# Patient Record
Sex: FEMALE | Race: WHITE | Employment: OTHER | URBAN - NONMETROPOLITAN AREA
[De-identification: names, ages, dates, MRNs, and addresses within clinical notes are randomized per-mention and may not be internally consistent; named-entity substitution may affect disease eponyms.]

---

## 2017-01-04 RX ORDER — GLUCOSAMINE HCL/CHONDROITIN SU 500-400 MG
CAPSULE ORAL
Qty: 100 STRIP | Refills: 3 | Status: SHIPPED | OUTPATIENT
Start: 2017-01-04 | End: 2018-05-11

## 2017-01-04 RX ORDER — BLOOD SUGAR DIAGNOSTIC
STRIP MISCELLANEOUS
Qty: 1 DEVICE | Refills: 0 | Status: CANCELLED | OUTPATIENT
Start: 2017-01-04

## 2017-01-19 ENCOUNTER — ANTI-COAG VISIT (OUTPATIENT)
Dept: OTHER | Age: 72
End: 2017-01-19

## 2017-01-19 VITALS
WEIGHT: 227.8 LBS | BODY MASS INDEX: 33.64 KG/M2 | SYSTOLIC BLOOD PRESSURE: 132 MMHG | DIASTOLIC BLOOD PRESSURE: 56 MMHG | HEART RATE: 47 BPM

## 2017-01-19 DIAGNOSIS — Z95.2 MITRAL VALVE REPLACED: ICD-10-CM

## 2017-01-19 LAB — POC INR: 2.9 (ref 0.8–1.2)

## 2017-01-19 PROCEDURE — 85610 PROTHROMBIN TIME: CPT

## 2017-01-19 PROCEDURE — 1036F TOBACCO NON-USER: CPT

## 2017-01-19 ASSESSMENT — ENCOUNTER SYMPTOMS
CONSTIPATION: 0
BLOOD IN STOOL: 0
SHORTNESS OF BREATH: 0
DIARRHEA: 0

## 2017-02-23 ENCOUNTER — ANTI-COAG VISIT (OUTPATIENT)
Dept: OTHER | Age: 72
End: 2017-02-23

## 2017-02-23 VITALS
SYSTOLIC BLOOD PRESSURE: 152 MMHG | HEART RATE: 48 BPM | WEIGHT: 233 LBS | DIASTOLIC BLOOD PRESSURE: 64 MMHG | BODY MASS INDEX: 34.41 KG/M2

## 2017-02-23 DIAGNOSIS — Z95.2 MITRAL VALVE REPLACED: ICD-10-CM

## 2017-02-23 LAB — POC INR: 3.3 (ref 0.8–1.2)

## 2017-02-23 PROCEDURE — G8427 DOCREV CUR MEDS BY ELIG CLIN: HCPCS | Performed by: NURSE PRACTITIONER

## 2017-02-23 PROCEDURE — 4040F PNEUMOC VAC/ADMIN/RCVD: CPT | Performed by: NURSE PRACTITIONER

## 2017-02-23 PROCEDURE — G8417 CALC BMI ABV UP PARAM F/U: HCPCS | Performed by: NURSE PRACTITIONER

## 2017-02-23 PROCEDURE — 3017F COLORECTAL CA SCREEN DOC REV: CPT | Performed by: NURSE PRACTITIONER

## 2017-02-23 PROCEDURE — 85610 PROTHROMBIN TIME: CPT

## 2017-02-23 PROCEDURE — 99999 PR OFFICE/OUTPT VISIT,PROCEDURE ONLY: CPT | Performed by: NURSE PRACTITIONER

## 2017-02-23 ASSESSMENT — ENCOUNTER SYMPTOMS
BLOOD IN STOOL: 0
DIARRHEA: 0
SHORTNESS OF BREATH: 0
CONSTIPATION: 0

## 2017-03-09 DIAGNOSIS — Z95.2 MITRAL VALVE REPLACED: Primary | ICD-10-CM

## 2017-03-10 RX ORDER — FUROSEMIDE 40 MG/1
40 TABLET ORAL DAILY
Qty: 30 TABLET | Refills: 3 | Status: SHIPPED | OUTPATIENT
Start: 2017-03-10 | End: 2017-07-05 | Stop reason: SDUPTHER

## 2017-03-10 RX ORDER — POTASSIUM CHLORIDE 20 MEQ/1
20 TABLET, EXTENDED RELEASE ORAL DAILY
COMMUNITY
End: 2017-03-10 | Stop reason: SDUPTHER

## 2017-03-10 RX ORDER — FUROSEMIDE 40 MG/1
40 TABLET ORAL DAILY
COMMUNITY
End: 2017-03-10 | Stop reason: SDUPTHER

## 2017-03-10 RX ORDER — POTASSIUM CHLORIDE 20 MEQ/1
20 TABLET, EXTENDED RELEASE ORAL DAILY
Qty: 30 TABLET | Refills: 3 | Status: SHIPPED | OUTPATIENT
Start: 2017-03-10 | End: 2017-03-23

## 2017-03-13 ENCOUNTER — OFFICE VISIT (OUTPATIENT)
Dept: CARDIOLOGY | Age: 72
End: 2017-03-13

## 2017-03-13 VITALS
BODY MASS INDEX: 33.62 KG/M2 | HEIGHT: 69 IN | SYSTOLIC BLOOD PRESSURE: 132 MMHG | DIASTOLIC BLOOD PRESSURE: 70 MMHG | HEART RATE: 60 BPM | WEIGHT: 227 LBS

## 2017-03-13 DIAGNOSIS — I48.21 PERMANENT ATRIAL FIBRILLATION (HCC): Primary | ICD-10-CM

## 2017-03-13 DIAGNOSIS — Z95.2 S/P MVR (MITRAL VALVE REPLACEMENT): ICD-10-CM

## 2017-03-13 DIAGNOSIS — I10 ESSENTIAL HYPERTENSION: ICD-10-CM

## 2017-03-13 DIAGNOSIS — I50.32 CHRONIC DIASTOLIC (CONGESTIVE) HEART FAILURE (HCC): ICD-10-CM

## 2017-03-13 PROCEDURE — G8417 CALC BMI ABV UP PARAM F/U: HCPCS | Performed by: PHYSICIAN ASSISTANT

## 2017-03-13 PROCEDURE — G8482 FLU IMMUNIZE ORDER/ADMIN: HCPCS | Performed by: PHYSICIAN ASSISTANT

## 2017-03-13 PROCEDURE — G8427 DOCREV CUR MEDS BY ELIG CLIN: HCPCS | Performed by: PHYSICIAN ASSISTANT

## 2017-03-13 PROCEDURE — 99213 OFFICE O/P EST LOW 20 MIN: CPT | Performed by: PHYSICIAN ASSISTANT

## 2017-03-13 PROCEDURE — 1090F PRES/ABSN URINE INCON ASSESS: CPT | Performed by: PHYSICIAN ASSISTANT

## 2017-03-13 PROCEDURE — G8399 PT W/DXA RESULTS DOCUMENT: HCPCS | Performed by: PHYSICIAN ASSISTANT

## 2017-03-13 PROCEDURE — 1123F ACP DISCUSS/DSCN MKR DOCD: CPT | Performed by: PHYSICIAN ASSISTANT

## 2017-03-13 PROCEDURE — 3014F SCREEN MAMMO DOC REV: CPT | Performed by: PHYSICIAN ASSISTANT

## 2017-03-13 PROCEDURE — 1036F TOBACCO NON-USER: CPT | Performed by: PHYSICIAN ASSISTANT

## 2017-03-13 PROCEDURE — 3017F COLORECTAL CA SCREEN DOC REV: CPT | Performed by: PHYSICIAN ASSISTANT

## 2017-03-13 PROCEDURE — 4040F PNEUMOC VAC/ADMIN/RCVD: CPT | Performed by: PHYSICIAN ASSISTANT

## 2017-03-21 ENCOUNTER — OFFICE VISIT (OUTPATIENT)
Dept: CARDIOLOGY | Age: 72
End: 2017-03-21

## 2017-03-21 VITALS
BODY MASS INDEX: 32.73 KG/M2 | DIASTOLIC BLOOD PRESSURE: 72 MMHG | HEART RATE: 86 BPM | HEIGHT: 69 IN | WEIGHT: 221 LBS | SYSTOLIC BLOOD PRESSURE: 162 MMHG

## 2017-03-21 DIAGNOSIS — Z95.2 MITRAL VALVE REPLACED: Primary | ICD-10-CM

## 2017-03-21 DIAGNOSIS — Z86.79 HISTORY OF VALVULAR HEART DISEASE: ICD-10-CM

## 2017-03-21 PROCEDURE — G8482 FLU IMMUNIZE ORDER/ADMIN: HCPCS | Performed by: INTERNAL MEDICINE

## 2017-03-21 PROCEDURE — 4040F PNEUMOC VAC/ADMIN/RCVD: CPT | Performed by: INTERNAL MEDICINE

## 2017-03-21 PROCEDURE — 1123F ACP DISCUSS/DSCN MKR DOCD: CPT | Performed by: INTERNAL MEDICINE

## 2017-03-21 PROCEDURE — G8399 PT W/DXA RESULTS DOCUMENT: HCPCS | Performed by: INTERNAL MEDICINE

## 2017-03-21 PROCEDURE — 93000 ELECTROCARDIOGRAM COMPLETE: CPT | Performed by: INTERNAL MEDICINE

## 2017-03-21 PROCEDURE — 3017F COLORECTAL CA SCREEN DOC REV: CPT | Performed by: INTERNAL MEDICINE

## 2017-03-21 PROCEDURE — 1090F PRES/ABSN URINE INCON ASSESS: CPT | Performed by: INTERNAL MEDICINE

## 2017-03-21 PROCEDURE — G8417 CALC BMI ABV UP PARAM F/U: HCPCS | Performed by: INTERNAL MEDICINE

## 2017-03-21 PROCEDURE — 99214 OFFICE O/P EST MOD 30 MIN: CPT | Performed by: INTERNAL MEDICINE

## 2017-03-21 PROCEDURE — G8428 CUR MEDS NOT DOCUMENT: HCPCS | Performed by: INTERNAL MEDICINE

## 2017-03-21 PROCEDURE — 1036F TOBACCO NON-USER: CPT | Performed by: INTERNAL MEDICINE

## 2017-03-21 PROCEDURE — 3014F SCREEN MAMMO DOC REV: CPT | Performed by: INTERNAL MEDICINE

## 2017-03-21 ASSESSMENT — ENCOUNTER SYMPTOMS
SHORTNESS OF BREATH: 1
COUGH: 0
LEFT EYE: 0
WHEEZING: 0
BACK PAIN: 0
SORE THROAT: 0
VOMITING: 0
NAUSEA: 0
DIARRHEA: 0
DOUBLE VISION: 0
CONSTIPATION: 0
BLURRED VISION: 0
RIGHT EYE: 0
ABDOMINAL PAIN: 0

## 2017-03-23 ENCOUNTER — ANTI-COAG VISIT (OUTPATIENT)
Dept: OTHER | Age: 72
End: 2017-03-23

## 2017-03-23 VITALS
WEIGHT: 220.4 LBS | SYSTOLIC BLOOD PRESSURE: 180 MMHG | HEART RATE: 60 BPM | DIASTOLIC BLOOD PRESSURE: 50 MMHG | BODY MASS INDEX: 32.55 KG/M2

## 2017-03-23 DIAGNOSIS — Z95.2 MITRAL VALVE REPLACED: ICD-10-CM

## 2017-03-23 LAB — POC INR: 2.5 (ref 0.8–1.2)

## 2017-03-23 ASSESSMENT — ENCOUNTER SYMPTOMS
BLOOD IN STOOL: 0
DIARRHEA: 0
SHORTNESS OF BREATH: 0
CONSTIPATION: 0

## 2017-04-25 ENCOUNTER — TELEPHONE (OUTPATIENT)
Dept: CARDIOLOGY | Age: 72
End: 2017-04-25

## 2017-04-25 ENCOUNTER — TELEPHONE (OUTPATIENT)
Dept: FAMILY MEDICINE CLINIC | Age: 72
End: 2017-04-25

## 2017-04-25 DIAGNOSIS — Z86.79 HISTORY OF VALVULAR HEART DISEASE: ICD-10-CM

## 2017-04-25 DIAGNOSIS — R00.1 BRADYCARDIA: Primary | ICD-10-CM

## 2017-04-25 DIAGNOSIS — E11.9 TYPE 2 DIABETES MELLITUS WITHOUT COMPLICATION, WITHOUT LONG-TERM CURRENT USE OF INSULIN (HCC): ICD-10-CM

## 2017-04-25 DIAGNOSIS — I10 ESSENTIAL HYPERTENSION: ICD-10-CM

## 2017-04-25 DIAGNOSIS — E78.5 HYPERLIPIDEMIA, UNSPECIFIED HYPERLIPIDEMIA TYPE: Primary | ICD-10-CM

## 2017-04-26 ENCOUNTER — TELEPHONE (OUTPATIENT)
Dept: OTHER | Age: 72
End: 2017-04-26

## 2017-04-27 ENCOUNTER — ANTI-COAG VISIT (OUTPATIENT)
Dept: OTHER | Age: 72
End: 2017-04-27

## 2017-04-27 VITALS
SYSTOLIC BLOOD PRESSURE: 162 MMHG | BODY MASS INDEX: 33.26 KG/M2 | HEART RATE: 56 BPM | WEIGHT: 225.2 LBS | DIASTOLIC BLOOD PRESSURE: 62 MMHG

## 2017-04-27 DIAGNOSIS — Z95.2 MITRAL VALVE REPLACED: ICD-10-CM

## 2017-04-27 LAB — POC INR: 2.2 (ref 0.8–1.2)

## 2017-04-27 ASSESSMENT — ENCOUNTER SYMPTOMS
SHORTNESS OF BREATH: 0
BLOOD IN STOOL: 0
DIARRHEA: 0
CONSTIPATION: 0

## 2017-05-01 ENCOUNTER — OFFICE VISIT (OUTPATIENT)
Dept: FAMILY MEDICINE CLINIC | Age: 72
End: 2017-05-01

## 2017-05-01 VITALS
SYSTOLIC BLOOD PRESSURE: 152 MMHG | WEIGHT: 223.2 LBS | HEIGHT: 69 IN | HEART RATE: 56 BPM | BODY MASS INDEX: 33.06 KG/M2 | DIASTOLIC BLOOD PRESSURE: 60 MMHG

## 2017-05-01 DIAGNOSIS — E11.9 TYPE 2 DIABETES MELLITUS WITHOUT COMPLICATION, WITHOUT LONG-TERM CURRENT USE OF INSULIN (HCC): Primary | ICD-10-CM

## 2017-05-01 DIAGNOSIS — Z95.2 MITRAL VALVE REPLACED: ICD-10-CM

## 2017-05-01 DIAGNOSIS — I10 ESSENTIAL HYPERTENSION: ICD-10-CM

## 2017-05-01 DIAGNOSIS — L98.9 SKIN LESION OF LEFT ARM: ICD-10-CM

## 2017-05-01 DIAGNOSIS — I25.2 MI, OLD: ICD-10-CM

## 2017-05-01 PROCEDURE — 3014F SCREEN MAMMO DOC REV: CPT | Performed by: FAMILY MEDICINE

## 2017-05-01 PROCEDURE — G8417 CALC BMI ABV UP PARAM F/U: HCPCS | Performed by: FAMILY MEDICINE

## 2017-05-01 PROCEDURE — 4040F PNEUMOC VAC/ADMIN/RCVD: CPT | Performed by: FAMILY MEDICINE

## 2017-05-01 PROCEDURE — 1123F ACP DISCUSS/DSCN MKR DOCD: CPT | Performed by: FAMILY MEDICINE

## 2017-05-01 PROCEDURE — G8399 PT W/DXA RESULTS DOCUMENT: HCPCS | Performed by: FAMILY MEDICINE

## 2017-05-01 PROCEDURE — 3017F COLORECTAL CA SCREEN DOC REV: CPT | Performed by: FAMILY MEDICINE

## 2017-05-01 PROCEDURE — 3044F HG A1C LEVEL LT 7.0%: CPT | Performed by: FAMILY MEDICINE

## 2017-05-01 PROCEDURE — 99214 OFFICE O/P EST MOD 30 MIN: CPT | Performed by: FAMILY MEDICINE

## 2017-05-01 PROCEDURE — 1090F PRES/ABSN URINE INCON ASSESS: CPT | Performed by: FAMILY MEDICINE

## 2017-05-01 PROCEDURE — 1036F TOBACCO NON-USER: CPT | Performed by: FAMILY MEDICINE

## 2017-05-01 PROCEDURE — G8427 DOCREV CUR MEDS BY ELIG CLIN: HCPCS | Performed by: FAMILY MEDICINE

## 2017-05-01 RX ORDER — VALSARTAN 320 MG/1
320 TABLET ORAL DAILY
Qty: 90 TABLET | Refills: 3 | Status: SHIPPED | OUTPATIENT
Start: 2017-05-01 | End: 2018-04-24 | Stop reason: SDUPTHER

## 2017-05-01 RX ORDER — GLIMEPIRIDE 2 MG/1
TABLET ORAL
Qty: 180 TABLET | Refills: 3 | Status: SHIPPED | OUTPATIENT
Start: 2017-05-01 | End: 2017-11-15 | Stop reason: DRUGHIGH

## 2017-05-01 RX ORDER — NITROGLYCERIN 400 UG/1
SPRAY ORAL
Qty: 1 BOTTLE | Refills: 0 | Status: SHIPPED | OUTPATIENT
Start: 2017-05-01 | End: 2018-07-03 | Stop reason: SDUPTHER

## 2017-05-01 RX ORDER — WARFARIN SODIUM 1 MG/1
1 TABLET ORAL
COMMUNITY
End: 2017-05-01 | Stop reason: SDUPTHER

## 2017-05-01 RX ORDER — WARFARIN SODIUM 1 MG/1
TABLET ORAL
Qty: 180 TABLET | Refills: 3 | Status: SHIPPED | OUTPATIENT
Start: 2017-05-01 | End: 2018-04-24 | Stop reason: SDUPTHER

## 2017-05-01 ASSESSMENT — ENCOUNTER SYMPTOMS
EYES NEGATIVE: 1
BLOOD IN STOOL: 0
DIARRHEA: 0
VOMITING: 0
ABDOMINAL PAIN: 0
SHORTNESS OF BREATH: 0
NAUSEA: 0

## 2017-05-15 ENCOUNTER — TELEPHONE (OUTPATIENT)
Dept: CARDIOLOGY | Age: 72
End: 2017-05-15

## 2017-05-15 ENCOUNTER — TELEPHONE (OUTPATIENT)
Dept: OTHER | Age: 72
End: 2017-05-15

## 2017-05-22 ENCOUNTER — TELEPHONE (OUTPATIENT)
Dept: CARDIOLOGY | Age: 72
End: 2017-05-22

## 2017-05-23 ENCOUNTER — PROCEDURE VISIT (OUTPATIENT)
Dept: CARDIOLOGY | Age: 72
End: 2017-05-23

## 2017-05-23 DIAGNOSIS — Z95.0 PACEMAKER: ICD-10-CM

## 2017-05-23 PROCEDURE — 93280 PM DEVICE PROGR EVAL DUAL: CPT | Performed by: INTERNAL MEDICINE

## 2017-05-23 RX ORDER — PREDNISONE 10 MG/1
TABLET ORAL
Qty: 8 TABLET | Refills: 0 | Status: SHIPPED | OUTPATIENT
Start: 2017-05-23 | End: 2017-06-08

## 2017-05-25 ENCOUNTER — TELEPHONE (OUTPATIENT)
Dept: CARDIOLOGY | Age: 72
End: 2017-05-25

## 2017-05-25 ENCOUNTER — ANTI-COAG VISIT (OUTPATIENT)
Dept: OTHER | Age: 72
End: 2017-05-25

## 2017-05-25 VITALS
DIASTOLIC BLOOD PRESSURE: 64 MMHG | WEIGHT: 221 LBS | SYSTOLIC BLOOD PRESSURE: 178 MMHG | HEART RATE: 60 BPM | BODY MASS INDEX: 32.64 KG/M2

## 2017-05-25 DIAGNOSIS — Z95.2 MITRAL VALVE REPLACED: ICD-10-CM

## 2017-05-25 ASSESSMENT — ENCOUNTER SYMPTOMS
DIARRHEA: 0
CONSTIPATION: 0
SHORTNESS OF BREATH: 1
BLOOD IN STOOL: 0

## 2017-05-30 ENCOUNTER — TELEPHONE (OUTPATIENT)
Dept: CARDIOLOGY | Age: 72
End: 2017-05-30

## 2017-06-08 ENCOUNTER — ANTI-COAG VISIT (OUTPATIENT)
Dept: OTHER | Age: 72
End: 2017-06-08

## 2017-06-08 VITALS
HEART RATE: 72 BPM | BODY MASS INDEX: 32.55 KG/M2 | SYSTOLIC BLOOD PRESSURE: 158 MMHG | WEIGHT: 220.4 LBS | DIASTOLIC BLOOD PRESSURE: 65 MMHG

## 2017-06-08 DIAGNOSIS — Z95.2 MITRAL VALVE REPLACED: ICD-10-CM

## 2017-06-08 LAB — POC INR: 2.4 (ref 0.8–1.2)

## 2017-06-08 ASSESSMENT — ENCOUNTER SYMPTOMS
DIARRHEA: 0
BLOOD IN STOOL: 0
CONSTIPATION: 0
SHORTNESS OF BREATH: 0

## 2017-06-22 ENCOUNTER — ANTI-COAG VISIT (OUTPATIENT)
Dept: OTHER | Age: 72
End: 2017-06-22

## 2017-06-22 VITALS
WEIGHT: 223 LBS | DIASTOLIC BLOOD PRESSURE: 70 MMHG | HEART RATE: 83 BPM | SYSTOLIC BLOOD PRESSURE: 150 MMHG | BODY MASS INDEX: 32.93 KG/M2

## 2017-06-22 DIAGNOSIS — Z95.2 MITRAL VALVE REPLACED: ICD-10-CM

## 2017-06-22 LAB — POC INR: 2.3 (ref 0.8–1.2)

## 2017-06-22 ASSESSMENT — ENCOUNTER SYMPTOMS
SHORTNESS OF BREATH: 0
DIARRHEA: 0
BLOOD IN STOOL: 0
CONSTIPATION: 0

## 2017-07-05 RX ORDER — FUROSEMIDE 40 MG/1
40 TABLET ORAL DAILY
Qty: 90 TABLET | Refills: 0 | Status: SHIPPED | OUTPATIENT
Start: 2017-07-05 | End: 2017-09-14 | Stop reason: SDUPTHER

## 2017-07-10 ENCOUNTER — ANTI-COAG VISIT (OUTPATIENT)
Dept: OTHER | Age: 72
End: 2017-07-10

## 2017-07-10 ENCOUNTER — TELEPHONE (OUTPATIENT)
Dept: FAMILY MEDICINE CLINIC | Age: 72
End: 2017-07-10

## 2017-07-10 VITALS
BODY MASS INDEX: 32.49 KG/M2 | WEIGHT: 220 LBS | DIASTOLIC BLOOD PRESSURE: 68 MMHG | HEART RATE: 83 BPM | SYSTOLIC BLOOD PRESSURE: 155 MMHG

## 2017-07-10 DIAGNOSIS — Z95.2 MITRAL VALVE REPLACED: ICD-10-CM

## 2017-07-10 LAB
HCT VFR BLD CALC: 34.3 % (ref 37–47)
HEMOGLOBIN: 11.5 GM/DL (ref 12–16)
POC INR: 2.7 (ref 0.8–1.2)

## 2017-07-10 ASSESSMENT — ENCOUNTER SYMPTOMS
DIARRHEA: 0
SHORTNESS OF BREATH: 0
BLOOD IN STOOL: 1
CONSTIPATION: 0

## 2017-07-13 ENCOUNTER — TELEPHONE (OUTPATIENT)
Dept: FAMILY MEDICINE CLINIC | Age: 72
End: 2017-07-13

## 2017-07-13 DIAGNOSIS — K92.1 BLACK TARRY STOOLS: Primary | ICD-10-CM

## 2017-07-20 ENCOUNTER — TELEPHONE (OUTPATIENT)
Dept: FAMILY MEDICINE CLINIC | Age: 72
End: 2017-07-20

## 2017-07-20 DIAGNOSIS — R19.5 POSITIVE OCCULT STOOL BLOOD TEST: ICD-10-CM

## 2017-07-20 DIAGNOSIS — K92.1 BLACK TARRY STOOLS: ICD-10-CM

## 2017-07-20 DIAGNOSIS — Z12.11 SCREEN FOR COLON CANCER: Primary | ICD-10-CM

## 2017-07-20 DIAGNOSIS — R53.1 WEAKNESS: Primary | ICD-10-CM

## 2017-07-20 LAB
HEMOCCULT STL QL: POSITIVE

## 2017-07-20 PROCEDURE — 82270 OCCULT BLOOD FECES: CPT | Performed by: FAMILY MEDICINE

## 2017-07-21 ENCOUNTER — HOSPITAL ENCOUNTER (OUTPATIENT)
Age: 72
Discharge: HOME | End: 2017-07-21
Payer: MEDICARE

## 2017-07-21 DIAGNOSIS — R53.1 WEAKNESS: ICD-10-CM

## 2017-07-21 DIAGNOSIS — R19.5 POSITIVE OCCULT STOOL BLOOD TEST: ICD-10-CM

## 2017-07-21 DIAGNOSIS — K92.1 BLACK TARRY STOOLS: ICD-10-CM

## 2017-07-21 LAB
ANION GAP SERPL CALCULATED.3IONS-SCNC: 16 MEQ/L (ref 8–16)
BUN BLDV-MCNC: 30 MG/DL (ref 7–22)
CALCIUM SERPL-MCNC: 9.9 MG/DL (ref 8.5–10.5)
CHLORIDE BLD-SCNC: 104 MEQ/L (ref 98–111)
CO2: 21 MEQ/L (ref 23–33)
CREAT SERPL-MCNC: 1.2 MG/DL (ref 0.4–1.2)
FERRITIN: 18 NG/ML (ref 10–291)
GFR SERPL CREATININE-BSD FRML MDRD: 44 ML/MIN/1.73M2
GLUCOSE BLD-MCNC: 138 MG/DL (ref 70–108)
HCT VFR BLD CALC: 30.6 % (ref 37–47)
HEMOGLOBIN: 10.2 GM/DL (ref 12–16)
IRON: 34 UG/DL (ref 50–170)
MCH RBC QN AUTO: 29.5 PG (ref 27–31)
MCHC RBC AUTO-ENTMCNC: 33.4 GM/DL (ref 33–37)
MCV RBC AUTO: 88.1 FL (ref 81–99)
PDW BLD-RTO: 15.4 % (ref 11.5–14.5)
PLATELET # BLD: 256 THOU/MM3 (ref 130–400)
PMV BLD AUTO: 8.7 MCM (ref 7.4–10.4)
POTASSIUM SERPL-SCNC: 4.7 MEQ/L (ref 3.5–5.2)
RBC # BLD: 3.47 MILL/MM3 (ref 4.2–5.4)
SODIUM BLD-SCNC: 141 MEQ/L (ref 135–145)
TSH SERPL DL<=0.05 MIU/L-ACNC: 2.89 UIU/ML (ref 0.4–4.2)
VITAMIN B-12: 273 PG/ML (ref 211–911)
WBC # BLD: 6.4 THOU/MM3 (ref 4.8–10.8)

## 2017-07-21 PROCEDURE — 36415 COLL VENOUS BLD VENIPUNCTURE: CPT

## 2017-07-21 PROCEDURE — 84443 ASSAY THYROID STIM HORMONE: CPT

## 2017-07-21 PROCEDURE — 85027 COMPLETE CBC AUTOMATED: CPT

## 2017-07-21 PROCEDURE — 82607 VITAMIN B-12: CPT

## 2017-07-21 PROCEDURE — 82728 ASSAY OF FERRITIN: CPT

## 2017-07-21 PROCEDURE — 80048 BASIC METABOLIC PNL TOTAL CA: CPT

## 2017-07-21 PROCEDURE — 83540 ASSAY OF IRON: CPT

## 2017-07-24 ENCOUNTER — TELEPHONE (OUTPATIENT)
Dept: FAMILY MEDICINE CLINIC | Age: 72
End: 2017-07-24

## 2017-07-24 DIAGNOSIS — R79.9 ELEVATED BUN: Primary | ICD-10-CM

## 2017-07-31 ENCOUNTER — HOSPITAL ENCOUNTER (OUTPATIENT)
Dept: PHARMACY | Age: 72
Setting detail: THERAPIES SERIES
Discharge: HOME | End: 2017-07-31
Payer: MEDICARE

## 2017-07-31 VITALS
BODY MASS INDEX: 32.84 KG/M2 | HEART RATE: 83 BPM | WEIGHT: 222.4 LBS | DIASTOLIC BLOOD PRESSURE: 68 MMHG | SYSTOLIC BLOOD PRESSURE: 157 MMHG

## 2017-07-31 DIAGNOSIS — Z95.2 MITRAL VALVE REPLACED: ICD-10-CM

## 2017-07-31 LAB — POC INR: 2.8 (ref 0.8–1.2)

## 2017-07-31 PROCEDURE — 36416 COLLJ CAPILLARY BLOOD SPEC: CPT

## 2017-07-31 PROCEDURE — 99211 OFF/OP EST MAY X REQ PHY/QHP: CPT

## 2017-08-01 ENCOUNTER — TELEPHONE (OUTPATIENT)
Dept: PHARMACY | Age: 72
End: 2017-08-01

## 2017-08-08 DIAGNOSIS — I10 ESSENTIAL HYPERTENSION: ICD-10-CM

## 2017-08-08 RX ORDER — HYDRALAZINE HYDROCHLORIDE 50 MG/1
TABLET, FILM COATED ORAL
Qty: 270 TABLET | Refills: 3 | Status: SHIPPED | OUTPATIENT
Start: 2017-08-08 | End: 2018-08-05 | Stop reason: SDUPTHER

## 2017-08-11 ENCOUNTER — OFFICE VISIT (OUTPATIENT)
Dept: PULMONOLOGY | Age: 72
End: 2017-08-11
Payer: MEDICARE

## 2017-08-11 VITALS
OXYGEN SATURATION: 98 % | DIASTOLIC BLOOD PRESSURE: 64 MMHG | HEART RATE: 70 BPM | BODY MASS INDEX: 33.18 KG/M2 | HEIGHT: 69 IN | SYSTOLIC BLOOD PRESSURE: 126 MMHG | WEIGHT: 224 LBS

## 2017-08-11 DIAGNOSIS — Z99.89 OSA ON CPAP: Primary | ICD-10-CM

## 2017-08-11 DIAGNOSIS — J30.9 ALLERGIC RHINITIS, UNSPECIFIED ALLERGIC RHINITIS TRIGGER, UNSPECIFIED RHINITIS SEASONALITY: ICD-10-CM

## 2017-08-11 DIAGNOSIS — G47.33 OSA ON CPAP: Primary | ICD-10-CM

## 2017-08-11 PROCEDURE — 1090F PRES/ABSN URINE INCON ASSESS: CPT | Performed by: INTERNAL MEDICINE

## 2017-08-11 PROCEDURE — 1123F ACP DISCUSS/DSCN MKR DOCD: CPT | Performed by: INTERNAL MEDICINE

## 2017-08-11 PROCEDURE — G8399 PT W/DXA RESULTS DOCUMENT: HCPCS | Performed by: INTERNAL MEDICINE

## 2017-08-11 PROCEDURE — 99214 OFFICE O/P EST MOD 30 MIN: CPT | Performed by: INTERNAL MEDICINE

## 2017-08-11 PROCEDURE — 1036F TOBACCO NON-USER: CPT | Performed by: INTERNAL MEDICINE

## 2017-08-11 PROCEDURE — G8427 DOCREV CUR MEDS BY ELIG CLIN: HCPCS | Performed by: INTERNAL MEDICINE

## 2017-08-11 PROCEDURE — 3014F SCREEN MAMMO DOC REV: CPT | Performed by: INTERNAL MEDICINE

## 2017-08-11 PROCEDURE — 4040F PNEUMOC VAC/ADMIN/RCVD: CPT | Performed by: INTERNAL MEDICINE

## 2017-08-11 PROCEDURE — 3017F COLORECTAL CA SCREEN DOC REV: CPT | Performed by: INTERNAL MEDICINE

## 2017-08-11 PROCEDURE — G8417 CALC BMI ABV UP PARAM F/U: HCPCS | Performed by: INTERNAL MEDICINE

## 2017-08-11 RX ORDER — FLUTICASONE PROPIONATE 50 MCG
2 SPRAY, SUSPENSION (ML) NASAL DAILY
Qty: 1 BOTTLE | Refills: 6 | Status: SHIPPED | OUTPATIENT
Start: 2017-08-11 | End: 2022-10-18

## 2017-08-21 ENCOUNTER — NURSE ONLY (OUTPATIENT)
Dept: CARDIOLOGY CLINIC | Age: 72
End: 2017-08-21
Payer: MEDICARE

## 2017-08-21 DIAGNOSIS — Z95.0 PACEMAKER: Primary | ICD-10-CM

## 2017-08-21 PROCEDURE — 93280 PM DEVICE PROGR EVAL DUAL: CPT | Performed by: INTERNAL MEDICINE

## 2017-08-22 ENCOUNTER — ANESTHESIA (OUTPATIENT)
Dept: ENDOSCOPY | Age: 72
End: 2017-08-22
Payer: MEDICARE

## 2017-08-22 ENCOUNTER — ANESTHESIA EVENT (OUTPATIENT)
Dept: ENDOSCOPY | Age: 72
End: 2017-08-22
Payer: MEDICARE

## 2017-08-22 ENCOUNTER — HOSPITAL ENCOUNTER (OUTPATIENT)
Age: 72
Setting detail: OUTPATIENT SURGERY
Discharge: HOME | End: 2017-08-22
Attending: INTERNAL MEDICINE | Admitting: INTERNAL MEDICINE
Payer: MEDICARE

## 2017-08-22 VITALS — SYSTOLIC BLOOD PRESSURE: 134 MMHG | DIASTOLIC BLOOD PRESSURE: 54 MMHG | OXYGEN SATURATION: 97 %

## 2017-08-22 VITALS
SYSTOLIC BLOOD PRESSURE: 170 MMHG | RESPIRATION RATE: 16 BRPM | OXYGEN SATURATION: 98 % | TEMPERATURE: 96.8 F | WEIGHT: 218 LBS | DIASTOLIC BLOOD PRESSURE: 63 MMHG | BODY MASS INDEX: 32.29 KG/M2 | HEIGHT: 69 IN | HEART RATE: 63 BPM

## 2017-08-22 PROCEDURE — 3609012400 HC EGD TRANSORAL BIOPSY SINGLE/MULTIPLE: Performed by: INTERNAL MEDICINE

## 2017-08-22 PROCEDURE — 3700000000 HC ANESTHESIA ATTENDED CARE: Performed by: INTERNAL MEDICINE

## 2017-08-22 PROCEDURE — 3700000001 HC ADD 15 MINUTES (ANESTHESIA): Performed by: INTERNAL MEDICINE

## 2017-08-22 PROCEDURE — 6360000002 HC RX W HCPCS

## 2017-08-22 PROCEDURE — 2580000003 HC RX 258: Performed by: INTERNAL MEDICINE

## 2017-08-22 PROCEDURE — 6360000002 HC RX W HCPCS: Performed by: INTERNAL MEDICINE

## 2017-08-22 PROCEDURE — 88305 TISSUE EXAM BY PATHOLOGIST: CPT

## 2017-08-22 PROCEDURE — 6360000002 HC RX W HCPCS: Performed by: NURSE ANESTHETIST, CERTIFIED REGISTERED

## 2017-08-22 PROCEDURE — 2500000003 HC RX 250 WO HCPCS: Performed by: NURSE ANESTHETIST, CERTIFIED REGISTERED

## 2017-08-22 PROCEDURE — 7100000000 HC PACU RECOVERY - FIRST 15 MIN: Performed by: INTERNAL MEDICINE

## 2017-08-22 PROCEDURE — 3609027000 HC COLONOSCOPY: Performed by: INTERNAL MEDICINE

## 2017-08-22 RX ORDER — SODIUM CHLORIDE 450 MG/100ML
INJECTION, SOLUTION INTRAVENOUS CONTINUOUS
Status: DISCONTINUED | OUTPATIENT
Start: 2017-08-22 | End: 2017-08-24 | Stop reason: HOSPADM

## 2017-08-22 RX ORDER — LIDOCAINE HYDROCHLORIDE 10 MG/ML
INJECTION, SOLUTION INFILTRATION; PERINEURAL PRN
Status: DISCONTINUED | OUTPATIENT
Start: 2017-08-22 | End: 2017-08-22 | Stop reason: SDUPTHER

## 2017-08-22 RX ORDER — PROPOFOL 10 MG/ML
INJECTION, EMULSION INTRAVENOUS PRN
Status: DISCONTINUED | OUTPATIENT
Start: 2017-08-22 | End: 2017-08-22 | Stop reason: SDUPTHER

## 2017-08-22 RX ADMIN — PROPOFOL 30 MG: 10 INJECTION, EMULSION INTRAVENOUS at 15:29

## 2017-08-22 RX ADMIN — SODIUM CHLORIDE: 4.5 INJECTION, SOLUTION INTRAVENOUS at 13:32

## 2017-08-22 RX ADMIN — LIDOCAINE HYDROCHLORIDE 50 MG: 10 INJECTION, SOLUTION INFILTRATION; PERINEURAL at 15:18

## 2017-08-22 RX ADMIN — PROPOFOL 30 MG: 10 INJECTION, EMULSION INTRAVENOUS at 15:19

## 2017-08-22 RX ADMIN — PROPOFOL 30 MG: 10 INJECTION, EMULSION INTRAVENOUS at 15:33

## 2017-08-22 RX ADMIN — PROPOFOL 70 MG: 10 INJECTION, EMULSION INTRAVENOUS at 15:18

## 2017-08-22 RX ADMIN — AMPICILLIN SODIUM 2 G: 2 INJECTION, POWDER, FOR SOLUTION INTRAMUSCULAR; INTRAVENOUS at 13:43

## 2017-08-22 RX ADMIN — PROPOFOL 30 MG: 10 INJECTION, EMULSION INTRAVENOUS at 15:23

## 2017-08-22 RX ADMIN — SODIUM CHLORIDE: 4.5 INJECTION, SOLUTION INTRAVENOUS at 15:12

## 2017-08-22 ASSESSMENT — ENCOUNTER SYMPTOMS: SHORTNESS OF BREATH: 1

## 2017-08-22 ASSESSMENT — PAIN - FUNCTIONAL ASSESSMENT: PAIN_FUNCTIONAL_ASSESSMENT: 0-10

## 2017-08-28 ENCOUNTER — HOSPITAL ENCOUNTER (OUTPATIENT)
Dept: PHARMACY | Age: 72
Setting detail: THERAPIES SERIES
Discharge: HOME | End: 2017-08-28
Payer: MEDICARE

## 2017-08-28 VITALS
SYSTOLIC BLOOD PRESSURE: 136 MMHG | WEIGHT: 218.8 LBS | DIASTOLIC BLOOD PRESSURE: 60 MMHG | BODY MASS INDEX: 32.31 KG/M2 | HEART RATE: 67 BPM

## 2017-08-28 DIAGNOSIS — Z95.2 MITRAL VALVE REPLACED: ICD-10-CM

## 2017-08-28 LAB — POC INR: 2.3 (ref 0.8–1.2)

## 2017-08-28 PROCEDURE — 85610 PROTHROMBIN TIME: CPT

## 2017-08-28 PROCEDURE — 36416 COLLJ CAPILLARY BLOOD SPEC: CPT

## 2017-08-28 PROCEDURE — 99211 OFF/OP EST MAY X REQ PHY/QHP: CPT

## 2017-08-28 RX ORDER — FAMOTIDINE 40 MG/1
40 TABLET, FILM COATED ORAL NIGHTLY PRN
COMMUNITY
End: 2020-08-03

## 2017-09-01 ENCOUNTER — HOSPITAL ENCOUNTER (OUTPATIENT)
Age: 72
Discharge: HOME | End: 2017-09-01
Payer: MEDICARE

## 2017-09-01 DIAGNOSIS — R79.9 ELEVATED BUN: ICD-10-CM

## 2017-09-01 LAB
BUN BLDV-MCNC: 22 MG/DL (ref 7–22)
CREAT SERPL-MCNC: 1.2 MG/DL (ref 0.4–1.2)
GFR SERPL CREATININE-BSD FRML MDRD: 44 ML/MIN/1.73M2

## 2017-09-01 PROCEDURE — 82565 ASSAY OF CREATININE: CPT

## 2017-09-01 PROCEDURE — 36415 COLL VENOUS BLD VENIPUNCTURE: CPT

## 2017-09-01 PROCEDURE — 84520 ASSAY OF UREA NITROGEN: CPT

## 2017-09-11 ENCOUNTER — HOSPITAL ENCOUNTER (OUTPATIENT)
Dept: PHARMACY | Age: 72
Setting detail: THERAPIES SERIES
Discharge: HOME | End: 2017-09-11
Payer: MEDICARE

## 2017-09-11 VITALS
BODY MASS INDEX: 32.64 KG/M2 | SYSTOLIC BLOOD PRESSURE: 152 MMHG | HEART RATE: 67 BPM | WEIGHT: 221 LBS | DIASTOLIC BLOOD PRESSURE: 66 MMHG

## 2017-09-11 DIAGNOSIS — Z95.2 MITRAL VALVE REPLACED: ICD-10-CM

## 2017-09-11 LAB — POC INR: 1.8 (ref 0.8–1.2)

## 2017-09-11 PROCEDURE — 36416 COLLJ CAPILLARY BLOOD SPEC: CPT

## 2017-09-11 PROCEDURE — 99211 OFF/OP EST MAY X REQ PHY/QHP: CPT

## 2017-09-11 PROCEDURE — 85610 PROTHROMBIN TIME: CPT

## 2017-09-11 RX ORDER — FERROUS SULFATE 325(65) MG
325 TABLET ORAL
COMMUNITY
End: 2018-04-24 | Stop reason: ALTCHOICE

## 2017-09-11 ASSESSMENT — ENCOUNTER SYMPTOMS
DIARRHEA: 0
CONSTIPATION: 0
BLOOD IN STOOL: 0

## 2017-09-14 ENCOUNTER — OFFICE VISIT (OUTPATIENT)
Dept: CARDIOLOGY CLINIC | Age: 72
End: 2017-09-14
Payer: MEDICARE

## 2017-09-14 VITALS
SYSTOLIC BLOOD PRESSURE: 148 MMHG | BODY MASS INDEX: 32.73 KG/M2 | HEIGHT: 69 IN | WEIGHT: 221 LBS | HEART RATE: 96 BPM | DIASTOLIC BLOOD PRESSURE: 60 MMHG

## 2017-09-14 DIAGNOSIS — I10 ESSENTIAL HYPERTENSION: ICD-10-CM

## 2017-09-14 DIAGNOSIS — R00.1 BRADYCARDIA: ICD-10-CM

## 2017-09-14 DIAGNOSIS — Z95.2 S/P MVR (MITRAL VALVE REPLACEMENT): Primary | ICD-10-CM

## 2017-09-14 PROCEDURE — 1036F TOBACCO NON-USER: CPT | Performed by: NUCLEAR MEDICINE

## 2017-09-14 PROCEDURE — 3014F SCREEN MAMMO DOC REV: CPT | Performed by: NUCLEAR MEDICINE

## 2017-09-14 PROCEDURE — 4040F PNEUMOC VAC/ADMIN/RCVD: CPT | Performed by: NUCLEAR MEDICINE

## 2017-09-14 PROCEDURE — 3017F COLORECTAL CA SCREEN DOC REV: CPT | Performed by: NUCLEAR MEDICINE

## 2017-09-14 PROCEDURE — 99213 OFFICE O/P EST LOW 20 MIN: CPT | Performed by: NUCLEAR MEDICINE

## 2017-09-14 PROCEDURE — 1090F PRES/ABSN URINE INCON ASSESS: CPT | Performed by: NUCLEAR MEDICINE

## 2017-09-14 PROCEDURE — G8399 PT W/DXA RESULTS DOCUMENT: HCPCS | Performed by: NUCLEAR MEDICINE

## 2017-09-14 PROCEDURE — G8417 CALC BMI ABV UP PARAM F/U: HCPCS | Performed by: NUCLEAR MEDICINE

## 2017-09-14 PROCEDURE — 1123F ACP DISCUSS/DSCN MKR DOCD: CPT | Performed by: NUCLEAR MEDICINE

## 2017-09-14 PROCEDURE — G8427 DOCREV CUR MEDS BY ELIG CLIN: HCPCS | Performed by: NUCLEAR MEDICINE

## 2017-09-14 RX ORDER — FUROSEMIDE 40 MG/1
40 TABLET ORAL DAILY
Qty: 90 TABLET | Refills: 3 | Status: SHIPPED | OUTPATIENT
Start: 2017-09-14 | End: 2018-09-20 | Stop reason: SDUPTHER

## 2017-09-18 ENCOUNTER — HOSPITAL ENCOUNTER (OUTPATIENT)
Dept: PHARMACY | Age: 72
Setting detail: THERAPIES SERIES
Discharge: HOME | End: 2017-09-18
Payer: MEDICARE

## 2017-09-18 VITALS
WEIGHT: 224.2 LBS | HEART RATE: 62 BPM | SYSTOLIC BLOOD PRESSURE: 154 MMHG | DIASTOLIC BLOOD PRESSURE: 57 MMHG | BODY MASS INDEX: 33.11 KG/M2

## 2017-09-18 DIAGNOSIS — Z95.2 MITRAL VALVE REPLACED: ICD-10-CM

## 2017-09-18 LAB — POC INR: 2.9 (ref 0.8–1.2)

## 2017-09-18 PROCEDURE — 99211 OFF/OP EST MAY X REQ PHY/QHP: CPT

## 2017-09-18 PROCEDURE — 36416 COLLJ CAPILLARY BLOOD SPEC: CPT

## 2017-09-18 PROCEDURE — 85610 PROTHROMBIN TIME: CPT

## 2017-10-02 ENCOUNTER — HOSPITAL ENCOUNTER (OUTPATIENT)
Dept: PHARMACY | Age: 72
Setting detail: THERAPIES SERIES
Discharge: HOME | End: 2017-10-02
Payer: MEDICARE

## 2017-10-02 VITALS
HEART RATE: 83 BPM | BODY MASS INDEX: 32.46 KG/M2 | DIASTOLIC BLOOD PRESSURE: 71 MMHG | WEIGHT: 219.8 LBS | SYSTOLIC BLOOD PRESSURE: 173 MMHG

## 2017-10-02 DIAGNOSIS — Z95.2 MITRAL VALVE REPLACED: ICD-10-CM

## 2017-10-02 LAB — POC INR: 2.4 (ref 0.8–1.2)

## 2017-10-02 PROCEDURE — 99211 OFF/OP EST MAY X REQ PHY/QHP: CPT

## 2017-10-02 PROCEDURE — 85610 PROTHROMBIN TIME: CPT

## 2017-10-02 PROCEDURE — 36416 COLLJ CAPILLARY BLOOD SPEC: CPT

## 2017-10-02 ASSESSMENT — ENCOUNTER SYMPTOMS
SHORTNESS OF BREATH: 0
DIARRHEA: 0
CONSTIPATION: 0
BLOOD IN STOOL: 0

## 2017-10-02 NOTE — PROGRESS NOTES
The Medication Management [facility]  Anticoagulation Clinic  [phone] (phone)           [phone] (fax)    Visit Date: 10/2/2017     Subjective:       Patient ID: Autumn Shah, 67 y.o., female    HPI  Patient seen in clinic for anticoagulation management for diagnoses of chronic atrial fib, MVR with a goal INR of 2.5-3.5. Last seen 2 weeks ago. States correct coumadin dose and tablet strength. Denies missed doses. Denies dietary changes. Review of Systems   Constitutional: Negative for activity change, appetite change and fatigue. HENT: Negative for nosebleeds. Respiratory: Negative for shortness of breath. Cardiovascular: Negative for chest pain and leg swelling. Gastrointestinal: Negative for blood in stool, constipation and diarrhea. Genitourinary: Negative for hematuria. Neurological: Negative for dizziness, weakness and light-headedness. Headaches:  off and on, usual.   Hematological: Does not bruise/bleed easily.        Objective:   Physical Exam   Vitals:    10/02/17 1032   BP: (!) 173/71   Pulse: 83       Physical Exam    Assessment:     Lab Results   Component Value Date    INR 2.40 (H) 10/02/2017    INR 2.90 (H) 09/18/2017    INR 1.80 (H) 09/11/2017    PROTIME 2.42 (H) 12/15/2011    PROTIME 1.97 (H) 12/01/2011    PROTIME 1.80 (H) 11/22/2011     INR subtherapeutic   Recent Labs      10/02/17   1032   INR  2.40*                           Plan:   POCT INR ordered and result reviewed with Ree Quinn. Order received and verified to take coumadin 3 mg po today, then continue Coumadin 3 mg po W, 2 mg po MTTHFSS. Recheck INR 2 weeks. Patient reminded to call the Anticoagulation Clinic with any signs or symptoms of bleeding or with any medication changes. Patient given instructions utilizing the teach back method. Discharged ambulatory in no apparent distress.

## 2017-10-02 NOTE — IP AVS SNAPSHOT
After Visit Summary             Ileana Correa   10/2/2017 10:30 AM   Anti-Coag    Description:  Female : 1945   Provider:  Ivan Peterson RN   Department:  [facility]              Your Follow-Up and Future Appointments         Below is a list of your follow-up and future appointments. This may not be a complete list as you may have made appointments directly with providers that we are not aware of or your providers may have made some for you. Please call your providers to confirm appointments. It is important to keep your appointments. Please bring your current insurance card, photo ID, co-pay, and all medication bottles to your appointment. If self-pay, payment is expected at the time of service. Your To-Do List     Future Appointments Provider Department Dept Phone    10/16/2017 10:30 AM Rakesh Milner RN [facility] [phone]    2018 11:15 AM [facility], [address] [place] [address]    Please arrive 15 minutes prior to appointment time, bring insurance card and photo ID. Please arrive 15 minutes prior to appointment time, bring insurance card and photo ID.    2018 9:30 AM SCHEDULE, [address] of [place] [address]    2018 10:30 AM Cale Conte MD [facility] [address] [place] [address] [phone]    Please arrive 15 minutes prior to appointment, bring photo ID and insurance card. Please arrive 15 minutes prior to appointment, bring photo ID and insurance card. Information from Your Visit        Department     Name Address Phone Fax    [facility] [address] [place] [address] [address]  [address] [place] [id number]      You Were Seen for:         Comments    Mitral valve replaced   [[id number]]         Anticoagulation Summary as of 10/2/2017              Today's INR 2.40!     Next INR check 10/16/2017      Vital Signs Obesity (BMI 35.0-39.9 without comorbidity)    SOB (shortness of breath)    Mitral valve replaced    Diabetes mellitus ([address]    Arrhythmia    MI, old    Cataract    Hyperlipidemia    Medtronic dual pacemaker    Symptomatic bradycardia    Anticoagulated on Coumadin    Essential hypertension    Nasal septal deviation    History of valvular heart disease    History of sinus bradycardia    Generalized headaches      Your Goals as of 10/2/2017              Today    9/18/17 9/14/17       Blood Pressure    Blood Pressure < 140/90   173/71  154/57  148/60       Exercise    Exercise 3x per week (30 min per time)              Result Component    LDL CALC < 100           HEMOGLOBIN A1C < 7.0             Immunizations as of 10/2/2017     Name Date    Influenza Virus Vaccine 9/24/2016, 11/24/2014, 11/14/2013, 11/1/2012, 1/1/2011    Influenza, High Dose 9/22/2016    Pneumococcal 13-valent Conjugate ([id number]) 4/23/2015    Pneumococcal Polysaccharide ([id number]) 1/1/2009    Zoster 6/30/2015      Preventive Care        Date Due    Tetanus Combination Vaccine (1 - Tdap) 6/12/1964    Pneumococcal Vaccines (two) for all adults aged 72 and over (2 of 2 - PPSV23) 4/23/2016    Yearly Flu Vaccine (1) 9/1/2017    Eye Exam By An Eye Doctor 9/28/2017    Hemoglobin A1C (Test For Long-Term Glucose Control) 4/27/2018    Urine Check For Kidney Problems 4/27/2018    Cholesterol Screening 4/27/2018    Diabetic Foot Exam 5/1/2018    Colon Cancer Stool Test 7/20/2018    Mammograms are recommended every 2 years for low/average risk patients aged 48 - 69, and every year for high risk patients per updated national guidelines. However these guidelines can be individualized by your provider. 12/27/2018            Dtime[employer] Signup           Our records indicate that you have an active [employer] account. You can view your After Visit Summary by going to [email or web address] and logging in with your

## 2017-10-10 DIAGNOSIS — E11.9 TYPE 2 DIABETES MELLITUS WITHOUT COMPLICATION, WITHOUT LONG-TERM CURRENT USE OF INSULIN (HCC): Primary | ICD-10-CM

## 2017-10-10 RX ORDER — METFORMIN HYDROCHLORIDE 500 MG/1
TABLET, EXTENDED RELEASE ORAL
Qty: 360 TABLET | Refills: 3 | Status: SHIPPED | OUTPATIENT
Start: 2017-10-10 | End: 2018-10-07 | Stop reason: SDUPTHER

## 2017-10-10 NOTE — TELEPHONE ENCOUNTER
Fax received from [address] for Emily Last asking for refill of the Metformin HCL ER Tabs 500 mg #360, directions 4 tabs q d. Filled last 12/30/16, last yearly visit 5/1/17.

## 2017-10-16 ENCOUNTER — HOSPITAL ENCOUNTER (OUTPATIENT)
Dept: PHARMACY | Age: 72
Setting detail: THERAPIES SERIES
Discharge: HOME | End: 2017-10-16
Payer: MEDICARE

## 2017-10-16 VITALS
SYSTOLIC BLOOD PRESSURE: 158 MMHG | BODY MASS INDEX: 32.61 KG/M2 | WEIGHT: 220.8 LBS | HEART RATE: 83 BPM | DIASTOLIC BLOOD PRESSURE: 71 MMHG

## 2017-10-16 DIAGNOSIS — Z95.2 MITRAL VALVE REPLACED: ICD-10-CM

## 2017-10-16 LAB — POC INR: 2.1 (ref 0.8–1.2)

## 2017-10-16 PROCEDURE — 99211 OFF/OP EST MAY X REQ PHY/QHP: CPT

## 2017-10-16 PROCEDURE — 36416 COLLJ CAPILLARY BLOOD SPEC: CPT

## 2017-10-16 PROCEDURE — 85610 PROTHROMBIN TIME: CPT

## 2017-10-16 ASSESSMENT — ENCOUNTER SYMPTOMS
SHORTNESS OF BREATH: 0
CONSTIPATION: 0
BLOOD IN STOOL: 0
DIARRHEA: 0

## 2017-10-16 NOTE — PROGRESS NOTES
The [address] [facility]  Anticoagulation Clinic  [phone] (phone)  [phone] (fax)    Subjective:      Patient ID: Maico Gallegos is a 67 y.o. female. HPI  Has diagnosis of  MVR, per Dr. Rajesh Schumacher referral - being seen for Warfarin monitoring (2 week visit). Correctly states dose/tablet strength. Denies missed dose. Denies diet/medication change. Review of Systems   Constitutional: Negative for activity change, appetite change and fatigue. HENT: Negative for nosebleeds. Respiratory: Negative for shortness of breath. Cardiovascular: Negative for chest pain and leg swelling. Gastrointestinal: Negative for blood in stool, constipation and diarrhea. Genitourinary: Negative for hematuria. Neurological: Positive for dizziness (at times, usual). Negative for weakness, light-headedness and headaches. Hematological: Does not bruise/bleed easily. Objective:   Physical Exam   Constitutional: She is oriented to person, place, and time. She appears well-developed and well-nourished. Cardiovascular: Normal rate. Pulmonary/Chest: Effort normal.   Neurological: She is alert and oriented to person, place, and time. Psychiatric: She has a normal mood and affect. Her behavior is normal.   Vitals reviewed. Assessment:      Lab Results   Component Value Date    INR 2.10 (H) 10/16/2017    INR 2.40 (H) 10/02/2017    INR 2.90 (H) 09/18/2017    PROTIME 2.42 (H) 12/15/2011    PROTIME 1.97 (H) 12/01/2011    PROTIME 1.80 (H) 11/22/2011     INR subtherapeutic - goal 2.5-3.5. Recent Labs      10/16/17   1032   INR  2.10*           Plan:    POCT INR ordered/reviewed. Increase Coumadin to 3 mg MWF, 2 mg TThSS PO (from 3 mg W, 2 mg MTThFSS). Recheck INR 2 weeks. (Report given - orders entered by HILDA Neil, PharmD.)  Patient reminded to call the Anticoagulation Clinic with any signs or symptoms of bleeding or with any medication changes.   Patient given instructions utilizing the teach back method. Discharged ambulatory in no apparent distress.

## 2017-10-30 ENCOUNTER — HOSPITAL ENCOUNTER (OUTPATIENT)
Dept: PHARMACY | Age: 72
Setting detail: THERAPIES SERIES
Discharge: HOME | End: 2017-10-30
Payer: MEDICARE

## 2017-10-30 VITALS
WEIGHT: 220.4 LBS | BODY MASS INDEX: 32.55 KG/M2 | DIASTOLIC BLOOD PRESSURE: 62 MMHG | SYSTOLIC BLOOD PRESSURE: 162 MMHG | HEART RATE: 64 BPM

## 2017-10-30 DIAGNOSIS — Z95.2 MITRAL VALVE REPLACED: ICD-10-CM

## 2017-10-30 LAB — POC INR: 4.1 (ref 0.8–1.2)

## 2017-10-30 PROCEDURE — 36416 COLLJ CAPILLARY BLOOD SPEC: CPT

## 2017-10-30 PROCEDURE — 85610 PROTHROMBIN TIME: CPT

## 2017-10-30 PROCEDURE — 99211 OFF/OP EST MAY X REQ PHY/QHP: CPT

## 2017-10-30 ASSESSMENT — ENCOUNTER SYMPTOMS
BLOOD IN STOOL: 0
CONSTIPATION: 0
DIARRHEA: 0
SHORTNESS OF BREATH: 0

## 2017-10-30 NOTE — PROGRESS NOTES
The Medication Management [facility]  Anticoagulation Clinic  [phone] (phone)           [phone] (fax)    Visit Date: 10/30/2017     Subjective:       Patient ID: Rachel Oscar, 67 y.o., female    HPI  Patient seen in clinic for anticoagulation management due to MVR and chronic A fib with a goal INR of 2.5-3.5. Patient last seen 2 week(s) ago. INR ordered and reviewed today. Patient denies any new Rx medications. Patient denies any OTC medications or products. Patient denies any missed doses. Patient denies change in diet. Patient denies change in tobacco/alcohol use. Patient denies upcoming surgeries. Review of Systems   Constitutional: Negative for activity change, appetite change and fatigue. HENT: Negative for nosebleeds. Respiratory: Negative for shortness of breath. Cardiovascular: Negative for chest pain and leg swelling. Gastrointestinal: Negative for blood in stool, constipation and diarrhea. Genitourinary: Negative for hematuria. Neurological: Negative for weakness, light-headedness and headaches. Hematological: Does not bruise/bleed easily.        Objective:   Physical Exam   Vitals:    10/30/17 1016   BP: (!) 162/62   Pulse: 64       Physical Exam    Assessment:      Lab Results   Component Value Date    INR 4.10 (H) 10/30/2017    INR 2.10 (H) 10/16/2017    INR 2.40 (H) 10/02/2017    PROTIME 2.42 (H) 12/15/2011    PROTIME 1.97 (H) 12/01/2011    PROTIME 1.80 (H) 11/22/2011     INR supratherapeutic   Recent Labs      10/30/17   1012   INR  4.10*                               Plan:      Coumadin 1.5mg today x1, then decrease Coumadin to 3mg MF, 2mg STWTHS. Recheck INR 2 weeks. Patient reminded to call the Anticoagulation Clinic with signs or symptoms of bleeding or with any medication changes. Patient given instructions utilizing the teach back method. Discharged ambulatory in no apparent distress.

## 2017-11-13 ENCOUNTER — HOSPITAL ENCOUNTER (OUTPATIENT)
Dept: PHARMACY | Age: 72
Setting detail: THERAPIES SERIES
Discharge: HOME | End: 2017-11-13
Payer: MEDICARE

## 2017-11-13 VITALS
DIASTOLIC BLOOD PRESSURE: 67 MMHG | SYSTOLIC BLOOD PRESSURE: 155 MMHG | WEIGHT: 216.2 LBS | BODY MASS INDEX: 31.93 KG/M2 | HEART RATE: 76 BPM

## 2017-11-13 DIAGNOSIS — Z95.2 MITRAL VALVE REPLACED: ICD-10-CM

## 2017-11-13 LAB — POC INR: 3.4 (ref 0.8–1.2)

## 2017-11-13 PROCEDURE — 99211 OFF/OP EST MAY X REQ PHY/QHP: CPT | Performed by: PHARMACIST

## 2017-11-13 PROCEDURE — 36416 COLLJ CAPILLARY BLOOD SPEC: CPT | Performed by: PHARMACIST

## 2017-11-13 PROCEDURE — 85610 PROTHROMBIN TIME: CPT | Performed by: PHARMACIST

## 2017-11-13 NOTE — PROGRESS NOTES
The Medication Management [facility]  Anticoagulation Clinic  [phone] (phone)           [phone] (fax)    Visit Date: 11/13/2017     Subjective:       Patient ID: Toño Warren, 67 y.o., female    HPI  Patient seen in clinic for anticoagulation management due to MVR and chronic A fib with a goal INR of 2.5-3.5. Patient last seen 2 week(s) ago. INR ordered and reviewed today. Patient denies any new Rx medications. Patient denies any OTC medications or products. Patient denies any missed doses. Patient denies change in diet. Patient denies change in tobacco/alcohol use. Patient denies upcoming surgeries. Review of Systems   Constitutional: Negative for activity change, appetite change and fatigue. HENT: Negative for nosebleeds. Respiratory: Negative for shortness of breath. Cardiovascular: Negative for chest pain and leg swelling. Gastrointestinal: Negative for blood in stool, constipation and diarrhea. Genitourinary: Negative for hematuria. Neurological: Negative for weakness, light-headedness and headaches. Hematological: Does not bruise/bleed easily.        Objective:   Physical Exam   Vitals:    11/13/17 1014   BP: (!) 155/67   Pulse: 76       Physical Exam    Assessment:      Lab Results   Component Value Date    INR 3.40 (H) 11/13/2017    INR 4.10 (H) 10/30/2017    INR 2.10 (H) 10/16/2017    PROTIME 2.42 (H) 12/15/2011    PROTIME 1.97 (H) 12/01/2011    PROTIME 1.80 (H) 11/22/2011     INR supratherapeutic   Recent Labs      11/13/17   1013   INR  3.40*                               Plan:      Continue Coumadin to 3mg MF, 2mg STWTHS. Recheck INR 3 weeks. Patient reminded to call the Anticoagulation Clinic with signs or symptoms of bleeding or with any medication changes. Patient given instructions utilizing the teach back method. Discharged ambulatory in no apparent distress.

## 2017-11-15 ENCOUNTER — HOSPITAL ENCOUNTER (OUTPATIENT)
Age: 72
Discharge: HOME | End: 2017-11-15
Payer: MEDICARE

## 2017-11-15 DIAGNOSIS — E11.9 TYPE 2 DIABETES MELLITUS WITHOUT COMPLICATION, WITHOUT LONG-TERM CURRENT USE OF INSULIN (HCC): ICD-10-CM

## 2017-11-15 LAB
AVERAGE GLUCOSE: 135 MG/DL (ref 70–126)
HBA1C MFR BLD: 6.5 % (ref 4.4–6.4)

## 2017-11-15 PROCEDURE — 36415 COLL VENOUS BLD VENIPUNCTURE: CPT

## 2017-11-15 PROCEDURE — 83036 HEMOGLOBIN GLYCOSYLATED A1C: CPT

## 2017-11-15 RX ORDER — GLIMEPIRIDE 4 MG/1
TABLET ORAL
Qty: 180 TABLET | Refills: 3 | Status: SHIPPED
Start: 2017-11-15 | End: 2018-04-24 | Stop reason: SDUPTHER

## 2017-11-15 NOTE — PROGRESS NOTES
Patient stated she increased Glimepiride 2mg 1 tablet BID to Glimepiride 4mg 1 tablet BID due to higher blood sugar reading. Med list updated.

## 2017-12-04 ENCOUNTER — HOSPITAL ENCOUNTER (OUTPATIENT)
Dept: PHARMACY | Age: 72
Setting detail: THERAPIES SERIES
Discharge: HOME | End: 2017-12-04
Payer: MEDICARE

## 2017-12-04 DIAGNOSIS — Z95.2 MITRAL VALVE REPLACED: ICD-10-CM

## 2017-12-04 LAB
HCT VFR BLD CALC: 38.6 % (ref 37–47)
HEMOGLOBIN: 12.4 GM/DL (ref 12–16)
INR BLD: 4.56 (ref 0.85–1.13)
INR BLD: 5.8

## 2017-12-04 PROCEDURE — 36415 COLL VENOUS BLD VENIPUNCTURE: CPT

## 2017-12-04 PROCEDURE — 85610 PROTHROMBIN TIME: CPT

## 2017-12-04 PROCEDURE — 36416 COLLJ CAPILLARY BLOOD SPEC: CPT

## 2017-12-04 PROCEDURE — 99211 OFF/OP EST MAY X REQ PHY/QHP: CPT

## 2017-12-04 ASSESSMENT — ENCOUNTER SYMPTOMS
COUGH: 1
DIARRHEA: 0
SHORTNESS OF BREATH: 1
CONSTIPATION: 0
BLOOD IN STOOL: 0

## 2017-12-04 NOTE — PROGRESS NOTES
reminded to call the Anticoagulation Clinic with any signs or symptoms of bleeding or with any medication changes. Patient given instructions utilizing the teach back method. Verified cell phone number; received permission to leave any new orders on voicemail. Discharged ambulatory in no apparent distress. Lab INR 4.56. Received new orders from [place]., PharmD., to take usual 2 mg dose 12/6, instead of 1 mg Coumadin (still hold today, 1 mg tomorrow); see in 3-7 days. Called patient - verified understanding with teach back method. Will keep 12/7 visit.

## 2017-12-07 ENCOUNTER — HOSPITAL ENCOUNTER (OUTPATIENT)
Dept: PHARMACY | Age: 72
Setting detail: THERAPIES SERIES
Discharge: HOME | End: 2017-12-07
Payer: MEDICARE

## 2017-12-07 DIAGNOSIS — Z95.2 MITRAL VALVE REPLACED: ICD-10-CM

## 2017-12-07 LAB — POC INR: 3 (ref 0.8–1.2)

## 2017-12-07 PROCEDURE — 36416 COLLJ CAPILLARY BLOOD SPEC: CPT

## 2017-12-07 PROCEDURE — 99211 OFF/OP EST MAY X REQ PHY/QHP: CPT

## 2017-12-07 PROCEDURE — 85610 PROTHROMBIN TIME: CPT

## 2017-12-07 ASSESSMENT — ENCOUNTER SYMPTOMS
CONSTIPATION: 0
BLOOD IN STOOL: 0
DIARRHEA: 0
SHORTNESS OF BREATH: 1

## 2017-12-07 NOTE — PROGRESS NOTES
The [address] [facility]  Anticoagulation Clinic  [phone] (phone)  [phone] (fax)    Subjective:      Patient ID: Abisai Howell is a 67 y.o. female. HPI  Has diagnosis of  MVR, per Dr. Carrington Templeton referral - being seen for Warfarin monitoring (3 day visit). Correctly states dose/tablet strength. Has been taking Coricidin past two weeks for head/chest cold, but feeling better today. Review of Systems   Constitutional: Positive for activity change (decreased) and appetite change (better than earlier this week). Negative for fatigue. HENT: Negative for nosebleeds. Respiratory: Positive for shortness of breath (usual). Cough: better. Cardiovascular: Negative for chest pain and leg swelling. Gastrointestinal: Negative for blood in stool, constipation and diarrhea. Genitourinary: Negative for hematuria. Neurological: Negative for dizziness, weakness, light-headedness and headaches. Hematological: Does not bruise/bleed easily. Objective:   Physical Exam   Constitutional: She is oriented to person, place, and time. She appears well-developed and well-nourished. Pulmonary/Chest: Effort normal.   Neurological: She is alert and oriented to person, place, and time. Skin: Skin is warm and dry. Psychiatric: She has a normal mood and affect. Her behavior is normal.       Assessment:      Lab Results   Component Value Date    INR 3.00 (H) 12/07/2017    INR 4.56 (H) 12/04/2017    INR 5.80 12/04/2017    PROTIME 2.42 (H) 12/15/2011    PROTIME 1.97 (H) 12/01/2011    PROTIME 1.80 (H) 11/22/2011     INR therapeutic - goal 2.5-3.5. Recent Labs      12/07/17   0856   INR  3.00*           Plan:    POCT INR ordered/performed/reviewed. 2 mg tomorrow, F, otherwise continue 3 mg MF, 2 mg TWThSS PO. Recheck INR in 7 days.  (Report given - orders entered by Guilherme Sanchez., PharmD.)  Patient reminded to call the Anticoagulation Clinic with any signs or symptoms

## 2017-12-12 ENCOUNTER — PROCEDURE VISIT (OUTPATIENT)
Dept: CARDIOLOGY CLINIC | Age: 72
End: 2017-12-12
Payer: MEDICARE

## 2017-12-12 DIAGNOSIS — Z95.0 PACEMAKER: Primary | ICD-10-CM

## 2017-12-12 PROCEDURE — 93296 REM INTERROG EVL PM/IDS: CPT | Performed by: INTERNAL MEDICINE

## 2017-12-12 PROCEDURE — 93294 REM INTERROG EVL PM/LDLS PM: CPT | Performed by: INTERNAL MEDICINE

## 2017-12-12 NOTE — PROGRESS NOTES
Emelina pt   7.5 years on device   At imped 456   At fib/coumadin  R waves 15.9  0.7% atrial paced 91.5% RVP

## 2017-12-14 ENCOUNTER — HOSPITAL ENCOUNTER (OUTPATIENT)
Dept: PHARMACY | Age: 72
Setting detail: THERAPIES SERIES
Discharge: HOME | End: 2017-12-14
Payer: MEDICARE

## 2017-12-14 DIAGNOSIS — Z95.2 MITRAL VALVE REPLACED: ICD-10-CM

## 2017-12-14 LAB — POC INR: 4.3 (ref 0.8–1.2)

## 2017-12-14 PROCEDURE — 99211 OFF/OP EST MAY X REQ PHY/QHP: CPT

## 2017-12-14 PROCEDURE — 85610 PROTHROMBIN TIME: CPT

## 2017-12-14 PROCEDURE — 36416 COLLJ CAPILLARY BLOOD SPEC: CPT

## 2017-12-14 ASSESSMENT — ENCOUNTER SYMPTOMS
SHORTNESS OF BREATH: 1
COUGH: 0
DIARRHEA: 0
CONSTIPATION: 0
BLOOD IN STOOL: 0

## 2017-12-14 NOTE — PROGRESS NOTES
The [address] [facility]  Anticoagulation Clinic  [phone] (phone)  [phone] (fax)    Subjective:      Patient ID: Edna Chao is a 67 y.o. female. HPI  Has diagnosis of  MVR, per Dr. Shahid Day referral - being seen for Warfarin monitoring (7 day visit). Correctly states tablet strength. Follows printed instructions for dose. Has been taking Coricidin past 3 weeks for head/chest cold. Review of Systems   Constitutional: Positive for appetite change (slowly improving). Negative for activity change and fatigue. HENT: Negative for nosebleeds. Respiratory: Positive for shortness of breath (usual). Negative for cough. Cardiovascular: Negative for chest pain and leg swelling. Gastrointestinal: Negative for blood in stool, constipation and diarrhea. Genitourinary: Negative for hematuria. Neurological: Positive for headaches (past 2 days; blames on cold). Negative for dizziness, weakness and light-headedness. Hematological: Does not bruise/bleed easily. Objective:   Physical Exam   Constitutional: She is oriented to person, place, and time. She appears well-developed and well-nourished. Pulmonary/Chest: Effort normal.   Neurological: She is alert and oriented to person, place, and time. Skin: Skin is warm and dry. Psychiatric: She has a normal mood and affect. Her behavior is normal.       Assessment:      Lab Results   Component Value Date    INR 4.30 (H) 12/14/2017    INR 3.00 (H) 12/07/2017    INR 4.56 (H) 12/04/2017    PROTIME 2.42 (H) 12/15/2011    PROTIME 1.97 (H) 12/01/2011    PROTIME 1.80 (H) 11/22/2011     INR supratherapeutic - goal 2.5-3.5. Recent Labs      12/14/17   0959   INR  4.30*           Plan:    POCT INR ordered/performed/reviewed. Hold today, then decrease PO Coumadin to 2 mg daily (from 3 mg MF, 2 mg TWThSS). Recheck INR in 14 days.  (Report given - orders received from/verified with Ilda Mclean PharmD.)  Patient reminded to call the Anticoagulation Clinic with any signs or symptoms of bleeding or with any medication changes. Patient given instructions utilizing the teach back method. Discharged ambulatory in no apparent distress.

## 2017-12-28 ENCOUNTER — HOSPITAL ENCOUNTER (OUTPATIENT)
Dept: PHARMACY | Age: 72
Setting detail: THERAPIES SERIES
Discharge: HOME | End: 2017-12-28
Payer: MEDICARE

## 2017-12-28 ENCOUNTER — HOSPITAL ENCOUNTER (OUTPATIENT)
Dept: WOMENS IMAGING | Age: 72
Discharge: HOME | End: 2017-12-28
Payer: MEDICARE

## 2017-12-28 DIAGNOSIS — Z95.2 MITRAL VALVE REPLACED: ICD-10-CM

## 2017-12-28 DIAGNOSIS — Z12.31 VISIT FOR SCREENING MAMMOGRAM: ICD-10-CM

## 2017-12-28 LAB — POC INR: 2.9 (ref 0.8–1.2)

## 2017-12-28 PROCEDURE — 99211 OFF/OP EST MAY X REQ PHY/QHP: CPT

## 2017-12-28 PROCEDURE — 85610 PROTHROMBIN TIME: CPT

## 2017-12-28 PROCEDURE — G0202 SCR MAMMO BI INCL CAD: HCPCS

## 2017-12-28 PROCEDURE — 36416 COLLJ CAPILLARY BLOOD SPEC: CPT

## 2017-12-28 ASSESSMENT — ENCOUNTER SYMPTOMS
DIARRHEA: 0
SHORTNESS OF BREATH: 1
COUGH: 0
BLOOD IN STOOL: 0
CONSTIPATION: 0

## 2017-12-28 NOTE — PROGRESS NOTES
The [address] [facility]  Anticoagulation Clinic  [phone] (phone)  [phone] (fax)    Subjective:      Patient ID: Kari Downey is a 67 y.o. female. HPI  Has diagnosis of  MVR, per Dr. Ten Zamora referral - being seen for Warfarin monitoring (14 day visit after decreasing dose by 2 mg/week). Correctly states tablet strength/dose. Has been taking Coricidin past 5 weeks for head/chest cold, but taking less now. Review of Systems   Constitutional: Negative for appetite change and fatigue. HENT: Negative for nosebleeds. Respiratory: Positive for shortness of breath (usual). Negative for cough. Cardiovascular: Negative for chest pain and leg swelling. Gastrointestinal: Negative for blood in stool, constipation and diarrhea. Genitourinary: Negative for hematuria. Neurological: Positive for headaches (blames on her cold - taking nothing extra). Negative for dizziness, weakness and light-headedness. Hematological: Does not bruise/bleed easily. Objective:   Physical Exam   Constitutional: She is oriented to person, place, and time. She appears well-developed and well-nourished. Pulmonary/Chest: Effort normal.   Neurological: She is alert and oriented to person, place, and time. Skin: Skin is warm and dry. Psychiatric: She has a normal mood and affect. Her behavior is normal.   Lateral aspect of right eye bloodshot - states had rubbed it. Assessment:      Lab Results   Component Value Date    INR 2.90 (H) 12/28/2017    INR 4.30 (H) 12/14/2017    INR 3.00 (H) 12/07/2017    PROTIME 2.42 (H) 12/15/2011    PROTIME 1.97 (H) 12/01/2011    PROTIME 1.80 (H) 11/22/2011     INR therapeutic  - goal 2.5-3.5. Recent Labs      12/28/17   1029   INR  2.90*         Plan:    POCT INR ordered/performed/reviewed. Continue PO Coumadin 2 mg daily. Recheck INR in 21 days.  (Report given - orders entered by Britt Brand, PharmD.)  Patient reminded to call the

## 2018-01-17 DIAGNOSIS — E78.5 HYPERLIPIDEMIA: ICD-10-CM

## 2018-01-17 RX ORDER — SIMVASTATIN 10 MG
TABLET ORAL
Qty: 90 TABLET | Refills: 3 | Status: SHIPPED | OUTPATIENT
Start: 2018-01-17 | End: 2019-01-14 | Stop reason: SDUPTHER

## 2018-01-18 ENCOUNTER — HOSPITAL ENCOUNTER (OUTPATIENT)
Dept: PHARMACY | Age: 73
Setting detail: THERAPIES SERIES
Discharge: HOME | End: 2018-01-18
Payer: MEDICARE

## 2018-01-18 DIAGNOSIS — Z95.2 MITRAL VALVE REPLACED: ICD-10-CM

## 2018-01-18 LAB — POC INR: 3 (ref 0.8–1.2)

## 2018-01-18 PROCEDURE — 85610 PROTHROMBIN TIME: CPT

## 2018-01-18 PROCEDURE — 36416 COLLJ CAPILLARY BLOOD SPEC: CPT

## 2018-01-18 PROCEDURE — 99211 OFF/OP EST MAY X REQ PHY/QHP: CPT

## 2018-01-18 ASSESSMENT — ENCOUNTER SYMPTOMS
CONSTIPATION: 0
SHORTNESS OF BREATH: 1
DIARRHEA: 0
BLOOD IN STOOL: 0
COUGH: 0

## 2018-01-18 NOTE — PROGRESS NOTES
The [address] [facility]  Anticoagulation Clinic  [phone] (phone)  [phone] (fax)    Subjective:      Patient ID: Reece Ledezma is a 67 y.o. female. HPI  Has diagnosis of  MVR, per Dr. Graciela Whitfield referral - being seen for Warfarin monitoring (3 week visit). Correctly states tablet strength/dose. No diet/medication change. Review of Systems   Constitutional: Positive for activity change (slightly decreased). Negative for appetite change and fatigue. HENT: Negative for nosebleeds. Respiratory: Positive for shortness of breath (usual). Negative for cough. Cardiovascular: Negative for chest pain and leg swelling. Gastrointestinal: Negative for blood in stool, constipation and diarrhea. Genitourinary: Negative for hematuria. Neurological: Positive for dizziness (at times, usual). Negative for weakness, light-headedness and headaches. Hematological: Does not bruise/bleed easily. Objective:   Physical Exam   Constitutional: She is oriented to person, place, and time. She appears well-developed and well-nourished. Pulmonary/Chest: Effort normal.   Neurological: She is alert and oriented to person, place, and time. Skin: Skin is warm and dry. Psychiatric: She has a normal mood and affect. Her behavior is normal.       Assessment:      Lab Results   Component Value Date    INR 3.00 (H) 01/18/2018    INR 2.90 (H) 12/28/2017    INR 4.30 (H) 12/14/2017    PROTIME 2.42 (H) 12/15/2011    PROTIME 1.97 (H) 12/01/2011    PROTIME 1.80 (H) 11/22/2011     INR therapeutic  - goal 2.5-3.5. Recent Labs      01/18/18   1056   INR  3.00*         Plan:    POCT INR ordered/performed/result reviewed. Continue PO Coumadin 2 mg daily. Recheck INR in 4 weeks. Patient reminded to call the Anticoagulation Clinic with any signs or symptoms of bleeding or with any medication changes. Patient given instructions utilizing the teach back method.     Discharged

## 2018-02-15 ENCOUNTER — HOSPITAL ENCOUNTER (OUTPATIENT)
Dept: PHARMACY | Age: 73
Setting detail: THERAPIES SERIES
Discharge: HOME | End: 2018-02-15
Payer: MEDICARE

## 2018-02-15 DIAGNOSIS — Z95.2 MITRAL VALVE REPLACED: ICD-10-CM

## 2018-02-15 LAB — POC INR: 3.2 (ref 0.8–1.2)

## 2018-02-15 PROCEDURE — 36416 COLLJ CAPILLARY BLOOD SPEC: CPT

## 2018-02-15 PROCEDURE — 99211 OFF/OP EST MAY X REQ PHY/QHP: CPT

## 2018-02-15 PROCEDURE — 85610 PROTHROMBIN TIME: CPT

## 2018-02-15 ASSESSMENT — ENCOUNTER SYMPTOMS
COUGH: 0
SHORTNESS OF BREATH: 1
CONSTIPATION: 0
DIARRHEA: 0
BLOOD IN STOOL: 0

## 2018-03-13 ENCOUNTER — PROCEDURE VISIT (OUTPATIENT)
Dept: CARDIOLOGY CLINIC | Age: 73
End: 2018-03-13
Payer: MEDICARE

## 2018-03-13 DIAGNOSIS — Z86.79 HISTORY OF SINUS BRADYCARDIA: Primary | ICD-10-CM

## 2018-03-13 PROCEDURE — 93296 REM INTERROG EVL PM/IDS: CPT | Performed by: INTERNAL MEDICINE

## 2018-03-13 PROCEDURE — 93294 REM INTERROG EVL PM/LDLS PM: CPT | Performed by: INTERNAL MEDICINE

## 2018-03-15 ENCOUNTER — HOSPITAL ENCOUNTER (OUTPATIENT)
Dept: PHARMACY | Age: 73
Setting detail: THERAPIES SERIES
Discharge: HOME | End: 2018-03-15
Payer: MEDICARE

## 2018-03-15 DIAGNOSIS — Z95.2 MITRAL VALVE REPLACED: ICD-10-CM

## 2018-03-15 LAB — POC INR: 2.8 (ref 0.8–1.2)

## 2018-03-15 PROCEDURE — 36416 COLLJ CAPILLARY BLOOD SPEC: CPT

## 2018-03-15 PROCEDURE — 99211 OFF/OP EST MAY X REQ PHY/QHP: CPT

## 2018-03-15 PROCEDURE — 85610 PROTHROMBIN TIME: CPT

## 2018-03-15 ASSESSMENT — ENCOUNTER SYMPTOMS
DIARRHEA: 0
CONSTIPATION: 0
BLOOD IN STOOL: 0
SHORTNESS OF BREATH: 1
COUGH: 0

## 2018-04-10 ENCOUNTER — HOSPITAL ENCOUNTER (OUTPATIENT)
Dept: PHARMACY | Age: 73
Setting detail: THERAPIES SERIES
Discharge: HOME | End: 2018-04-10
Payer: MEDICARE

## 2018-04-10 DIAGNOSIS — Z95.2 MITRAL VALVE REPLACED: ICD-10-CM

## 2018-04-10 LAB — POC INR: 3.2 (ref 0.8–1.2)

## 2018-04-10 PROCEDURE — 36416 COLLJ CAPILLARY BLOOD SPEC: CPT

## 2018-04-10 PROCEDURE — 85610 PROTHROMBIN TIME: CPT

## 2018-04-10 PROCEDURE — 99211 OFF/OP EST MAY X REQ PHY/QHP: CPT

## 2018-04-18 ENCOUNTER — TELEPHONE (OUTPATIENT)
Dept: FAMILY MEDICINE CLINIC | Age: 73
End: 2018-04-18

## 2018-04-18 DIAGNOSIS — E78.5 HYPERLIPIDEMIA, UNSPECIFIED HYPERLIPIDEMIA TYPE: ICD-10-CM

## 2018-04-18 DIAGNOSIS — I10 ESSENTIAL HYPERTENSION: ICD-10-CM

## 2018-04-18 DIAGNOSIS — E11.9 TYPE 2 DIABETES MELLITUS WITHOUT COMPLICATION, WITHOUT LONG-TERM CURRENT USE OF INSULIN (HCC): Primary | ICD-10-CM

## 2018-04-20 DIAGNOSIS — E11.9 TYPE 2 DIABETES MELLITUS WITHOUT COMPLICATION, WITHOUT LONG-TERM CURRENT USE OF INSULIN (HCC): ICD-10-CM

## 2018-04-21 RX ORDER — GLIMEPIRIDE 2 MG/1
2 TABLET ORAL 2 TIMES DAILY
Qty: 180 TABLET | Refills: 3 | Status: SHIPPED | OUTPATIENT
Start: 2018-04-21 | End: 2018-04-24 | Stop reason: SDUPTHER

## 2018-04-21 RX ORDER — GLIMEPIRIDE 2 MG/1
TABLET ORAL
Qty: 60 TABLET | Refills: 0 | Status: SHIPPED | OUTPATIENT
Start: 2018-04-21 | End: 2019-04-23

## 2018-04-23 ENCOUNTER — HOSPITAL ENCOUNTER (OUTPATIENT)
Age: 73
Discharge: HOME | End: 2018-04-23
Payer: MEDICARE

## 2018-04-23 DIAGNOSIS — E11.9 TYPE 2 DIABETES MELLITUS WITHOUT COMPLICATION, WITHOUT LONG-TERM CURRENT USE OF INSULIN (HCC): ICD-10-CM

## 2018-04-23 DIAGNOSIS — E78.5 HYPERLIPIDEMIA, UNSPECIFIED HYPERLIPIDEMIA TYPE: ICD-10-CM

## 2018-04-23 DIAGNOSIS — I10 ESSENTIAL HYPERTENSION: ICD-10-CM

## 2018-04-23 LAB
ALBUMIN SERPL-MCNC: 4.3 G/DL (ref 3.5–5.1)
ALP BLD-CCNC: 70 U/L (ref 38–126)
ALT SERPL-CCNC: 12 U/L (ref 11–66)
ANION GAP SERPL CALCULATED.3IONS-SCNC: 13 MEQ/L (ref 8–16)
AST SERPL-CCNC: 21 U/L (ref 5–40)
AVERAGE GLUCOSE: 144 MG/DL (ref 70–126)
BILIRUB SERPL-MCNC: 0.3 MG/DL (ref 0.3–1.2)
BUN BLDV-MCNC: 24 MG/DL (ref 7–22)
CALCIUM SERPL-MCNC: 10.3 MG/DL (ref 8.5–10.5)
CHLORIDE BLD-SCNC: 105 MEQ/L (ref 98–111)
CHOLESTEROL, TOTAL: 174 MG/DL (ref 100–199)
CO2: 24 MEQ/L (ref 23–33)
CREAT SERPL-MCNC: 1.2 MG/DL (ref 0.4–1.2)
CREATININE, URINE: 176.7 MG/DL
GFR SERPL CREATININE-BSD FRML MDRD: 44 ML/MIN/1.73M2
GLUCOSE BLD-MCNC: 152 MG/DL (ref 70–108)
HBA1C MFR BLD: 6.8 % (ref 4.4–6.4)
HDLC SERPL-MCNC: 42 MG/DL
LDL CHOLESTEROL CALCULATED: 87 MG/DL
MICROALBUMIN UR-MCNC: 1.93 MG/DL
MICROALBUMIN/CREAT UR-RTO: 11 MG/G (ref 0–30)
POTASSIUM SERPL-SCNC: 4.2 MEQ/L (ref 3.5–5.2)
SODIUM BLD-SCNC: 142 MEQ/L (ref 135–145)
TOTAL PROTEIN: 7.7 G/DL (ref 6.1–8)
TRIGL SERPL-MCNC: 223 MG/DL (ref 0–199)

## 2018-04-23 PROCEDURE — 80061 LIPID PANEL: CPT

## 2018-04-23 PROCEDURE — 83036 HEMOGLOBIN GLYCOSYLATED A1C: CPT

## 2018-04-23 PROCEDURE — 82043 UR ALBUMIN QUANTITATIVE: CPT

## 2018-04-23 PROCEDURE — 36415 COLL VENOUS BLD VENIPUNCTURE: CPT

## 2018-04-23 PROCEDURE — 80053 COMPREHEN METABOLIC PANEL: CPT

## 2018-04-24 ENCOUNTER — OFFICE VISIT (OUTPATIENT)
Dept: FAMILY MEDICINE CLINIC | Age: 73
End: 2018-04-24
Payer: MEDICARE

## 2018-04-24 VITALS
HEART RATE: 64 BPM | WEIGHT: 221 LBS | RESPIRATION RATE: 16 BRPM | TEMPERATURE: 97.8 F | BODY MASS INDEX: 32.64 KG/M2 | DIASTOLIC BLOOD PRESSURE: 70 MMHG | SYSTOLIC BLOOD PRESSURE: 138 MMHG

## 2018-04-24 DIAGNOSIS — L98.9 SKIN LESION OF LEFT ARM: ICD-10-CM

## 2018-04-24 DIAGNOSIS — E11.9 TYPE 2 DIABETES MELLITUS WITHOUT COMPLICATION, WITHOUT LONG-TERM CURRENT USE OF INSULIN (HCC): Primary | ICD-10-CM

## 2018-04-24 DIAGNOSIS — L30.0 NUMMULAR ECZEMA: ICD-10-CM

## 2018-04-24 DIAGNOSIS — Z95.2 MITRAL VALVE REPLACED: ICD-10-CM

## 2018-04-24 DIAGNOSIS — I10 ESSENTIAL HYPERTENSION: ICD-10-CM

## 2018-04-24 PROCEDURE — G8399 PT W/DXA RESULTS DOCUMENT: HCPCS | Performed by: FAMILY MEDICINE

## 2018-04-24 PROCEDURE — 3017F COLORECTAL CA SCREEN DOC REV: CPT | Performed by: FAMILY MEDICINE

## 2018-04-24 PROCEDURE — 3044F HG A1C LEVEL LT 7.0%: CPT | Performed by: FAMILY MEDICINE

## 2018-04-24 PROCEDURE — 4040F PNEUMOC VAC/ADMIN/RCVD: CPT | Performed by: FAMILY MEDICINE

## 2018-04-24 PROCEDURE — 2022F DILAT RTA XM EVC RTNOPTHY: CPT | Performed by: FAMILY MEDICINE

## 2018-04-24 PROCEDURE — 1036F TOBACCO NON-USER: CPT | Performed by: FAMILY MEDICINE

## 2018-04-24 PROCEDURE — 99214 OFFICE O/P EST MOD 30 MIN: CPT | Performed by: FAMILY MEDICINE

## 2018-04-24 PROCEDURE — 1123F ACP DISCUSS/DSCN MKR DOCD: CPT | Performed by: FAMILY MEDICINE

## 2018-04-24 PROCEDURE — G8427 DOCREV CUR MEDS BY ELIG CLIN: HCPCS | Performed by: FAMILY MEDICINE

## 2018-04-24 PROCEDURE — 1090F PRES/ABSN URINE INCON ASSESS: CPT | Performed by: FAMILY MEDICINE

## 2018-04-24 PROCEDURE — G8417 CALC BMI ABV UP PARAM F/U: HCPCS | Performed by: FAMILY MEDICINE

## 2018-04-24 RX ORDER — VALSARTAN 320 MG/1
320 TABLET ORAL DAILY
Qty: 90 TABLET | Refills: 3 | Status: SHIPPED | OUTPATIENT
Start: 2018-04-24 | End: 2018-08-20

## 2018-04-24 RX ORDER — MOMETASONE FUROATE 1 MG/G
CREAM TOPICAL
Qty: 45 G | Refills: 0 | Status: SHIPPED | OUTPATIENT
Start: 2018-04-24 | End: 2019-04-29 | Stop reason: SDUPTHER

## 2018-04-24 RX ORDER — WARFARIN SODIUM 1 MG/1
TABLET ORAL
Qty: 180 TABLET | Refills: 3 | Status: SHIPPED | OUTPATIENT
Start: 2018-04-24 | End: 2019-04-23 | Stop reason: SDUPTHER

## 2018-04-24 ASSESSMENT — ENCOUNTER SYMPTOMS
BLOOD IN STOOL: 0
DIARRHEA: 0
ABDOMINAL PAIN: 0
VOMITING: 0
EYES NEGATIVE: 1
NAUSEA: 0
SHORTNESS OF BREATH: 0

## 2018-04-24 ASSESSMENT — PATIENT HEALTH QUESTIONNAIRE - PHQ9
2. FEELING DOWN, DEPRESSED OR HOPELESS: 0
SUM OF ALL RESPONSES TO PHQ QUESTIONS 1-9: 0
SUM OF ALL RESPONSES TO PHQ9 QUESTIONS 1 & 2: 0
1. LITTLE INTEREST OR PLEASURE IN DOING THINGS: 0

## 2018-05-11 RX ORDER — GLUCOSAMINE HCL/CHONDROITIN SU 500-400 MG
CAPSULE ORAL
Qty: 100 STRIP | Refills: 3 | Status: CANCELLED | OUTPATIENT
Start: 2018-05-11

## 2018-05-11 RX ORDER — GLUCOSAMINE HCL/CHONDROITIN SU 500-400 MG
CAPSULE ORAL
Qty: 100 STRIP | Refills: 3 | Status: SHIPPED | OUTPATIENT
Start: 2018-05-11 | End: 2020-04-23

## 2018-05-15 ENCOUNTER — HOSPITAL ENCOUNTER (OUTPATIENT)
Dept: PHARMACY | Age: 73
Setting detail: THERAPIES SERIES
Discharge: HOME | End: 2018-05-15
Payer: MEDICARE

## 2018-05-15 DIAGNOSIS — C44.41 BASAL CELL CARCINOMA OF SKIN OF NECK: ICD-10-CM

## 2018-05-15 DIAGNOSIS — Z95.2 MITRAL VALVE REPLACED: ICD-10-CM

## 2018-05-15 LAB — POC INR: 3 (ref 0.8–1.2)

## 2018-05-15 PROCEDURE — 36416 COLLJ CAPILLARY BLOOD SPEC: CPT

## 2018-05-15 PROCEDURE — 99211 OFF/OP EST MAY X REQ PHY/QHP: CPT

## 2018-05-15 PROCEDURE — 85610 PROTHROMBIN TIME: CPT

## 2018-06-12 ENCOUNTER — PROCEDURE VISIT (OUTPATIENT)
Dept: CARDIOLOGY CLINIC | Age: 73
End: 2018-06-12

## 2018-06-12 DIAGNOSIS — Z95.0 PACEMAKER: Primary | ICD-10-CM

## 2018-06-18 DIAGNOSIS — Z95.2 MITRAL VALVE REPLACED: Primary | ICD-10-CM

## 2018-06-19 ENCOUNTER — HOSPITAL ENCOUNTER (OUTPATIENT)
Age: 73
Discharge: HOME | End: 2018-06-19
Payer: MEDICARE

## 2018-06-19 ENCOUNTER — HOSPITAL ENCOUNTER (OUTPATIENT)
Dept: PHARMACY | Age: 73
Setting detail: THERAPIES SERIES
Discharge: HOME | End: 2018-06-19
Payer: MEDICARE

## 2018-06-19 DIAGNOSIS — Z95.2 MITRAL VALVE REPLACED: ICD-10-CM

## 2018-06-19 LAB
HCT VFR BLD CALC: 36.8 % (ref 37–47)
HEMOGLOBIN: 12.3 GM/DL (ref 12–16)
POC INR: 3.4 (ref 0.8–1.2)

## 2018-06-19 PROCEDURE — 85014 HEMATOCRIT: CPT

## 2018-06-19 PROCEDURE — 36416 COLLJ CAPILLARY BLOOD SPEC: CPT

## 2018-06-19 PROCEDURE — 36415 COLL VENOUS BLD VENIPUNCTURE: CPT

## 2018-06-19 PROCEDURE — 85610 PROTHROMBIN TIME: CPT

## 2018-06-19 PROCEDURE — 85018 HEMOGLOBIN: CPT

## 2018-06-19 PROCEDURE — 99211 OFF/OP EST MAY X REQ PHY/QHP: CPT

## 2018-07-03 DIAGNOSIS — I25.2 MI, OLD: ICD-10-CM

## 2018-07-03 RX ORDER — NITROGLYCERIN 400 UG/1
SPRAY ORAL
Qty: 1 BOTTLE | Refills: 0 | Status: SHIPPED | OUTPATIENT
Start: 2018-07-03 | End: 2020-07-23 | Stop reason: SDUPTHER

## 2018-07-24 ENCOUNTER — HOSPITAL ENCOUNTER (OUTPATIENT)
Dept: PHARMACY | Age: 73
Setting detail: THERAPIES SERIES
Discharge: HOME | End: 2018-07-24
Payer: MEDICARE

## 2018-07-24 DIAGNOSIS — Z95.2 MITRAL VALVE REPLACED: ICD-10-CM

## 2018-07-24 LAB — POC INR: 3 (ref 0.8–1.2)

## 2018-07-24 PROCEDURE — 99211 OFF/OP EST MAY X REQ PHY/QHP: CPT

## 2018-07-24 PROCEDURE — 36416 COLLJ CAPILLARY BLOOD SPEC: CPT

## 2018-07-24 PROCEDURE — 85610 PROTHROMBIN TIME: CPT

## 2018-07-24 NOTE — PROGRESS NOTES
Medication Management [facility]  Anticoagulation Clinic  [phone] (phone)  [phone] (fax)      Ms. Ellen Oseguera is a 75 y.o.  female with history of MVR, per Dr. Meliza Ford referral, who presents today for Warfarin monitoring and adjustment (5 week visit). Patient verifies current dosing regimen and tablet strength. No missed or extra doses. Patient denies bleeding/chest pain. Has usual LAND, but slightly more swelling. States has had some blood spots on arms. No blood in urine or stool. No dietary changes. No changes in medication/OTC agents/herbals. No change in alcohol use or tobacco use. Change in activity level: increased. Patient denies lightheadedness/falls. Has usual dizziness. Takes nothing for the usual occasional headache. No vomiting/diarrhea or acute illness. No procedures scheduled in the future at this time. Assessment:   Lab Results   Component Value Date    INR 3.00 (H) 07/24/2018    INR 3.40 (H) 06/19/2018    INR 3.00 (H) 05/15/2018    PROTIME 2.42 (H) 12/15/2011    PROTIME 1.97 (H) 12/01/2011    PROTIME 1.80 (H) 11/22/2011     INR therapeutic - goal 2.5-3.5. Recent Labs      07/24/18   1056   INR  3.00*           Plan:  POCT INR ordered/performed/result reviewed. Continue Coumadin 2 mg daily PO. Recheck INR in 6 weeks. Patient reminded to call the Anticoagulation Clinic with any signs or symptoms of bleeding or with any medication changes. Patient given instructions utilizing the teach back method. Discharged ambulatory in no apparent distress. After visit summary printed and reviewed with patient.       Medications reviewed and updated on home medication list.     Influenza vaccine:     [] given    [x] declined   [x] received previously   [] plans to receive at a later time   [] refused    [x] documented in EPIC

## 2018-08-05 DIAGNOSIS — I10 ESSENTIAL HYPERTENSION: ICD-10-CM

## 2018-08-06 RX ORDER — HYDRALAZINE HYDROCHLORIDE 50 MG/1
TABLET, FILM COATED ORAL
Qty: 270 TABLET | Refills: 3 | Status: SHIPPED | OUTPATIENT
Start: 2018-08-06 | End: 2019-08-04 | Stop reason: SDUPTHER

## 2018-08-07 ENCOUNTER — PATIENT MESSAGE (OUTPATIENT)
Dept: FAMILY MEDICINE CLINIC | Age: 73
End: 2018-08-07

## 2018-08-07 NOTE — TELEPHONE ENCOUNTER
From: Sharilyn Apgar  To: Jorge Desai MD  Sent: 2018 1:41 PM EDT  Subject: Prescription Question    I understand there has been a recall on Valsartan, which is a generic for  Diovan. I am taking 320 mg tablet daily of Valsartan.   What if anything would you suggest?

## 2018-08-07 NOTE — TELEPHONE ENCOUNTER
Have her contact her pharmacy and see if she is taking one of the lots of Diovan that were recalled. If so, would recommend changing diovan to losartan 100mg po qd.  CG

## 2018-08-20 RX ORDER — OLMESARTAN MEDOXOMIL 40 MG/1
40 TABLET ORAL DAILY
Qty: 30 TABLET | Refills: 1 | Status: SHIPPED | OUTPATIENT
Start: 2018-08-20 | End: 2018-10-11 | Stop reason: SDUPTHER

## 2018-08-21 ENCOUNTER — NURSE ONLY (OUTPATIENT)
Dept: CARDIOLOGY CLINIC | Age: 73
End: 2018-08-21
Payer: MEDICARE

## 2018-08-21 DIAGNOSIS — Z95.0 PACEMAKER: Primary | ICD-10-CM

## 2018-08-21 PROCEDURE — 93280 PM DEVICE PROGR EVAL DUAL: CPT | Performed by: INTERNAL MEDICINE

## 2018-08-22 ENCOUNTER — OFFICE VISIT (OUTPATIENT)
Dept: PULMONOLOGY | Age: 73
End: 2018-08-22
Payer: MEDICARE

## 2018-08-22 VITALS
SYSTOLIC BLOOD PRESSURE: 128 MMHG | OXYGEN SATURATION: 97 % | WEIGHT: 219.2 LBS | BODY MASS INDEX: 32.47 KG/M2 | DIASTOLIC BLOOD PRESSURE: 78 MMHG | HEIGHT: 69 IN | HEART RATE: 81 BPM

## 2018-08-22 DIAGNOSIS — G47.33 OBSTRUCTIVE SLEEP APNEA ON CPAP: Primary | ICD-10-CM

## 2018-08-22 DIAGNOSIS — Z99.89 OBSTRUCTIVE SLEEP APNEA ON CPAP: Primary | ICD-10-CM

## 2018-08-22 PROCEDURE — 4040F PNEUMOC VAC/ADMIN/RCVD: CPT | Performed by: PHYSICIAN ASSISTANT

## 2018-08-22 PROCEDURE — 3017F COLORECTAL CA SCREEN DOC REV: CPT | Performed by: PHYSICIAN ASSISTANT

## 2018-08-22 PROCEDURE — G8427 DOCREV CUR MEDS BY ELIG CLIN: HCPCS | Performed by: PHYSICIAN ASSISTANT

## 2018-08-22 PROCEDURE — G8399 PT W/DXA RESULTS DOCUMENT: HCPCS | Performed by: PHYSICIAN ASSISTANT

## 2018-08-22 PROCEDURE — 1101F PT FALLS ASSESS-DOCD LE1/YR: CPT | Performed by: PHYSICIAN ASSISTANT

## 2018-08-22 PROCEDURE — 1036F TOBACCO NON-USER: CPT | Performed by: PHYSICIAN ASSISTANT

## 2018-08-22 PROCEDURE — G8417 CALC BMI ABV UP PARAM F/U: HCPCS | Performed by: PHYSICIAN ASSISTANT

## 2018-08-22 PROCEDURE — 1123F ACP DISCUSS/DSCN MKR DOCD: CPT | Performed by: PHYSICIAN ASSISTANT

## 2018-08-22 PROCEDURE — 1090F PRES/ABSN URINE INCON ASSESS: CPT | Performed by: PHYSICIAN ASSISTANT

## 2018-08-22 PROCEDURE — 99213 OFFICE O/P EST LOW 20 MIN: CPT | Performed by: PHYSICIAN ASSISTANT

## 2018-08-22 ASSESSMENT — ENCOUNTER SYMPTOMS
SPUTUM PRODUCTION: 0
SHORTNESS OF BREATH: 0
HEARTBURN: 0
SINUS PAIN: 0
SORE THROAT: 0
NAUSEA: 0
ORTHOPNEA: 0
COUGH: 0
WHEEZING: 0
GASTROINTESTINAL NEGATIVE: 1
EYES NEGATIVE: 1
RESPIRATORY NEGATIVE: 1

## 2018-08-22 NOTE — PROGRESS NOTES
[facility]      Kailash Weiner                                         [id number]  8/22/2018   Chief Complaint   Patient presents with    Sleep Apnea     [place] 12 mo f/u [facility]         Pt of Dr. Silvia Reina    PAP Download:   Original or initial  AHI: 40.8     Date of initial study: 3/21/11    [x] Compliant  100%   []  Noncompliant 0 %     PAP Type CPAP   Level  12   Avg Hrs/Day 6:17  AHI: 2.2   Recorded compliance dates , July 23, 2018  to August 21, 2018   Machine/Mfg: Respironics Interface: Nasal with chin strap    Provider:  [x][facility]  [][facility]  [][employer]  [][facility]         [][facility] []Other:   Neck Size: 15.75  Mallampati Mallampati 3  ESS:  7    Here is a scan of the most recent download:              Presentation:   Halima Allen presents for sleep medicine follow up for obstructive sleep apnea  Since the last visit, Halima Allen is doing reasonably well with their sleep machine. Other comments: She is doing well with PAP. She uses a chin strap. She is still tired at times. Equipment issues: The pressure is  acceptable, the mask is acceptable and she  is  using the humidity. Sleep issues:  Do you feel better? Yes  More rested? Sometimes   Better concentration? yes    Progress History:   Since last visit any new medical issues? No  New ER or hospitlal visits? No  Any new or changes in medicines? No  Any new sleep medicines?  No        Past Medical History:   Diagnosis Date    Arrhythmia     CHRONIC ATRIAL FIB    Cancer ([facility] [address]     breast ca left    Depression     Diabetes mellitus (HCC)     Generalized headaches     History of dizziness     History of sinus bradycardia     History of valvular heart disease     Hyperlipidemia     Hypertension     Medtronic dual pacemaker 5/23/2017    MI, old     Mitral valve replaced     Numbness and tingling     Obesity     Obstructive sleep apnea on CPAP 2011    Osteopenia     SOB (shortness of breath)     HX OF: daily on Mondays. (Patient taking differently: No sig reported) 180 tablet 3    SITagliptin (JANUVIA) 100 MG tablet Take 1 tablet by mouth daily 90 tablet 3    glimepiride (AMARYL) 2 MG tablet TAKE 1 TABLET TWICE A DAY 60 tablet 0    simvastatin (ZOCOR) 10 MG tablet TAKE 1 TABLET AT BEDTIME 90 tablet 3    Chlorpheniramine-DM (CORICIDIN HBP COUGH/COLD PO) Take by mouth See Admin Instructions      metFORMIN (GLUCOPHAGE-XR) 500 MG extended release tablet TAKE 4 TABLETS DAILY 360 tablet 3    Biotin w/ Vitamins C & E (HAIR SKIN & NAILS GUMMIES PO) Take 2 tablets by mouth daily       furosemide (LASIX) 40 MG tablet Take 1 tablet by mouth daily 90 tablet 3    famotidine (PEPCID) 40 MG tablet Take 40 mg by mouth nightly as needed       AMOXICILLIN PO Take 4 capsules by mouth With teeth cleaning.  Blood Glucose Monitoring Suppl (ADVOCATE BLOOD GLUCOSE MONITOR) CONNIE Dx: 250.00 1 Device 0    aspirin 81 MG tablet Take 81 mg by mouth daily. With food; blood thinner      fluticasone (FLONASE) 50 MCG/ACT nasal spray 2 sprays by Nasal route daily (Patient taking differently: 2 sprays by Nasal route daily as needed ) 1 Bottle 6     No current facility-administered medications for this visit. Family History   Problem Relation Age of Onset    Hypertension Mother     Stroke Mother     Diabetes Mother     Cancer Father         Review of Systems -   Review of Systems   Constitutional: Negative. Negative for chills and fever. HENT: Negative. Negative for congestion, nosebleeds, sinus pain and sore throat. Eyes: Negative. Respiratory: Negative. Negative for cough, sputum production, shortness of breath and wheezing. Cardiovascular: Negative. Negative for chest pain, orthopnea and PND. Gastrointestinal: Negative. Negative for heartburn and nausea. Genitourinary: Negative. Musculoskeletal: Negative. Negative for joint pain and myalgias. Skin: Negative. Neurological: Negative.   Negative for dizziness, weakness and headaches. Endo/Heme/Allergies: Negative. Psychiatric/Behavioral: Negative. Negative for depression. The patient is not nervous/anxious and does not have insomnia. All other systems reviewed and are negative. Physical Exam:    BMI:  Body mass index is 32.37 kg/m². Wt Readings from Last 3 Encounters:   08/22/18 219 lb 3.2 oz (99.4 kg)   04/24/18 221 lb (100.2 kg)   11/13/17 216 lb 3.2 oz (98.1 kg)     Vitals: /78 (Site: Right Arm, Position: Sitting)   Pulse 81   Ht 5' 9\" (1.753 m)   Wt 219 lb 3.2 oz (99.4 kg)   SpO2 97%   BMI 32.37 kg/m²       Physical Exam   Constitutional: She is oriented to person, place, and time and well-developed, well-nourished, and in no distress. No distress. HENT:   Head: Normocephalic and atraumatic. Mouth/Throat: Oropharynx is clear and moist. No oropharyngeal exudate. Eyes: Pupils are equal, round, and reactive to light. Neck: Normal range of motion. Neck supple. Cardiovascular: Normal rate and normal heart sounds. irreg with click   Pulmonary/Chest: Effort normal and breath sounds normal. No stridor. No respiratory distress. Abdominal: Soft. Bowel sounds are normal.   Musculoskeletal: Normal range of motion. She exhibits no edema. Neurological: She is alert and oriented to person, place, and time. Gait normal.   Skin: Skin is warm and dry. She is not diaphoretic. Psychiatric: Mood, memory, affect and judgment normal.         ASSESSMENT/DIAGNOSIS     Diagnosis Orders   1. Obstructive sleep apnea on CPAP              Plan   Do you need any equipment today? Yes update supplies    - She  was advised to continue current positive airway pressure therapy with above described pressure. - She  advised to keep good compliance with current recommended pressure to get optimal results and clinical improvement  - Recommend 7-9 hours of sleep with PAP  - She was advised to call [employer] regarding supplies if needed.    - She call my office for earlier appointment if needed for worsening of sleep symptoms.   - She was instructed on weight loss  - Keisha aWtts was educated about my impression and plan. Patient verbalizes understanding.   We will see Theresa Hernandez back in: 1 year with download    Mary Glasgow PA-C, MPAS  8/22/2018

## 2018-09-04 ENCOUNTER — HOSPITAL ENCOUNTER (OUTPATIENT)
Dept: PHARMACY | Age: 73
Setting detail: THERAPIES SERIES
Discharge: HOME | End: 2018-09-04
Payer: MEDICARE

## 2018-09-04 DIAGNOSIS — Z95.2 MITRAL VALVE REPLACED: ICD-10-CM

## 2018-09-04 LAB — POC INR: 3.5 (ref 0.8–1.2)

## 2018-09-04 PROCEDURE — 85610 PROTHROMBIN TIME: CPT

## 2018-09-04 PROCEDURE — 99211 OFF/OP EST MAY X REQ PHY/QHP: CPT

## 2018-09-04 PROCEDURE — 36416 COLLJ CAPILLARY BLOOD SPEC: CPT

## 2018-09-04 NOTE — PROGRESS NOTES
Medication Management [facility]  Anticoagulation Clinic  [phone] (phone)  [phone] (fax)    Ms. Ajay Barr is a 75 y.o.  female with history of MVR who presents today for anticoagulation monitoring and adjustment. Patient verifies current dosing regimen and tablet strength. No missed or extra doses. Patient denies s/s bleeding/swelling/chest pain. Usual SOB. Has a fading bruise on right forearm with small knot in middle. Origin unknown. No blood in urine or stool. No dietary changes. Eating more salads, mostly iceberg lettuce. No changes in medication/herbals. Stopped vitamin D, vitamin B12, and biotin 2 weeks ago. No change in alcohol use or tobacco use. Small increase in activity level. Patient denies dizziness/lightheadedness/falls. Has had a few headaches, relieved with tylenol. No vomiting/diarrhea or acute illness. No procedures scheduled in the future at this time. Assessment:   Lab Results   Component Value Date    INR 3.50 (H) 09/04/2018    INR 3.00 (H) 07/24/2018    INR 3.40 (H) 06/19/2018    PROTIME 2.42 (H) 12/15/2011    PROTIME 1.97 (H) 12/01/2011    PROTIME 1.80 (H) 11/22/2011     INR therapeutic   Recent Labs      09/04/18   1047   INR  3.50*     Plan: POCT INR ordered and result reviewed. Continue Coumadin 2 mg po daily. Recheck INR 6 weeks. Patient reminded to call the Anticoagulation Clinic with any signs or symptoms of bleeding or with any medication changes. Patient given instructions utilizing the teach back method. Discharged ambulatory in no apparent distress. After visit summary printed and reviewed with patient.       Medications reviewed and updated on home medication list Yes    Influenza vaccine:     [] given    [] declined   [x] received previously   [] plans to receive at a later time   [] refused    [x] documented in EPIC

## 2018-09-20 ENCOUNTER — OFFICE VISIT (OUTPATIENT)
Dept: CARDIOLOGY CLINIC | Age: 73
End: 2018-09-20
Payer: MEDICARE

## 2018-09-20 VITALS
BODY MASS INDEX: 32.45 KG/M2 | DIASTOLIC BLOOD PRESSURE: 62 MMHG | HEIGHT: 69 IN | SYSTOLIC BLOOD PRESSURE: 150 MMHG | HEART RATE: 81 BPM | WEIGHT: 219.1 LBS

## 2018-09-20 DIAGNOSIS — R06.02 SOB (SHORTNESS OF BREATH): ICD-10-CM

## 2018-09-20 DIAGNOSIS — I10 ESSENTIAL HYPERTENSION: Primary | ICD-10-CM

## 2018-09-20 DIAGNOSIS — Z95.2 MITRAL VALVE REPLACED: ICD-10-CM

## 2018-09-20 PROCEDURE — 1090F PRES/ABSN URINE INCON ASSESS: CPT | Performed by: NUCLEAR MEDICINE

## 2018-09-20 PROCEDURE — 3017F COLORECTAL CA SCREEN DOC REV: CPT | Performed by: NUCLEAR MEDICINE

## 2018-09-20 PROCEDURE — 1036F TOBACCO NON-USER: CPT | Performed by: NUCLEAR MEDICINE

## 2018-09-20 PROCEDURE — G8427 DOCREV CUR MEDS BY ELIG CLIN: HCPCS | Performed by: NUCLEAR MEDICINE

## 2018-09-20 PROCEDURE — 4040F PNEUMOC VAC/ADMIN/RCVD: CPT | Performed by: NUCLEAR MEDICINE

## 2018-09-20 PROCEDURE — 1101F PT FALLS ASSESS-DOCD LE1/YR: CPT | Performed by: NUCLEAR MEDICINE

## 2018-09-20 PROCEDURE — G8399 PT W/DXA RESULTS DOCUMENT: HCPCS | Performed by: NUCLEAR MEDICINE

## 2018-09-20 PROCEDURE — 1123F ACP DISCUSS/DSCN MKR DOCD: CPT | Performed by: NUCLEAR MEDICINE

## 2018-09-20 PROCEDURE — 99213 OFFICE O/P EST LOW 20 MIN: CPT | Performed by: NUCLEAR MEDICINE

## 2018-09-20 PROCEDURE — G8417 CALC BMI ABV UP PARAM F/U: HCPCS | Performed by: NUCLEAR MEDICINE

## 2018-09-20 PROCEDURE — 93000 ELECTROCARDIOGRAM COMPLETE: CPT | Performed by: NUCLEAR MEDICINE

## 2018-09-20 RX ORDER — FUROSEMIDE 40 MG/1
40 TABLET ORAL DAILY
Qty: 90 TABLET | Refills: 3 | Status: SHIPPED | OUTPATIENT
Start: 2018-09-20 | End: 2019-10-21 | Stop reason: SDUPTHER

## 2018-09-20 NOTE — PROGRESS NOTES
[address] [facility]ARDI[facility]  [address]  [address] [address]  Dept: [phone]  Dept Fax: [phone]  Loc: [phone]    Visit Date: 9/20/2018    Mehdi Case is a 75 y.o. female who presents today for:  Chief Complaint   Patient presents with    Check-Up    Cardiac Valve Problem    Hypertension     MVR and pacer  Doing fair  Some tachycardia at times  BP is stable  Higher today   No ER visits      HPI:  HPI  Past Medical History:   Diagnosis Date    Arrhythmia     CHRONIC ATRIAL FIB    Cancer ([facility] [address]     breast ca left    Depression     Diabetes mellitus ([facility] [address]     Generalized headaches     History of dizziness     History of sinus bradycardia     History of valvular heart disease     Hyperlipidemia     Hypertension     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 MV area by recent EKG was 1 sq. cm. and mean gradient across MV was 17mmHg. MV index was about 0.7 sq. cm. per body surface area. Moderate pulmonary artery systolic HTN. Patent coronary arteries.  CARDIAC VALVE REPLACEMENT  2003    MVR  33mm [place] Mechanical valve    CARDIOVASCULAR STRESS TEST  05/26/2011    Cannot exclude mild degree of ischemia in anterolateral wall. EF 58%. Mildly abnormal nuclear scan.  CARDIOVASCULAR STRESS TEST  7-09-10    Atrial fibrillation. Episodes of bradycardia mainly at night, which could be physiologic. Predominantly bradycardiac rhythm w/o significant pauses.  Frequent PVC's     CARDIOVASCULAR STRESS TEST  1-14-08 Atrial fibrillation w/ controlled ventricular repsonse and an average heart rate of 58 bpm. 1 episode of 3 beat run of nonsustained ventricular tachyarrhythmia at rate of 110-158 bpm. 3 transient episodes of ventricular bigeminy w/ rate in mid 60's to low 70's.  CARDIOVASCULAR STRESS TEST  2-08-99    Unremarkable routine stress test. Chronic A-fib.  CARDIOVASCULAR STRESS TEST  2-08-00    Normal stress EKG. Hypertension, not well controlled. Patient's sitting BP was 160/94. At peak exercise BP was 180/102. Moderate degree of MVP.  CATARACT REMOVAL Right 7/16/14    CATARACT REMOVAL      COLONOSCOPY  2/2010    COLONOSCOPY  08/2017    KNEE ARTHROSCOPY  6/2011    KNEE SURGERY      MITRAL VALVE REPLACEMENT      PACEMAKER INSERTION Left 05/18/2017    by Dr Faye Alberts  [address] IND N/A 8/22/2017    COLONOSCOPY performed by Malena Crawford MD at [facility]    [place] EGD TRANSORAL BIOPSY SINGLE/MULTIPLE N/A 8/22/2017    EGD BIOPSY performed by Malena Crawford MD at [address] ECHOCARDIOGRAM  05/26/2011    LV mildly dilated, systolic function mildly reduced. EF 40-45%. Mild diffuse hypokinesis. Mild lateral wall was hypokinetic. Apical lateral wall was dyskinetic. Wall thickness was at upper limits of normal. LA moderately dilated. RV and RA mildly dilated. Systolic function mildly reduced. Mild MR and TR.  TRANSTHORACIC ECHOCARDIOGRAM  01/14/2008    Biatrial enlargement, RV dilated. Atheromatous aortic root. Borderline LVH. Normal LV systolic function. EF 55%. Prosthetic MV appears to be functioning well. Mildly sclerosed AV. Trivial MR, mild TR, trivial PI, RVSP 47mmHg.  TRANSTHORACIC ECHOCARDIOGRAM  2-08-00    Moderate MV stenosis based on available echo doppler data.     UPPER GASTROINTESTINAL ENDOSCOPY  08/2017     Family History   Problem Relation Age of Onset    Hypertension Mother     Stroke Mother     Diabetes Mother     Cancer Father      Social tablet Take 81 mg by mouth daily. With food; blood thinner       No current facility-administered medications for this visit. Allergies   Allergen Reactions    Adalat [Nifedipine] Itching     redness    Amlodipine Swelling     If take more than 5mg legs swell, hot and red    Tape [Adhesive Tape] Rash     Skin pulls off     Health Maintenance   Topic Date Due    DTaP/Tdap/Td vaccine (1 - Tdap) 06/12/1964    Shingles Vaccine (1 of 2 - 2 Dose Series) 08/30/2015    Colon Cancer Screen FIT/FOBT  07/20/2018    Flu vaccine (1) 09/01/2018    Diabetic retinal exam  10/30/2018    A1C test (Diabetic or Prediabetic)  04/23/2019    Diabetic microalbuminuria test  04/23/2019    Lipid screen  04/23/2019    Potassium monitoring  04/23/2019    Creatinine monitoring  04/23/2019    Diabetic foot exam  04/24/2019    Breast cancer screen  12/28/2019    DEXA (modify frequency per FRAX score)  Completed    Pneumococcal low/med risk  Completed       Subjective:  Review of Systems  General:   No fever, no chills, No fatigue or weight loss  Pulmonary:    No dyspnea, no wheezing  Cardiac:    Denies recent chest pain,   GI:     No nausea or vomiting, no abdominal pain  Neuro:    No dizziness or light headedness,   Musculoskeletal:  No recent active issues  Extremities:   No edema, good peripheral pulses      Objective:  Physical Exam  BP (!) 166/70   Pulse 81   Ht 5' 9\" (1.753 m)   Wt 219 lb 1.6 oz (99.4 kg)   BMI 32.36 kg/m²   General:   Well developed, well nourished  Lungs:   Clear to auscultation  Heart:    Normal S1 S2, Slight murmur. no rubs, no gallops  Abdomen:   Soft, non tender, no organomegalies, positive bowel sounds  Extremities:   No edema, no cyanosis, good peripheral pulses  Neurological:   Awake, alert, oriented. No obvious focal deficits  Musculoskelatal:  No obvious deformities    Assessment:      Diagnosis Orders   1. Essential hypertension  EKG 12 lead   2.  SOB (shortness of breath)  EKG 12 lead

## 2018-09-20 NOTE — PROGRESS NOTES
Pt here for 1 year fu     Pt states she will get a \"twinge\"   Gets some SOB on exertion, and dizziness

## 2018-09-25 ENCOUNTER — CARE COORDINATION (OUTPATIENT)
Dept: CARE COORDINATION | Age: 73
End: 2018-09-25

## 2018-10-08 RX ORDER — METFORMIN HYDROCHLORIDE 500 MG/1
TABLET, EXTENDED RELEASE ORAL
Qty: 360 TABLET | Refills: 3 | Status: SHIPPED | OUTPATIENT
Start: 2018-10-08 | End: 2019-10-06 | Stop reason: SDUPTHER

## 2018-10-10 ENCOUNTER — PATIENT MESSAGE (OUTPATIENT)
Dept: FAMILY MEDICINE CLINIC | Age: 73
End: 2018-10-10

## 2018-10-10 NOTE — TELEPHONE ENCOUNTER
From: Roopa Higgins  To: Sandee Man MD  Sent: 10/10/2018 11:33 AM EDT  Subject: Prescription Question    Here are my blood pressure readings since Oct 1st. Dr Colby Serrano put me on metaprolol 25 mg 2 tablets daily, starting 9/20/2018. Oct. 1   8:45 a m 153/69 69  10:40 a.m. 139/65 76  7:30 p.m. 111/46 75  Oct. 2  7:50 a.m. 125/53 69  1:30 p.m. 167/88 77  Oct. 3  7:00 a.m. 127/58 69  Oct. 4  7:00 a.m. 140/76 84  Oct.5  7:10 a.m. 107//48 80   Oct 6  8:00 a.m. 149/80 74  Oct 7  9:00 a.m 142/74 73  2:00 p.m. 124/62 81  Oct. 8  8:00 a.m. 153/79 72  Oct. 9  8:25 a.m. 136/65 69  11:00 a.m. 124/58/87  3:00 p.m. 131/50/ 63  Oct. 10  9:30 a.m. 144/62 66     A.m. pressures are taken before any a.m. medications. I have 9 pills remaining of Olmesartan meds. 4o mgs.   If  wants me to continue, call [facility]

## 2018-10-11 RX ORDER — OLMESARTAN MEDOXOMIL 40 MG/1
40 TABLET ORAL DAILY
Qty: 90 TABLET | Refills: 3 | Status: SHIPPED | OUTPATIENT
Start: 2018-10-11 | End: 2019-10-21 | Stop reason: SDUPTHER

## 2018-10-11 RX ORDER — OLMESARTAN MEDOXOMIL 40 MG/1
40 TABLET ORAL DAILY
Qty: 30 TABLET | Refills: 1 | Status: SHIPPED | OUTPATIENT
Start: 2018-10-11 | End: 2018-10-11

## 2018-10-16 ENCOUNTER — HOSPITAL ENCOUNTER (OUTPATIENT)
Dept: PHARMACY | Age: 73
Setting detail: THERAPIES SERIES
Discharge: HOME | End: 2018-10-16
Payer: MEDICARE

## 2018-10-16 DIAGNOSIS — Z95.2 MITRAL VALVE REPLACED: ICD-10-CM

## 2018-10-16 LAB — POC INR: 2.5 (ref 0.8–1.2)

## 2018-10-16 PROCEDURE — 85610 PROTHROMBIN TIME: CPT

## 2018-10-16 PROCEDURE — 99211 OFF/OP EST MAY X REQ PHY/QHP: CPT

## 2018-10-16 PROCEDURE — 36416 COLLJ CAPILLARY BLOOD SPEC: CPT

## 2018-10-16 NOTE — PROGRESS NOTES
Medication Management [facility]  Anticoagulation Clinic  [phone] (phone)  [phone] (fax)    Ms. Nilton Shields is a 75 y.o.  female with history of MVR who presents today for anticoagulation monitoring and adjustment. Patient verifies current dosing regimen and tablet strength. No missed or extra doses. Patient denies s/s bleeding/swelling/chest pain. Usual SOB and bruises. No blood in urine or stool. No dietary changes. No changes in OTC agents/Herbals.  switched to benicar due to valsartan recall. Onesimo James started metoprolol. No change in alcohol use or tobacco use. No change in activity level. Patient denies headaches/dizziness/lightheadedness/falls. No vomiting/diarrhea or acute illness. No procedures scheduled in the future at this time. Assessment:   Lab Results   Component Value Date    INR 2.50 (H) 10/16/2018    INR 3.50 (H) 09/04/2018    INR 3.00 (H) 07/24/2018    PROTIME 2.42 (H) 12/15/2011    PROTIME 1.97 (H) 12/01/2011    PROTIME 1.80 (H) 11/22/2011     INR therapeutic   Recent Labs      10/16/18   1013   INR  2.50*     Plan: POCT INR ordered and result reviewed. Continue Coumadin 2 mg po daily. Recheck INR in 6 weeks. Patient reminded to call the Anticoagulation Clinic with any signs or symptoms of bleeding or with any medication changes. Patient given instructions utilizing the teach back method. Discharged ambulatory in no apparent distress. After visit summary printed and reviewed with patient.       Medications reviewed and updated on home medication list Yes    Influenza vaccine:     [] given    [] declined   [x] received previously   [] plans to receive at a later time   [] refused    [x] documented in EPIC

## 2018-10-26 ENCOUNTER — HOSPITAL ENCOUNTER (OUTPATIENT)
Age: 73
Discharge: HOME | End: 2018-10-26
Payer: MEDICARE

## 2018-10-26 DIAGNOSIS — E11.9 TYPE 2 DIABETES MELLITUS WITHOUT COMPLICATION, WITHOUT LONG-TERM CURRENT USE OF INSULIN (HCC): ICD-10-CM

## 2018-10-26 LAB
AVERAGE GLUCOSE: 129 MG/DL (ref 70–126)
HBA1C MFR BLD: 6.3 % (ref 4.4–6.4)

## 2018-10-26 PROCEDURE — 36415 COLL VENOUS BLD VENIPUNCTURE: CPT

## 2018-10-26 PROCEDURE — 83036 HEMOGLOBIN GLYCOSYLATED A1C: CPT

## 2018-10-29 ENCOUNTER — OFFICE VISIT (OUTPATIENT)
Dept: FAMILY MEDICINE CLINIC | Age: 73
End: 2018-10-29
Payer: MEDICARE

## 2018-10-29 VITALS
HEART RATE: 80 BPM | RESPIRATION RATE: 12 BRPM | DIASTOLIC BLOOD PRESSURE: 62 MMHG | SYSTOLIC BLOOD PRESSURE: 162 MMHG | WEIGHT: 223.4 LBS | BODY MASS INDEX: 32.99 KG/M2

## 2018-10-29 DIAGNOSIS — E11.21 TYPE 2 DIABETES MELLITUS WITH DIABETIC NEPHROPATHY, WITHOUT LONG-TERM CURRENT USE OF INSULIN (HCC): Primary | ICD-10-CM

## 2018-10-29 DIAGNOSIS — Z12.31 ENCOUNTER FOR SCREENING MAMMOGRAM FOR MALIGNANT NEOPLASM OF BREAST: ICD-10-CM

## 2018-10-29 DIAGNOSIS — I10 ESSENTIAL HYPERTENSION: ICD-10-CM

## 2018-10-29 DIAGNOSIS — N18.30 CHRONIC KIDNEY DISEASE, STAGE III (MODERATE) (HCC): ICD-10-CM

## 2018-10-29 PROCEDURE — 1036F TOBACCO NON-USER: CPT | Performed by: FAMILY MEDICINE

## 2018-10-29 PROCEDURE — G8427 DOCREV CUR MEDS BY ELIG CLIN: HCPCS | Performed by: FAMILY MEDICINE

## 2018-10-29 PROCEDURE — G8484 FLU IMMUNIZE NO ADMIN: HCPCS | Performed by: FAMILY MEDICINE

## 2018-10-29 PROCEDURE — 1101F PT FALLS ASSESS-DOCD LE1/YR: CPT | Performed by: FAMILY MEDICINE

## 2018-10-29 PROCEDURE — 3017F COLORECTAL CA SCREEN DOC REV: CPT | Performed by: FAMILY MEDICINE

## 2018-10-29 PROCEDURE — G8399 PT W/DXA RESULTS DOCUMENT: HCPCS | Performed by: FAMILY MEDICINE

## 2018-10-29 PROCEDURE — 2022F DILAT RTA XM EVC RTNOPTHY: CPT | Performed by: FAMILY MEDICINE

## 2018-10-29 PROCEDURE — G8417 CALC BMI ABV UP PARAM F/U: HCPCS | Performed by: FAMILY MEDICINE

## 2018-10-29 PROCEDURE — 1123F ACP DISCUSS/DSCN MKR DOCD: CPT | Performed by: FAMILY MEDICINE

## 2018-10-29 PROCEDURE — 3044F HG A1C LEVEL LT 7.0%: CPT | Performed by: FAMILY MEDICINE

## 2018-10-29 PROCEDURE — 4040F PNEUMOC VAC/ADMIN/RCVD: CPT | Performed by: FAMILY MEDICINE

## 2018-10-29 PROCEDURE — 1090F PRES/ABSN URINE INCON ASSESS: CPT | Performed by: FAMILY MEDICINE

## 2018-10-29 PROCEDURE — 99214 OFFICE O/P EST MOD 30 MIN: CPT | Performed by: FAMILY MEDICINE

## 2018-10-29 ASSESSMENT — ENCOUNTER SYMPTOMS
SHORTNESS OF BREATH: 0
DIARRHEA: 0
NAUSEA: 0
BLOOD IN STOOL: 0
EYES NEGATIVE: 1
VOMITING: 0
ABDOMINAL PAIN: 0

## 2018-10-29 NOTE — PROGRESS NOTES
Arm, Position: Sitting, Cuff Size: Large Adult)   Pulse 80   Resp 12   Wt 223 lb 6.4 oz (101.3 kg)   BMI 32.99 kg/m²     Wt Readings from Last 3 Encounters:   10/29/18 223 lb 6.4 oz (101.3 kg)   09/20/18 219 lb 1.6 oz (99.4 kg)   08/22/18 219 lb 3.2 oz (99.4 kg)       Physical Exam   Constitutional: She is oriented to person, place, and time. She appears well-developed and well-nourished. HENT:   Head: Normocephalic and atraumatic. Right Ear: External ear normal.   Left Ear: External ear normal.   Mouth/Throat: Oropharynx is clear and moist.   TM's normal.   Eyes: Conjunctivae are normal.   Neck: Neck supple. No thyromegaly present. Cardiovascular: Normal rate and regular rhythm. No murmur heard. Valve click noted   Pulmonary/Chest: Effort normal and breath sounds normal. She has no wheezes. Abdominal: Soft. Bowel sounds are normal. There is no tenderness. There is no rebound and no guarding. Musculoskeletal: She exhibits no edema. Lymphadenopathy:     She has no cervical adenopathy. Neurological: She is alert and oriented to person, place, and time. Skin: Skin is warm and dry. No rash noted. Psychiatric: She has a normal mood and affect. Her behavior is normal.   Vitals reviewed.     Lab Results   Component Value Date    LABA1C 6.3 10/26/2018     No results found for: EAG  Lab Results   Component Value Date    CHOL 174 04/23/2018    TRIG 223 (H) 04/23/2018    HDL 42 04/23/2018    LDLCALC 87 04/23/2018     Lab Results   Component Value Date    WBC 6.4 07/21/2017    HGB 12.3 06/19/2018    HCT 36.8 (L) 06/19/2018    MCV 88.1 07/21/2017     07/21/2017     Lab Results   Component Value Date     04/23/2018    K 4.2 04/23/2018     04/23/2018    CO2 24 04/23/2018    BUN 24 04/23/2018    CREATININE 1.2 04/23/2018    GLUCOSE 152 04/23/2018    GLUCOSE 129 12/15/2011    CALCIUM 10.3 04/23/2018              Immunization History   Administered Date(s) Administered    Influenza Virus

## 2018-11-13 ENCOUNTER — PATIENT MESSAGE (OUTPATIENT)
Dept: FAMILY MEDICINE CLINIC | Age: 73
End: 2018-11-13

## 2018-11-14 RX ORDER — METOPROLOL TARTRATE 50 MG/1
50 TABLET, FILM COATED ORAL 2 TIMES DAILY
Qty: 180 TABLET | Refills: 3 | Status: SHIPPED | OUTPATIENT
Start: 2018-11-14 | End: 2019-10-21 | Stop reason: SDUPTHER

## 2018-11-20 ENCOUNTER — PROCEDURE VISIT (OUTPATIENT)
Dept: CARDIOLOGY CLINIC | Age: 73
End: 2018-11-20

## 2018-11-20 DIAGNOSIS — Z95.0 PACEMAKER: Primary | ICD-10-CM

## 2018-11-20 NOTE — PROGRESS NOTES
DR Claudia Quintana PT/AFIB/COUMADIN   AFIB BURDEN 100%  [employer] DUAL PACEMAKER   BATTERY 5.5-7.5 YRS REMAINING  ATRIAL IMPEDENCE 475  RV IMPEDENCE 475  P WAVES 0.6  RV WAVES 11.3  DDDR   A PACED 1.5%  V PACED 97.6%  NO EPISODES

## 2018-11-27 ENCOUNTER — HOSPITAL ENCOUNTER (OUTPATIENT)
Dept: PHARMACY | Age: 73
Setting detail: THERAPIES SERIES
Discharge: HOME | End: 2018-11-27
Payer: MEDICARE

## 2018-11-27 DIAGNOSIS — Z95.2 MITRAL VALVE REPLACED: ICD-10-CM

## 2018-11-27 LAB — POC INR: 3.3 (ref 0.8–1.2)

## 2018-11-27 PROCEDURE — 85610 PROTHROMBIN TIME: CPT

## 2018-11-27 PROCEDURE — 36416 COLLJ CAPILLARY BLOOD SPEC: CPT

## 2018-11-27 PROCEDURE — 99211 OFF/OP EST MAY X REQ PHY/QHP: CPT

## 2018-11-27 NOTE — PROGRESS NOTES
Medication Management [facility]  Anticoagulation Clinic  [phone] (phone)  [phone] (fax)    Ms. Jose Manuel Yoon is a 75 y.o.  female with history of MVR who presents today for anticoagulation monitoring and adjustment. Patient verifies current dosing regimen and tablet strength. No missed or extra doses. Patient denies s/s bleeding/bruising/swelling/chest pain. Usual SOB. No blood in urine or stool. No dietary changes. Had one spoonful of cranberry salad. No changes in medication/OTC agents/Herbals. No change in alcohol use or tobacco use. No change in activity level. Patient denies falls. Usual sinus headaches, dizziness, and lightheadedness. No vomiting/diarrhea or acute illness. No procedures scheduled in the future at this time. Assessment:   Lab Results   Component Value Date    INR 3.30 (H) 11/27/2018    INR 2.50 (H) 10/16/2018    INR 3.50 (H) 09/04/2018    PROTIME 2.42 (H) 12/15/2011    PROTIME 1.97 (H) 12/01/2011    PROTIME 1.80 (H) 11/22/2011     INR therapeutic   Recent Labs      11/27/18   0959   INR  3.30*     Plan: POCT INR ordered and result reviewed. Continue Coumadin 2 mg po daily. Recheck INR in 6 weeks. Patient reminded to call the Anticoagulation Clinic with any signs or symptoms of bleeding or with any medication changes. Patient given instructions utilizing the teach back method. Discharged ambulatory in no apparent distress. After visit summary printed and reviewed with patient.       Medications reviewed and updated on home medication list Yes    Influenza vaccine:     [] given    [] declined   [x] received previously   [] plans to receive at a later time   [] refused    [x] documented in EPIC

## 2018-12-31 ENCOUNTER — HOSPITAL ENCOUNTER (OUTPATIENT)
Dept: WOMENS IMAGING | Age: 73
Discharge: HOME | End: 2018-12-31
Payer: MEDICARE

## 2018-12-31 DIAGNOSIS — Z12.31 ENCOUNTER FOR SCREENING MAMMOGRAM FOR MALIGNANT NEOPLASM OF BREAST: ICD-10-CM

## 2018-12-31 PROCEDURE — 77067 SCR MAMMO BI INCL CAD: CPT

## 2019-01-08 ENCOUNTER — HOSPITAL ENCOUNTER (OUTPATIENT)
Dept: PHARMACY | Age: 74
Setting detail: THERAPIES SERIES
Discharge: HOME | End: 2019-01-08
Payer: MEDICARE

## 2019-01-08 ENCOUNTER — HOSPITAL ENCOUNTER (OUTPATIENT)
Age: 74
Discharge: HOME | End: 2019-01-08
Payer: MEDICARE

## 2019-01-08 DIAGNOSIS — Z95.2 MITRAL VALVE REPLACED: ICD-10-CM

## 2019-01-08 LAB
HCT VFR BLD CALC: 40.2 % (ref 37–47)
HEMOGLOBIN: 12.6 GM/DL (ref 12–16)
POC INR: 2.2 (ref 0.8–1.2)

## 2019-01-08 PROCEDURE — 99211 OFF/OP EST MAY X REQ PHY/QHP: CPT

## 2019-01-08 PROCEDURE — 85018 HEMOGLOBIN: CPT

## 2019-01-08 PROCEDURE — 85014 HEMATOCRIT: CPT

## 2019-01-08 PROCEDURE — 36415 COLL VENOUS BLD VENIPUNCTURE: CPT

## 2019-01-08 PROCEDURE — 36416 COLLJ CAPILLARY BLOOD SPEC: CPT

## 2019-01-08 PROCEDURE — 85610 PROTHROMBIN TIME: CPT

## 2019-01-14 DIAGNOSIS — E78.5 HYPERLIPIDEMIA: ICD-10-CM

## 2019-01-14 RX ORDER — SIMVASTATIN 10 MG
TABLET ORAL
Qty: 90 TABLET | Refills: 3 | Status: SHIPPED | OUTPATIENT
Start: 2019-01-14 | End: 2020-01-09

## 2019-01-16 ENCOUNTER — TELEPHONE (OUTPATIENT)
Dept: PULMONOLOGY | Age: 74
End: 2019-01-16

## 2019-01-16 DIAGNOSIS — Z99.89 OBSTRUCTIVE SLEEP APNEA ON CPAP: Primary | ICD-10-CM

## 2019-01-16 DIAGNOSIS — G47.33 OBSTRUCTIVE SLEEP APNEA ON CPAP: Primary | ICD-10-CM

## 2019-01-29 ENCOUNTER — HOSPITAL ENCOUNTER (OUTPATIENT)
Dept: PHARMACY | Age: 74
Setting detail: THERAPIES SERIES
Discharge: HOME | End: 2019-01-29
Payer: MEDICARE

## 2019-01-29 DIAGNOSIS — Z95.2 MITRAL VALVE REPLACED: ICD-10-CM

## 2019-01-29 LAB — POC INR: 3.5 (ref 0.8–1.2)

## 2019-01-29 PROCEDURE — 36416 COLLJ CAPILLARY BLOOD SPEC: CPT

## 2019-01-29 PROCEDURE — 85610 PROTHROMBIN TIME: CPT

## 2019-01-29 PROCEDURE — 99211 OFF/OP EST MAY X REQ PHY/QHP: CPT

## 2019-01-29 RX ORDER — FLUOROURACIL 50 MG/G
CREAM TOPICAL DAILY
COMMUNITY
Start: 2018-11-29

## 2019-02-26 ENCOUNTER — HOSPITAL ENCOUNTER (OUTPATIENT)
Dept: PHARMACY | Age: 74
Setting detail: THERAPIES SERIES
Discharge: HOME | End: 2019-02-26
Payer: MEDICARE

## 2019-02-26 ENCOUNTER — PROCEDURE VISIT (OUTPATIENT)
Dept: CARDIOLOGY CLINIC | Age: 74
End: 2019-02-26
Payer: MEDICARE

## 2019-02-26 DIAGNOSIS — Z95.0 PACEMAKER: Primary | ICD-10-CM

## 2019-02-26 DIAGNOSIS — Z95.2 MITRAL VALVE REPLACED: ICD-10-CM

## 2019-02-26 LAB — POC INR: 4.9 (ref 0.8–1.2)

## 2019-02-26 PROCEDURE — 85610 PROTHROMBIN TIME: CPT

## 2019-02-26 PROCEDURE — 93296 REM INTERROG EVL PM/IDS: CPT | Performed by: INTERNAL MEDICINE

## 2019-02-26 PROCEDURE — 36416 COLLJ CAPILLARY BLOOD SPEC: CPT

## 2019-02-26 PROCEDURE — 99211 OFF/OP EST MAY X REQ PHY/QHP: CPT

## 2019-02-26 PROCEDURE — 93294 REM INTERROG EVL PM/LDLS PM: CPT | Performed by: INTERNAL MEDICINE

## 2019-02-27 ENCOUNTER — TELEPHONE (OUTPATIENT)
Dept: CARDIOLOGY CLINIC | Age: 74
End: 2019-02-27

## 2019-03-05 ENCOUNTER — HOSPITAL ENCOUNTER (OUTPATIENT)
Dept: PHARMACY | Age: 74
Setting detail: THERAPIES SERIES
Discharge: HOME | End: 2019-03-05
Payer: MEDICARE

## 2019-03-05 DIAGNOSIS — Z95.2 MITRAL VALVE REPLACED: ICD-10-CM

## 2019-03-05 LAB — POC INR: 2.3 (ref 0.8–1.2)

## 2019-03-05 PROCEDURE — 36416 COLLJ CAPILLARY BLOOD SPEC: CPT

## 2019-03-05 PROCEDURE — 85610 PROTHROMBIN TIME: CPT

## 2019-03-05 PROCEDURE — 99211 OFF/OP EST MAY X REQ PHY/QHP: CPT

## 2019-03-08 ENCOUNTER — OFFICE VISIT (OUTPATIENT)
Dept: CARDIOLOGY CLINIC | Age: 74
End: 2019-03-08
Payer: MEDICARE

## 2019-03-08 ENCOUNTER — TELEPHONE (OUTPATIENT)
Dept: CARDIOLOGY CLINIC | Age: 74
End: 2019-03-08

## 2019-03-08 ENCOUNTER — OFFICE VISIT (OUTPATIENT)
Dept: PULMONOLOGY | Age: 74
End: 2019-03-08
Payer: MEDICARE

## 2019-03-08 VITALS
DIASTOLIC BLOOD PRESSURE: 76 MMHG | HEART RATE: 84 BPM | SYSTOLIC BLOOD PRESSURE: 144 MMHG | OXYGEN SATURATION: 98 % | HEIGHT: 69 IN | WEIGHT: 221.4 LBS | BODY MASS INDEX: 32.79 KG/M2

## 2019-03-08 VITALS
DIASTOLIC BLOOD PRESSURE: 60 MMHG | HEIGHT: 69 IN | WEIGHT: 222 LBS | BODY MASS INDEX: 32.88 KG/M2 | SYSTOLIC BLOOD PRESSURE: 140 MMHG

## 2019-03-08 DIAGNOSIS — Z95.2 H/O MITRAL VALVE REPLACEMENT: Primary | ICD-10-CM

## 2019-03-08 DIAGNOSIS — E66.9 OBESITY (BMI 35.0-39.9 WITHOUT COMORBIDITY): ICD-10-CM

## 2019-03-08 DIAGNOSIS — I48.20 CHRONIC ATRIAL FIBRILLATION (HCC): ICD-10-CM

## 2019-03-08 DIAGNOSIS — Z99.89 OBSTRUCTIVE SLEEP APNEA ON CPAP: Primary | ICD-10-CM

## 2019-03-08 DIAGNOSIS — I10 ESSENTIAL HYPERTENSION: ICD-10-CM

## 2019-03-08 DIAGNOSIS — G47.33 OBSTRUCTIVE SLEEP APNEA ON CPAP: Primary | ICD-10-CM

## 2019-03-08 PROCEDURE — G8482 FLU IMMUNIZE ORDER/ADMIN: HCPCS | Performed by: PHYSICIAN ASSISTANT

## 2019-03-08 PROCEDURE — 99213 OFFICE O/P EST LOW 20 MIN: CPT | Performed by: NUCLEAR MEDICINE

## 2019-03-08 PROCEDURE — 1123F ACP DISCUSS/DSCN MKR DOCD: CPT | Performed by: NUCLEAR MEDICINE

## 2019-03-08 PROCEDURE — 1090F PRES/ABSN URINE INCON ASSESS: CPT | Performed by: NUCLEAR MEDICINE

## 2019-03-08 PROCEDURE — 4040F PNEUMOC VAC/ADMIN/RCVD: CPT | Performed by: NUCLEAR MEDICINE

## 2019-03-08 PROCEDURE — G8482 FLU IMMUNIZE ORDER/ADMIN: HCPCS | Performed by: NUCLEAR MEDICINE

## 2019-03-08 PROCEDURE — 1036F TOBACCO NON-USER: CPT | Performed by: NUCLEAR MEDICINE

## 2019-03-08 PROCEDURE — 1101F PT FALLS ASSESS-DOCD LE1/YR: CPT | Performed by: NUCLEAR MEDICINE

## 2019-03-08 PROCEDURE — G8399 PT W/DXA RESULTS DOCUMENT: HCPCS | Performed by: PHYSICIAN ASSISTANT

## 2019-03-08 PROCEDURE — 1101F PT FALLS ASSESS-DOCD LE1/YR: CPT | Performed by: PHYSICIAN ASSISTANT

## 2019-03-08 PROCEDURE — G8427 DOCREV CUR MEDS BY ELIG CLIN: HCPCS | Performed by: NUCLEAR MEDICINE

## 2019-03-08 PROCEDURE — 4040F PNEUMOC VAC/ADMIN/RCVD: CPT | Performed by: PHYSICIAN ASSISTANT

## 2019-03-08 PROCEDURE — 3017F COLORECTAL CA SCREEN DOC REV: CPT | Performed by: NUCLEAR MEDICINE

## 2019-03-08 PROCEDURE — 3017F COLORECTAL CA SCREEN DOC REV: CPT | Performed by: PHYSICIAN ASSISTANT

## 2019-03-08 PROCEDURE — G8399 PT W/DXA RESULTS DOCUMENT: HCPCS | Performed by: NUCLEAR MEDICINE

## 2019-03-08 PROCEDURE — G8417 CALC BMI ABV UP PARAM F/U: HCPCS | Performed by: PHYSICIAN ASSISTANT

## 2019-03-08 PROCEDURE — 1036F TOBACCO NON-USER: CPT | Performed by: PHYSICIAN ASSISTANT

## 2019-03-08 PROCEDURE — 1090F PRES/ABSN URINE INCON ASSESS: CPT | Performed by: PHYSICIAN ASSISTANT

## 2019-03-08 PROCEDURE — G8427 DOCREV CUR MEDS BY ELIG CLIN: HCPCS | Performed by: PHYSICIAN ASSISTANT

## 2019-03-08 PROCEDURE — G8417 CALC BMI ABV UP PARAM F/U: HCPCS | Performed by: NUCLEAR MEDICINE

## 2019-03-08 PROCEDURE — 1123F ACP DISCUSS/DSCN MKR DOCD: CPT | Performed by: PHYSICIAN ASSISTANT

## 2019-03-08 PROCEDURE — 99213 OFFICE O/P EST LOW 20 MIN: CPT | Performed by: PHYSICIAN ASSISTANT

## 2019-03-08 ASSESSMENT — ENCOUNTER SYMPTOMS
CHEST TIGHTNESS: 0
EYES NEGATIVE: 1
NAUSEA: 0
COUGH: 0
WHEEZING: 0
BACK PAIN: 0
ALLERGIC/IMMUNOLOGIC NEGATIVE: 1
SHORTNESS OF BREATH: 0
DIARRHEA: 0
STRIDOR: 0

## 2019-03-19 ENCOUNTER — HOSPITAL ENCOUNTER (OUTPATIENT)
Dept: PHARMACY | Age: 74
Setting detail: THERAPIES SERIES
Discharge: HOME | End: 2019-03-19
Payer: MEDICARE

## 2019-03-19 DIAGNOSIS — Z95.2 H/O MITRAL VALVE REPLACEMENT: ICD-10-CM

## 2019-03-19 LAB — POC INR: 3.5 (ref 0.8–1.2)

## 2019-03-19 PROCEDURE — 36416 COLLJ CAPILLARY BLOOD SPEC: CPT

## 2019-03-19 PROCEDURE — 85610 PROTHROMBIN TIME: CPT

## 2019-03-19 PROCEDURE — 99211 OFF/OP EST MAY X REQ PHY/QHP: CPT

## 2019-03-21 ENCOUNTER — TELEPHONE (OUTPATIENT)
Dept: PHARMACY | Age: 74
End: 2019-03-21

## 2019-04-09 ENCOUNTER — HOSPITAL ENCOUNTER (OUTPATIENT)
Dept: PHARMACY | Age: 74
Setting detail: THERAPIES SERIES
Discharge: HOME | End: 2019-04-09
Payer: MEDICARE

## 2019-04-09 DIAGNOSIS — Z95.2 H/O MITRAL VALVE REPLACEMENT: ICD-10-CM

## 2019-04-09 LAB — POC INR: 2.8 (ref 0.8–1.2)

## 2019-04-09 PROCEDURE — 36416 COLLJ CAPILLARY BLOOD SPEC: CPT

## 2019-04-09 PROCEDURE — 99211 OFF/OP EST MAY X REQ PHY/QHP: CPT

## 2019-04-09 PROCEDURE — 85610 PROTHROMBIN TIME: CPT

## 2019-04-09 NOTE — PROGRESS NOTES
Medication Management [facility]  Anticoagulation Clinic  [phone] (phone)  [phone] (fax)      Ms. Cipriano Honeycutt is a 75 y.o.  female with history of MVR, per Dr. Jose Daniel Garcia referral, who presents today for Warfarin monitoring and adjustment (3 week visit). Patient verifies current dosing regimen and tablet strength. No missed or extra doses. Patient denies bleeding/bruising/chest pain. Has usual SOB and bilateral leg edema. No blood in urine or stool. No dietary changes. No changes in medication/OTC agents/herbals. No change in alcohol use or tobacco use. No change in activity level. Patient denies falls. Takes nothing for usual slight headaches. Has usual intermittent dizziness. No vomiting/diarrhea or acute illness. No procedures scheduled in the future at this time. Assessment:   Lab Results   Component Value Date    INR 2.80 (H) 04/09/2019    INR 3.50 (H) 03/19/2019    INR 2.30 (H) 03/05/2019    PROTIME 2.42 (H) 12/15/2011    PROTIME 1.97 (H) 12/01/2011    PROTIME 1.80 (H) 11/22/2011     INR therapeutic - goal 2.5-3.5. Recent Labs     04/09/19  1309   INR 2.80*     Plan:  POCT INR ordered/performed/result reviewed. Continue Coumadin 2 mg daily PO. Recheck INR in 4 weeks. Patient reminded to call the Anticoagulation Clinic with any signs or symptoms of bleeding or with any medication changes. Patient given instructions utilizing the teach back method. Discharged ambulatory in no apparent distress. After visit summary printed and reviewed with patient.       Medications reviewed and updated on home medication list.    Influenza vaccine:     [] given    [] declined   [x] received previously   [] plans to receive at a later time   [] refused    [x] documented in EPIC

## 2019-04-22 ENCOUNTER — HOSPITAL ENCOUNTER (OUTPATIENT)
Age: 74
Discharge: HOME | End: 2019-04-22
Payer: MEDICARE

## 2019-04-22 DIAGNOSIS — N18.30 CHRONIC KIDNEY DISEASE, STAGE III (MODERATE) (HCC): ICD-10-CM

## 2019-04-22 DIAGNOSIS — E11.21 TYPE 2 DIABETES MELLITUS WITH DIABETIC NEPHROPATHY, WITHOUT LONG-TERM CURRENT USE OF INSULIN (HCC): ICD-10-CM

## 2019-04-22 DIAGNOSIS — I10 ESSENTIAL HYPERTENSION: ICD-10-CM

## 2019-04-22 LAB
ALBUMIN SERPL-MCNC: 4 G/DL (ref 3.5–5.1)
ALP BLD-CCNC: 68 U/L (ref 38–126)
ALT SERPL-CCNC: 10 U/L (ref 11–66)
ANION GAP SERPL CALCULATED.3IONS-SCNC: 14 MEQ/L (ref 8–16)
AST SERPL-CCNC: 22 U/L (ref 5–40)
AVERAGE GLUCOSE: 144 MG/DL (ref 70–126)
BILIRUB SERPL-MCNC: 0.3 MG/DL (ref 0.3–1.2)
BUN BLDV-MCNC: 21 MG/DL (ref 7–22)
CALCIUM SERPL-MCNC: 10.1 MG/DL (ref 8.5–10.5)
CHLORIDE BLD-SCNC: 108 MEQ/L (ref 98–111)
CHOLESTEROL, TOTAL: 148 MG/DL (ref 100–199)
CO2: 26 MEQ/L (ref 23–33)
CREAT SERPL-MCNC: 1.2 MG/DL (ref 0.4–1.2)
CREATININE, URINE: 139.7 MG/DL
GFR SERPL CREATININE-BSD FRML MDRD: 44 ML/MIN/1.73M2
GLUCOSE BLD-MCNC: 147 MG/DL (ref 70–108)
HBA1C MFR BLD: 6.8 % (ref 4.4–6.4)
HDLC SERPL-MCNC: 39 MG/DL
LDL CHOLESTEROL CALCULATED: 65 MG/DL
MICROALBUMIN UR-MCNC: < 1.2 MG/DL
MICROALBUMIN/CREAT UR-RTO: 9 MG/G (ref 0–30)
POTASSIUM SERPL-SCNC: 4.9 MEQ/L (ref 3.5–5.2)
SODIUM BLD-SCNC: 148 MEQ/L (ref 135–145)
TOTAL PROTEIN: 7 G/DL (ref 6.1–8)
TRIGL SERPL-MCNC: 221 MG/DL (ref 0–199)

## 2019-04-22 PROCEDURE — 80061 LIPID PANEL: CPT

## 2019-04-22 PROCEDURE — 80053 COMPREHEN METABOLIC PANEL: CPT

## 2019-04-22 PROCEDURE — 82043 UR ALBUMIN QUANTITATIVE: CPT

## 2019-04-22 PROCEDURE — 36415 COLL VENOUS BLD VENIPUNCTURE: CPT

## 2019-04-22 PROCEDURE — 83036 HEMOGLOBIN GLYCOSYLATED A1C: CPT

## 2019-04-23 DIAGNOSIS — Z95.2 MITRAL VALVE REPLACED: ICD-10-CM

## 2019-04-23 RX ORDER — GLIMEPIRIDE 2 MG/1
TABLET ORAL
Qty: 180 TABLET | Refills: 3 | Status: SHIPPED | OUTPATIENT
Start: 2019-04-23 | End: 2020-04-17

## 2019-04-23 RX ORDER — WARFARIN SODIUM 1 MG/1
TABLET ORAL
Qty: 180 TABLET | Refills: 3 | Status: SHIPPED | OUTPATIENT
Start: 2019-04-23 | End: 2020-04-17

## 2019-04-29 ENCOUNTER — OFFICE VISIT (OUTPATIENT)
Dept: FAMILY MEDICINE CLINIC | Age: 74
End: 2019-04-29
Payer: MEDICARE

## 2019-04-29 VITALS
BODY MASS INDEX: 33.56 KG/M2 | SYSTOLIC BLOOD PRESSURE: 138 MMHG | HEART RATE: 64 BPM | DIASTOLIC BLOOD PRESSURE: 62 MMHG | HEIGHT: 68 IN | WEIGHT: 221.4 LBS | RESPIRATION RATE: 16 BRPM

## 2019-04-29 DIAGNOSIS — L30.0 NUMMULAR ECZEMA: ICD-10-CM

## 2019-04-29 DIAGNOSIS — E78.5 HYPERLIPIDEMIA, UNSPECIFIED HYPERLIPIDEMIA TYPE: ICD-10-CM

## 2019-04-29 DIAGNOSIS — E11.21 TYPE 2 DIABETES MELLITUS WITH DIABETIC NEPHROPATHY, WITHOUT LONG-TERM CURRENT USE OF INSULIN (HCC): ICD-10-CM

## 2019-04-29 DIAGNOSIS — N18.30 CHRONIC KIDNEY DISEASE, STAGE III (MODERATE) (HCC): ICD-10-CM

## 2019-04-29 DIAGNOSIS — Z00.00 ROUTINE GENERAL MEDICAL EXAMINATION AT A HEALTH CARE FACILITY: Primary | ICD-10-CM

## 2019-04-29 PROCEDURE — G8417 CALC BMI ABV UP PARAM F/U: HCPCS | Performed by: FAMILY MEDICINE

## 2019-04-29 PROCEDURE — 3017F COLORECTAL CA SCREEN DOC REV: CPT | Performed by: FAMILY MEDICINE

## 2019-04-29 PROCEDURE — 1090F PRES/ABSN URINE INCON ASSESS: CPT | Performed by: FAMILY MEDICINE

## 2019-04-29 PROCEDURE — 1036F TOBACCO NON-USER: CPT | Performed by: FAMILY MEDICINE

## 2019-04-29 PROCEDURE — G8427 DOCREV CUR MEDS BY ELIG CLIN: HCPCS | Performed by: FAMILY MEDICINE

## 2019-04-29 PROCEDURE — 2022F DILAT RTA XM EVC RTNOPTHY: CPT | Performed by: FAMILY MEDICINE

## 2019-04-29 PROCEDURE — 3044F HG A1C LEVEL LT 7.0%: CPT | Performed by: FAMILY MEDICINE

## 2019-04-29 PROCEDURE — 99213 OFFICE O/P EST LOW 20 MIN: CPT | Performed by: FAMILY MEDICINE

## 2019-04-29 PROCEDURE — 1123F ACP DISCUSS/DSCN MKR DOCD: CPT | Performed by: FAMILY MEDICINE

## 2019-04-29 PROCEDURE — G0438 PPPS, INITIAL VISIT: HCPCS | Performed by: FAMILY MEDICINE

## 2019-04-29 PROCEDURE — G8399 PT W/DXA RESULTS DOCUMENT: HCPCS | Performed by: FAMILY MEDICINE

## 2019-04-29 PROCEDURE — 4040F PNEUMOC VAC/ADMIN/RCVD: CPT | Performed by: FAMILY MEDICINE

## 2019-04-29 RX ORDER — MOMETASONE FUROATE 1 MG/G
CREAM TOPICAL
Qty: 45 G | Refills: 0 | Status: SHIPPED | OUTPATIENT
Start: 2019-04-29 | End: 2020-12-03 | Stop reason: SDUPTHER

## 2019-04-29 RX ORDER — MOMETASONE FUROATE 1 MG/G
CREAM TOPICAL
Qty: 45 G | Refills: 0 | Status: CANCELLED | OUTPATIENT
Start: 2019-04-29

## 2019-04-29 ASSESSMENT — LIFESTYLE VARIABLES: HOW OFTEN DO YOU HAVE A DRINK CONTAINING ALCOHOL: 0

## 2019-04-29 ASSESSMENT — ANXIETY QUESTIONNAIRES: GAD7 TOTAL SCORE: 0

## 2019-04-29 ASSESSMENT — ENCOUNTER SYMPTOMS
DIARRHEA: 0
SHORTNESS OF BREATH: 0
BLOOD IN STOOL: 0
EYES NEGATIVE: 1
ABDOMINAL PAIN: 0
VOMITING: 0
NAUSEA: 0

## 2019-04-29 ASSESSMENT — PATIENT HEALTH QUESTIONNAIRE - PHQ9
SUM OF ALL RESPONSES TO PHQ QUESTIONS 1-9: 2
SUM OF ALL RESPONSES TO PHQ QUESTIONS 1-9: 2

## 2019-04-29 NOTE — PATIENT INSTRUCTIONS
Personalized Preventive Plan for [facility] - 4/29/2019  Medicare offers a range of preventive health benefits. Some of the tests and screenings are paid in full while other may be subject to a deductible, co-insurance, and/or copay. Some of these benefits include a comprehensive review of your medical history including lifestyle, illnesses that may run in your family, and various assessments and screenings as appropriate. After reviewing your medical record and screening and assessments performed today your provider may have ordered immunizations, labs, imaging, and/or referrals for you. A list of these orders (if applicable) as well as your Preventive Care list are included within your After Visit Summary for your review. Other Preventive Recommendations:    · A preventive eye exam performed by an eye specialist is recommended every 1-2 years to screen for glaucoma; cataracts, macular degeneration, and other eye disorders. · A preventive dental visit is recommended every 6 months. · Try to get at least 150 minutes of exercise per week or 10,000 steps per day on a pedometer . · Order or download the FREE \"Exercise & Physical Activity: Your Everyday Guide\" from [employer]. Call 0-751.570.4965 or search [employer] online. · You need 9810-3954 mg of calcium and 8184-4759 IU of vitamin D per day. It is possible to meet your calcium requirement with diet alone, but a vitamin D supplement is usually necessary to meet this goal.  · When exposed to the sun, use a sunscreen that protects against both UVA and UVB radiation with an SPF of 30 or greater. Reapply every 2 to 3 hours or after sweating, drying off with a towel, or swimming. · Always wear a seat belt when traveling in a car. Always wear a helmet when riding a bicycle or motorcycle.     Keep Your Memory Benton Curiel       Many factors can affect your ability to remembera hectic lifestyle, aging, stress, chronic nutritional needs are being met? Can herbs and supplements still offer a benefit? Researchers have investigated a range of natural remedies, such as ginkgo , ginseng , and the supplement phosphatidylserine (PS). So far, though, the evidence is inconsistent as to whether these products can improve memory or thinking. If you are interested in taking herbs and supplements, talk to your doctor first because they may interact with other medicines that you are taking. Exercise Regularly    Among the many benefits of regular exercise are increased blood flow to the brain and decreased risk of certain diseases that can interfere with memory function. One study found that even moderate exercise has a beneficial effect. Examples of \"moderate\" exercise include:   Playing 18 holes of golf once a week, without a cart   Playing tennis twice a week   Walking one mile per day   Manage Stress    It can be tough to remember what is important when your mind is cluttered. Make time for relaxation. Choose activities that calm you down, and make it routine. Manage Chronic Conditions    Side effects of high blood pressure , diabetes, and heart disease can interfere with mental function. Many of the lifestyle steps discussed here can help manage these conditions. Strive to eat a healthy diet, exercise regularly, get stress under control, and follow your doctor's advice for your condition. Minimize Medications    Talk to your doctor about the medicines that you take. Some may be unnecessary. Also, healthy lifestyle habits may lower the need for certain drugs. Last Reviewed: April 2010 Jenn Wilkins MD   Updated: 4/13/2010   ·     [address]       As we get older, changes in balance, gait, strength, vision, hearing, and cognition make even the most youthful senior more prone to accidents. Falls are one of the leading health risks for older people.  This increased risk of falling is related to:   Aging process (eg, decreased muscle strength, slowed reflexes)   Higher incidence of chronic health problems (eg, arthritis, diabetes) that may limit mobility, agility or sensory awareness   Side effects of medicine (eg, dizziness, blurred vision)especially medicines like prescription pain medicines and drugs used to treat mental health conditions   Depending on the brittleness of your bones, the consequences of a fall can be serious and long lasting. Home Life   Research by the Association of A[employer] [facility]) shows that some home accidents among older adults can be prevented by making simple lifestyle changes and basic modifications and repairs to the home environment. Here are some lifestyle changes that experts recommend:   Have your hearing and vision checked regularly. Be sure to wear prescription glasses that are right for you. Speak to your doctor or pharmacist about the possible side effects of your medicines. A number of medicines can cause dizziness. If you have problems with sleep, talk to your doctor. Limit your intake of alcohol. If necessary, use a cane or walker to help maintain your balance. Wear supportive, rubber-soled shoes, even at home. If you live in a region that gets wintry weather, you may want to put special cleats on your shoes to prevent you from slipping on the snow and ice. Exercise regularly to help maintain muscle tone, agility, and balance. Always hold the banister when going up or down stairs. Also, use  bars when getting in or out of the bath or shower, or using the toilet. To avoid dizziness, get up slowly from a lying down position. Sit up first, dangling your legs for a minute or two before rising to a standing position. Overall Home Safety Check   According to the Consumer Product Safety Commision's \"Older Consumer Home Safety Checklist,\" it is important to check for potential hazards in each room. And remember, proper lighting is an essential factor in home safety.  If you they do not hang over the sink, range, or working areas. Look for coffee pots, kettles and toaster ovens with automatic shut-offs. Keep a mop handy in the kitchen so you can wipe up spills instantly. You should also have a small fire extinguisher. Arrange your kitchen with frequently used items on lower shelves to avoid the need to stand on a stepstool to reach them. Make sure countertops are well-lit to avoid injuries while cutting and preparing food. In the Bathroom    Use a non-slip mat or decals in the tub and shower, since wet, soapy tile or porcelain surfaces are extremely slippery. Make sure bathroom rugs are non-skid or tape them firmly to the floor. Bathtubs should have at least one, preferably two, grab bars, firmly attached to structural supports in the wall. (Do not use built-in soap holders or glass shower doors as grab bars.)    Tub seats fitted with non-slip material on the legs allow you to wash sitting down. For people with limited mobility, bathtub transfer benches allow you to slide safely into the tub. Raised toilet seats and toilet safety rails are helpful for those with knee or hip problems. In the [facility]    Make sure you use a nightlight and that the area around your bed is clear of potential obstacles. Be careful with electric blankets and never go to sleep with a heating pad, which can cause serious burns even if on a low setting. Use fire-resistant mattress covers and pillows, and NEVER smoke in bed. Keep a phone next to the bed that is programmed to dial 911 at the push of a button. If you have a chronic condition, you may want to sign on with an automatic call-in service. Typically the system includes a small pendant that connects directly to an emergency medical voice-response system. You should also make arrangements to stay in contact with someonefriend, neighbor, family memberon a regular schedule.    Fire Prevention   According to the [employer] (Smoke Alarms for Every) [facility], senior citizens are one of the two highest risk groups for death and serious injuries due to residential fires. When cooking, wear short-sleeved items, never a bulky long-sleeved robe. The [facility]'s Safety Checklist for Older Consumers emphasizes the importance of checking basements, garages, workshops and storage areas for fire hazards, such as volatile liquids, piles of old rags or clothing and overloaded circuits. Never smoke in bed or when lying down on a couch or recliner chair. Small portable electric or kerosene heaters are responsible for many home fires and should be used cautiously if at all. If you do use one, be sure to keep them away from flammable materials. In case of fire, make sure you have a pre-established emergency exit plan. Have a professional check your fireplace and other fuel-burning appliances yearly. [employer]   Baby boomers entering the arroyo years will continue to see the development of new products to help older adults live safely and independently in spite of age-related changes. Making Life More Livable  , by Swathi Cabrera, lists over 1,000 products for \"living well in the mature years,\" such as bathing and mobility aids, household security devices, ergonomically designed knives and peelers, and faucet valves and knobs for temperature control. Medical supply stores and organizations are good sources of information about products that improve your quality of life and insure your safety. Last Reviewed: November 2009 Mariza Hernandez MD   Updated: 3/7/2011     ·        Learning About Living Delpha Short  What is a living will? A living will is a legal form you use to write down the kind of care you want at the end of your life. It is used by the health professionals who will treat you if you aren't able to decide for yourself.   If you put your wishes in writing, your loved ones and others will know what kind of care you want. They won't need to guess. This can ease your mind and be helpful to others. A living will is not the same as an estate or property will. An estate will explains what you want to happen with your money and property after you die. Is a living will a legal document? A living will is a legal document. Each state has its own laws about living [place]. If you move to another state, make sure that your living will is legal in the state where you now live. Or you might use a universal form that has been approved by many states. This kind of form can sometimes be completed and stored online. Your electronic copy will then be available wherever you have a connection to the Internet. In most cases, doctors will respect your wishes even if you have a form from a different state. You don't need an  to complete a living will. But legal advice can be helpful if your state's laws are unclear, your health history is complicated, or your family can't agree on what should be in your living will. You can change your living will at any time. Some people find that their wishes about end-of-life care change as their health changes. In addition to making a living will, think about completing a medical power of  form. This form lets you name the person you want to make end-of-life treatment decisions for you (your \"health care agent\") if you're not able to. Many hospitals and nursing homes will give you the forms you need to complete a living will and a medical power of . Your living will is used only if you can't make or communicate decisions for yourself anymore. If you become able to make decisions again, you can accept or refuse any treatment, no matter what you wrote in your living will. Your state may offer an online registry. This is a place where you can store your living will online so the doctors and nurses who need to treat you can find it right away.   What should you think about when creating a living will? Talk about your end-of-life wishes with your family members and your doctor. Let them know what you want. That way the people making decisions for you won't be surprised by your choices. Think about these questions as you make your living will:  Do you know enough about life support methods that might be used? If not, talk to your doctor so you know what might be done if you can't breathe on your own, your heart stops, or you're unable to swallow. What things would you still want to be able to do after you receive life-support methods? Would you want to be able to walk? To speak? To eat on your own? To live without the help of machines? If you have a choice, where do you want to be cared for? In your home? At a hospital or nursing home? Do you want certain Anabaptism practices performed if you become very ill? If you have a choice at the end of your life, where would you prefer to die? At home? In a hospital or nursing home? Somewhere else? Would you prefer to be buried or cremated? Do you want your organs to be donated after you die? What should you do with your living will? Make sure that your family members and your health care agent have copies of your living will. Give your doctor a copy of your living will to keep in your medical record. If you have more than one doctor, make sure that each one has a copy. You may want to put a copy of your living will where it can be easily found. Where can you learn more? Go to [email or web address]://[email or web address].[email or web address] org and sign in to your [employer] account. Enter I794 in the Victorious box to learn more about \"Learning About Living [place]. \"     If you do not have an account, please click on the \"Sign Up Now\" link. Current as of: April 18, 2018  Content Version: 11.9  © 7965-3943 [employer]. Care instructions adapted under license by [address]  If you have questions about a medical condition or this instruction, always ask your healthcare professional. [place] [address] any warranty or liability for your use of this information.     ·

## 2019-04-29 NOTE — PROGRESS NOTES
Chief Complaint   Patient presents with   [place] AT [facility] [place],THE Medicare AW       SUBJECTIVE     Radha Calderon is a 75 y. o.female      Here for follow up of chronic health problems including:    Patient Active Problem List   Diagnosis    SOB (shortness of breath)    H/O mitral valve replacement    Type 2 diabetes mellitus with diabetic nephropathy (HCC)    Hyperlipidemia    Arrhythmia    History of valvular heart disease    History of sinus bradycardia    MI, old    Generalized headaches    Nasal septal deviation    Obesity (BMI 35.0-39.9 without comorbidity)    Cataract    Essential hypertension    Obstructive sleep apnea on CPAP    Anticoagulated on Coumadin    Symptomatic bradycardia    Medtronic dual pacemaker    Basal cell carcinoma of skin of neck    Chronic kidney disease, stage III (moderate) (HCC)    Chronic atrial fibrillation (HCC)       Doing well. FBS under 140. No hypoglycemia. BPs stable. She sees her specialists regularly. Takes all meds as directed and denies side effects. No recent illnesses or hospitalizations. States that she is usually more depressed over the winter but it is improving already with the Spring season. No falls. Some short term memory issues, but nothing significant at this point. Nonsmoker. Body mass index is 33.56 kg/m². Review of Systems   Constitutional: Negative for chills, fatigue and fever. HENT: Negative. Eyes: Negative. Respiratory: Negative for shortness of breath. Cardiovascular: Negative for chest pain and palpitations (chronic). Gastrointestinal: Negative for abdominal pain, blood in stool, diarrhea, nausea and vomiting. Genitourinary: Negative for dysuria. Musculoskeletal: Negative for arthralgias and myalgias. Skin: Negative for rash. Neurological: Negative for dizziness and headaches. Hematological: Negative. Psychiatric/Behavioral: Positive for dysphoric mood.    All other systems reviewed and are 39   LDL Calculated      mg/dL 65   Microalbumin, Random Urine      mg/dL < 1.20   Creatinine, Urine      mg/dL 139.7   Microalb/Creat Ratio      0 - 30 mg/g 9   Hemoglobin A1C      4.4 - 6.4 % 6.8 (H)   AVERAGE GLUCOSE      70 - 126 mg/dL 144 (H)   Anion Gap      8.0 - 16.0 meq/L 14.0   Est, Glom Filt Rate      ml/min/1.73m2 44 (A)         Immunization History   Administered Date(s) Administered    Influenza Virus Vaccine 01/01/2011, 11/01/2012, 11/14/2013, 11/24/2014, 09/24/2016, 10/07/2017    Influenza, High Dose (Fluzone 65 yrs and older) 09/22/2016    Influenza, Triv, inactivated, subunit, adjuvanted, IM (Fluad 65 yrs and older) 10/26/2018    Pneumococcal 13-valent Conjugate ([id number]) 04/23/2015    Pneumococcal Polysaccharide ([id number]) 07/01/2009    Zoster Live (Zostavax) 06/30/2015         Health Maintenance   Topic Date Due    DTaP/Tdap/Td vaccine (1 - Tdap) 06/12/1963    Shingles Vaccine (2 of 3) 08/25/2015    Diabetic retinal exam  12/19/2019    A1C test (Diabetic or Prediabetic)  04/22/2020    Lipid screen  04/22/2020    Potassium monitoring  04/22/2020    Creatinine monitoring  04/22/2020    Diabetic foot exam  04/29/2020    Breast cancer screen  12/31/2020    Colon cancer screen colonoscopy  08/22/2027    DEXA (modify frequency per FRAX score)  Completed    Flu vaccine  Completed    Pneumococcal 65+ years Vaccine  Completed       Future Appointments   Date Time Provider Marija Benavidesi   5/7/2019 10:00 AM Valerie Perry RN STR MED [facility] [address] [facility]   6/18/2019 11:15 AM Ryan Talbot PA-C Pulm Med [facility] - [address] [facility]   8/22/2019 10:15 AM Ryan Talbot PA-C Pulm Med [facility] - [address] [facility]   8/27/2019 10:00 AM SCHEDULE, SRPS PACER NURSE [facility] PACER [facility] [place]   3/13/2020 10:15 AM Caren Kelley MD SRPX Heart [facility] - [address]      1. Type 2 diabetes mellitus with diabetic nephropathy, without long-term current use of insulin ([facility] [address]    2. Nummular eczema    3.  Chronic kidney disease, stage III (moderate) ([facility] [address]    4. Hyperlipidemia, unspecified hyperlipidemia type        PLAN        Requested Prescriptions     Signed Prescriptions Disp Refills    mometasone (ELOCON) 0.1 % cream 45 g 0     Sig: Apply topically daily. Continue current meds    See specialists as planned    Shingrix discussed. She will check with her pharmacy about it. HbA1C in 6 months      Reg Lambert received counseling on the following healthy behaviors: nutrition, exercise and medication adherence    I have instructed Reg Lambert to complete a self tracking handout on Blood Sugars  and Blood Pressures  and instructed them to bring it with them to her next appointment. Discussed use, benefit, and side effects of prescribed medications. Barriers to medication compliance addressed. All patient questions answered. Pt voiced understanding. Electronically signed by Viridiana Wang MD on 2019 at 11:11 AM           Medicare Annual Wellness Visit  Name: Elizabeth Mata Date: 2019   MRN: [id number] Sex: Female   Age: 75 y.o. Ethnicity: Non-/Non    : 1945 Race: Romayne Printers is here for Check-Up and Medicare AWV    Screenings for behavioral, psychosocial and functional/safety risks, and cognitive dysfunction are all negative except as indicated below. These results, as well as other patient data from the [address] [facility] [address] form, are documented in Flowsheets linked to this Encounter. Allergies   Allergen Reactions    Adalat [Nifedipine] Itching     redness    Amlodipine Swelling     If take more than 5mg legs swell, hot and red    Potassium-Containing Compounds      SHIMA    Tape [Adhesive Tape] Rash     Skin pulls off     Prior to Visit Medications    Medication Sig Taking? Authorizing Provider   mometasone (ELOCON) 0.1 % cream Apply topically daily.  Yes Viridiana Wang MD   warfarin (COUMADIN) 1 MG tablet TAKE 2 TABLETS DAILY EXCEPT Medical History:   Diagnosis Date    Arrhythmia     CHRONIC ATRIAL FIB    Cancer ([facility] [address]     breast ca left    Chronic kidney disease, stage III (moderate) ([facility] [address] 10/29/2018    Depression     Diabetes mellitus (HCC)     Generalized headaches     History of dizziness     History of sinus bradycardia     History of valvular heart disease     Hyperlipidemia     Hypertension     Medtronic dual pacemaker 5/23/2017    MI, old     Mitral valve replaced     Numbness and tingling     Obesity     Obstructive sleep apnea on CPAP 2011    Osteopenia     SOB (shortness of breath)     HX OF:     Past Surgical History:   Procedure Laterality Date    BREAST SURGERY  2011    right biopsy    BREAST SURGERY Left 1/15/13    re-excision cranial margin and mammosite spacer    CARDIAC CATHETERIZATION  10/06/2003    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 MV area by recent EKG was 1 sq. cm. and mean gradient across MV was 17mmHg. MV index was about 0.7 sq. cm. per body surface area. Moderate pulmonary artery systolic HTN. Patent coronary arteries.  CARDIAC VALVE REPLACEMENT  2003    MVR  33mm [address] [place] Mechanical valve    CARDIOVASCULAR STRESS TEST  05/26/2011    Cannot exclude mild degree of ischemia in anterolateral wall. EF 58%. Mildly abnormal nuclear scan.  CARDIOVASCULAR STRESS TEST  7-09-10    Atrial fibrillation. Episodes of bradycardia mainly at night, which could be physiologic. Predominantly bradycardiac rhythm w/o significant pauses. Frequent PVC's     CARDIOVASCULAR STRESS TEST  1-14-08    Atrial fibrillation w/ controlled ventricular repsonse and an average heart rate of 58 bpm. 1 episode of 3 beat run of nonsustained ventricular tachyarrhythmia at rate of 110-158 bpm. 3 transient episodes of ventricular bigeminy w/ rate in mid 60's to low 70's.  CARDIOVASCULAR STRESS TEST  2-08-99    Unremarkable routine stress test. Chronic A-fib.  CARDIOVASCULAR STRESS TEST  2-08-00    Normal stress EKG. Physician (Cardiology)  Niki Emmanuel MD (Dermatology)    Wt Readings from Last 3 Encounters:   04/29/19 221 lb 6.4 oz (100.4 kg)   03/08/19 221 lb 6.4 oz (100.4 kg)   03/08/19 222 lb (100.7 kg)     Vitals:    04/29/19 1038 04/29/19 1040   BP: (!) 148/64 138/62   Site: Right Upper Arm Right Upper Arm   Position: Sitting Sitting   Cuff Size: Large Adult Large Adult   Pulse: 64    Resp: 16    Weight: 221 lb 6.4 oz (100.4 kg)    Height: 5' 8.1\" (1.73 m)      Body mass index is 33.56 kg/m². Based upon direct observation of the patient, evaluation of cognition reveals recent and remote memory intact. Patient's complete Health Risk Assessment and screening values have been reviewed and are found in Flowsheets. The following problems were reviewed today and where indicated follow up appointments were made and/or referrals ordered. Positive Risk Factor Screenings with Interventions:     General Health:  General  In general, how would you say your health is?: Good  In the past 7 days, have you experienced any of the following? New or Increased Pain, New or Increased Fatigue, Loneliness, Social Isolation, Stress or Anger?: None of These  Do you get the social and emotional support that you need?: Yes  Do you have a Living Will?: (!) No  General Health Risk Interventions:  · No Living Will: additional information provided . Health Habits/Nutrition:  Health Habits/Nutrition  Do you exercise for at least 20 minutes 2-3 times per week?: (!) No  Have you lost any weight without trying in the past 3 months?: No  Do you eat fewer than 2 meals per day?: (!) Yes  Have you seen a dentist within the past year?: Yes  Body mass index is 33.56 kg/m².   Health Habits/Nutrition Interventions:  · Inadequate physical activity:  educational materials provided to promote increased physical activity  · Nutritional issues:  healthy eating habits encouraged    Safety:  Safety  Do you have working smoke detectors?: Yes  Have all throw rugs been removed or fastened?: (!) No  Do you have non-slip mats in all bathtubs?: (!) No  Do all of your stairways have a railing or banister?: (!) No  Are your doorways, halls and stairs free of clutter?: Yes  Do you always fasten your seatbelt when you are in a car?: Yes  Safety Interventions:  · Home safety tips provided    Personalized Preventive Plan   Current Health Maintenance Status  Immunization History   Administered Date(s) Administered    Influenza Virus Vaccine 01/01/2011, 11/01/2012, 11/14/2013, 11/24/2014, 09/24/2016, 10/07/2017    Influenza, High Dose (Fluzone 65 yrs and older) 09/22/2016    Influenza, Triv, inactivated, subunit, adjuvanted, IM (Fluad 65 yrs and older) 10/26/2018    Pneumococcal 13-valent Conjugate ([id number]) 04/23/2015    Pneumococcal Polysaccharide (Zdiupzpis31) 07/01/2009    Zoster Live (Zostavax) 06/30/2015        Health Maintenance   Topic Date Due    DTaP/Tdap/Td vaccine (1 - Tdap) 06/12/1963    Shingles Vaccine (2 of 3) 08/25/2015    Diabetic retinal exam  12/19/2019    A1C test (Diabetic or Prediabetic)  04/22/2020    Lipid screen  04/22/2020    Potassium monitoring  04/22/2020    Creatinine monitoring  04/22/2020    Diabetic foot exam  04/29/2020    Breast cancer screen  12/31/2020    Colon cancer screen colonoscopy  08/22/2027    DEXA (modify frequency per FRAX score)  Completed    Flu vaccine  Completed    Pneumococcal 65+ years Vaccine  Completed     Recommendations for Preventive Services Due: see orders and patient instructions/AVS.  .   Recommended screening schedule for the next 5-10 years is provided to the patient in written form: see Patient Instructions/AVS.        Electronically signed by Yasmin Garcia MD on 4/29/2019 at 11:18 AM

## 2019-04-30 DIAGNOSIS — E11.9 TYPE 2 DIABETES MELLITUS WITHOUT COMPLICATION, WITHOUT LONG-TERM CURRENT USE OF INSULIN (HCC): ICD-10-CM

## 2019-04-30 RX ORDER — SITAGLIPTIN 100 MG/1
TABLET, FILM COATED ORAL
Qty: 90 TABLET | Refills: 3 | Status: SHIPPED | OUTPATIENT
Start: 2019-04-30 | End: 2020-04-24

## 2019-04-30 NOTE — TELEPHONE ENCOUNTER
Higginson Dyke needs refill of   Requested Prescriptions     Pending Prescriptions Disp Refills    JANUVIA 100 MG tablet [Pharmacy Med Name: James Embs TABS 100MG] 90 tablet 3     Sig: TAKE 1 TABLET DAILY       Last Filled on:  4/24/2018    Last Visit Date:  4/29/2019    Next Visit Date:    Visit date not found

## 2019-05-07 ENCOUNTER — HOSPITAL ENCOUNTER (OUTPATIENT)
Dept: PHARMACY | Age: 74
Setting detail: THERAPIES SERIES
Discharge: HOME | End: 2019-05-07
Payer: MEDICARE

## 2019-05-07 ENCOUNTER — TELEPHONE (OUTPATIENT)
Dept: FAMILY MEDICINE CLINIC | Age: 74
End: 2019-05-07

## 2019-05-07 VITALS — DIASTOLIC BLOOD PRESSURE: 66 MMHG | SYSTOLIC BLOOD PRESSURE: 136 MMHG | HEART RATE: 70 BPM

## 2019-05-07 DIAGNOSIS — D64.9 ANEMIA, UNSPECIFIED TYPE: Primary | ICD-10-CM

## 2019-05-07 DIAGNOSIS — I48.20 CHRONIC ATRIAL FIBRILLATION (HCC): Primary | ICD-10-CM

## 2019-05-07 DIAGNOSIS — Z95.2 H/O MITRAL VALVE REPLACEMENT: ICD-10-CM

## 2019-05-07 LAB
HCT VFR BLD CALC: 33.6 % (ref 37–47)
HEMOGLOBIN: 10.9 GM/DL (ref 12–16)
INR BLD: 5.1 (ref 0.85–1.13)

## 2019-05-07 PROCEDURE — 85014 HEMATOCRIT: CPT

## 2019-05-07 PROCEDURE — 85610 PROTHROMBIN TIME: CPT

## 2019-05-07 PROCEDURE — 85018 HEMOGLOBIN: CPT

## 2019-05-07 PROCEDURE — 99211 OFF/OP EST MAY X REQ PHY/QHP: CPT

## 2019-05-07 NOTE — TELEPHONE ENCOUNTER
This is regarding a fax from [address] stating that they would like CG to review the pts H&H done on 5/7/19 as it was abnormal and lower than it was at last check. Pt has no bleeding but has more ankle swelling and dizziness than usual. Vitals stable. Fax scanned and attached to this telephone encounter for review.

## 2019-05-07 NOTE — PROGRESS NOTES
Medication Management [facility]  Anticoagulation Clinic  [phone] (phone)  [phone] (fax)      Ms. Karen Escalera is a 75 y.o.  female with history of MVR, per Dr. Annie Ching referral, who presents today for Warfarin monitoring and adjustment (4 week visit). Patient verifies current dosing regimen and tablet strength. No missed or extra doses. Patient denies bleeding/chest pain. Has usual SOB, but more ankle swelling bilat. Doesn't weigh at home. No blood in urine or stool. No dietary changes. No changes in medication/OTC agents/herbals. No change in alcohol use or tobacco use. No change in activity level. Patient denies headaches/falls. Has had more dizziness than usual starting 2 days ago. No vomiting/diarrhea or acute illness. No procedures scheduled in the future at this time. Assessment:   Lab Results   Component Value Date    INR 5.10 (HH) 05/07/2019    INR 2.80 (H) 04/09/2019    INR 3.50 (H) 03/19/2019    PROTIME 2.42 (H) 12/15/2011    PROTIME 1.97 (H) 12/01/2011    PROTIME 1.80 (H) 11/22/2011   POCT INR 6.0. INR supratherapeutic - goal 2.5-3.5. Recent Labs     05/07/19  1014   INR 5.10*   Today's H&H:  10.9/33.6 (12.6/40.2 last January). Faxed trend/condition report to PCP; clinic pharmacist was also updated. Plan:  POCT INR ordered/performed/result reviewed. Blood for INR/H&H drawn left antecubital per lab staff - sent to lab. Hold 2 days, then continue Coumadin 2 mg daily PO. Recheck INR in 2 days. (Report given - orders entered by KEITH Crowley, PharmD.)  Patient reminded to call the Anticoagulation Clinic with any signs or symptoms of bleeding or with any medication changes. Patient given instructions utilizing the teach back method. Verified phone numbers; received permission to leave any new orders on voicemail. Advised caution due to unusually thin blood. Discharged ambulatory in no apparent distress.   New orders after lab result per KEITH Vázquez, PharmD.:  Same plan as above, but can see 5/16. Orders called to voicemail on home number and cell phone at noon. New appt. 10:40 5/16 - canceled 5/9 visit. Also advised patient of drop in H&H - sent to PCP. After visit summary printed and reviewed with patient.       Medications reviewed and updated on home medication list.    Influenza vaccine:     [] given    [] declined   [] received previously   [] plans to receive at a later time   [] refused    [] documented in EPIC

## 2019-05-09 NOTE — TELEPHONE ENCOUNTER
Pt called in. She was notified of results. She states she did have a black tarry BM today - do you still want all testing done? She states she did see GI last May for colonoscopy and EGD for same issue.

## 2019-05-13 DIAGNOSIS — D64.9 LOW HEMOGLOBIN AND LOW HEMATOCRIT: Primary | ICD-10-CM

## 2019-05-13 DIAGNOSIS — D64.9 LOW HEMOGLOBIN: ICD-10-CM

## 2019-05-13 LAB
HEMOCCULT STL QL: POSITIVE

## 2019-05-13 PROCEDURE — 82270 OCCULT BLOOD FECES: CPT | Performed by: FAMILY MEDICINE

## 2019-05-14 ENCOUNTER — TELEPHONE (OUTPATIENT)
Dept: FAMILY MEDICINE CLINIC | Age: 74
End: 2019-05-14

## 2019-05-14 NOTE — TELEPHONE ENCOUNTER
I spoke to the pt and notified her of the positive hemocult testing and CG recommendation for referral back to Dr. Osmar Bowling for evaluation and the pt is agreeable. Pt is scheduled for an appt with Enma Jonas CNP on 5/21/19 at 9:15 AM. Pt notified and is agreeable. Records faxed to [phone].

## 2019-05-14 NOTE — TELEPHONE ENCOUNTER
----- Message from Junior Pfeiffer MD sent at 5/13/2019  4:57 PM EDT -----  Stool for occult blood positive x 3. Refer to her GI specialist for evaluation of this.  CG

## 2019-05-16 ENCOUNTER — HOSPITAL ENCOUNTER (OUTPATIENT)
Dept: PHARMACY | Age: 74
Setting detail: THERAPIES SERIES
Discharge: HOME | End: 2019-05-16
Payer: MEDICARE

## 2019-05-16 DIAGNOSIS — Z95.2 H/O MITRAL VALVE REPLACEMENT: ICD-10-CM

## 2019-05-16 LAB — POC INR: 3.7 (ref 0.8–1.2)

## 2019-05-16 PROCEDURE — 36416 COLLJ CAPILLARY BLOOD SPEC: CPT

## 2019-05-16 PROCEDURE — 85610 PROTHROMBIN TIME: CPT

## 2019-05-16 PROCEDURE — 99211 OFF/OP EST MAY X REQ PHY/QHP: CPT

## 2019-05-16 NOTE — PROGRESS NOTES
Medication Management [facility]  Anticoagulation Clinic  [phone] (phone)  [phone] (fax)      Ms. Abbie Posada is a 75 y.o.  female with history of MVR who presents today for anticoagulation monitoring and adjustment. Patient verifies current dosing regimen and tablet strength. No missed or extra doses. Patient denies s/s bruising/swelling/SOB/chest pain   Occult blood in stool x 3. Has follow-up with Dr. [facility] on 5/21/19. No dietary changes. No changes in medication/OTC agents/Herbals. No change in alcohol use or tobacco use. No change in activity level. Patient denies falls. Positive for headaches/dizziness/lightheadednes. Discussed fall prevention. No vomiting or acute illness. Had a couple days of diarrhea in the past week  No Procedures scheduled in the future at this time. Patient informed if she notices she is bleeding more heavily in the stool or becomes very weak to go to the emergency department     Assessment:   Lab Results   Component Value Date    INR 3.70 (H) 05/16/2019    INR 5.10 (HH) 05/07/2019    INR 2.80 (H) 04/09/2019    PROTIME 2.42 (H) 12/15/2011    PROTIME 1.97 (H) 12/01/2011    PROTIME 1.80 (H) 11/22/2011     INR supratherapeutic   Recent Labs     05/16/19  1048   INR 3.70*     Plan:  Hold today's dose then continue Coumadin 2 mg daily. Recheck INR in 1 week. Patient reminded to call the Anticoagulation Clinic with any signs or symptoms of bleeding or with any medication changes. Patient given instructions utilizing the teach back method. Discharged ambulatory in no apparent distress. After visit summary printed and reviewed with patient.       Medications reviewed and updated on home medication list Yes    Influenza vaccine:     [] given    [] declined   [x] received previously   [] plans to receive at a later time   [] refused    [x] documented in EPIC

## 2019-05-21 ENCOUNTER — TELEPHONE (OUTPATIENT)
Dept: CARDIOLOGY CLINIC | Age: 74
End: 2019-05-21

## 2019-05-21 NOTE — TELEPHONE ENCOUNTER
Pre op Risk Assessment    Procedure EGD  Physician GI  Date of surgery/procedure 6-19-19    Last OV 3-8-19  Last Stress 8-9-16  Last Echo 8-9-16  Is patient on blood thinners ASA and Coumadin  Hold Meds/how many days 3 days - pt will need bridged

## 2019-05-22 ENCOUNTER — TELEPHONE (OUTPATIENT)
Dept: PHARMACY | Age: 74
End: 2019-05-22

## 2019-05-22 NOTE — TELEPHONE ENCOUNTER
Left message for patient to return call.  Routed Medication Mgmt message as well as left message for coumadin clinic

## 2019-05-22 NOTE — TELEPHONE ENCOUNTER
Received call from Nickie Engel from heart specialists. Patient has egd on 6/19/19 and to hold coumadin 3 days prior. Dr. Shelia Soliz would like patient bridged with lovenox.     ABW:  221 lb (100 kg)  Calculated CrCl:  51 ml/min  Plt:  256  Lovenox dose:  150 mg subcutaneous daily    Medication Management [address] [facility] Instruction Sheet  Patient:   Governor Sherstha      YOB: 1945    Procedure:  EGD         Date of Procedure:  6/19/19    Day Lovenox AM Lovenox PM Coumadin PM     6/16/19   none   none   none       6/17/19   150 mg   none   none       6/18/19     75 mg   none   none     6/19/19  (procedure)     none   none   none     6/20/19     None   150 mg   4 mg     6/21/19     None   150 mg   4 mg     6/22/19     None   150 mg   2 mg     6/23/19     None   150 mg   2 mg             INR to be checked:  6/24/19  Jessica Santillan [facility]/5/22/19    Clinical Pharmacist/Date    Any questions please call [facility] Anticoagulation Clinic at [phone]

## 2019-05-22 NOTE — TELEPHONE ENCOUNTER
Patient voiced understanding. Is going to see coumadin clinic tomorrow to get instructions on lovenox.

## 2019-05-23 ENCOUNTER — HOSPITAL ENCOUNTER (OUTPATIENT)
Dept: PHARMACY | Age: 74
Setting detail: THERAPIES SERIES
Discharge: HOME | End: 2019-05-23
Payer: MEDICARE

## 2019-05-23 DIAGNOSIS — Z95.2 H/O MITRAL VALVE REPLACEMENT: ICD-10-CM

## 2019-05-23 LAB — POC INR: 4 (ref 0.8–1.2)

## 2019-05-23 PROCEDURE — 36416 COLLJ CAPILLARY BLOOD SPEC: CPT

## 2019-05-23 PROCEDURE — 99211 OFF/OP EST MAY X REQ PHY/QHP: CPT

## 2019-05-23 PROCEDURE — 85610 PROTHROMBIN TIME: CPT

## 2019-05-29 ENCOUNTER — HOSPITAL ENCOUNTER (OUTPATIENT)
Age: 74
Discharge: HOME | End: 2019-05-29
Payer: MEDICARE

## 2019-05-29 LAB
BASOPHILS # BLD: 0.3 %
BASOPHILS ABSOLUTE: 0 THOU/MM3 (ref 0–0.1)
EOSINOPHIL # BLD: 3.1 %
EOSINOPHILS ABSOLUTE: 0.2 THOU/MM3 (ref 0–0.4)
ERYTHROCYTE [DISTWIDTH] IN BLOOD BY AUTOMATED COUNT: 14.6 % (ref 11.5–14.5)
ERYTHROCYTE [DISTWIDTH] IN BLOOD BY AUTOMATED COUNT: 49.7 FL (ref 35–45)
HCT VFR BLD CALC: 29.1 % (ref 37–47)
HEMOGLOBIN: 8.8 GM/DL (ref 12–16)
IMMATURE GRANS (ABS): 0.03 THOU/MM3 (ref 0–0.07)
IMMATURE GRANULOCYTES: 0.5 %
LYMPHOCYTES # BLD: 16.9 %
LYMPHOCYTES ABSOLUTE: 1 THOU/MM3 (ref 1–4.8)
MCH RBC QN AUTO: 28.1 PG (ref 26–33)
MCHC RBC AUTO-ENTMCNC: 30.2 GM/DL (ref 32.2–35.5)
MCV RBC AUTO: 93 FL (ref 81–99)
MONOCYTES # BLD: 8.5 %
MONOCYTES ABSOLUTE: 0.5 THOU/MM3 (ref 0.4–1.3)
NUCLEATED RED BLOOD CELLS: 0 /100 WBC
PLATELET # BLD: 235 THOU/MM3 (ref 130–400)
PMV BLD AUTO: 10 FL (ref 9.4–12.4)
RBC # BLD: 3.13 MILL/MM3 (ref 4.2–5.4)
SEG NEUTROPHILS: 70.7 %
SEGMENTED NEUTROPHILS ABSOLUTE COUNT: 4.3 THOU/MM3 (ref 1.8–7.7)
WBC # BLD: 6.1 THOU/MM3 (ref 4.8–10.8)

## 2019-05-29 PROCEDURE — 36415 COLL VENOUS BLD VENIPUNCTURE: CPT

## 2019-05-29 PROCEDURE — 85025 COMPLETE CBC W/AUTO DIFF WBC: CPT

## 2019-05-31 ENCOUNTER — TELEPHONE (OUTPATIENT)
Dept: PHARMACY | Age: 74
End: 2019-05-31

## 2019-06-03 ENCOUNTER — HOSPITAL ENCOUNTER (OUTPATIENT)
Age: 74
Discharge: HOME | End: 2019-06-03
Payer: MEDICARE

## 2019-06-03 LAB
BASOPHILS # BLD: 0.4 %
BASOPHILS ABSOLUTE: 0 THOU/MM3 (ref 0–0.1)
EOSINOPHIL # BLD: 2.3 %
EOSINOPHILS ABSOLUTE: 0.2 THOU/MM3 (ref 0–0.4)
ERYTHROCYTE [DISTWIDTH] IN BLOOD BY AUTOMATED COUNT: 14.6 % (ref 11.5–14.5)
ERYTHROCYTE [DISTWIDTH] IN BLOOD BY AUTOMATED COUNT: 50 FL (ref 35–45)
HCT VFR BLD CALC: 30.4 % (ref 37–47)
HEMOGLOBIN: 9.2 GM/DL (ref 12–16)
IMMATURE GRANS (ABS): 0.02 THOU/MM3 (ref 0–0.07)
IMMATURE GRANULOCYTES: 0.3 %
LYMPHOCYTES # BLD: 10.4 %
LYMPHOCYTES ABSOLUTE: 0.8 THOU/MM3 (ref 1–4.8)
MCH RBC QN AUTO: 28.1 PG (ref 26–33)
MCHC RBC AUTO-ENTMCNC: 30.3 GM/DL (ref 32.2–35.5)
MCV RBC AUTO: 93 FL (ref 81–99)
MONOCYTES # BLD: 8.3 %
MONOCYTES ABSOLUTE: 0.6 THOU/MM3 (ref 0.4–1.3)
NUCLEATED RED BLOOD CELLS: 0 /100 WBC
PLATELET # BLD: 237 THOU/MM3 (ref 130–400)
PMV BLD AUTO: 10.2 FL (ref 9.4–12.4)
RBC # BLD: 3.27 MILL/MM3 (ref 4.2–5.4)
SEG NEUTROPHILS: 78.3 %
SEGMENTED NEUTROPHILS ABSOLUTE COUNT: 5.7 THOU/MM3 (ref 1.8–7.7)
WBC # BLD: 7.3 THOU/MM3 (ref 4.8–10.8)

## 2019-06-03 PROCEDURE — 36415 COLL VENOUS BLD VENIPUNCTURE: CPT

## 2019-06-03 PROCEDURE — 85025 COMPLETE CBC W/AUTO DIFF WBC: CPT

## 2019-06-04 ENCOUNTER — PROCEDURE VISIT (OUTPATIENT)
Dept: CARDIOLOGY CLINIC | Age: 74
End: 2019-06-04
Payer: MEDICARE

## 2019-06-04 DIAGNOSIS — Z95.0 PACEMAKER: Primary | ICD-10-CM

## 2019-06-04 PROCEDURE — 93296 REM INTERROG EVL PM/IDS: CPT | Performed by: INTERNAL MEDICINE

## 2019-06-04 PROCEDURE — 93294 REM INTERROG EVL PM/LDLS PM: CPT | Performed by: INTERNAL MEDICINE

## 2019-06-04 NOTE — PROGRESS NOTES
Medtronic dual pacemaker  Battery 3.00Volts   6 years  A paced <0.1%  V Paced 98.4%  p wave 0.5mV   r wave 9.9mV   Mode VVIR  A fib burden 100%  Atrial impedance 456 ohms  Ventricle impedance 437 ohms

## 2019-06-06 ENCOUNTER — APPOINTMENT (OUTPATIENT)
Dept: PHARMACY | Age: 74
End: 2019-06-06
Payer: MEDICARE

## 2019-06-10 ENCOUNTER — HOSPITAL ENCOUNTER (OUTPATIENT)
Dept: PHARMACY | Age: 74
Setting detail: THERAPIES SERIES
Discharge: HOME | End: 2019-06-10
Payer: MEDICARE

## 2019-06-10 ENCOUNTER — HOSPITAL ENCOUNTER (OUTPATIENT)
Age: 74
Discharge: HOME | End: 2019-06-10
Payer: MEDICARE

## 2019-06-10 DIAGNOSIS — Z95.2 H/O MITRAL VALVE REPLACEMENT: ICD-10-CM

## 2019-06-10 DIAGNOSIS — K25.3 ACUTE PEPTIC ULCER OF STOMACH: ICD-10-CM

## 2019-06-10 PROBLEM — K25.9 ULCER, STOMACH PEPTIC: Status: ACTIVE | Noted: 2019-06-10

## 2019-06-10 LAB
BASOPHILS # BLD: 0.1 %
BASOPHILS ABSOLUTE: 0 THOU/MM3 (ref 0–0.1)
EOSINOPHIL # BLD: 3.1 %
EOSINOPHILS ABSOLUTE: 0.2 THOU/MM3 (ref 0–0.4)
ERYTHROCYTE [DISTWIDTH] IN BLOOD BY AUTOMATED COUNT: 14.8 % (ref 11.5–14.5)
ERYTHROCYTE [DISTWIDTH] IN BLOOD BY AUTOMATED COUNT: 49.9 FL (ref 35–45)
HCT VFR BLD CALC: 32.9 % (ref 37–47)
HEMOGLOBIN: 9.7 GM/DL (ref 12–16)
IMMATURE GRANS (ABS): 0.03 THOU/MM3 (ref 0–0.07)
IMMATURE GRANULOCYTES: 0.4 %
LYMPHOCYTES # BLD: 12.7 %
LYMPHOCYTES ABSOLUTE: 1 THOU/MM3 (ref 1–4.8)
MCH RBC QN AUTO: 27.2 PG (ref 26–33)
MCHC RBC AUTO-ENTMCNC: 29.5 GM/DL (ref 32.2–35.5)
MCV RBC AUTO: 92.4 FL (ref 81–99)
MONOCYTES # BLD: 6.8 %
MONOCYTES ABSOLUTE: 0.5 THOU/MM3 (ref 0.4–1.3)
NUCLEATED RED BLOOD CELLS: 0 /100 WBC
PLATELET # BLD: 295 THOU/MM3 (ref 130–400)
PMV BLD AUTO: 10.2 FL (ref 9.4–12.4)
POC INR: 2.8 (ref 0.8–1.2)
RBC # BLD: 3.56 MILL/MM3 (ref 4.2–5.4)
SEG NEUTROPHILS: 76.9 %
SEGMENTED NEUTROPHILS ABSOLUTE COUNT: 6.2 THOU/MM3 (ref 1.8–7.7)
WBC # BLD: 8 THOU/MM3 (ref 4.8–10.8)

## 2019-06-10 PROCEDURE — 85610 PROTHROMBIN TIME: CPT

## 2019-06-10 PROCEDURE — 36415 COLL VENOUS BLD VENIPUNCTURE: CPT

## 2019-06-10 PROCEDURE — 99211 OFF/OP EST MAY X REQ PHY/QHP: CPT

## 2019-06-10 PROCEDURE — 36416 COLLJ CAPILLARY BLOOD SPEC: CPT

## 2019-06-10 PROCEDURE — 85025 COMPLETE CBC W/AUTO DIFF WBC: CPT

## 2019-06-10 RX ORDER — FERROUS SULFATE 325(65) MG
325 TABLET ORAL
COMMUNITY
End: 2019-07-02

## 2019-06-10 RX ORDER — OMEPRAZOLE 40 MG/1
40 CAPSULE, DELAYED RELEASE ORAL PRN
COMMUNITY
End: 2019-07-29

## 2019-06-10 NOTE — PROGRESS NOTES
Medication Management [facility]  Anticoagulation Clinic  [phone] (phone)  [phone] (fax)      Ms. Los Espinal is a 75 y.o.  female with history of Heart Valve Replacement who presents today for anticoagulation monitoring and adjustment. Patient verifies current dosing regimen and tablet strength. No missed or extra doses. Patient denies s/s /bruising/chest pain Had a bleeding stomach ulcer, was diagnosed last Wednesday (6/5) but says she felt like she has had it for about a month. Has bilateral swelling in ankles, says this is normal for her. Also has SOB but also says this is normal for her. No blood in urine. Has noticed blood in stool for about a month. Not as many vegetables or greens due to pain from ulcer. No changes in medication/OTC agents/Herbals. No change in alcohol use or tobacco use. Less activity, has been weak from scope and ulcer. Patient denieslightheadedness/falls. Pt says she gets dizzy and has headaches fairly often but this is not new for her. No vomiting/diarrhea or acute illness. No Procedures scheduled in the future at this time. Scope was moved up, not on the 19th anymore already performed on 6/5/19. Says she may see her pulmonologist soon. Patient has an EGD on 7/23/19 and will most likely need lovenox with Dr. Castillo Carbajal. Assessment:   Lab Results   Component Value Date    INR 2.80 (H) 06/10/2019    INR 4.00 (H) 05/23/2019    INR 3.70 (H) 05/16/2019    PROTIME 2.42 (H) 12/15/2011    PROTIME 1.97 (H) 12/01/2011    PROTIME 1.80 (H) 11/22/2011     INR therapeutic   Recent Labs     06/10/19  1046   INR 2.80*           Plan:  Continue Coumadin 1 mg MF; 2 mg [id number]. Recheck INR in 2 weeks. Discontinue Lovenox. Patient reminded to call the Anticoagulation Clinic with any signs or symptoms of bleeding or with any medication changes. Patient given instructions utilizing the teach back method.     Discharged ambulatory in no apparent distress. After visit summary printed and reviewed with patient.       Medications reviewed and updated on home medication list Yes    Influenza vaccine:     [] given    [x] declined   [x] received previously   [] plans to receive at a later time   [] refused    [x] documented in [address], PharmD, [address]  PGY2 Ambulatory Care Resident

## 2019-06-12 ENCOUNTER — TELEPHONE (OUTPATIENT)
Dept: PHARMACY | Age: 74
End: 2019-06-12

## 2019-06-12 NOTE — TELEPHONE ENCOUNTER
Patient has an EGD on 7/23/19 and will most likely need lovenox with Dr. Josie Sol. Dr. Josie Sol would like patient to hold coumadin 3 days prior.  Patient previously had an EGD on 6/19/19 and Dr. Quiana Jose would like patient bridge with lovenox then.      ABW:  221 lb (100 kg)  Calculated CrCl:  51 ml/min  Plt:  295  Lovenox dose:  150 mg subcutaneous daily     Medication Management [address] [facility] Instruction Sheet  Patient:   Nelson Romero                                                                   YOB: 1945     Procedure:  EGD                                                                                Date of Procedure:  7/23/19     Day Lovenox AM Lovenox PM Coumadin PM      7/20/19    none    none    none         7/21/19    150 mg    none    none         7/22/19       75 mg    none    none      7/23/19  (procedure)       none    none    none      7/24/19       None    150 mg    4 mg      7/25/19       None    150 mg    4 mg      7/26/19       None    150 mg    1 mg      7/27/19       None    150 mg    2 mg     7/28/19     None   150 mg   2 mg                                                                                                      INR to be checked:  7/29/19  Torrey Larios RPH/5/22/19     Clinical Pharmacist/Date     Any questions please call [facility] Anticoagulation Clinic at [phone]

## 2019-06-18 ENCOUNTER — OFFICE VISIT (OUTPATIENT)
Dept: PULMONOLOGY | Age: 74
End: 2019-06-18
Payer: MEDICARE

## 2019-06-18 VITALS
DIASTOLIC BLOOD PRESSURE: 68 MMHG | HEIGHT: 68 IN | SYSTOLIC BLOOD PRESSURE: 132 MMHG | WEIGHT: 220.2 LBS | HEART RATE: 82 BPM | OXYGEN SATURATION: 90 % | BODY MASS INDEX: 33.37 KG/M2

## 2019-06-18 DIAGNOSIS — Z99.89 OBSTRUCTIVE SLEEP APNEA ON CPAP: Primary | ICD-10-CM

## 2019-06-18 DIAGNOSIS — E66.9 OBESITY (BMI 35.0-39.9 WITHOUT COMORBIDITY): ICD-10-CM

## 2019-06-18 DIAGNOSIS — G47.33 OBSTRUCTIVE SLEEP APNEA ON CPAP: Primary | ICD-10-CM

## 2019-06-18 PROCEDURE — 1090F PRES/ABSN URINE INCON ASSESS: CPT | Performed by: PHYSICIAN ASSISTANT

## 2019-06-18 PROCEDURE — 99213 OFFICE O/P EST LOW 20 MIN: CPT | Performed by: PHYSICIAN ASSISTANT

## 2019-06-18 PROCEDURE — G8417 CALC BMI ABV UP PARAM F/U: HCPCS | Performed by: PHYSICIAN ASSISTANT

## 2019-06-18 PROCEDURE — 1123F ACP DISCUSS/DSCN MKR DOCD: CPT | Performed by: PHYSICIAN ASSISTANT

## 2019-06-18 PROCEDURE — 4040F PNEUMOC VAC/ADMIN/RCVD: CPT | Performed by: PHYSICIAN ASSISTANT

## 2019-06-18 PROCEDURE — 3017F COLORECTAL CA SCREEN DOC REV: CPT | Performed by: PHYSICIAN ASSISTANT

## 2019-06-18 PROCEDURE — G8399 PT W/DXA RESULTS DOCUMENT: HCPCS | Performed by: PHYSICIAN ASSISTANT

## 2019-06-18 PROCEDURE — 1036F TOBACCO NON-USER: CPT | Performed by: PHYSICIAN ASSISTANT

## 2019-06-18 PROCEDURE — G8427 DOCREV CUR MEDS BY ELIG CLIN: HCPCS | Performed by: PHYSICIAN ASSISTANT

## 2019-06-18 ASSESSMENT — ENCOUNTER SYMPTOMS
CHEST TIGHTNESS: 0
NAUSEA: 0
WHEEZING: 0
DIARRHEA: 0
SHORTNESS OF BREATH: 0
ALLERGIC/IMMUNOLOGIC NEGATIVE: 1
BACK PAIN: 0
COUGH: 0
STRIDOR: 0
EYES NEGATIVE: 1

## 2019-06-18 NOTE — PROGRESS NOTES
(shortness of breath)     HX OF:       Past Surgical History:   Procedure Laterality Date    BREAST SURGERY  2011    right biopsy    BREAST SURGERY Left 1/15/13    re-excision cranial margin and mammosite spacer    CARDIAC CATHETERIZATION  10/06/2003    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 MV area by recent EKG was 1 sq. cm. and mean gradient across MV was 17mmHg. MV index was about 0.7 sq. cm. per body surface area. Moderate pulmonary artery systolic HTN. Patent coronary arteries.  CARDIAC VALVE REPLACEMENT  2003    MVR  33mm St Milan Mechanical valve    CARDIOVASCULAR STRESS TEST  05/26/2011    Cannot exclude mild degree of ischemia in anterolateral wall. EF 58%. Mildly abnormal nuclear scan.  CARDIOVASCULAR STRESS TEST  7-09-10    Atrial fibrillation. Episodes of bradycardia mainly at night, which could be physiologic. Predominantly bradycardiac rhythm w/o significant pauses. Frequent PVC's     CARDIOVASCULAR STRESS TEST  1-14-08    Atrial fibrillation w/ controlled ventricular repsonse and an average heart rate of 58 bpm. 1 episode of 3 beat run of nonsustained ventricular tachyarrhythmia at rate of 110-158 bpm. 3 transient episodes of ventricular bigeminy w/ rate in mid 60's to low 70's.  CARDIOVASCULAR STRESS TEST  2-08-99    Unremarkable routine stress test. Chronic A-fib.  CARDIOVASCULAR STRESS TEST  2-08-00    Normal stress EKG. Hypertension, not well controlled. Patient's sitting BP was 160/94. At peak exercise BP was 180/102. Moderate degree of MVP.     CATARACT REMOVAL Right 7/16/14    CATARACT REMOVAL      COLONOSCOPY  2/2010    COLONOSCOPY  08/2017    KNEE ARTHROSCOPY  6/2011    KNEE SURGERY      MITRAL VALVE REPLACEMENT      PACEMAKER INSERTION Left 05/18/2017    by Dr Jonathan Koroma 8/22/2017    COLONOSCOPY performed by Shane Foster MD at [facility]    HI EGD TRANSORAL BIOPSY SINGLE/MULTIPLE N/A 8/22/2017    EGD BIOPSY performed by Delfin Lorenzo I tablet 3    fluorouracil (EFUDEX) 5 % cream Apply topically 2 times daily       simvastatin (ZOCOR) 10 MG tablet TAKE 1 TABLET AT BEDTIME 90 tablet 3    metoprolol tartrate (LOPRESSOR) 50 MG tablet Take 1 tablet by mouth 2 times daily 180 tablet 3    olmesartan (BENICAR) 40 MG tablet Take 1 tablet by mouth daily 90 tablet 3    metFORMIN (GLUCOPHAGE-XR) 500 MG extended release tablet TAKE 4 TABLETS DAILY 360 tablet 3    furosemide (LASIX) 40 MG tablet Take 1 tablet by mouth daily 90 tablet 3    hydrALAZINE (APRESOLINE) 50 MG tablet TAKE 1 TABLET THREE TIMES A  tablet 3    nitroGLYCERIN (NITROLINGUAL) 0.4 MG/SPRAY 0.4 mg spray 1 spray SL q5 minutes prn chest pain. If third spray does not relieve pain, call 9-1-1. 1 Bottle 0    metroNIDAZOLE (METROCREAM) 0.75 % cream Apply topically See Admin Instructions       Glucose Blood (BLOOD GLUCOSE TEST STRIPS) STRP [employer]ocate Redi-Code plus blood glucose test strips, use daily, DX: E11.9 100 strip 3    famotidine (PEPCID) 40 MG tablet Take 40 mg by mouth nightly as needed       AMOXICILLIN PO Take 4 capsules by mouth With teeth cleaning.  Blood Glucose Monitoring Suppl (ADVOCATE BLOOD GLUCOSE MONITOR) CONNIE Dx: 250.00 1 Device 0    fluticasone (FLONASE) 50 MCG/ACT nasal spray 2 sprays by Nasal route daily (Patient taking differently: 2 sprays by Nasal route daily as needed ) 1 Bottle 6     No current facility-administered medications for this visit. Family History   Problem Relation Age of Onset    Hypertension Mother     Stroke Mother     Diabetes Mother     Cancer Father     Heart Attack Brother     Heart Disease Brother     High Blood Pressure Brother         Review of Systems -   Review of Systems   Constitutional: Negative for activity change, appetite change, chills and fever. HENT: Negative for congestion and postnasal drip. Eyes: Negative.     Respiratory: Negative for cough, chest tightness, shortness of breath, wheezing and

## 2019-06-25 ENCOUNTER — HOSPITAL ENCOUNTER (OUTPATIENT)
Dept: PHARMACY | Age: 74
Setting detail: THERAPIES SERIES
Discharge: HOME | End: 2019-06-25
Payer: MEDICARE

## 2019-06-25 DIAGNOSIS — Z95.2 H/O MITRAL VALVE REPLACEMENT: ICD-10-CM

## 2019-06-25 LAB — POC INR: 1.3 (ref 0.8–1.2)

## 2019-06-25 PROCEDURE — 99211 OFF/OP EST MAY X REQ PHY/QHP: CPT | Performed by: PHARMACIST

## 2019-06-25 PROCEDURE — 85610 PROTHROMBIN TIME: CPT | Performed by: PHARMACIST

## 2019-06-25 PROCEDURE — 36416 COLLJ CAPILLARY BLOOD SPEC: CPT | Performed by: PHARMACIST

## 2019-06-25 NOTE — PROGRESS NOTES
Medication Management [facility]  Anticoagulation Clinic  [phone] (phone)  [phone] (fax)      Ms. Johanny Colon is a 75 y.o.  female with history of Heart Valve Replacement who presents today for anticoagulation monitoring and adjustment. Patient verifies current dosing regimen and tablet strength. No missed or extra doses. Some SOB - normal for patient, swollen ankle - normal for patient  No blood in urine or stool. Pt does not feel like eating anymore; 'nothing tastes good.'  No changes in medication/OTC agents/Herbals. No change in alcohol use or tobacco use. Pt is more active. headaches/dizziness/lightheadedness/ - normal for patient; not doing anything to relieve symptoms  Once in a while diarrhea - comes and goes  EGD schedule for July 23rd, Dr. Dedrick Deal would like patient to hold coumadin 3 days prior. Patient previously had an EGD on 6/19/19 and Dr. Gus Munoz had patient bridge with lovenox then. Assessment:   Lab Results   Component Value Date    INR 1.30 (H) 06/25/2019    INR 2.80 (H) 06/10/2019    INR 4.00 (H) 05/23/2019    PROTIME 2.42 (H) 12/15/2011    PROTIME 1.97 (H) 12/01/2011    PROTIME 1.80 (H) 11/22/2011     INR subtherapeutic   Recent Labs     06/25/19  1135   INR 1.30*          Plan:  Coumadin 4mg today and tomorrow, then increase to 2mg daily. Recheck INR in 1 weeks. Patient reminded to call the Anticoagulation Clinic with any signs or symptoms of bleeding or with any medication changes. Patient given instructions utilizing the teach back method. Discharged ambulatory in no apparent distress. After visit summary printed and reviewed with patient.       Medications reviewed and updated on home medication list Yes    Influenza vaccine:     [] given    [x] declined   [] received previously   [] plans to receive at a later time   [] refused    [] documented in EPIC

## 2019-07-02 ENCOUNTER — HOSPITAL ENCOUNTER (OUTPATIENT)
Dept: PHARMACY | Age: 74
Setting detail: THERAPIES SERIES
Discharge: HOME | End: 2019-07-02
Payer: MEDICARE

## 2019-07-02 DIAGNOSIS — Z95.2 H/O MITRAL VALVE REPLACEMENT: ICD-10-CM

## 2019-07-02 LAB — POC INR: 2.7 (ref 0.8–1.2)

## 2019-07-02 PROCEDURE — 36416 COLLJ CAPILLARY BLOOD SPEC: CPT

## 2019-07-02 PROCEDURE — 85610 PROTHROMBIN TIME: CPT

## 2019-07-02 PROCEDURE — 99211 OFF/OP EST MAY X REQ PHY/QHP: CPT

## 2019-07-08 ENCOUNTER — HOSPITAL ENCOUNTER (OUTPATIENT)
Age: 74
Discharge: HOME | End: 2019-07-08
Payer: MEDICARE

## 2019-07-08 LAB
BASOPHILS # BLD: 0.4 %
BASOPHILS ABSOLUTE: 0 THOU/MM3 (ref 0–0.1)
EOSINOPHIL # BLD: 5.4 %
EOSINOPHILS ABSOLUTE: 0.4 THOU/MM3 (ref 0–0.4)
ERYTHROCYTE [DISTWIDTH] IN BLOOD BY AUTOMATED COUNT: 15 % (ref 11.5–14.5)
ERYTHROCYTE [DISTWIDTH] IN BLOOD BY AUTOMATED COUNT: 49.6 FL (ref 35–45)
HCT VFR BLD CALC: 36.4 % (ref 37–47)
HEMOGLOBIN: 10.8 GM/DL (ref 12–16)
IMMATURE GRANS (ABS): 0.02 THOU/MM3 (ref 0–0.07)
IMMATURE GRANULOCYTES: 0.3 %
LYMPHOCYTES # BLD: 15.9 %
LYMPHOCYTES ABSOLUTE: 1.1 THOU/MM3 (ref 1–4.8)
MCH RBC QN AUTO: 26.8 PG (ref 26–33)
MCHC RBC AUTO-ENTMCNC: 29.7 GM/DL (ref 32.2–35.5)
MCV RBC AUTO: 90.3 FL (ref 81–99)
MONOCYTES # BLD: 5.9 %
MONOCYTES ABSOLUTE: 0.4 THOU/MM3 (ref 0.4–1.3)
NUCLEATED RED BLOOD CELLS: 0 /100 WBC
PLATELET # BLD: 242 THOU/MM3 (ref 130–400)
PMV BLD AUTO: 11.1 FL (ref 9.4–12.4)
RBC # BLD: 4.03 MILL/MM3 (ref 4.2–5.4)
SEG NEUTROPHILS: 72.1 %
SEGMENTED NEUTROPHILS ABSOLUTE COUNT: 5.2 THOU/MM3 (ref 1.8–7.7)
WBC # BLD: 7.2 THOU/MM3 (ref 4.8–10.8)

## 2019-07-08 PROCEDURE — 36415 COLL VENOUS BLD VENIPUNCTURE: CPT

## 2019-07-08 PROCEDURE — 85025 COMPLETE CBC W/AUTO DIFF WBC: CPT

## 2019-07-16 ENCOUNTER — HOSPITAL ENCOUNTER (OUTPATIENT)
Dept: PHARMACY | Age: 74
Setting detail: THERAPIES SERIES
Discharge: HOME | End: 2019-07-16
Payer: MEDICARE

## 2019-07-16 DIAGNOSIS — Z95.2 H/O MITRAL VALVE REPLACEMENT: ICD-10-CM

## 2019-07-16 LAB — POC INR: 3.6 (ref 0.8–1.2)

## 2019-07-16 PROCEDURE — 36416 COLLJ CAPILLARY BLOOD SPEC: CPT | Performed by: PHARMACIST

## 2019-07-16 PROCEDURE — 99213 OFFICE O/P EST LOW 20 MIN: CPT | Performed by: PHARMACIST

## 2019-07-16 PROCEDURE — 85610 PROTHROMBIN TIME: CPT | Performed by: PHARMACIST

## 2019-07-16 NOTE — PATIENT INSTRUCTIONS
Medication Management [address] [place] [address] Instruction Sheet  Patient:   Es Curtis                                                                   Date of Birth:  1945     Procedure:  EGD                                                                                Date of Procedure:  7/23/19     Day Lovenox AM Lovenox PM Coumadin PM      7/20/19    none    none    none         7/21/19    150 mg    none    none         7/22/19       75 mg    none    none      7/23/19  (procedure)       none    none    none      7/24/19       None    150 mg    4 mg      7/25/19       None    150 mg    4 mg      7/26/19       None    150 mg    2 mg      7/27/19       None    150 mg    2 mg      7/28/19       None    150 mg    2 mg                                                                                                      INR to be checked:  7/29/19  Joi Rutherford [facility]/5/22/19     Clinical Pharmacist/Date     Any questions please call [facility] Anticoagulation Clinic at [phone]

## 2019-07-29 ENCOUNTER — HOSPITAL ENCOUNTER (OUTPATIENT)
Dept: PHARMACY | Age: 74
Setting detail: THERAPIES SERIES
Discharge: HOME | End: 2019-07-29
Payer: MEDICARE

## 2019-07-29 DIAGNOSIS — Z95.2 H/O MITRAL VALVE REPLACEMENT: ICD-10-CM

## 2019-07-29 LAB — POC INR: 3.8 (ref 0.8–1.2)

## 2019-07-29 PROCEDURE — 36416 COLLJ CAPILLARY BLOOD SPEC: CPT

## 2019-07-29 PROCEDURE — 85610 PROTHROMBIN TIME: CPT

## 2019-07-29 PROCEDURE — 99211 OFF/OP EST MAY X REQ PHY/QHP: CPT

## 2019-07-29 NOTE — PROGRESS NOTES
Medication Management [facility]  Anticoagulation Clinic  [phone] (phone)  [phone] (fax)      Ms. Zoey Vera is a 75 y.o.  female with history of MVR, per Dr. Kyle Giron referral, who presents today for Warfarin monitoring and adjustment (6 day visit after 7/23 EGD). Patient verifies current dosing regimen and tablet strength. No missed or extra doses, except as ordered before and after procedure. Finished Lovenox last night. Patient denies bleeding/bruising/chest pain. Has usual SOB/swelling of legs. No blood in urine or stool. No dietary changes. No changes in medication/OTC agents/herbals. No change in alcohol use or tobacco use. No change in activity level. Patient denies headaches/falls. Has been more dizzy than usual - advised to call doctor. No vomiting/acute illness. Had \"bad\" diarrhea 7/25 or 7/26. Took Pepto-Bismol; explained why that should be avoided (recommended Imodium). Stool was loose and black today. No procedures scheduled in the future at this time. Assessment:   Lab Results   Component Value Date    INR 3.80 (H) 07/29/2019    INR 3.60 (H) 07/16/2019    INR 2.70 (H) 07/02/2019    PROTIME 2.42 (H) 12/15/2011    PROTIME 1.97 (H) 12/01/2011    PROTIME 1.80 (H) 11/22/2011     INR supratherapeutic - goal 2.5-3.5. Recent Labs     07/29/19  1012   INR 3.80*     Plan:  POCT INR ordered/performed/result reviewed. 1 mg today, then continue Coumadin 2 mg daily PO. Recheck INR in 9-10 days. (Report given - orders entered by K. Ladena Favre., PharmD.)  Patient reminded to call the Anticoagulation Clinic with any signs or symptoms of bleeding or with any medication changes. Patient given instructions utilizing the teach back method. Advised caution due to unusually thin blood. Discharged ambulatory in no apparent distress. After visit summary printed and reviewed with patient.       Medications reviewed and updated on home medication

## 2019-08-04 DIAGNOSIS — I10 ESSENTIAL HYPERTENSION: ICD-10-CM

## 2019-08-05 RX ORDER — HYDRALAZINE HYDROCHLORIDE 50 MG/1
TABLET, FILM COATED ORAL
Qty: 270 TABLET | Refills: 3 | Status: SHIPPED | OUTPATIENT
Start: 2019-08-05 | End: 2020-07-30

## 2019-08-05 NOTE — TELEPHONE ENCOUNTER
Luiza Olsen needs refill of   Requested Prescriptions     Pending Prescriptions Disp Refills    hydrALAZINE (APRESOLINE) 50 MG tablet [Pharmacy Med Name: HYDRALAZINE TABS 50MG] 270 tablet 3     Sig: TAKE 1 TABLET THREE TIMES A DAY       Last Filled on:  8/6/18 #270/3    Last Visit Date:  4/29/2019 medicare annual wellness    Next Visit Date:    Visit date not found

## 2019-08-08 ENCOUNTER — HOSPITAL ENCOUNTER (OUTPATIENT)
Dept: PHARMACY | Age: 74
Setting detail: THERAPIES SERIES
Discharge: HOME | End: 2019-08-08
Payer: MEDICARE

## 2019-08-08 LAB — POC INR: 4 (ref 0.8–1.2)

## 2019-08-08 PROCEDURE — 85610 PROTHROMBIN TIME: CPT

## 2019-08-08 PROCEDURE — 99211 OFF/OP EST MAY X REQ PHY/QHP: CPT

## 2019-08-08 PROCEDURE — 36416 COLLJ CAPILLARY BLOOD SPEC: CPT

## 2019-08-22 ENCOUNTER — HOSPITAL ENCOUNTER (OUTPATIENT)
Dept: PHARMACY | Age: 74
Setting detail: THERAPIES SERIES
Discharge: HOME | End: 2019-08-22
Payer: MEDICARE

## 2019-08-22 ENCOUNTER — HOSPITAL ENCOUNTER (OUTPATIENT)
Age: 74
Discharge: HOME | End: 2019-08-22
Payer: MEDICARE

## 2019-08-22 DIAGNOSIS — Z95.2 H/O MITRAL VALVE REPLACEMENT: ICD-10-CM

## 2019-08-22 LAB
BASOPHILS # BLD: 0.2 %
BASOPHILS ABSOLUTE: 0 THOU/MM3 (ref 0–0.1)
EOSINOPHIL # BLD: 7.2 %
EOSINOPHILS ABSOLUTE: 0.4 THOU/MM3 (ref 0–0.4)
ERYTHROCYTE [DISTWIDTH] IN BLOOD BY AUTOMATED COUNT: 16.5 % (ref 11.5–14.5)
ERYTHROCYTE [DISTWIDTH] IN BLOOD BY AUTOMATED COUNT: 54.2 FL (ref 35–45)
HCT VFR BLD CALC: 35.1 % (ref 37–47)
HEMOGLOBIN: 10.3 GM/DL (ref 12–16)
IMMATURE GRANS (ABS): 0.02 THOU/MM3 (ref 0–0.07)
IMMATURE GRANULOCYTES: 0 %
LYMPHOCYTES # BLD: 16.5 %
LYMPHOCYTES ABSOLUTE: 0.9 THOU/MM3 (ref 1–4.8)
MCH RBC QN AUTO: 26.5 PG (ref 26–33)
MCHC RBC AUTO-ENTMCNC: 29.3 GM/DL (ref 32.2–35.5)
MCV RBC AUTO: 90.2 FL (ref 81–99)
MONOCYTES # BLD: 8.2 %
MONOCYTES ABSOLUTE: 0.5 THOU/MM3 (ref 0.4–1.3)
NUCLEATED RED BLOOD CELLS: 0 /100 WBC
PLATELET # BLD: 206 THOU/MM3 (ref 130–400)
PMV BLD AUTO: 10.9 FL (ref 9.4–12.4)
POC INR: 4.2 (ref 0.8–1.2)
RBC # BLD: 3.89 MILL/MM3 (ref 4.2–5.4)
SEG NEUTROPHILS: 67.5 %
SEGMENTED NEUTROPHILS ABSOLUTE COUNT: 3.8 THOU/MM3 (ref 1.8–7.7)
WBC # BLD: 5.7 THOU/MM3 (ref 4.8–10.8)

## 2019-08-22 PROCEDURE — 36415 COLL VENOUS BLD VENIPUNCTURE: CPT

## 2019-08-22 PROCEDURE — 85610 PROTHROMBIN TIME: CPT

## 2019-08-22 PROCEDURE — 99211 OFF/OP EST MAY X REQ PHY/QHP: CPT

## 2019-08-22 PROCEDURE — 36416 COLLJ CAPILLARY BLOOD SPEC: CPT

## 2019-08-22 PROCEDURE — 85025 COMPLETE CBC W/AUTO DIFF WBC: CPT

## 2019-08-22 RX ORDER — OMEPRAZOLE 40 MG/1
40 CAPSULE, DELAYED RELEASE ORAL DAILY
COMMUNITY
Start: 2019-07-29 | End: 2020-08-03

## 2019-08-27 ENCOUNTER — NURSE ONLY (OUTPATIENT)
Dept: CARDIOLOGY CLINIC | Age: 74
End: 2019-08-27
Payer: MEDICARE

## 2019-08-27 DIAGNOSIS — Z95.0 PACEMAKER: Primary | ICD-10-CM

## 2019-08-27 PROCEDURE — 93280 PM DEVICE PROGR EVAL DUAL: CPT | Performed by: INTERNAL MEDICINE

## 2019-09-05 ENCOUNTER — APPOINTMENT (OUTPATIENT)
Dept: PHARMACY | Age: 74
End: 2019-09-05
Payer: MEDICARE

## 2019-09-09 ENCOUNTER — HOSPITAL ENCOUNTER (OUTPATIENT)
Dept: PHARMACY | Age: 74
Setting detail: THERAPIES SERIES
Discharge: HOME | End: 2019-09-09
Payer: MEDICARE

## 2019-09-09 DIAGNOSIS — Z95.2 H/O MITRAL VALVE REPLACEMENT: ICD-10-CM

## 2019-09-09 LAB — POC INR: 2.3 (ref 0.8–1.2)

## 2019-09-09 PROCEDURE — 36416 COLLJ CAPILLARY BLOOD SPEC: CPT

## 2019-09-09 PROCEDURE — 85610 PROTHROMBIN TIME: CPT

## 2019-09-09 PROCEDURE — 99211 OFF/OP EST MAY X REQ PHY/QHP: CPT

## 2019-09-09 NOTE — PROGRESS NOTES
Medication Management [facility]  Anticoagulation Clinic  [phone] (phone)  [phone] (fax)      Ms. Noris Jade is a 75 y.o.  female with history of MVR, afib who presents today for anticoagulation monitoring and adjustment. Patient verifies current dosing regimen and tablet strength. No missed or extra doses. Patient denies s/s bleeding/bruising/swelling/SOB/chest pain  No blood in urine or stool. No dietary changes. No changes in medication/OTC agents/Herbals. No change in alcohol use or tobacco use. No change in activity level. Patient denies headaches/dizziness/lightheadedness/falls. No vomiting/diarrhea or acute illness. No Procedures scheduled in the future at this time. Assessment:   Lab Results   Component Value Date    INR 2.30 (H) 09/09/2019    INR 4.20 (H) 08/22/2019    INR 4.00 (H) 08/08/2019    PROTIME 2.42 (H) 12/15/2011    PROTIME 1.97 (H) 12/01/2011    PROTIME 1.80 (H) 11/22/2011     INR subtherapeutic   Recent Labs     09/09/19  1051   INR 2.30*     Patient presents with subtherapeutic INR following dose decrease (15.4%) at last visit. Plan:  Take 2mg tonight then increase Coumadin 1mg MonFri and 2mg SuTuWThSa (9.1% increase). Recheck INR in 2 weeks. Patient reminded to call the Anticoagulation Clinic with signs or symptoms of bleeding or with any medication changes. Patient given instructions utilizing the teach back method. Discharged ambulatory in no apparent distress. After visit summary printed and reviewed with patient.       Medications reviewed and updated on home medication list -Yes

## 2019-09-16 ENCOUNTER — HOSPITAL ENCOUNTER (OUTPATIENT)
Dept: GENERAL RADIOLOGY | Age: 74
Discharge: HOME | End: 2019-09-16
Payer: MEDICARE

## 2019-09-16 DIAGNOSIS — D50.0 ANEMIA DUE TO BLOOD LOSS: ICD-10-CM

## 2019-09-16 PROCEDURE — 2500000003 HC RX 250 WO HCPCS: Performed by: NURSE PRACTITIONER

## 2019-09-16 PROCEDURE — 74250 X-RAY XM SM INT 1CNTRST STD: CPT

## 2019-09-16 RX ADMIN — BARIUM SULFATE 600 ML: 240 SUSPENSION ORAL at 09:32

## 2019-09-23 ENCOUNTER — HOSPITAL ENCOUNTER (OUTPATIENT)
Age: 74
Discharge: HOME | End: 2019-09-23
Payer: MEDICARE

## 2019-09-23 LAB
BASOPHILS # BLD: 0.5 %
BASOPHILS ABSOLUTE: 0 THOU/MM3 (ref 0–0.1)
EOSINOPHIL # BLD: 2.5 %
EOSINOPHILS ABSOLUTE: 0.2 THOU/MM3 (ref 0–0.4)
ERYTHROCYTE [DISTWIDTH] IN BLOOD BY AUTOMATED COUNT: 17 % (ref 11.5–14.5)
ERYTHROCYTE [DISTWIDTH] IN BLOOD BY AUTOMATED COUNT: 54.2 FL (ref 35–45)
FOLATE: 17.1 NG/ML (ref 4.8–24.2)
HCT VFR BLD CALC: 34.3 % (ref 37–47)
HEMOGLOBIN: 10.2 GM/DL (ref 12–16)
IMMATURE GRANS (ABS): 0.03 THOU/MM3 (ref 0–0.07)
IMMATURE GRANULOCYTES: 1 %
LYMPHOCYTES # BLD: 16.9 %
LYMPHOCYTES ABSOLUTE: 1 THOU/MM3 (ref 1–4.8)
MCH RBC QN AUTO: 26 PG (ref 26–33)
MCHC RBC AUTO-ENTMCNC: 29.7 GM/DL (ref 32.2–35.5)
MCV RBC AUTO: 87.5 FL (ref 81–99)
MONOCYTES # BLD: 8.9 %
MONOCYTES ABSOLUTE: 0.5 THOU/MM3 (ref 0.4–1.3)
NUCLEATED RED BLOOD CELLS: 0 /100 WBC
PLATELET # BLD: 196 THOU/MM3 (ref 130–400)
PMV BLD AUTO: 10.9 FL (ref 9.4–12.4)
RBC # BLD: 3.92 MILL/MM3 (ref 4.2–5.4)
SEG NEUTROPHILS: 70.7 %
SEGMENTED NEUTROPHILS ABSOLUTE COUNT: 4.2 THOU/MM3 (ref 1.8–7.7)
VITAMIN B-12: 212 PG/ML (ref 211–911)
WBC # BLD: 6 THOU/MM3 (ref 4.8–10.8)

## 2019-09-23 PROCEDURE — 36415 COLL VENOUS BLD VENIPUNCTURE: CPT

## 2019-09-23 PROCEDURE — 85025 COMPLETE CBC W/AUTO DIFF WBC: CPT

## 2019-09-23 PROCEDURE — 82746 ASSAY OF FOLIC ACID SERUM: CPT

## 2019-09-23 PROCEDURE — 84238 ASSAY NONENDOCRINE RECEPTOR: CPT

## 2019-09-23 PROCEDURE — 82607 VITAMIN B-12: CPT

## 2019-09-24 LAB — SOLUBLE TRANSFERRIN RECEPT: 5.6 MG/L (ref 1.9–4.4)

## 2019-09-26 ENCOUNTER — HOSPITAL ENCOUNTER (OUTPATIENT)
Dept: PHARMACY | Age: 74
Setting detail: THERAPIES SERIES
Discharge: HOME | End: 2019-09-26
Payer: MEDICARE

## 2019-09-26 VITALS — TEMPERATURE: 98.6 F

## 2019-09-26 DIAGNOSIS — Z95.2 H/O MITRAL VALVE REPLACEMENT: ICD-10-CM

## 2019-09-26 LAB — POC INR: 1.5 (ref 0.8–1.2)

## 2019-09-26 PROCEDURE — 99211 OFF/OP EST MAY X REQ PHY/QHP: CPT

## 2019-09-26 PROCEDURE — 36416 COLLJ CAPILLARY BLOOD SPEC: CPT

## 2019-09-26 PROCEDURE — 90686 IIV4 VACC NO PRSV 0.5 ML IM: CPT

## 2019-09-26 PROCEDURE — 6360000002 HC RX W HCPCS

## 2019-09-26 PROCEDURE — 85610 PROTHROMBIN TIME: CPT

## 2019-09-26 PROCEDURE — G0008 ADMIN INFLUENZA VIRUS VAC: HCPCS

## 2019-09-26 RX ADMIN — INFLUENZA A VIRUS A/BRISBANE/02/2018 IVR-190 (H1N1) ANTIGEN (PROPIOLACTONE INACTIVATED), INFLUENZA A VIRUS A/KANSAS/14/2017 X-327 (H3N2) ANTIGEN (PROPIOLACTONE INACTIVATED), INFLUENZA B VIRUS B/MARYLAND/15/2016 ANTIGEN (PROPIOLACTONE INACTIVATED), INFLUENZA B VIRUS B/PHUKET/3073/2013 BVR-1B ANTIGEN (PROPIOLACTONE INACTIVATED) 0.5 ML: 15; 15; 15; 15 INJECTION, SUSPENSION INTRAMUSCULAR at 09:05

## 2019-10-02 ENCOUNTER — TELEPHONE (OUTPATIENT)
Dept: CARDIOLOGY CLINIC | Age: 74
End: 2019-10-02

## 2019-10-02 DIAGNOSIS — Z95.2 H/O MITRAL VALVE REPLACEMENT: Primary | ICD-10-CM

## 2019-10-03 ENCOUNTER — TELEPHONE (OUTPATIENT)
Dept: CARDIOLOGY CLINIC | Age: 74
End: 2019-10-03

## 2019-10-03 DIAGNOSIS — Z95.2 MITRAL VALVE REPLACED: Primary | ICD-10-CM

## 2019-10-04 ENCOUNTER — APPOINTMENT (OUTPATIENT)
Dept: PHARMACY | Age: 74
End: 2019-10-04
Payer: MEDICARE

## 2019-10-07 RX ORDER — METFORMIN HYDROCHLORIDE 500 MG/1
TABLET, EXTENDED RELEASE ORAL
Qty: 360 TABLET | Refills: 4 | Status: SHIPPED | OUTPATIENT
Start: 2019-10-07 | End: 2020-12-30

## 2019-10-09 ENCOUNTER — HOSPITAL ENCOUNTER (OUTPATIENT)
Dept: PHARMACY | Age: 74
Setting detail: THERAPIES SERIES
Discharge: HOME | End: 2019-10-09
Payer: MEDICARE

## 2019-10-09 DIAGNOSIS — Z95.2 H/O MITRAL VALVE REPLACEMENT: ICD-10-CM

## 2019-10-09 LAB — POC INR: 2.6 (ref 0.8–1.2)

## 2019-10-09 PROCEDURE — 85610 PROTHROMBIN TIME: CPT

## 2019-10-09 PROCEDURE — 99211 OFF/OP EST MAY X REQ PHY/QHP: CPT

## 2019-10-09 PROCEDURE — 36416 COLLJ CAPILLARY BLOOD SPEC: CPT

## 2019-10-21 RX ORDER — FUROSEMIDE 40 MG/1
TABLET ORAL
Qty: 90 TABLET | Refills: 1 | Status: SHIPPED | OUTPATIENT
Start: 2019-10-21 | End: 2020-04-20

## 2019-10-21 RX ORDER — OLMESARTAN MEDOXOMIL 40 MG/1
TABLET ORAL
Qty: 90 TABLET | Refills: 4 | Status: SHIPPED | OUTPATIENT
Start: 2019-10-21 | End: 2021-01-14

## 2019-10-21 RX ORDER — METOPROLOL TARTRATE 50 MG/1
TABLET, FILM COATED ORAL
Qty: 180 TABLET | Refills: 4 | Status: SHIPPED | OUTPATIENT
Start: 2019-10-21 | End: 2021-01-14

## 2019-10-25 ENCOUNTER — HOSPITAL ENCOUNTER (OUTPATIENT)
Age: 74
Discharge: HOME | End: 2019-10-25
Payer: MEDICARE

## 2019-10-25 LAB
AVERAGE GLUCOSE: 141 MG/DL (ref 70–126)
HBA1C MFR BLD: 6.7 % (ref 4.4–6.4)

## 2019-10-25 PROCEDURE — 83036 HEMOGLOBIN GLYCOSYLATED A1C: CPT

## 2019-10-25 PROCEDURE — 36415 COLL VENOUS BLD VENIPUNCTURE: CPT

## 2019-10-28 ENCOUNTER — HOSPITAL ENCOUNTER (OUTPATIENT)
Dept: PHARMACY | Age: 74
Setting detail: THERAPIES SERIES
Discharge: HOME | End: 2019-10-28
Payer: MEDICARE

## 2019-10-28 DIAGNOSIS — Z95.2 H/O MITRAL VALVE REPLACEMENT: ICD-10-CM

## 2019-10-28 LAB — POC INR: 2.9 (ref 0.8–1.2)

## 2019-10-28 PROCEDURE — 85610 PROTHROMBIN TIME: CPT

## 2019-10-28 PROCEDURE — 99211 OFF/OP EST MAY X REQ PHY/QHP: CPT

## 2019-10-28 PROCEDURE — 36416 COLLJ CAPILLARY BLOOD SPEC: CPT

## 2019-11-21 ENCOUNTER — HOSPITAL ENCOUNTER (OUTPATIENT)
Age: 74
Discharge: HOME | End: 2019-11-21
Payer: MEDICARE

## 2019-11-21 LAB
ERYTHROCYTE [DISTWIDTH] IN BLOOD BY AUTOMATED COUNT: 16.7 % (ref 11.5–14.5)
ERYTHROCYTE [DISTWIDTH] IN BLOOD BY AUTOMATED COUNT: 54.4 FL (ref 35–45)
HCT VFR BLD CALC: 39.1 % (ref 37–47)
HEMOGLOBIN: 11.3 GM/DL (ref 12–16)
MCH RBC QN AUTO: 25.9 PG (ref 26–33)
MCHC RBC AUTO-ENTMCNC: 28.9 GM/DL (ref 32.2–35.5)
MCV RBC AUTO: 89.7 FL (ref 81–99)
PLATELET # BLD: 237 THOU/MM3 (ref 130–400)
PMV BLD AUTO: 11 FL (ref 9.4–12.4)
RBC # BLD: 4.36 MILL/MM3 (ref 4.2–5.4)
SCAN OF BLOOD SMEAR: NORMAL
WBC # BLD: 8.2 THOU/MM3 (ref 4.8–10.8)

## 2019-11-21 PROCEDURE — 85027 COMPLETE CBC AUTOMATED: CPT

## 2019-11-21 PROCEDURE — 36415 COLL VENOUS BLD VENIPUNCTURE: CPT

## 2019-11-25 ENCOUNTER — HOSPITAL ENCOUNTER (OUTPATIENT)
Dept: PHARMACY | Age: 74
Setting detail: THERAPIES SERIES
Discharge: HOME | End: 2019-11-25
Payer: MEDICARE

## 2019-11-25 DIAGNOSIS — Z95.2 H/O MITRAL VALVE REPLACEMENT: ICD-10-CM

## 2019-11-25 LAB — POC INR: 2.9 (ref 0.8–1.2)

## 2019-11-25 PROCEDURE — 99211 OFF/OP EST MAY X REQ PHY/QHP: CPT

## 2019-11-25 PROCEDURE — 85610 PROTHROMBIN TIME: CPT

## 2019-11-25 PROCEDURE — 36416 COLLJ CAPILLARY BLOOD SPEC: CPT

## 2019-12-04 ENCOUNTER — PROCEDURE VISIT (OUTPATIENT)
Dept: CARDIOLOGY CLINIC | Age: 74
End: 2019-12-04
Payer: MEDICARE

## 2019-12-04 DIAGNOSIS — Z95.0 PACEMAKER: Primary | ICD-10-CM

## 2019-12-04 PROCEDURE — 93296 REM INTERROG EVL PM/IDS: CPT | Performed by: INTERNAL MEDICINE

## 2019-12-04 PROCEDURE — 93294 REM INTERROG EVL PM/LDLS PM: CPT | Performed by: INTERNAL MEDICINE

## 2019-12-27 ENCOUNTER — HOSPITAL ENCOUNTER (OUTPATIENT)
Dept: PHARMACY | Age: 74
Setting detail: THERAPIES SERIES
Discharge: HOME | End: 2019-12-27
Payer: MEDICARE

## 2019-12-27 DIAGNOSIS — Z95.2 H/O MITRAL VALVE REPLACEMENT: ICD-10-CM

## 2019-12-27 LAB — POC INR: 2.8 (ref 0.8–1.2)

## 2019-12-27 PROCEDURE — 85610 PROTHROMBIN TIME: CPT

## 2019-12-27 PROCEDURE — 99211 OFF/OP EST MAY X REQ PHY/QHP: CPT

## 2019-12-27 PROCEDURE — 36416 COLLJ CAPILLARY BLOOD SPEC: CPT

## 2020-01-09 RX ORDER — SIMVASTATIN 10 MG
TABLET ORAL
Qty: 90 TABLET | Refills: 4 | Status: SHIPPED | OUTPATIENT
Start: 2020-01-09 | End: 2021-04-05

## 2020-01-09 NOTE — TELEPHONE ENCOUNTER
Eamon Lomax needs refill of   Requested Prescriptions     Pending Prescriptions Disp Refills    simvastatin (ZOCOR) 10 MG tablet [Pharmacy Med Name: SIMVASTATIN TABS 10MG] 90 tablet 4     Sig: TAKE 1 TABLET AT BEDTIME       Last Filled on:  1/14/19     Last Visit Date:  4/29/2019-WINDY      Next Visit Date:    Visit date not found

## 2020-01-28 ENCOUNTER — HOSPITAL ENCOUNTER (OUTPATIENT)
Dept: PHARMACY | Age: 75
Setting detail: THERAPIES SERIES
Discharge: HOME | End: 2020-01-28
Payer: MEDICARE

## 2020-01-28 LAB — POC INR: 3.4 (ref 0.8–1.2)

## 2020-01-28 PROCEDURE — 85610 PROTHROMBIN TIME: CPT

## 2020-01-28 PROCEDURE — 99211 OFF/OP EST MAY X REQ PHY/QHP: CPT

## 2020-01-28 PROCEDURE — 36416 COLLJ CAPILLARY BLOOD SPEC: CPT

## 2020-01-28 RX ORDER — LOPERAMIDE HYDROCHLORIDE 2 MG/1
2 CAPSULE ORAL 4 TIMES DAILY PRN
COMMUNITY

## 2020-02-04 ENCOUNTER — HOSPITAL ENCOUNTER (OUTPATIENT)
Dept: WOMENS IMAGING | Age: 75
Discharge: HOME | End: 2020-02-04
Payer: MEDICARE

## 2020-02-04 PROCEDURE — 77063 BREAST TOMOSYNTHESIS BI: CPT

## 2020-03-03 ENCOUNTER — HOSPITAL ENCOUNTER (OUTPATIENT)
Dept: PHARMACY | Age: 75
Setting detail: THERAPIES SERIES
Discharge: HOME | End: 2020-03-03
Payer: MEDICARE

## 2020-03-03 LAB — POC INR: 2.9 (ref 0.8–1.2)

## 2020-03-03 PROCEDURE — 85610 PROTHROMBIN TIME: CPT

## 2020-03-03 PROCEDURE — 99211 OFF/OP EST MAY X REQ PHY/QHP: CPT

## 2020-03-03 PROCEDURE — 36416 COLLJ CAPILLARY BLOOD SPEC: CPT

## 2020-03-03 RX ORDER — PEPPERMINT OIL
OIL (ML) MISCELLANEOUS PRN
COMMUNITY
End: 2020-11-12

## 2020-03-03 NOTE — PROGRESS NOTES
Medication Management [address]  [phone] (phone)  [phone] (fax)      Ms. Lidia Zhao is a 75 y.o.  female with history of MVR, per Dr. Korin Prince referral, who presents today for Warfarin monitoring and adjustment (5 week visit). Patient verifies current dosing regimen and tablet strength. No missed or extra doses. Patient denies bleeding/bruising/chest pain. Has usual SOB/swelling of legs. No blood in urine or stool. No dietary changes. No changes in medication/OTC agents/herbals. No change in alcohol use or tobacco use. No change in activity level. Patient denies falls. Uses peppermint oil for typical headaches. Has usual dizziness. No vomiting/diarrhea or acute illness. No procedures scheduled in the future at this time. Assessment:   Lab Results   Component Value Date    INR 2.90 (H) 03/03/2020    INR 3.40 (H) 01/28/2020    INR 2.80 (H) 12/27/2019    PROTIME 2.42 (H) 12/15/2011    PROTIME 1.97 (H) 12/01/2011    PROTIME 1.80 (H) 11/22/2011     INR therapeutic - goal 2.5-3.5. Recent Labs     03/03/20  1019   INR 2.90*       Plan:  POCT INR ordered/performed/result reviewed. Continue PO Coumadin 1 mg W, 2 mg MTThFSS. Recheck INR in 5 weeks. Patient reminded to call the Anticoagulation Clinic with any signs or symptoms of bleeding or with any medication changes. Patient given instructions utilizing the teach back method. Discharged ambulatory in no apparent distress. After visit summary printed and reviewed with patient.       Medications reviewed and updated on home medication list.    Influenza vaccine:     [] given    [] declined   [x] received previously   [] plans to receive at a later time   [] refused    [x] documented in EPIC

## 2020-03-11 ENCOUNTER — PROCEDURE VISIT (OUTPATIENT)
Dept: CARDIOLOGY CLINIC | Age: 75
End: 2020-03-11
Payer: MEDICARE

## 2020-03-11 PROCEDURE — 93294 REM INTERROG EVL PM/LDLS PM: CPT | Performed by: INTERNAL MEDICINE

## 2020-03-11 PROCEDURE — 93296 REM INTERROG EVL PM/IDS: CPT | Performed by: INTERNAL MEDICINE

## 2020-03-13 ENCOUNTER — OFFICE VISIT (OUTPATIENT)
Dept: CARDIOLOGY CLINIC | Age: 75
End: 2020-03-13
Payer: MEDICARE

## 2020-03-13 VITALS
SYSTOLIC BLOOD PRESSURE: 138 MMHG | WEIGHT: 220.6 LBS | HEART RATE: 79 BPM | HEIGHT: 69 IN | DIASTOLIC BLOOD PRESSURE: 80 MMHG | BODY MASS INDEX: 32.67 KG/M2

## 2020-03-13 PROCEDURE — 93000 ELECTROCARDIOGRAM COMPLETE: CPT | Performed by: NUCLEAR MEDICINE

## 2020-03-13 PROCEDURE — G8427 DOCREV CUR MEDS BY ELIG CLIN: HCPCS | Performed by: NUCLEAR MEDICINE

## 2020-03-13 PROCEDURE — 3017F COLORECTAL CA SCREEN DOC REV: CPT | Performed by: NUCLEAR MEDICINE

## 2020-03-13 PROCEDURE — 4040F PNEUMOC VAC/ADMIN/RCVD: CPT | Performed by: NUCLEAR MEDICINE

## 2020-03-13 PROCEDURE — G8399 PT W/DXA RESULTS DOCUMENT: HCPCS | Performed by: NUCLEAR MEDICINE

## 2020-03-13 PROCEDURE — G8482 FLU IMMUNIZE ORDER/ADMIN: HCPCS | Performed by: NUCLEAR MEDICINE

## 2020-03-13 PROCEDURE — 1123F ACP DISCUSS/DSCN MKR DOCD: CPT | Performed by: NUCLEAR MEDICINE

## 2020-03-13 PROCEDURE — 1036F TOBACCO NON-USER: CPT | Performed by: NUCLEAR MEDICINE

## 2020-03-13 PROCEDURE — G8417 CALC BMI ABV UP PARAM F/U: HCPCS | Performed by: NUCLEAR MEDICINE

## 2020-03-13 PROCEDURE — 99213 OFFICE O/P EST LOW 20 MIN: CPT | Performed by: NUCLEAR MEDICINE

## 2020-03-13 PROCEDURE — 1090F PRES/ABSN URINE INCON ASSESS: CPT | Performed by: NUCLEAR MEDICINE

## 2020-03-13 NOTE — PROGRESS NOTES
Pt here for a 1 yr f/u    EKG done today    Pt denies cp and palpitations     Pt c/o sob, dizziness and SHIMA

## 2020-03-13 NOTE — PROGRESS NOTES
[facility],[place] [address] [address]  [facility] [address]  Dept: [phone]  Dept Fax: [phone]  Loc: [phone]    Visit Date: 3/13/2020    Alyson Ellis is a 75 y.o. female who presents todayfor:  Chief Complaint   Patient presents with    1 Year Follow Up    Check-Up    Cardiac Valve Problem    Atrial Fibrillation    Hypertension   had some GI bleeding and ulcer  Known MVR and A fib   As well pacer   No major CAD  No chest pain   No changes in breathing  No ER visits  Active       HPI:  HPI  Past Medical History:   Diagnosis Date    Arrhythmia     CHRONIC ATRIAL FIB    Cancer ([facility] [address]     breast ca left    Chronic kidney disease, stage III (moderate) ([facility] [address] 10/29/2018    Depression     Diabetes mellitus (HCC)     Generalized headaches     History of dizziness     History of sinus bradycardia     History of valvular heart disease     Hyperlipidemia     Hypertension     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 MV area by recent EKG was 1 sq. cm. and mean gradient across MV was 17mmHg. MV index was about 0.7 sq. cm. per body surface area. Moderate pulmonary artery systolic HTN. Patent coronary arteries.  CARDIAC VALVE REPLACEMENT  2003    MVR  33mm St Milan Mechanical valve    CARDIOVASCULAR STRESS TEST  05/26/2011    Cannot exclude mild degree of ischemia in anterolateral wall. EF 58%. Mildly abnormal nuclear scan.  CARDIOVASCULAR STRESS TEST  7-09-10    Atrial fibrillation.  Episodes of bradycardia mainly

## 2020-03-31 ENCOUNTER — TELEPHONE (OUTPATIENT)
Dept: PHARMACY | Age: 75
End: 2020-03-31

## 2020-03-31 NOTE — TELEPHONE ENCOUNTER
Called patient to explain new Coumadin monitoring process with COVID-Gus. Advised to go to [facility] at [employer]. 4/7, then will do phone visit. Received permission to leave any orders/messages on any of her numbers.

## 2020-04-07 ENCOUNTER — NURSE ONLY (OUTPATIENT)
Dept: LAB | Age: 75
End: 2020-04-07

## 2020-04-07 ENCOUNTER — HOSPITAL ENCOUNTER (OUTPATIENT)
Dept: PHARMACY | Age: 75
Setting detail: THERAPIES SERIES
Discharge: HOME | End: 2020-04-07
Payer: MEDICARE

## 2020-04-07 LAB — INR BLD: 3.89 (ref 0.85–1.13)

## 2020-04-07 PROCEDURE — 99211 OFF/OP EST MAY X REQ PHY/QHP: CPT | Performed by: PHARMACIST

## 2020-04-17 RX ORDER — WARFARIN SODIUM 1 MG/1
TABLET ORAL
Qty: 180 TABLET | Refills: 0 | Status: SHIPPED | OUTPATIENT
Start: 2020-04-17 | End: 2020-07-16

## 2020-04-17 RX ORDER — GLIMEPIRIDE 2 MG/1
TABLET ORAL
Qty: 180 TABLET | Refills: 0 | Status: SHIPPED | OUTPATIENT
Start: 2020-04-17 | End: 2020-07-16

## 2020-04-17 NOTE — TELEPHONE ENCOUNTER
Lajoyce Kawasaki needs refill of   Requested Prescriptions     Pending Prescriptions Disp Refills    glimepiride (AMARYL) 2 MG tablet [Pharmacy Med Name: GLIMEPIRIDE TABS 2MG] 180 tablet 3     Sig: TAKE 1 TABLET TWICE A DAY    warfarin (COUMADIN) 1 MG tablet [Pharmacy Med Name: Ayse Alicia 1MG] 180 tablet 3     Sig: TAKE 2 TABLETS DAILY EXCEPT TAKE 1 TABLET DAILY ON MONDAYS       Last Filled on:  4/23/19    Last Visit Date:  4/29/2019-WINDY    Next Visit Date:    Visit date not found

## 2020-04-20 RX ORDER — FUROSEMIDE 40 MG/1
TABLET ORAL
Qty: 90 TABLET | Refills: 3 | Status: SHIPPED | OUTPATIENT
Start: 2020-04-20 | End: 2021-03-16

## 2020-04-23 RX ORDER — BLOOD SUGAR DIAGNOSTIC
STRIP MISCELLANEOUS
Qty: 100 STRIP | Refills: 0 | Status: SHIPPED | OUTPATIENT
Start: 2020-04-23 | End: 2021-04-28 | Stop reason: SDUPTHER

## 2020-05-05 ENCOUNTER — NURSE ONLY (OUTPATIENT)
Dept: LAB | Age: 75
End: 2020-05-05

## 2020-05-05 ENCOUNTER — HOSPITAL ENCOUNTER (OUTPATIENT)
Dept: PHARMACY | Age: 75
Setting detail: THERAPIES SERIES
Discharge: HOME | End: 2020-05-05
Payer: MEDICARE

## 2020-05-05 LAB — INR BLD: 3.69 (ref 0.85–1.13)

## 2020-05-05 PROCEDURE — 99211 OFF/OP EST MAY X REQ PHY/QHP: CPT

## 2020-05-21 ENCOUNTER — NURSE ONLY (OUTPATIENT)
Dept: LAB | Age: 75
End: 2020-05-21

## 2020-05-21 ENCOUNTER — HOSPITAL ENCOUNTER (OUTPATIENT)
Dept: PHARMACY | Age: 75
Setting detail: THERAPIES SERIES
Discharge: HOME | End: 2020-05-21
Payer: MEDICARE

## 2020-05-21 LAB — INR BLD: 2.74 (ref 0.85–1.13)

## 2020-05-21 PROCEDURE — 99211 OFF/OP EST MAY X REQ PHY/QHP: CPT | Performed by: PHARMACIST

## 2020-05-21 NOTE — PROGRESS NOTES
Medication Management [address]  [phone] (phone)  [phone] (fax)      Anticoagulation encounter completed via telephone. Patient had lab result completed at outside lab. Ms. Adolph Griffiths is a 75 y.o.  female with history of Heart Valve Replacement. Patient verifies current dosing regimen and tablet strength. No missed or extra doses. Patient denies s/s bleeding/bruising/swelling/SOB/chest pain. +SOB - at baseline. No blood in urine or stool. No dietary changes. Eating less greens  No changes in medication/OTC agents/Herbals. No change in alcohol use or tobacco use. No change in activity level. Patient denies headaches/dizziness/lightheadedness/falls. No vomiting/diarrhea or acute illness. No Procedures scheduled in the future at this time. Assessment:   Lab Results   Component Value Date    INR 2.74 (H) 05/21/2020    INR 3.69 (H) 05/05/2020    INR 3.89 (H) 04/07/2020    PROTIME 2.42 (H) 12/15/2011    PROTIME 1.97 (H) 12/01/2011    PROTIME 1.80 (H) 11/22/2011     INR therapeutic   Recent Labs     05/21/20  0843   INR 2.74*         Plan:  Continue Coumadin 1mg W, 2mg MTThFSS. Recheck INR in 4 weeks. Patient reminded to call the Anticoagulation Clinic with any signs or symptoms of bleeding or with any medication changes. Patient given instructions utilizing the teach back method. The following statement was review with patient regarding this virtual visit:  We want to confirm that, for purposes of billing, this is a virtual visit with your provider for which we will submit a claim for reimbursement with your insurance company. You may be responsible for any copays, coinsurance amounts or other amounts not covered by your insurance company. If you do not accept this, unfortunately we will not be able to schedule a virtual visit with the provider. Do you accept?   Yes    Verbal Consent for Consult Agreement participation during [phone]

## 2020-06-17 ENCOUNTER — PROCEDURE VISIT (OUTPATIENT)
Dept: CARDIOLOGY CLINIC | Age: 75
End: 2020-06-17
Payer: MEDICARE

## 2020-06-17 PROCEDURE — 93294 REM INTERROG EVL PM/LDLS PM: CPT | Performed by: NUCLEAR MEDICINE

## 2020-06-17 PROCEDURE — 93296 REM INTERROG EVL PM/IDS: CPT | Performed by: NUCLEAR MEDICINE

## 2020-06-18 ENCOUNTER — NURSE ONLY (OUTPATIENT)
Dept: LAB | Age: 75
End: 2020-06-18

## 2020-06-18 ENCOUNTER — HOSPITAL ENCOUNTER (OUTPATIENT)
Dept: PHARMACY | Age: 75
Setting detail: THERAPIES SERIES
Discharge: HOME | End: 2020-06-18
Payer: MEDICARE

## 2020-06-18 LAB — INR BLD: 4.09 (ref 0.85–1.13)

## 2020-06-18 PROCEDURE — 99211 OFF/OP EST MAY X REQ PHY/QHP: CPT | Performed by: PHARMACIST

## 2020-06-18 NOTE — PROGRESS NOTES
visit:  We want to confirm that, for purposes of billing, this is a virtual visit with your provider for which we will submit a claim for reimbursement with your insurance company. You may be responsible for any copays, coinsurance amounts or other amounts not covered by your insurance company. If you do not accept this, unfortunately we will not be able to schedule a virtual visit with the provider. Do you accept? Yes    CPA consent:  Yes

## 2020-06-23 ENCOUNTER — OFFICE VISIT (OUTPATIENT)
Dept: PULMONOLOGY | Age: 75
End: 2020-06-23
Payer: MEDICARE

## 2020-06-23 VITALS
DIASTOLIC BLOOD PRESSURE: 72 MMHG | TEMPERATURE: 98 F | WEIGHT: 218.6 LBS | HEIGHT: 69 IN | SYSTOLIC BLOOD PRESSURE: 136 MMHG | OXYGEN SATURATION: 97 % | HEART RATE: 64 BPM | BODY MASS INDEX: 32.38 KG/M2

## 2020-06-23 PROCEDURE — G8427 DOCREV CUR MEDS BY ELIG CLIN: HCPCS | Performed by: PHYSICIAN ASSISTANT

## 2020-06-23 PROCEDURE — 1123F ACP DISCUSS/DSCN MKR DOCD: CPT | Performed by: PHYSICIAN ASSISTANT

## 2020-06-23 PROCEDURE — 99214 OFFICE O/P EST MOD 30 MIN: CPT | Performed by: PHYSICIAN ASSISTANT

## 2020-06-23 PROCEDURE — G8417 CALC BMI ABV UP PARAM F/U: HCPCS | Performed by: PHYSICIAN ASSISTANT

## 2020-06-23 PROCEDURE — 1036F TOBACCO NON-USER: CPT | Performed by: PHYSICIAN ASSISTANT

## 2020-06-23 PROCEDURE — 1090F PRES/ABSN URINE INCON ASSESS: CPT | Performed by: PHYSICIAN ASSISTANT

## 2020-06-23 PROCEDURE — 4040F PNEUMOC VAC/ADMIN/RCVD: CPT | Performed by: PHYSICIAN ASSISTANT

## 2020-06-23 PROCEDURE — G8399 PT W/DXA RESULTS DOCUMENT: HCPCS | Performed by: PHYSICIAN ASSISTANT

## 2020-06-23 ASSESSMENT — ENCOUNTER SYMPTOMS
SHORTNESS OF BREATH: 0
EYES NEGATIVE: 1
ALLERGIC/IMMUNOLOGIC NEGATIVE: 1
COUGH: 0
DIARRHEA: 0
WHEEZING: 0
BACK PAIN: 0
CHEST TIGHTNESS: 0
NAUSEA: 0
STRIDOR: 0

## 2020-06-23 NOTE — PROGRESS NOTES
[facility]      Jennifer Turner         [id number]  6/23/2020   Chief Complaint   Patient presents with    Follow-up     [place] 1 year follow up with a download        Pt of Dr. Dallin Brennan    PAP Download:   Aldo Lynn or initial AHI: 40.8     Date of initial study: 3/21/11      Compliant  20%     Noncompliant 27 %     PAP Type CPAP Level  12 cmh20  Avg Hrs/Day 3:22  AHI: 28.8   Recorded compliance dates , 5/23/20  to 6/21/20   Machine/Mfg: ResMed  Interface: FFM    Provider:    _x_SR-THOMASE           Cali Canas        __ Brianna Porter    __ Manjit Gonzalez            __[facility]   __[place]:       __Other    Neck Size: 15.75  Mallampati Mallampati 3  ESS:  8  SAQLI:     Here is a scan of the most recent download:          Presentation:   Lauren Farrell presents for sleep medicine follow up for obstructive sleep apnea  Since the last visit, Lauren Farrell is struggling with PAP. She states that her nasal mask was discontinued and worn out. She did not tolerate FFM. She states the FFM irritated her skin. She has dry mouth at night. Large mask leak. She is not sleeping well. She takes naps during the day. Equipment issues: The pressure is  acceptable, the mask is unacceptable     Sleep issues:  Do you feel better? No  More rested? No   Better concentration? no    Progress History:   Since last visit any new medical issues? No  New ER or hospitlal visits? No  Any new or changes in medicines? No  Any new sleep medicines?  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 MV area by recent EKG was 1 sq. cm. and mean gradient across MV was 17mmHg. MV index was about 0.7 sq. cm. per body surface area. Moderate pulmonary artery systolic HTN. Patent coronary arteries.  CARDIAC VALVE REPLACEMENT  2003    MVR  33mm St [place] Mechanical valve    CARDIOVASCULAR STRESS TEST  05/26/2011    Cannot exclude mild degree of ischemia in anterolateral wall. EF 58%. Mildly abnormal nuclear scan.  CARDIOVASCULAR STRESS TEST  7-09-10    Atrial fibrillation. Episodes of bradycardia mainly at night, which could be physiologic. Predominantly bradycardiac rhythm w/o significant pauses. Frequent PVC's     CARDIOVASCULAR STRESS TEST  1-14-08    Atrial fibrillation w/ controlled ventricular repsonse and an average heart rate of 58 bpm. 1 episode of 3 beat run of nonsustained ventricular tachyarrhythmia at rate of 110-158 bpm. 3 transient episodes of ventricular bigeminy w/ rate in mid 60's to low 70's.  CARDIOVASCULAR STRESS TEST  2-08-99    Unremarkable routine stress test. Chronic A-fib.  CARDIOVASCULAR STRESS TEST  2-08-00    Normal stress EKG. Hypertension, not well controlled. Patient's sitting BP was 160/94. At peak exercise BP was 180/102. Moderate degree of MVP.     CATARACT REMOVAL Right 7/16/14    CATARACT REMOVAL      COLONOSCOPY  2/2010    COLONOSCOPY  08/2017    KNEE ARTHROSCOPY  6/2011    KNEE SURGERY      MITRAL VALVE REPLACEMENT      PACEMAKER INSERTION Left 05/18/2017    by Dr Damián Gallo  [address]  IND N/A 8/22/2017    COLONOSCOPY performed by Rogerio Kelley, topically as needed Indications: Pain      DM-APAP-CPM (CORICIDIN HBP PO) Take by mouth See Admin Instructions Indications: Cold Symptoms      loperamide (IMODIUM) 2 MG capsule Take 2 mg by mouth 4 times daily as needed for Diarrhea      simvastatin (ZOCOR) 10 MG tablet TAKE 1 TABLET AT BEDTIME 90 tablet 4    UNABLE TO FIND Apply topically See Admin Instructions Indications: Pain Lavendar oil      metoprolol tartrate (LOPRESSOR) 50 MG tablet TAKE 1 TABLET TWICE A  tablet 4    olmesartan (BENICAR) 40 MG tablet TAKE 1 TABLET DAILY 90 tablet 4    metFORMIN (GLUCOPHAGE-XR) 500 MG extended release tablet TAKE 4 TABLETS DAILY 360 tablet 4    omeprazole (PRILOSEC) 40 MG delayed release capsule Take 40 mg by mouth daily       hydrALAZINE (APRESOLINE) 50 MG tablet TAKE 1 TABLET THREE TIMES A  tablet 3    mometasone (ELOCON) 0.1 % cream Apply topically daily. (Patient taking differently: Apply topically as needed Apply topically daily.) 45 g 0    fluorouracil (EFUDEX) 5 % cream Apply topically 2 times daily       nitroGLYCERIN (NITROLINGUAL) 0.4 MG/SPRAY 0.4 mg spray 1 spray SL q5 minutes prn chest pain. If third spray does not relieve pain, call 9-1-1. 1 Bottle 0    metroNIDAZOLE (METROCREAM) 0.75 % cream Apply topically See Admin Instructions       famotidine (PEPCID) 40 MG tablet Take 40 mg by mouth nightly as needed       AMOXICILLIN PO Take 4 capsules by mouth With teeth cleaning.  Blood Glucose Monitoring Suppl (ADVOCATE BLOOD GLUCOSE MONITOR) CONNIE Dx: 250.00 1 Device 0    fluticasone (FLONASE) 50 MCG/ACT nasal spray 2 sprays by Nasal route daily (Patient taking differently: 2 sprays by Nasal route daily as needed ) 1 Bottle 6     No current facility-administered medications for this visit.         Family History   Problem Relation Age of Onset    Hypertension Mother     Stroke Mother     Diabetes Mother     Cancer Father     Heart Attack Brother     Heart Disease Brother    

## 2020-07-06 ENCOUNTER — APPOINTMENT (OUTPATIENT)
Dept: PHARMACY | Age: 75
End: 2020-07-06
Payer: MEDICARE

## 2020-07-06 ENCOUNTER — HOSPITAL ENCOUNTER (OUTPATIENT)
Dept: PHARMACY | Age: 75
Setting detail: THERAPIES SERIES
Discharge: HOME | End: 2020-07-06
Payer: MEDICARE

## 2020-07-06 VITALS — TEMPERATURE: 97.8 F

## 2020-07-06 LAB — POC INR: 3.6 (ref 0.8–1.2)

## 2020-07-06 PROCEDURE — 99211 OFF/OP EST MAY X REQ PHY/QHP: CPT | Performed by: PHARMACIST

## 2020-07-06 PROCEDURE — 85610 PROTHROMBIN TIME: CPT | Performed by: PHARMACIST

## 2020-07-06 PROCEDURE — 36416 COLLJ CAPILLARY BLOOD SPEC: CPT | Performed by: PHARMACIST

## 2020-07-16 RX ORDER — WARFARIN SODIUM 1 MG/1
TABLET ORAL
Qty: 180 TABLET | Refills: 3 | Status: SHIPPED | OUTPATIENT
Start: 2020-07-16 | End: 2021-08-05 | Stop reason: SDUPTHER

## 2020-07-16 RX ORDER — GLIMEPIRIDE 2 MG/1
TABLET ORAL
Qty: 180 TABLET | Refills: 3 | Status: SHIPPED | OUTPATIENT
Start: 2020-07-16 | End: 2021-07-26 | Stop reason: SDUPTHER

## 2020-07-16 NOTE — TELEPHONE ENCOUNTER
Daphney Curtis needs refill of   Requested Prescriptions     Pending Prescriptions Disp Refills    warfarin (COUMADIN) 1 MG tablet [Pharmacy Med Name: Chilkat Diaz 1MG] 180 tablet 3     Sig: TAKE 2 TABLETS DAILY EXCEPT TAKE 1 TABLET DAILY ON MONDAYS    glimepiride (AMARYL) 2 MG tablet [Pharmacy Med Name: GLIMEPIRIDE TABS 2MG] 180 tablet 3     Sig: TAKE 1 TABLET TWICE A DAY       Last Filled on:  4/17/20 #180/0    Last Visit Date:  4/29/2019 annual    Next Visit Date:    7/23/2020 annual

## 2020-07-20 ENCOUNTER — HOSPITAL ENCOUNTER (OUTPATIENT)
Age: 75
Discharge: HOME | End: 2020-07-20
Payer: MEDICARE

## 2020-07-20 DIAGNOSIS — E11.21 TYPE 2 DIABETES MELLITUS WITH DIABETIC NEPHROPATHY, WITHOUT LONG-TERM CURRENT USE OF INSULIN (HCC): ICD-10-CM

## 2020-07-20 DIAGNOSIS — E78.5 HYPERLIPIDEMIA, UNSPECIFIED HYPERLIPIDEMIA TYPE: ICD-10-CM

## 2020-07-20 DIAGNOSIS — I10 ESSENTIAL HYPERTENSION: ICD-10-CM

## 2020-07-20 LAB
ALBUMIN SERPL-MCNC: 4.3 G/DL (ref 3.5–5.1)
ALP BLD-CCNC: < 5 U/L (ref 38–126)
ALT SERPL-CCNC: 10 U/L (ref 11–66)
ANION GAP SERPL CALCULATED.3IONS-SCNC: 9 MEQ/L (ref 8–16)
AST SERPL-CCNC: 21 U/L (ref 5–40)
AVERAGE GLUCOSE: 138 MG/DL (ref 70–126)
BILIRUB SERPL-MCNC: 0.3 MG/DL (ref 0.3–1.2)
BUN BLDV-MCNC: 27 MG/DL (ref 7–22)
CALCIUM SERPL-MCNC: 9.9 MG/DL (ref 8.5–10.5)
CHLORIDE BLD-SCNC: 103 MEQ/L (ref 98–111)
CHOLESTEROL, TOTAL: 150 MG/DL (ref 100–199)
CO2: 28 MEQ/L (ref 23–33)
CREAT SERPL-MCNC: 1.3 MG/DL (ref 0.4–1.2)
CREATININE, URINE: 115.2 MG/DL
GFR SERPL CREATININE-BSD FRML MDRD: 40 ML/MIN/1.73M2
GLUCOSE BLD-MCNC: 141 MG/DL (ref 70–108)
HBA1C MFR BLD: 6.6 % (ref 4.4–6.4)
HDLC SERPL-MCNC: 35 MG/DL
LDL CHOLESTEROL CALCULATED: 71 MG/DL
MICROALBUMIN UR-MCNC: < 1.2 MG/DL
MICROALBUMIN/CREAT UR-RTO: 10 MG/G (ref 0–30)
POTASSIUM SERPL-SCNC: 4.2 MEQ/L (ref 3.5–5.2)
SODIUM BLD-SCNC: 140 MEQ/L (ref 135–145)
TOTAL PROTEIN: 7 G/DL (ref 6.1–8)
TRIGL SERPL-MCNC: 220 MG/DL (ref 0–199)

## 2020-07-20 PROCEDURE — 80061 LIPID PANEL: CPT

## 2020-07-20 PROCEDURE — 83036 HEMOGLOBIN GLYCOSYLATED A1C: CPT

## 2020-07-20 PROCEDURE — 36415 COLL VENOUS BLD VENIPUNCTURE: CPT

## 2020-07-20 PROCEDURE — 82043 UR ALBUMIN QUANTITATIVE: CPT

## 2020-07-20 PROCEDURE — 80053 COMPREHEN METABOLIC PANEL: CPT

## 2020-07-23 ENCOUNTER — OFFICE VISIT (OUTPATIENT)
Dept: FAMILY MEDICINE CLINIC | Age: 75
End: 2020-07-23
Payer: MEDICARE

## 2020-07-23 VITALS
DIASTOLIC BLOOD PRESSURE: 64 MMHG | HEIGHT: 69 IN | HEART RATE: 78 BPM | TEMPERATURE: 97.8 F | WEIGHT: 218 LBS | RESPIRATION RATE: 16 BRPM | SYSTOLIC BLOOD PRESSURE: 134 MMHG | BODY MASS INDEX: 32.29 KG/M2

## 2020-07-23 PROCEDURE — 4040F PNEUMOC VAC/ADMIN/RCVD: CPT | Performed by: FAMILY MEDICINE

## 2020-07-23 PROCEDURE — 1123F ACP DISCUSS/DSCN MKR DOCD: CPT | Performed by: FAMILY MEDICINE

## 2020-07-23 PROCEDURE — G0439 PPPS, SUBSEQ VISIT: HCPCS | Performed by: FAMILY MEDICINE

## 2020-07-23 RX ORDER — NITROGLYCERIN 400 UG/1
SPRAY ORAL
Qty: 1 BOTTLE | Refills: 0 | Status: SHIPPED | OUTPATIENT
Start: 2020-07-23 | End: 2022-01-31 | Stop reason: SDUPTHER

## 2020-07-23 RX ORDER — SITAGLIPTIN 100 MG/1
TABLET, FILM COATED ORAL
Qty: 90 TABLET | Refills: 3 | Status: SHIPPED | OUTPATIENT
Start: 2020-07-23 | End: 2021-07-29

## 2020-07-23 SDOH — ECONOMIC STABILITY: INCOME INSECURITY: HOW HARD IS IT FOR YOU TO PAY FOR THE VERY BASICS LIKE FOOD, HOUSING, MEDICAL CARE, AND HEATING?: NOT HARD AT ALL

## 2020-07-23 SDOH — ECONOMIC STABILITY: FOOD INSECURITY: WITHIN THE PAST 12 MONTHS, YOU WORRIED THAT YOUR FOOD WOULD RUN OUT BEFORE YOU GOT MONEY TO BUY MORE.: NEVER TRUE

## 2020-07-23 SDOH — ECONOMIC STABILITY: TRANSPORTATION INSECURITY
IN THE PAST 12 MONTHS, HAS LACK OF TRANSPORTATION KEPT YOU FROM MEETINGS, WORK, OR FROM GETTING THINGS NEEDED FOR DAILY LIVING?: NO

## 2020-07-23 SDOH — ECONOMIC STABILITY: TRANSPORTATION INSECURITY
IN THE PAST 12 MONTHS, HAS THE LACK OF TRANSPORTATION KEPT YOU FROM MEDICAL APPOINTMENTS OR FROM GETTING MEDICATIONS?: NO

## 2020-07-23 SDOH — ECONOMIC STABILITY: FOOD INSECURITY: WITHIN THE PAST 12 MONTHS, THE FOOD YOU BOUGHT JUST DIDN'T LAST AND YOU DIDN'T HAVE MONEY TO GET MORE.: NEVER TRUE

## 2020-07-23 ASSESSMENT — PATIENT HEALTH QUESTIONNAIRE - PHQ9
SUM OF ALL RESPONSES TO PHQ QUESTIONS 1-9: 0
SUM OF ALL RESPONSES TO PHQ QUESTIONS 1-9: 0

## 2020-07-23 NOTE — PATIENT INSTRUCTIONS
Personalized Preventive Plan for Nate Triplett - 7/23/2020  Medicare offers a range of preventive health benefits. Some of the tests and screenings are paid in full while other may be subject to a deductible, co-insurance, and/or copay. Some of these benefits include a comprehensive review of your medical history including lifestyle, illnesses that may run in your family, and various assessments and screenings as appropriate. After reviewing your medical record and screening and assessments performed today your provider may have ordered immunizations, labs, imaging, and/or referrals for you. A list of these orders (if applicable) as well as your Preventive Care list are included within your After Visit Summary for your review. Other Preventive Recommendations:    · A preventive eye exam performed by an eye specialist is recommended every 1-2 years to screen for glaucoma; cataracts, macular degeneration, and other eye disorders. · A preventive dental visit is recommended every 6 months. · Try to get at least 150 minutes of exercise per week or 10,000 steps per day on a pedometer . · Order or download the FREE \"Exercise & Physical Activity: Your Everyday Guide\" from [employer]. Call 1-970.374.4469 or search [employer] online. · You need 1770-6312 mg of calcium and 3287-9938 IU of vitamin D per day. It is possible to meet your calcium requirement with diet alone, but a vitamin D supplement is usually necessary to meet this goal.  · When exposed to the sun, use a sunscreen that protects against both UVA and UVB radiation with an SPF of 30 or greater. Reapply every 2 to 3 hours or after sweating, drying off with a towel, or swimming. · Always wear a seat belt when traveling in a car. Always wear a helmet when riding a bicycle or motorcycle. Learning About Living [place]  What is a living will? A living will, also called a declaration, is a legal form. It tells your family and your doctor your wishes when you can't speak for yourself. It's used by the health professionals who will treat you as you near the end of your life or if you get seriously hurt or ill. If you put your wishes in writing, your loved ones and others will know what kind of care you want. They won't need to guess. This can ease your mind and be helpful to others. And you can change or cancel your living will at any time. A living will is not the same as an estate or property will. An estate will explains what you want to happen with your money and property after you die. How do you use it? A living will is used to describe the kinds of treatment or life support you want as you near the end of your life or if you get seriously hurt or ill. Keep these facts in mind about living barnhart. Your living will is used only if you can't speak or make decisions for yourself. Most often, one or more doctors must certify that you can't speak or decide for yourself before your living will takes effect. If you get better and can speak for yourself again, you can accept or refuse any treatment. It doesn't matter what you said in your living will. Some states may limit your right to refuse treatment in certain cases. For example, you may need to clearly state in your living will that you don't want artificial hydration and nutrition, such as being fed through a tube. Is a living will a legal document? A living will is a legal document. Each state has its own laws about living barnhart. And a living will may be called something else in your state. Here are some things to know about living barnhart. You don't need an  to complete a living will. But legal advice can be helpful if your state's laws are unclear. It can also help if your health history is complicated or your family can't agree on what should be in your living will. You can change your living will at any time.  Some people find that their living will (also called a declaration). Give your doctor a copy of your living will. Ask him or her to keep it as part of your medical record. If you have more than one doctor, make sure that each one has a copy. Put a copy of your living will where it can be easily found. For example, some people may put a copy on their refrigerator door. If you are using a digital copy, be sure your doctor, family members, and health care agent know how to find and access it. Where can you learn more? Go to [email or web address] and sign in to your [employer] account. Enter M239 in the [employer] [place] box to learn more about \"Learning About Living [place]. \"     If you do not have an account, please click on the \"Sign Up Now\" link. Current as of: December 9, 2019               Content Version: 12.5  © 2739-1565 Healthwise, Incorporated. Care instructions adapted under license by [facility] [place] ([facility]). If you have questions about a medical condition or this instruction, always ask your healthcare professional. [address] any warranty or liability for your use of this information.     ·

## 2020-07-23 NOTE — PROGRESS NOTES
Medicare Annual Wellness Visit      Name: Lalitha Rodriguez Date: 2020   MRN: [id number] Sex: Female   Age: 67 y.o. Ethnicity: Non-/Non    : 1945 Race: Tor Smyth is here for Medicare AWV (no concerns )    Screenings for behavioral, psychosocial and functional/safety risks, and cognitive dysfunction are all negative except as indicated below. These results, as well as other patient data from the [address] form, are documented in Flowsheets linked to this Encounter. Doing well. No complaints. Sees her specialists regularly. BPs and blood sugars have been great. Denies hypoglycemia. Takes all meds as directed and denies side effects. No recent illnesses or hospitalizations. No changes in family history. Nonsmoker. Body mass index is 32.47 kg/m². Allergies   Allergen Reactions    Adalat [Nifedipine] Itching     redness    Amlodipine Swelling     If take more than 5mg legs swell, hot and red    Potassium-Containing Compounds      SHIMA    Tape [Adhesive Tape] Rash     Skin pulls off       Prior to Visit Medications    Medication Sig Taking? Authorizing Provider   nitroGLYCERIN (NITROLINGUAL) 0.4 MG/SPRAY 0.4 mg spray Indications: Angina Pectoris 1 spray SL q5 minutes prn chest pain. If third spray does not relieve pain, call 9-1-1.  Yes Lisandra Ramsey MD   warfarin (COUMADIN) 1 MG tablet TAKE 2 TABLETS DAILY EXCEPT TAKE 1 TABLET DAILY ON  Yes Lisandra Ramsey MD   glimepiride (AMARYL) 2 MG tablet TAKE 1 TABLET TWICE A DAY Yes Lisandra Ramsey MD   JANUVIA 100 MG tablet TAKE 1 TABLET DAILY Yes Lisandra Ramsey MD   blood glucose test strips (ADVOCATE REDI-CODE) strip USE ONCE DAILY Yes Lisandra Ramsey MD   furosemide (LASIX) 40 MG tablet TAKE 1 TABLET DAILY Yes Maryjane Elias MD   peppermint oil liquid Apply topically as needed Indications: Pain Yes Historical Provider, MD   loperamide (IMODIUM) 2 MG capsule bradycardia     History of valvular heart disease     Hyperlipidemia     Hypertension     Medtronic dual pacemaker 5/23/2017    MI, old    Geoffrey Valerie Mitral valve replaced     Numbness and tingling     Obesity     Obstructive sleep apnea on CPAP 2011    Osteopenia     SOB (shortness of breath)     HX OF:       Past Surgical History:   Procedure Laterality Date    BREAST SURGERY  2011    right biopsy    BREAST SURGERY Left 1/15/13    re-excision cranial margin and mammosite spacer    CARDIAC CATHETERIZATION  10/06/2003    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 MV area by recent EKG was 1 sq. cm. and mean gradient across MV was 17mmHg. MV index was about 0.7 sq. cm. per body surface area. Moderate pulmonary artery systolic HTN. Patent coronary arteries.  CARDIAC VALVE REPLACEMENT  2003    MVR  33mm St Milan Mechanical valve    CARDIOVASCULAR STRESS TEST  05/26/2011    Cannot exclude mild degree of ischemia in anterolateral wall. EF 58%. Mildly abnormal nuclear scan.  CARDIOVASCULAR STRESS TEST  7-09-10    Atrial fibrillation. Episodes of bradycardia mainly at night, which could be physiologic. Predominantly bradycardiac rhythm w/o significant pauses. Frequent PVC's     CARDIOVASCULAR STRESS TEST  1-14-08    Atrial fibrillation w/ controlled ventricular repsonse and an average heart rate of 58 bpm. 1 episode of 3 beat run of nonsustained ventricular tachyarrhythmia at rate of 110-158 bpm. 3 transient episodes of ventricular bigeminy w/ rate in mid 60's to low 70's.  CARDIOVASCULAR STRESS TEST  2-08-99    Unremarkable routine stress test. Chronic A-fib.  CARDIOVASCULAR STRESS TEST  2-08-00    Normal stress EKG. Hypertension, not well controlled. Patient's sitting BP was 160/94. At peak exercise BP was 180/102. Moderate degree of MVP.     CATARACT REMOVAL Right 7/16/14    CATARACT REMOVAL      COLONOSCOPY  2/2010    COLONOSCOPY  08/2017    KNEE ARTHROSCOPY  6/2011    KNEE SURGERY      MITRAL VALVE REPLACEMENT      PACEMAKER INSERTION Left 05/18/2017    by Dr Rafael Wall 8/22/2017    COLONOSCOPY performed by [facility]ille Skelton [facility]    [place] EGD TRANSORAL BIOPSY SINGLE/MULTIPLE N/A 8/22/2017    EGD BIOPSY performed by Neville Skelton MD at [address] ECHOCARDIOGRAM  05/26/2011    LV mildly dilated, systolic function mildly reduced. EF 40-45%. Mild diffuse hypokinesis. Mild lateral wall was hypokinetic. Apical lateral wall was dyskinetic. Wall thickness was at upper limits of normal. LA moderately dilated. RV and RA mildly dilated. Systolic function mildly reduced. Mild MR and TR.  TRANSTHORACIC ECHOCARDIOGRAM  01/14/2008    Biatrial enlargement, RV dilated. Atheromatous aortic root. Borderline LVH. Normal LV systolic function. EF 55%. Prosthetic MV appears to be functioning well. Mildly sclerosed AV. Trivial MR, mild TR, trivial PI, RVSP 47mmHg.  TRANSTHORACIC ECHOCARDIOGRAM  2-08-00    Moderate MV stenosis based on available echo doppler data.     UPPER GASTROINTESTINAL ENDOSCOPY  08/2017       Family History   Problem Relation Age of Onset    Hypertension Mother     Stroke Mother     Diabetes Mother     Cancer Father     Heart Attack Brother     Heart Disease Brother     High Blood Pressure Brother        CareTeam (Including outside providers/suppliers regularly involved in providing care):   Patient Care Team:  Sury Bartlett MD as PCP - General (Family Medicine)  Sury Bartlett MD as PCP - [facility] Provider  Kayleigh Gallo MD as Consulting Physician (Sleep Medicine)  Stephanie Del Toro MD as Consulting Physician (Cardiology)  Angelica Shoemaker MD (Dermatology)    Wt Readings from Last 3 Encounters:   07/23/20 218 lb (98.9 kg)   06/23/20 218 lb 9.6 oz (99.2 kg)   03/13/20 220 lb 9.6 oz (100.1 kg)     Vitals:    07/23/20 0903   BP: 134/64   Pulse: 78   Resp: 16   Temp: 97.8 °F (36.6 °C)   Weight: 218 lb (98.9 kg)   Height: 5' 8.7\" (1.745 m)     Body mass index is 32.47 kg/m². Based upon direct observation of the patient, evaluation of cognition reveals recent and remote memory intact. General Appearance: alert and oriented to person, place and time, well developed and well- nourished, in no acute distress  Skin: warm and dry, no rash or erythema  Head: normocephalic and atraumatic  Eyes: pupils equal, round, and reactive to light, extraocular eye movements intact, conjunctivae normal  ENT: tympanic membrane, external ear and ear canal normal bilaterally, nose without deformity, nasal mucosa and turbinates normal without polyps  Neck: supple and non-tender without mass, no thyromegaly or thyroid nodules, no cervical lymphadenopathy  Pulmonary/Chest: clear to auscultation bilaterally- no wheezes, rales or rhonchi, normal air movement, no respiratory distress  Cardiovascular: normal rate, regular rhythm, normal S1 and S2, no murmurs, rubs, clicks, or gallops, distal pulses intact, no carotid bruits. Artificial valve click noted.   Abdomen: soft, non-tender, non-distended, normal bowel sounds, no masses or organomegaly  Extremities: no cyanosis, clubbing or edema  Musculoskeletal: normal range of motion, no joint swelling, deformity or tenderness      Lab Results   Component Value Date    INR 3.60 (H) 07/06/2020    INR 4.09 (H) 06/18/2020    INR 2.74 (H) 05/21/2020    PROTIME 2.42 (H) 12/15/2011    PROTIME 1.97 (H) 12/01/2011    PROTIME 1.80 (H) 11/22/2011     Lab Results   Component Value Date    LABA1C 6.6 (H) 07/20/2020     No results found for: EAG  Lab Results   Component Value Date    CHOL 150 07/20/2020    TRIG 220 (H) 07/20/2020    HDL 35 07/20/2020    LDLCALC 71 07/20/2020     Lab Results   Component Value Date     07/20/2020    K 4.2 07/20/2020     07/20/2020    CO2 28 07/20/2020    BUN 27 (H) 07/20/2020    CREATININE 1.3 (H) 07/20/2020    GLUCOSE 141 (H) 07/20/2020    CALCIUM 9.9 07/20/2020    PROT 7.0 07/20/2020    LABALBU 4.3 07/20/2020    BILITOT 0.3 07/20/2020    ALKPHOS <5 (L) 07/20/2020    AST 21 07/20/2020    ALT 10 (L) 07/20/2020    LABGLOM 40 (A) 07/20/2020           Patient's complete Health Risk Assessment and screening values have been reviewed and are found in Flowsheets. The following problems were reviewed today and where indicated follow up appointments were made and/or referrals ordered. Positive Risk Factor Screenings with Interventions:     General Health:  General  In general, how would you say your health is?: Good  In the past 7 days, have you experienced any of the following? New or Increased Pain, New or Increased Fatigue, Loneliness, Social Isolation, Stress or Anger?: None of These  Do you get the social and emotional support that you need?: Yes  Do you have a Living Will?: (!) No  General Health Risk Interventions:  · No Living Will: patient will think about doing this    Health Habits/Nutrition:  Health Habits/Nutrition  Do you exercise for at least 20 minutes 2-3 times per week?: (!) No  Have you lost any weight without trying in the past 3 months?: No  Do you eat fewer than 2 meals per day?: (!) Yes  Have you seen a dentist within the past year?: Yes  Body mass index is 32.47 kg/m².   Health Habits/Nutrition Interventions:  · Nutritional issues:  encouraged to eat at least 2 meals daily    Safety:  Safety  Do you have working smoke detectors?: Yes  Have all throw rugs been removed or fastened?: Yes  Do you have non-slip mats or surfaces in all bathtubs/showers?: (!) No  Do all of your stairways have a railing or banister?: Yes  Are your doorways, halls and stairs free of clutter?: Yes  Do you always fasten your seatbelt when you are in a car?: Yes  Safety Interventions:  · Home safety tips provided    Personalized Preventive Plan   Current Health Maintenance Status  Immunization History   Administered Date(s) Administered    Influenza 11/14/2013    Influenza Virus Vaccine 01/01/2011, 11/01/2012, 11/24/2014, 09/24/2016, 10/07/2017    Influenza, High Dose (Fluzone 65 yrs and older) 09/22/2016    Influenza, Quadv, IM, PF (6 mo and older Fluzone, Flulaval, Fluarix, and 3 yrs and older Afluria) 09/26/2019    Influenza, Triv, inactivated, subunit, adjuvanted, IM (Fluad 65 yrs and older) 10/26/2018    Pneumococcal Conjugate 13-valent ([id number]) 04/23/2015    Pneumococcal Polysaccharide ([id number]) 07/01/2009    Zoster Live (Zostavax) 06/30/2015        Health Maintenance   Topic Date Due    DTaP/Tdap/Td vaccine (1 - Tdap) 06/12/1964    Shingles Vaccine (2 of 3) 08/25/2015    Annual Wellness Visit (AWV)  05/29/2019    Flu vaccine (1) 09/01/2020    Lipid screen  07/20/2021    Potassium monitoring  07/20/2021    Creatinine monitoring  07/20/2021    DEXA (modify frequency per FRAX score)  Completed    Pneumococcal 65+ years Vaccine  Completed    Hepatitis A vaccine  Aged Out    Hib vaccine  Aged Out    Meningococcal (ACWY) vaccine  Aged Out     Recommendations for [facility] Due: see orders and patient instructions/AVS.  . Recommended screening schedule for the next 5-10 years is provided to the patient in written form: see Patient Memory  was seen today for medicare awv. Diagnoses and all orders for this visit:    Type 2 diabetes mellitus with diabetic nephropathy, without long-term current use of insulin (HCC)    MI, old ([facility] [address]  -     nitroGLYCERIN (NITROLINGUAL) 0.4 MG/SPRAY 0.4 mg spray; Indications: Angina Pectoris 1 spray SL q5 minutes prn chest pain. If third spray does not relieve pain, call 9-1-1.           Continue current meds    Refill as above    Flu shot this fall    Follow up with specialists as planned        Electronically signed by Dani Wallace MD on 7/23/2020 at 10:54 AM

## 2020-07-23 NOTE — TELEPHONE ENCOUNTER
Received rx request for:  Mya Luna needs refill of   Requested Prescriptions     Pending Prescriptions Disp Refills    JANUVIA 100 MG tablet [Pharmacy Med Name: Zane Keven 100MG] 90 tablet 3     Sig: TAKE 1 TABLET DAILY       Last Filled on:  4/24/2020 #90/0    Last Visit Date:  7/23/2020    Next Visit Date:    Visit date not found    Last A1C - 6.6 --7/20/2020. Rx pending x 1 year.

## 2020-07-30 RX ORDER — HYDRALAZINE HYDROCHLORIDE 50 MG/1
TABLET, FILM COATED ORAL
Qty: 270 TABLET | Refills: 3 | Status: SHIPPED | OUTPATIENT
Start: 2020-07-30 | End: 2021-07-26

## 2020-07-30 NOTE — TELEPHONE ENCOUNTER
Aria Courtney needs refill of   Requested Prescriptions     Pending Prescriptions Disp Refills    hydrALAZINE (APRESOLINE) 50 MG tablet [Pharmacy Med Name: HYDRALAZINE TABS 50MG] 270 tablet 3     Sig: TAKE 1 TABLET THREE TIMES A DAY       Last Filled on:  8/5/2019 x 1 year.     Last Visit Date:  7/23/2020 - medicare wellness    Next Visit Date:    Visit date not found

## 2020-08-03 ENCOUNTER — HOSPITAL ENCOUNTER (OUTPATIENT)
Dept: PHARMACY | Age: 75
Setting detail: THERAPIES SERIES
Discharge: HOME | End: 2020-08-03
Payer: MEDICARE

## 2020-08-03 VITALS — TEMPERATURE: 97.8 F

## 2020-08-03 LAB — POC INR: 3.2 (ref 0.8–1.2)

## 2020-08-03 PROCEDURE — 85610 PROTHROMBIN TIME: CPT

## 2020-08-03 PROCEDURE — 99211 OFF/OP EST MAY X REQ PHY/QHP: CPT

## 2020-08-03 PROCEDURE — 36416 COLLJ CAPILLARY BLOOD SPEC: CPT

## 2020-08-03 RX ORDER — OMEPRAZOLE 20 MG/1
40 CAPSULE, DELAYED RELEASE ORAL DAILY
COMMUNITY
Start: 2020-07-28 | End: 2020-10-15

## 2020-08-03 NOTE — PROGRESS NOTES
Medication Management [address]  [phone] (phone)  [phone] (fax)      Ms. Michael Miles is a 67 y.o.  female with history of MVR, per Dr. Ana Maria Vu referral, who presents today for Warfarin monitoring and adjustment (4 week visit). Patient verifies current dosing regimen and tablet strength. No missed or extra doses. Patient denies bleeding/bruising/chest pain. Has usual SOB/slight swelling of legs. No blood in urine or stool. Dietary changes: eating more iceberg lettuce salads. Had zucchini, but peeled them. No changes in medication/OTC agents/herbals. No change in alcohol use or tobacco use. No change in activity level. Patient denies headaches/falls. Has usual dizziness. No vomiting/diarrhea or acute illness. No procedures scheduled in the future at this time. Assessment:   Lab Results   Component Value Date    INR 3.20 (H) 08/03/2020    INR 3.60 (H) 07/06/2020    INR 4.09 (H) 06/18/2020    PROTIME 2.42 (H) 12/15/2011    PROTIME 1.97 (H) 12/01/2011    PROTIME 1.80 (H) 11/22/2011     INR therapeutic - goal 2.5-3.5. Recent Labs     08/03/20  1049   INR 3.20*     Plan:  POCT INR ordered/performed/result reviewed. Continue PO Coumadin 1 mg MF, 2 mg TWThSS. Recheck INR in 4 weeks. Patient reminded to call the Anticoagulation Clinic with any signs or symptoms of bleeding or with any medication changes. Patient given instructions utilizing the teach back method. Given routine H&H order. Discharged ambulatory in no apparent distress, wearing mask. After visit summary printed and reviewed with patient.       Medications reviewed and updated on home medication list.     Influenza vaccine:     [] given    [] declined   [] received previously   [] plans to receive at a later time   [] refused    [] documented in EPIC

## 2020-08-06 ENCOUNTER — HOSPITAL ENCOUNTER (OUTPATIENT)
Age: 75
Discharge: HOME | End: 2020-08-06
Payer: MEDICARE

## 2020-08-06 DIAGNOSIS — Z79.01 ANTICOAGULATED ON COUMADIN: ICD-10-CM

## 2020-08-06 DIAGNOSIS — Z95.2 H/O MITRAL VALVE REPLACEMENT: ICD-10-CM

## 2020-08-06 LAB
HCT VFR BLD CALC: 37.5 % (ref 37–47)
HEMOGLOBIN: 11.2 GM/DL (ref 12–16)

## 2020-08-06 PROCEDURE — 85018 HEMOGLOBIN: CPT

## 2020-08-06 PROCEDURE — 36415 COLL VENOUS BLD VENIPUNCTURE: CPT

## 2020-08-06 PROCEDURE — 85014 HEMATOCRIT: CPT

## 2020-08-31 ENCOUNTER — APPOINTMENT (OUTPATIENT)
Dept: PHARMACY | Age: 75
End: 2020-08-31
Payer: MEDICARE

## 2020-09-03 ENCOUNTER — APPOINTMENT (OUTPATIENT)
Dept: PHARMACY | Age: 75
End: 2020-09-03
Payer: MEDICARE

## 2020-09-10 ENCOUNTER — TELEPHONE (OUTPATIENT)
Dept: FAMILY MEDICINE CLINIC | Age: 75
End: 2020-09-10

## 2020-09-10 ENCOUNTER — HOSPITAL ENCOUNTER (OUTPATIENT)
Dept: PHARMACY | Age: 75
Setting detail: THERAPIES SERIES
Discharge: HOME | End: 2020-09-10
Payer: MEDICARE

## 2020-09-10 VITALS — HEART RATE: 59 BPM | TEMPERATURE: 97.6 F | SYSTOLIC BLOOD PRESSURE: 175 MMHG | DIASTOLIC BLOOD PRESSURE: 69 MMHG

## 2020-09-10 LAB
HCT VFR BLD CALC: 34.9 % (ref 37–47)
HEMOGLOBIN: 11 GM/DL (ref 12–16)
INR BLD: 6.74 (ref 0.85–1.13)

## 2020-09-10 PROCEDURE — 85018 HEMOGLOBIN: CPT

## 2020-09-10 PROCEDURE — 85610 PROTHROMBIN TIME: CPT

## 2020-09-10 PROCEDURE — 85014 HEMATOCRIT: CPT

## 2020-09-10 PROCEDURE — 99211 OFF/OP EST MAY X REQ PHY/QHP: CPT

## 2020-09-10 NOTE — TELEPHONE ENCOUNTER
Nicole Kat from coumadin clinic left message at 327pm stating pt was seen today after not being there for 10days which is not like the pt, see below charting for update on pt. Nicole Kat just wanted to let CG know of (FYI)    Copied from anticoag encounter 9/10/20  Medication Management [address]   [phone] (phone)  [phone] (fax)        Ms. Wing Stevenson is a 67 y.o.  female with history of MVR, per Dr. Tunde Ortiz referral, who presents today for Warfarin monitoring and adjustment (10 days late for 4 week visit).     Patient verifies current dosing regimen and tablet strength. No missed or extra doses. Patient denies bleeding/bruising. Had more SOB with illness, plus more swelling of legs. No blood in urine or stool. Dietary changes: only ate tea, Gatorade, and chicken broth while ill. Yesterday was first she had solid food. Would normally have been eating salads. Changes in medication/OTC agents/herbals: quit taking Pepcid while ill, has not yet resumed. Also while ill, didn't always take BP meds. - states it ran low. No change in alcohol use or tobacco use. Change in activity level: very decreased while ill - just starting to increase activity now. Patient denies dizziness/lightheadedness/falls. No vomiting/acute illness. No procedures scheduled in the future at this time. States was sick for 2 weeks: diarrhea, chest congestion/discomfort, fatigue, headaches, fever, diarrhea (took Imodium once/day). Took 2 Tylenol every other day; took Coricidin twice. 596/32-31; 25 min. later 175/69-59. Has not taken pills yet this morning.     Assessment:         Lab Results   Component Value Date     INR 6.74 (HH) 09/10/2020     INR 3.20 (H) 08/03/2020     INR 3.60 (H) 07/06/2020     PROTIME 2.42 (H) 12/15/2011     PROTIME 1.97 (H) 12/01/2011     PROTIME 1.80 (H) 11/22/2011     INR supratherapeutic - goal 2.5-3.5.       Recent Labs     09/10/20  0927   INR 6.74*    POCT INR 7.8. Today's H&H:  11/34.9 (11.2/37.5 8/6/20). Trend/update called to PCP's office.     Plan:  POCT INR ordered/performed/result reviewed. Blood for INR/H&H drawn left antecubital per lab staff, per policy/procedure; sent to lab. Hold 3 days, Th/F/Sat., then continue PO Coumadin 1 mg MF, 2 mg TWThSS. Recheck INR in 4 day(s). (Report given - orders entered by Sarahy Rosales PharmD.)  Patient reminded to call the Anticoagulation Clinic with any signs or symptoms of bleeding or with any medication changes. Patient given instructions utilizing the teach back method. Advised extra caution. Verified phone numbers; received permission to leave any new orders on home answering machine. Discharged ambulatory in no apparent distress, wearing mask. New orders from Sarahy Rosales PharmD., after reviewing lab INR/H&H:  Hold Coumadin 2 days, 0.5 mg 9/12, then usual dose; still check INR 9/14.   Orders called to patient.

## 2020-09-14 ENCOUNTER — HOSPITAL ENCOUNTER (OUTPATIENT)
Dept: PHARMACY | Age: 75
Setting detail: THERAPIES SERIES
Discharge: HOME | End: 2020-09-14
Payer: MEDICARE

## 2020-09-14 VITALS — TEMPERATURE: 98 F

## 2020-09-14 LAB — POC INR: 4.1 (ref 0.8–1.2)

## 2020-09-14 PROCEDURE — 85610 PROTHROMBIN TIME: CPT

## 2020-09-14 PROCEDURE — 99211 OFF/OP EST MAY X REQ PHY/QHP: CPT

## 2020-09-14 PROCEDURE — 36416 COLLJ CAPILLARY BLOOD SPEC: CPT

## 2020-09-14 NOTE — PROGRESS NOTES
Medication Management [address]  [phone] (phone)  [phone] (fax)      Ms. Martina Sawyer is a 67 y.o.  female with history of MVR, per Dr. Jessica Garber referral, who presents today for Warfarin monitoring and adjustment (4 day visit). Patient verifies current dosing regimen and tablet strength. No missed (except as ordered last visit) or extra doses. Patient denies bleeding/bruising/chest pain. States SOB/swelling of ankles still more than usual.  No blood in urine or stool. Dietary changes: had small iceberg lettuce salad yesterday - first since getting sick 2-3 weeks ago. Still has no appetite. No changes in medication/OTC agents/herbals. No change in alcohol use or tobacco use. Change in activity level: very slightly increased. Patient denies falls. Still has usual dizziness with getting out of chair. Takes nothing for usual headaches. No vomiting or acute illness. Continues to take Imodium for diarrhea. Eating bananas and Activia yogurt. No procedures scheduled in the future at this time. Assessment:   Lab Results   Component Value Date    INR 4.10 (H) 09/14/2020    INR 6.74 (HH) 09/10/2020    INR 3.20 (H) 08/03/2020    PROTIME 2.42 (H) 12/15/2011    PROTIME 1.97 (H) 12/01/2011    PROTIME 1.80 (H) 11/22/2011     INR supratherapeutic - goal 2.5-3.5. Recent Labs     09/14/20  1005   INR 4.10*       Plan:  POCT INR ordered/performed/result reviewed. Hold today, M, 1 mg 9/16, W, otherwise  continue PO Coumadin 1 mg MF, 2 mg TWThSS. Recheck INR in 1 week(s). Patient to call this clinic in 2-3 days if not back to usual amount of salad/green vegetables - may consider another day of less Coumadin. (Report given - orders entered by YO Rosales, PharmD.)  Patient reminded to call the Anticoagulation Clinic with any signs or symptoms of bleeding or with any medication changes.   Patient given instructions utilizing the teach back method. Advised extra caution. Discharged ambulatory in no apparent distress, wearing mask. After visit summary printed and reviewed with patient.       Medications reviewed and updated on home medication list.    Influenza vaccine:     [] given    [] declined   [] received previously   [] plans to receive at a later time   [] refused    [] documented in Epic

## 2020-09-21 ENCOUNTER — HOSPITAL ENCOUNTER (OUTPATIENT)
Dept: PHARMACY | Age: 75
Setting detail: THERAPIES SERIES
Discharge: HOME | End: 2020-09-21
Payer: MEDICARE

## 2020-09-21 VITALS — TEMPERATURE: 98 F

## 2020-09-21 LAB — POC INR: 4.2 (ref 0.8–1.2)

## 2020-09-21 PROCEDURE — 85610 PROTHROMBIN TIME: CPT

## 2020-09-21 PROCEDURE — 36416 COLLJ CAPILLARY BLOOD SPEC: CPT

## 2020-09-21 PROCEDURE — 99211 OFF/OP EST MAY X REQ PHY/QHP: CPT

## 2020-09-21 NOTE — PROGRESS NOTES
Medication Management [address]  [phone] (phone)  [phone] (fax)      Ms. Isatu Valdes is a 67 y.o.  female with history of Heart Valve Replacement who presents today for anticoagulation monitoring and adjustment. Patient verifies current dosing regimen and tablet strength. No missed or extra doses. Patient denies s/s bleeding/bruising/swelling/SOB/chest pain - SOB normal, swelling in ankles normal  No blood in urine or stool. No dietary changes. - more green leafy vegetables this week - salad, coleslaw, green beans - getting back to normal diet  No changes in medication/OTC agents/Herbals. No change in alcohol use or tobacco use. No change in activity level. - activity level up  Patient denies headaches/dizziness/lightheadedness/falls. - headaches/dizziness/lightheadness not out of the normal  No vomiting/diarrhea or acute illness. - no diarrhea since Thursday (off Imodium)  No Procedures scheduled in the future at this time. - pacemaker to be checked on Thursday    Assessment:   Lab Results   Component Value Date    INR 4.20 (H) 09/21/2020    INR 4.10 (H) 09/14/2020    INR 6.74 (HH) 09/10/2020    PROTIME 2.42 (H) 12/15/2011    PROTIME 1.97 (H) 12/01/2011    PROTIME 1.80 (H) 11/22/2011     INR supratherapeutic   Recent Labs     09/21/20  1021   INR 4.20*       Plan:   Hold coumadin today and take 1 mg coumadin tomorrow (9/22/20), then decrease Coumadin 1 mg MWF, 2 mg all other days (~8.3% decrease). Recheck INR in 10 day(s). Patient reminded to call the Anticoagulation Clinic with signs or symptoms of bleeding or with any medication changes. Patient given instructions utilizing the teach back method. Discharged ambulatory in no apparent distress. After visit summary printed and reviewed with patient.       Medications reviewed and updated on home medication list Yes    CLINICAL PHARMACY CONSULT: MED RECONCILIATION/REVIEW ADDENDUM    For Pharmacy Admin Tracking Only    PHSO: Yes  Total # of Interventions Recommended: 1  - Decreased Dose #: 1  - Maintenance Safety Lab Monitoring #: 1  Total Interventions Accepted: 1  Time Spent (min): [address], PharmD, BCPS  9/21/2020  10:38 AM

## 2020-09-24 ENCOUNTER — NURSE ONLY (OUTPATIENT)
Dept: CARDIOLOGY CLINIC | Age: 75
End: 2020-09-24
Payer: MEDICARE

## 2020-09-24 PROCEDURE — 93280 PM DEVICE PROGR EVAL DUAL: CPT | Performed by: NUCLEAR MEDICINE

## 2020-09-24 NOTE — PROGRESS NOTES
[employer] dual pacer  Mode VVIR  A fib burden 100%/ Coumadin  Battery 4.5yrs  A paced 0.0%  V Paced 98%  p wave 0.8mV fib waves  r wave >=80% paced  Ventricle threshold 1.0V @ 0.4ms  Atrial impedance 418ohms  Ventricle impendance 437 ohms

## 2020-10-01 ENCOUNTER — HOSPITAL ENCOUNTER (OUTPATIENT)
Dept: PHARMACY | Age: 75
Setting detail: THERAPIES SERIES
Discharge: HOME | End: 2020-10-01
Payer: MEDICARE

## 2020-10-01 VITALS — TEMPERATURE: 97.7 F

## 2020-10-01 LAB — POC INR: 2.4 (ref 0.8–1.2)

## 2020-10-01 PROCEDURE — 36416 COLLJ CAPILLARY BLOOD SPEC: CPT | Performed by: PHARMACIST

## 2020-10-01 PROCEDURE — 99211 OFF/OP EST MAY X REQ PHY/QHP: CPT | Performed by: PHARMACIST

## 2020-10-01 PROCEDURE — 85610 PROTHROMBIN TIME: CPT | Performed by: PHARMACIST

## 2020-10-01 NOTE — PROGRESS NOTES
Medication Management [address]   [phone] (phone)  [phone] (fax)      Ms. Loel Alpers is a 67 y.o.  female with history of MVR who presents today for anticoagulation monitoring and adjustment. Patient verifies current dosing regimen and tablet strength. No missed or extra doses. Patient denies s/s bleeding/bruising/swelling/SOB/chest pain Ankle swelling is \"down\"  No blood in urine or stool. No dietary changes. Diet is back to normal.   No changes in medication/OTC agents/Herbals. No change in alcohol use or tobacco use. No change in activity level. Patient denies headaches/dizziness/lightheadedness/falls. No vomiting/diarrhea or acute illness. No Procedures scheduled in the future at this time. Assessment:   Lab Results   Component Value Date    INR 2.40 (H) 10/01/2020    INR 4.20 (H) 09/21/2020    INR 4.10 (H) 09/14/2020    PROTIME 2.42 (H) 12/15/2011    PROTIME 1.97 (H) 12/01/2011    PROTIME 1.80 (H) 11/22/2011     INR subtherapeutic   Recent Labs     10/01/20  1021   INR 2.40*         Plan:  Coumadin 2.5mg today, then continue Coumadin 1mg MWF and 2mg TThSaS. Recheck INR in 2 week(s). Patient reminded to call the Anticoagulation Clinic with any signs or symptoms of bleeding or with any medication changes. Patient given instructions utilizing the teach back method. Discharged ambulatory in no apparent distress. After visit summary printed and reviewed with patient.       Medications reviewed and updated on home medication list Yes    Influenza vaccine:     [] given    [] declined   [] received previously   [] plans to receive at a later time   [x] refused    [] documented in [address]: MED [address]: Yes  Total # of Interventions Recommended: 1  - Increased Dose #: 1  - Maintenance Safety Lab Monitoring #: 1  Total Interventions Accepted: 1  Time Spent (min): 15

## 2020-10-15 ENCOUNTER — HOSPITAL ENCOUNTER (OUTPATIENT)
Dept: PHARMACY | Age: 75
Setting detail: THERAPIES SERIES
Discharge: HOME | End: 2020-10-15
Payer: MEDICARE

## 2020-10-15 VITALS — TEMPERATURE: 97.8 F

## 2020-10-15 LAB — POC INR: 2.5 (ref 0.8–1.2)

## 2020-10-15 PROCEDURE — 85610 PROTHROMBIN TIME: CPT

## 2020-10-15 PROCEDURE — 99211 OFF/OP EST MAY X REQ PHY/QHP: CPT

## 2020-10-15 PROCEDURE — 36416 COLLJ CAPILLARY BLOOD SPEC: CPT

## 2020-10-15 NOTE — PROGRESS NOTES
Medication Management [address]   [phone] (phone)  [phone] (fax)      Ms. Balaji Adler is a 67 y.o.  female with history of Heart Valve Replacement who presents today for anticoagulation monitoring and adjustment. Patient verifies current dosing regimen and tablet strength. Patient brought AVS from last time with her. She fills pill box according to what we give her. No missed or extra doses. Patient denies s/s bruising/swelling/SOB/chest pain. Patient had a sore on the outside of her nose that bled after she got out of the shower this morning for two to three minutes. Spoke to patient about seeking medical care if she has any bleeding that lasts longer than 20 minutes. No blood in urine or stool. No dietary changes. Patient is no longer taking omeprazole and Coricidin. Removed from medication list.   No change in alcohol use or tobacco use. Patient is more active than she was a few weeks ago when she was not feeling well. Patient denies headaches/dizziness/lightheadedness/falls. No vomiting/diarrhea or acute illness. No Procedures scheduled in the future at this time. Assessment:   Lab Results   Component Value Date    INR 2.50 (H) 10/15/2020    INR 2.40 (H) 10/01/2020    INR 4.20 (H) 09/21/2020    PROTIME 2.42 (H) 12/15/2011    PROTIME 1.97 (H) 12/01/2011    PROTIME 1.80 (H) 11/22/2011     INR therapeutic   Recent Labs     10/15/20  1000   INR 2.50*         Plan:  Continue Coumadin 1 mg MWF and 2 mg TuThSaSu. Recheck INR in 2 week(s). Patient reminded to call the Anticoagulation Clinic with any signs or symptoms of bleeding or with any medication changes. Patient given instructions utilizing the teach back method. Discharged ambulatory in no apparent distress. After visit summary printed and reviewed with patient.       Medications reviewed and updated on home medication list Yes    Influenza vaccine:     [] given    [x] declined   [] received previously   [x] plans to receive at a later time   [] refused    [] documented in [address] [place]: [address]: Yes  Total # of Interventions Recommended: 1  - Maintenance Safety Lab Monitoring #: 1  Total Interventions Accepted: 1  Time Spent (min): [address] Melgoza PharmKRISTEN   10/15/2020, 10:13 AM

## 2020-10-29 ENCOUNTER — HOSPITAL ENCOUNTER (OUTPATIENT)
Dept: PHARMACY | Age: 75
Setting detail: THERAPIES SERIES
Discharge: HOME | End: 2020-10-29
Payer: MEDICARE

## 2020-10-29 VITALS — TEMPERATURE: 97.6 F

## 2020-10-29 LAB — POC INR: 2.2 (ref 0.8–1.2)

## 2020-10-29 PROCEDURE — 99211 OFF/OP EST MAY X REQ PHY/QHP: CPT | Performed by: PHARMACIST

## 2020-10-29 PROCEDURE — 85610 PROTHROMBIN TIME: CPT | Performed by: PHARMACIST

## 2020-10-29 PROCEDURE — 36416 COLLJ CAPILLARY BLOOD SPEC: CPT | Performed by: PHARMACIST

## 2020-10-29 NOTE — PROGRESS NOTES
Medication Management [address]  [phone] (phone)  [phone] (fax)      Ms. Joel Chu is a 67 y.o.  female with history of Heart Valve Replacement who presents today for anticoagulation monitoring and adjustment. Patient verifies current dosing regimen and tablet strength. No missed or extra doses. Patient denies s/s bleeding/bruising/swelling/SOB/chest pain  No blood in urine or stool. No dietary changes. Maybe a little less green vegetables - not much difference since last visit. She is back on a normal regular diet. No changes in medication/OTC agents/Herbals. No change in alcohol use or tobacco use. No change in activity level. No difference since last visit. Patient denies headaches/dizziness/lightheadedness/falls. No vomiting/diarrhea or acute illness. No Procedures scheduled in the future at this time. Got teeth cleaned last Tuesday (takes amoxicillin prior)    Assessment:   Lab Results   Component Value Date    INR 2.20 (H) 10/29/2020    INR 2.50 (H) 10/15/2020    INR 2.40 (H) 10/01/2020    PROTIME 2.42 (H) 12/15/2011    PROTIME 1.97 (H) 12/01/2011    PROTIME 1.80 (H) 11/22/2011     INR subtherapeutic   Recent Labs     10/29/20  0947   INR 2.20*     2 of last 3 INR have been subtherapeutic following dose decrease on 9/21/20. Plan:  Coumadin 3mg x1 today (Th 10/29/20) then increase from Coumadin 1mg MWF 2mg TuThSS to Coumadin 1mg MF 2mg TuWThSS (9.1% increase). Recheck INR in 2 week(s). Patient reminded to call the Anticoagulation Clinic with any signs or symptoms of bleeding or with any medication changes. Patient given instructions utilizing the teach back method. Discharged ambulatory in no apparent distress. After visit summary printed and reviewed with patient.       Medications reviewed and updated on home medication list Yes    Influenza vaccine:     [] given    [x] declined   [x] received previously - entered into computer 10/22/20   [] plans to receive at a later time   [] refused    [] documented in [address]: [address] [facility] [address]: Yes  Total # of Interventions Recommended: 1  - Increased Dose #: 1  - Maintenance Safety Lab Monitoring #: 1  Total Interventions Accepted: 1  Time Spent (min): 3009 Niko Banegas PharmD 10/29/2020 10:07 AM

## 2020-11-12 ENCOUNTER — HOSPITAL ENCOUNTER (OUTPATIENT)
Dept: PHARMACY | Age: 75
Setting detail: THERAPIES SERIES
Discharge: HOME | End: 2020-11-12
Payer: MEDICARE

## 2020-11-12 VITALS — TEMPERATURE: 97.9 F

## 2020-11-12 LAB — POC INR: 2.5 (ref 0.8–1.2)

## 2020-11-12 PROCEDURE — 99211 OFF/OP EST MAY X REQ PHY/QHP: CPT

## 2020-11-12 PROCEDURE — 85610 PROTHROMBIN TIME: CPT

## 2020-11-12 PROCEDURE — 36416 COLLJ CAPILLARY BLOOD SPEC: CPT

## 2020-11-12 NOTE — PROGRESS NOTES
Medication Management [address]   [phone] (phone)  [phone] (fax)      Ms. Charanjit Whittington is a 67 y.o.  female with history of Afib who presents today for anticoagulation monitoring and adjustment. Patient pulled out her dosing calendar and states she followed it  No missed or extra doses. Patient denies s/s bleeding/bruising/swelling/SOB/chest pain  No blood in urine or stool. No dietary changes. No changes in medication/OTC agents/Herbals. No change in alcohol use or tobacco use. Increased activity level, raking and bagging leaves  Patient denies headaches/dizziness/lightheadedness/falls. No vomiting/diarrhea or acute illness. No Procedures scheduled in the future at this time. Assessment:   Lab Results   Component Value Date    INR 2.50 (H) 11/12/2020    INR 2.20 (H) 10/29/2020    INR 2.50 (H) 10/15/2020    PROTIME 2.42 (H) 12/15/2011    PROTIME 1.97 (H) 12/01/2011    PROTIME 1.80 (H) 11/22/2011     INR therapeutic   Recent Labs     11/12/20  1409   INR 2.50*     Patient presents with therapeutic INR after 9.1% dose increase change made last appointment. Plan:  Continue Coumadin 1 mg MoFr and 2 mg SuTuWeThSa. Recheck INR in 3 week(s). Patient reminded to call the Anticoagulation Clinic with any signs or symptoms of bleeding or with any medication changes. Patient given instructions utilizing the teach back method. Discharged ambulatory in no apparent distress. After visit summary printed and reviewed with patient.       Medications reviewed and updated on home medication list Yes    Influenza vaccine:     [] given    [] declined   [x] received previously   [] plans to receive at a later time   [] refused    [x] documented in [address]: MED [address]: Yes  Total # of Interventions Recommended: 0  - Maintenance Safety Lab Monitoring #: 1  Total Interventions Accepted: 0  Time Spent (min): Shaw Melgoza, PharmD  [address]

## 2020-12-03 ENCOUNTER — HOSPITAL ENCOUNTER (OUTPATIENT)
Dept: PHARMACY | Age: 75
Setting detail: THERAPIES SERIES
Discharge: HOME | End: 2020-12-03
Payer: MEDICARE

## 2020-12-03 ENCOUNTER — PROCEDURE VISIT (OUTPATIENT)
Dept: CARDIOLOGY CLINIC | Age: 75
End: 2020-12-03
Payer: MEDICARE

## 2020-12-03 VITALS — TEMPERATURE: 97 F

## 2020-12-03 LAB — POC INR: 2.8 (ref 0.8–1.2)

## 2020-12-03 PROCEDURE — 36416 COLLJ CAPILLARY BLOOD SPEC: CPT

## 2020-12-03 PROCEDURE — 93294 REM INTERROG EVL PM/LDLS PM: CPT | Performed by: NUCLEAR MEDICINE

## 2020-12-03 PROCEDURE — 99211 OFF/OP EST MAY X REQ PHY/QHP: CPT

## 2020-12-03 PROCEDURE — 93296 REM INTERROG EVL PM/IDS: CPT | Performed by: NUCLEAR MEDICINE

## 2020-12-03 PROCEDURE — 85610 PROTHROMBIN TIME: CPT

## 2020-12-03 NOTE — PROGRESS NOTES
DR Naeem Yuan PT /CHRONIC AFIB/ COUMADIN  AFIB BURDEN 100%    [employer] DUAL PACEMAKER REMOTE  BATTERY 3-4.5 YRS REMAINING  ATRIAL IMPEDENCE 418  RV IMPEDENCE 418  P WAVES 0.4  RV WAVES 6.3  RV THRESHOLD PER THE DEVICE 1 @ 0.4  VENT AMPLITUDE 2 @ 0.4    VVIR   A PACED 0%  V PACED 99.2%

## 2020-12-03 NOTE — PROGRESS NOTES
Medication Management [address]  [phone] (phone)  [phone] (fax)      Ms. Sagar Mahan is a 67 y.o.  female with history of mitral valve replacement who presents today for anticoagulation monitoring and adjustment. Patient verifies current dosing regimen and tablet strength. No missed or extra doses. Patient denies s/s bleeding/bruising/swelling/SOB/chest pain  No blood in urine or stool. No dietary changes. No changes in medication/OTC agents/Herbals. No change in alcohol use or tobacco use. No change in activity level. Patient denies headaches/falls. States her baseline dizziness/lightheadedness. Has discussed with provider. Fall prevention and education discussed  No vomiting/diarrhea or acute illness. No Procedures scheduled in the future at this time. Assessment:   Lab Results   Component Value Date    INR 2.80 (H) 12/03/2020    INR 2.50 (H) 11/12/2020    INR 2.20 (H) 10/29/2020    PROTIME 2.42 (H) 12/15/2011    PROTIME 1.97 (H) 12/01/2011    PROTIME 1.80 (H) 11/22/2011     INR therapeutic   Recent Labs     12/03/20  0953   INR 2.80*     Patient presents with 2nd consecutive therapeutic INR at current dose. Plan:  Continue Coumadin 1 mg MoFr and 2 mg SuTuWeThSa. Recheck INR in 3 week(s). Patient reminded to call the Anticoagulation Clinic with any signs or symptoms of bleeding or with any medication changes. Patient given instructions utilizing the teach back method. Discharged ambulatory in no apparent distress. After visit summary printed and reviewed with patient.       Medications reviewed and updated on home medication list Yes    Influenza vaccine:     [] given    [] declined   [x] received previously   [] plans to receive at a later time   [] refused    [x] documented in [address]:  [place]: Yes  Total # of Interventions Recommended: 0  - Maintenance Safety Lab Monitoring #: 1  Total Interventions Accepted: 0  Time Spent (min): [address], PharmD  [address]

## 2020-12-06 RX ORDER — MOMETASONE FUROATE 1 MG/G
CREAM TOPICAL
Qty: 50 G | Refills: 0 | Status: SHIPPED | OUTPATIENT
Start: 2020-12-06 | End: 2021-07-26 | Stop reason: SDUPTHER

## 2020-12-06 NOTE — TELEPHONE ENCOUNTER
Rx EP'd to pharmacy. Please notify patient. Requested Prescriptions     Signed Prescriptions Disp Refills    mometasone (ELOCON) 0.1 % cream 50 g 0     Sig: Apply topically daily.      Authorizing Provider: Bryan Solis           Electronically signed by Michael Christianson MD on 12/6/2020 at 3:40 PM

## 2020-12-29 ENCOUNTER — HOSPITAL ENCOUNTER (OUTPATIENT)
Dept: PHARMACY | Age: 75
Setting detail: THERAPIES SERIES
Discharge: HOME | End: 2020-12-29
Payer: MEDICARE

## 2020-12-29 ENCOUNTER — OFFICE VISIT (OUTPATIENT)
Dept: PULMONOLOGY | Age: 75
End: 2020-12-29
Payer: MEDICARE

## 2020-12-29 VITALS
BODY MASS INDEX: 31.1 KG/M2 | DIASTOLIC BLOOD PRESSURE: 64 MMHG | HEART RATE: 83 BPM | HEIGHT: 69 IN | OXYGEN SATURATION: 98 % | SYSTOLIC BLOOD PRESSURE: 128 MMHG | WEIGHT: 210 LBS | TEMPERATURE: 96.5 F

## 2020-12-29 VITALS — TEMPERATURE: 97.1 F

## 2020-12-29 LAB — POC INR: 2.4 (ref 0.8–1.2)

## 2020-12-29 PROCEDURE — 4040F PNEUMOC VAC/ADMIN/RCVD: CPT | Performed by: PHYSICIAN ASSISTANT

## 2020-12-29 PROCEDURE — 99211 OFF/OP EST MAY X REQ PHY/QHP: CPT

## 2020-12-29 PROCEDURE — G8427 DOCREV CUR MEDS BY ELIG CLIN: HCPCS | Performed by: PHYSICIAN ASSISTANT

## 2020-12-29 PROCEDURE — G8399 PT W/DXA RESULTS DOCUMENT: HCPCS | Performed by: PHYSICIAN ASSISTANT

## 2020-12-29 PROCEDURE — 1123F ACP DISCUSS/DSCN MKR DOCD: CPT | Performed by: PHYSICIAN ASSISTANT

## 2020-12-29 PROCEDURE — G8417 CALC BMI ABV UP PARAM F/U: HCPCS | Performed by: PHYSICIAN ASSISTANT

## 2020-12-29 PROCEDURE — G8484 FLU IMMUNIZE NO ADMIN: HCPCS | Performed by: PHYSICIAN ASSISTANT

## 2020-12-29 PROCEDURE — 99215 OFFICE O/P EST HI 40 MIN: CPT | Performed by: PHYSICIAN ASSISTANT

## 2020-12-29 PROCEDURE — 36416 COLLJ CAPILLARY BLOOD SPEC: CPT

## 2020-12-29 PROCEDURE — 85610 PROTHROMBIN TIME: CPT

## 2020-12-29 PROCEDURE — 1090F PRES/ABSN URINE INCON ASSESS: CPT | Performed by: PHYSICIAN ASSISTANT

## 2020-12-29 PROCEDURE — 1036F TOBACCO NON-USER: CPT | Performed by: PHYSICIAN ASSISTANT

## 2020-12-29 RX ORDER — AMOXICILLIN 500 MG/1
CAPSULE ORAL
COMMUNITY
Start: 2020-10-20

## 2020-12-29 ASSESSMENT — ENCOUNTER SYMPTOMS
NAUSEA: 0
ALLERGIC/IMMUNOLOGIC NEGATIVE: 1
WHEEZING: 0
STRIDOR: 0
CHEST TIGHTNESS: 0
BACK PAIN: 0
SHORTNESS OF BREATH: 0
DIARRHEA: 0
COUGH: 0
EYES NEGATIVE: 1

## 2020-12-29 NOTE — PROGRESS NOTES
Medication Management [address]  [phone] (phone)  [phone] (fax)      Ms. Felicia Lam is a 67 y.o.  female with history of MVR, per Dr. Fredi Sever referral, who presents today for Warfarin  monitoring and adjustment (4 week visit). Patient verifies current dosing regimen and tablet strength. No missed or extra doses. Patient denies bleeding/chest pain. Has usual easy bruising. Has more SOB with mask. Left leg has been swelling more; has left hip pain radiating down to foot (taking more Tylenol than usual). Advised to let doctor know. No blood in urine or stool. Dietary changes: had less salad/green vegetables. No changes in medication/OTC agents/herbals. No change in alcohol use or tobacco use. Change in activity level: decreased. Patient denies falls. Has usual dizziness. Takes Tylenol for usual headaches. No vomiting/diarrhea or acute illness. No procedures scheduled in the future at this time. Assessment:   Lab Results   Component Value Date    INR 2.40 (H) 12/29/2020    INR 2.80 (H) 12/03/2020    INR 2.50 (H) 11/12/2020    PROTIME 2.42 (H) 12/15/2011    PROTIME 1.97 (H) 12/01/2011    PROTIME 1.80 (H) 11/22/2011     INR subtherapeutic - goal 2.5-3.5. Recent Labs     12/29/20  1319   INR 2.40*       Plan:  POCT INR ordered/performed/result reviewed. 3 mg today, T (per policy), then continue PO Coumadin 1 mg MF, 2 mg TWThSS. Recheck INR in 3 week(s), per policy. Patient reminded to call the Anticoagulation Clinic with any signs or symptoms of bleeding or with any medication changes. Patient given instructions utilizing the teach back method. Discharged ambulatory in no apparent distress, wearing mask. Has sleep apnea follow-up next. After visit summary printed and reviewed with patient.       Medications reviewed and updated on home medication list.    Influenza vaccine:     [] given    [x] declined

## 2020-12-30 RX ORDER — METFORMIN HYDROCHLORIDE 500 MG/1
TABLET, EXTENDED RELEASE ORAL
Qty: 360 TABLET | Refills: 0 | Status: SHIPPED | OUTPATIENT
Start: 2020-12-30 | End: 2020-12-30 | Stop reason: SDUPTHER

## 2020-12-30 RX ORDER — METFORMIN HYDROCHLORIDE 500 MG/1
TABLET, EXTENDED RELEASE ORAL
Qty: 360 TABLET | Refills: 3 | Status: SHIPPED | OUTPATIENT
Start: 2020-12-30 | End: 2021-07-26

## 2021-01-06 NOTE — TELEPHONE ENCOUNTER
Ruth Villafana,  Yes, you can go ahead and schedule an appointment in July on a Monday or Tuesday for around 10 or later. I would like to have an order for my blood work to be done before my appointment. Thank you. appt made for 7/26/21 at 1030am      What labs would you like pt to do before that appt in July?

## 2021-01-14 RX ORDER — OLMESARTAN MEDOXOMIL 40 MG/1
TABLET ORAL
Qty: 90 TABLET | Refills: 3 | Status: SHIPPED | OUTPATIENT
Start: 2021-01-14 | End: 2022-01-03

## 2021-01-14 RX ORDER — METOPROLOL TARTRATE 50 MG/1
TABLET, FILM COATED ORAL
Qty: 180 TABLET | Refills: 3 | Status: SHIPPED | OUTPATIENT
Start: 2021-01-14 | End: 2021-03-16

## 2021-01-14 NOTE — TELEPHONE ENCOUNTER
Last written: 10-21-19   Last seen:  7-23-20  Next visit: 7-26-21    Order pended for #90day/3 refills

## 2021-01-19 ENCOUNTER — HOSPITAL ENCOUNTER (OUTPATIENT)
Dept: PHARMACY | Age: 76
Setting detail: THERAPIES SERIES
Discharge: HOME | End: 2021-01-19
Payer: MEDICARE

## 2021-01-19 VITALS — TEMPERATURE: 97.9 F

## 2021-01-19 DIAGNOSIS — Z95.2 H/O MITRAL VALVE REPLACEMENT: ICD-10-CM

## 2021-01-19 LAB — POC INR: 2.6 (ref 0.8–1.2)

## 2021-01-19 PROCEDURE — 85610 PROTHROMBIN TIME: CPT

## 2021-01-19 PROCEDURE — 99211 OFF/OP EST MAY X REQ PHY/QHP: CPT

## 2021-01-19 PROCEDURE — 36416 COLLJ CAPILLARY BLOOD SPEC: CPT

## 2021-01-19 NOTE — PROGRESS NOTES
Medication Management [address]  [phone] (phone)  [phone] (fax)      Ms. Ryley Acuña is a 67 y.o.  female with history of MVR, per Dr. Isis Harris referral, who presents today for Warfarin  monitoring and adjustment (3 week visit). Patient verifies current dosing regimen and tablet strength. No missed or extra doses, except as ordered last visit. Patient denies bleeding/bruising/chest pain. Has usual SOB/swelling of left ankle  No blood in urine or stool. Dietary changes: had slightly less salad. No changes in medication/OTC agents/herbals. No change in alcohol use or tobacco use. No change in activity level. Patient denies falls. Has usual dizziness. Takes nothing for usual headaches. No vomiting or acute illness. Had total of 2 episodes of diarrhea 1/16-1/17; took Imodium. No procedures scheduled in the future at this time. Assessment:   Lab Results   Component Value Date    INR 2.60 (H) 01/19/2021    INR 2.40 (H) 12/29/2020    INR 2.80 (H) 12/03/2020    PROTIME 2.42 (H) 12/15/2011    PROTIME 1.97 (H) 12/01/2011    PROTIME 1.80 (H) 11/22/2011     INR therapeutic - goal 2.5-3.5. Recent Labs     01/19/21  1028   INR 2.60*       Plan:  POCT INR ordered/performed/result reviewed. Continue PO Coumadin 1 mg MF, 2 mg TWThSS. Recheck INR in 4 week(s). Patient reminded to call the Anticoagulation Clinic with any signs or symptoms of bleeding or with any medication changes. Patient given instructions utilizing the teach back method. Discharged ambulatory in no apparent distress, wearing mask. After visit summary printed and reviewed with patient.       Medications reviewed and updated on home medication list.    Influenza vaccine:     [] given    [x] declined   [x] received previously   [] plans to receive at a later time   [] refused    [x] documented in Epic

## 2021-02-08 ENCOUNTER — HOSPITAL ENCOUNTER (OUTPATIENT)
Dept: WOMENS IMAGING | Age: 76
Discharge: HOME | End: 2021-02-08
Payer: MEDICARE

## 2021-02-08 DIAGNOSIS — Z12.31 VISIT FOR SCREENING MAMMOGRAM: ICD-10-CM

## 2021-02-08 PROCEDURE — 77063 BREAST TOMOSYNTHESIS BI: CPT

## 2021-02-16 ENCOUNTER — APPOINTMENT (OUTPATIENT)
Dept: PHARMACY | Age: 76
End: 2021-02-16
Payer: MEDICARE

## 2021-02-23 ENCOUNTER — HOSPITAL ENCOUNTER (OUTPATIENT)
Dept: PHARMACY | Age: 76
Setting detail: THERAPIES SERIES
Discharge: HOME | End: 2021-02-23
Payer: MEDICARE

## 2021-02-23 VITALS — TEMPERATURE: 98 F

## 2021-02-23 DIAGNOSIS — Z95.2 H/O MITRAL VALVE REPLACEMENT: ICD-10-CM

## 2021-02-23 LAB — POC INR: 2.9 (ref 0.8–1.2)

## 2021-02-23 PROCEDURE — 85610 PROTHROMBIN TIME: CPT

## 2021-02-23 PROCEDURE — 99211 OFF/OP EST MAY X REQ PHY/QHP: CPT

## 2021-02-23 PROCEDURE — 36416 COLLJ CAPILLARY BLOOD SPEC: CPT

## 2021-02-23 RX ORDER — OMEPRAZOLE 20 MG/1
20 CAPSULE, DELAYED RELEASE ORAL DAILY PRN
COMMUNITY
Start: 2021-01-20 | End: 2022-01-31 | Stop reason: DRUGHIGH

## 2021-03-16 ENCOUNTER — OFFICE VISIT (OUTPATIENT)
Dept: CARDIOLOGY CLINIC | Age: 76
End: 2021-03-16
Payer: MEDICARE

## 2021-03-16 VITALS
SYSTOLIC BLOOD PRESSURE: 152 MMHG | HEIGHT: 69 IN | HEART RATE: 82 BPM | WEIGHT: 217 LBS | DIASTOLIC BLOOD PRESSURE: 64 MMHG | BODY MASS INDEX: 32.14 KG/M2

## 2021-03-16 DIAGNOSIS — I48.20 CHRONIC ATRIAL FIBRILLATION (HCC): ICD-10-CM

## 2021-03-16 DIAGNOSIS — I10 ESSENTIAL HYPERTENSION: ICD-10-CM

## 2021-03-16 DIAGNOSIS — R06.02 SOB (SHORTNESS OF BREATH): ICD-10-CM

## 2021-03-16 DIAGNOSIS — E78.01 FAMILIAL HYPERCHOLESTEROLEMIA: ICD-10-CM

## 2021-03-16 DIAGNOSIS — Z95.2 MITRAL VALVE REPLACED: Primary | ICD-10-CM

## 2021-03-16 PROCEDURE — G8399 PT W/DXA RESULTS DOCUMENT: HCPCS | Performed by: NUCLEAR MEDICINE

## 2021-03-16 PROCEDURE — G8417 CALC BMI ABV UP PARAM F/U: HCPCS | Performed by: NUCLEAR MEDICINE

## 2021-03-16 PROCEDURE — 99214 OFFICE O/P EST MOD 30 MIN: CPT | Performed by: NUCLEAR MEDICINE

## 2021-03-16 PROCEDURE — 1036F TOBACCO NON-USER: CPT | Performed by: NUCLEAR MEDICINE

## 2021-03-16 PROCEDURE — 93000 ELECTROCARDIOGRAM COMPLETE: CPT | Performed by: NUCLEAR MEDICINE

## 2021-03-16 PROCEDURE — G8428 CUR MEDS NOT DOCUMENT: HCPCS | Performed by: NUCLEAR MEDICINE

## 2021-03-16 PROCEDURE — 1090F PRES/ABSN URINE INCON ASSESS: CPT | Performed by: NUCLEAR MEDICINE

## 2021-03-16 PROCEDURE — 4040F PNEUMOC VAC/ADMIN/RCVD: CPT | Performed by: NUCLEAR MEDICINE

## 2021-03-16 PROCEDURE — 1123F ACP DISCUSS/DSCN MKR DOCD: CPT | Performed by: NUCLEAR MEDICINE

## 2021-03-16 PROCEDURE — G8484 FLU IMMUNIZE NO ADMIN: HCPCS | Performed by: NUCLEAR MEDICINE

## 2021-03-16 RX ORDER — CARVEDILOL 12.5 MG/1
12.5 TABLET ORAL 2 TIMES DAILY
Qty: 30 TABLET | Refills: 0 | Status: SHIPPED | OUTPATIENT
Start: 2021-03-16 | End: 2021-03-29 | Stop reason: SDUPTHER

## 2021-03-16 RX ORDER — BUMETANIDE 2 MG/1
2 TABLET ORAL DAILY
Qty: 30 TABLET | Refills: 0 | Status: SHIPPED | OUTPATIENT
Start: 2021-03-16 | End: 2021-04-01

## 2021-03-16 NOTE — PROGRESS NOTES
[id number] [facility] [place] [address]  [place] [place] [address]  Dept: [phone]  Dept Fax: [phone]  Loc: [phone]    Visit Date: 3/16/2021    Ryley Acuña is a 67 y.o. female who presents todayfor:  Chief Complaint   Patient presents with    Check-Up    Cardiac Valve Problem    Atrial Fibrillation    Hypertension   some more dyspnea  More than usual  Some fatigue  Some atypical chest pain   No use of nitro   Does have MVr and A fib   Does have pacer   BP is stable   No dizziness  No syncope  She is 89 5 in a fib           HPI:  HPI  Past Medical History:   Diagnosis Date    Arrhythmia     CHRONIC ATRIAL FIB    Cancer ([facility] [address]     breast ca left    Chronic kidney disease, stage III (moderate) 10/29/2018    Depression     Diabetes mellitus (HCC)     Generalized headaches     History of dizziness     History of sinus bradycardia     History of valvular heart disease     Hyperlipidemia     Hypertension     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 MV area by recent EKG was 1 sq. cm. and mean gradient across MV was 17mmHg. MV index was about 0.7 sq. cm. per body surface area. Moderate pulmonary artery systolic HTN. Patent coronary arteries.  CARDIAC VALVE REPLACEMENT  2003    MVR  33mm [place] Mechanical valve    CARDIOVASCULAR STRESS TEST  05/26/2011    Cannot exclude mild degree of ischemia in anterolateral wall. EF 58%. Mildly abnormal nuclear scan.      CARDIOVASCULAR STRESS TEST  7-09-10    Atrial fibrillation. Episodes of bradycardia mainly at night, which could be physiologic. Predominantly bradycardiac rhythm w/o significant pauses. Frequent PVC's     CARDIOVASCULAR STRESS TEST  1-14-08    Atrial fibrillation w/ controlled ventricular repsonse and an average heart rate of 58 bpm. 1 episode of 3 beat run of nonsustained ventricular tachyarrhythmia at rate of 110-158 bpm. 3 transient episodes of ventricular bigeminy w/ rate in mid 60's to low 70's.  CARDIOVASCULAR STRESS TEST  2-08-99    Unremarkable routine stress test. Chronic A-fib.  CARDIOVASCULAR STRESS TEST  2-08-00    Normal stress EKG. Hypertension, not well controlled. Patient's sitting BP was 160/94. At peak exercise BP was 180/102. Moderate degree of MVP.  CATARACT REMOVAL Right 7/16/14    CATARACT REMOVAL      COLONOSCOPY  2/2010    COLONOSCOPY  08/2017    KNEE ARTHROSCOPY  6/2011    KNEE SURGERY      MITRAL VALVE REPLACEMENT      PACEMAKER INSERTION Left 05/18/2017    by Dr Delia Macario  [address] IND N/A 8/22/2017    COLONOSCOPY performed by Stephane Moraes MD at [facility]    MT EGD TRANSORAL BIOPSY SINGLE/MULTIPLE N/A 8/22/2017    EGD BIOPSY performed by Stephane Moraes MD at [address] ECHOCARDIOGRAM  05/26/2011    LV mildly dilated, systolic function mildly reduced. EF 40-45%. Mild diffuse hypokinesis. Mild lateral wall was hypokinetic. Apical lateral wall was dyskinetic. Wall thickness was at upper limits of normal. LA moderately dilated. RV and RA mildly dilated. Systolic function mildly reduced. Mild MR and TR.  TRANSTHORACIC ECHOCARDIOGRAM  01/14/2008    Biatrial enlargement, RV dilated. Atheromatous aortic root. Borderline LVH. Normal LV systolic function. EF 55%. Prosthetic MV appears to be functioning well. Mildly sclerosed AV. Trivial MR, mild TR, trivial PI, RVSP 47mmHg.     TRANSTHORACIC ECHOCARDIOGRAM  2-08-00    Moderate MV stenosis based on available echo doppler data.    UPPER GASTROINTESTINAL ENDOSCOPY  08/2017     Family History   Problem Relation Age of Onset    Hypertension Mother     Stroke Mother     Diabetes Mother     Cancer Father     Heart Attack Brother     Heart Disease Brother     High Blood Pressure Brother      Social History     Tobacco Use    Smoking status: Never Smoker    Smokeless tobacco: Never Used    Tobacco comment: Friends were heavy smokers   Substance Use Topics    Alcohol use: Yes     Alcohol/week: 0.0 standard drinks     Comment: Occassionally      Current Outpatient Medications   Medication Sig Dispense Refill    omeprazole (PRILOSEC) 20 MG delayed release capsule Take 20 mg by mouth Daily       olmesartan (BENICAR) 40 MG tablet TAKE 1 TABLET DAILY 90 tablet 3    metoprolol tartrate (LOPRESSOR) 50 MG tablet TAKE 1 TABLET TWICE A  tablet 3    metFORMIN (GLUCOPHAGE-XR) 500 MG extended release tablet TAKE 4 TABLETS DAILY 360 tablet 3    amoxicillin (AMOXIL) 500 MG capsule Take by mouth Prior to teeth cleaning.  mometasone (ELOCON) 0.1 % cream Apply topically daily. 50 g 0    Acetaminophen (TYLENOL PO) Take by mouth See Admin Instructions Don't take more than 3,000 mg each day.  hydrALAZINE (APRESOLINE) 50 MG tablet TAKE 1 TABLET THREE TIMES A  tablet 3    JANUVIA 100 MG tablet TAKE 1 TABLET DAILY 90 tablet 3    nitroGLYCERIN (NITROLINGUAL) 0.4 MG/SPRAY 0.4 mg spray Indications: Angina Pectoris 1 spray SL q5 minutes prn chest pain.   If third spray does not relieve pain, call 9-1-1. 1 Bottle 0    warfarin (COUMADIN) 1 MG tablet TAKE 2 TABLETS DAILY EXCEPT TAKE 1 TABLET DAILY ON MONDAYS 180 tablet 3    glimepiride (AMARYL) 2 MG tablet TAKE 1 TABLET TWICE A  tablet 3    blood glucose test strips (ADVOCATE REDI-CODE) strip USE ONCE DAILY 100 strip 0    furosemide (LASIX) 40 MG tablet TAKE 1 TABLET DAILY 90 tablet 3    loperamide (IMODIUM) 2 MG capsule Take 2 mg by mouth 4 times daily as needed for Diarrhea      simvastatin (ZOCOR) 10 MG tablet TAKE 1 TABLET AT BEDTIME 90 tablet 4    UNABLE TO FIND Apply topically See Admin Instructions Indications: Pain Lavendar oil      fluorouracil (EFUDEX) 5 % cream Apply topically daily Indications: precancer treatment Off it for intervals, then back on.  metroNIDAZOLE (METROCREAM) 0.75 % cream Apply topically See Admin Instructions       Blood Glucose Monitoring Suppl (ADVOCATE BLOOD GLUCOSE MONITOR) CONNIE Dx: 250.00 1 Device 0    fluticasone (FLONASE) 50 MCG/ACT nasal spray 2 sprays by Nasal route daily (Patient taking differently: 2 sprays by Nasal route daily as needed ) 1 Bottle 6     No current facility-administered medications for this visit.       Allergies   Allergen Reactions    Adalat [Nifedipine] Itching     redness    Amlodipine Swelling     If take more than 5mg legs swell, hot and red    Potassium-Containing Compounds      SHIMA    Tape [Adhesive Tape] Rash     Skin pulls off     Health Maintenance   Topic Date Due    Hepatitis C screen  Never done    DTaP/Tdap/Td vaccine (1 - Tdap) Never done    Shingles Vaccine (2 of 3) 08/25/2015    Lipid screen  07/20/2021    Potassium monitoring  07/20/2021    Creatinine monitoring  07/20/2021    Annual Wellness Visit (AWV)  07/24/2021    DEXA (modify frequency per FRAX score)  Completed    Flu vaccine  Completed    Pneumococcal 65+ years Vaccine  Completed    COVID-19 Vaccine  Completed    Hepatitis A vaccine  Aged Out    Hib vaccine  Aged Out    Meningococcal (ACWY) vaccine  Aged Out       Subjective:  Review of Systems  General:   No fever, no chills, some fatigue or weight loss  Pulmonary:    some more dyspnea, no wheezing  Cardiac:    Denies recent chest pain,   GI:     No nausea or vomiting, no abdominal pain  Neuro:    No dizziness or light headedness,   Musculoskeletal:  No recent active issues  Extremities:   No edema, no obvious claudication       Objective:  Physical Exam  BP (!) 152/64   Pulse 82   Ht 5' 9\" (1.753 m)   Wt 217 lb (98.4 kg)   BMI 32.05 kg/m²   General:   Well developed, well nourished  Lungs:   Clear to auscultation  Heart:    Normal S1 S2, Slight murmur. no rubs, no gallops  Abdomen:   Soft, non tender, no organomegalies, positive bowel sounds  Extremities:   No edema, no cyanosis, good peripheral pulses  Neurological:   Awake, alert, oriented. No obvious focal deficits  Musculoskelatal:  No obvious deformities    Assessment:      Diagnosis Orders   1. Mitral valve replaced  EKG 12 lead   2. Chronic atrial fibrillation (HCC)  EKG 12 lead   3. Essential hypertension     4. Familial hypercholesterolemia     as above  Concerning patient   ? ? Worsening EF   ? ? A fib related heart failure  ECG in office was done today. I reviewed the ECG. No acute findings      Plan:  No follow-ups on file. Consider changing to bumex  Follow lytes   Change metoprolol to coreg 12.5 bid   Echo and stress test   Decide after that   ?/ CHF clinic  Continue risk factor modification and medical management  Thank you for allowing me to participate in the care of your patient. Please don't hesitate to contact me regarding any further issues related to the patient care    Orders Placed:  Orders Placed This Encounter   Procedures    EKG 12 lead     Order Specific Question:   Reason for Exam?     Answer: Other       Medications Prescribed:  No orders of the defined types were placed in this encounter. Discussed use, benefit, and side effects of prescribed medications. All patient questions answered. Pt voicedunderstanding. Instructed to continue current medications, diet and exercise. Continue risk factor modification and medical management. Patient agreed with treatment plan. Follow up as directed.     Electronically signedby Rica Cole MD on 3/16/2021 at 10:59 AM

## 2021-03-29 ENCOUNTER — TELEPHONE (OUTPATIENT)
Dept: CARDIOLOGY CLINIC | Age: 76
End: 2021-03-29

## 2021-03-29 DIAGNOSIS — Z95.2 MITRAL VALVE REPLACED: ICD-10-CM

## 2021-03-29 DIAGNOSIS — Z95.0 PACEMAKER: ICD-10-CM

## 2021-03-29 DIAGNOSIS — I48.20 CHRONIC ATRIAL FIBRILLATION (HCC): ICD-10-CM

## 2021-03-29 DIAGNOSIS — R06.02 SOB (SHORTNESS OF BREATH): Primary | ICD-10-CM

## 2021-03-29 DIAGNOSIS — E78.01 FAMILIAL HYPERCHOLESTEROLEMIA: ICD-10-CM

## 2021-03-29 DIAGNOSIS — I10 ESSENTIAL HYPERTENSION: ICD-10-CM

## 2021-03-29 RX ORDER — CARVEDILOL 12.5 MG/1
12.5 TABLET ORAL 2 TIMES DAILY
Qty: 60 TABLET | Refills: 3 | Status: SHIPPED | OUTPATIENT
Start: 2021-03-29 | End: 2021-07-08 | Stop reason: SDUPTHER

## 2021-03-29 NOTE — TELEPHONE ENCOUNTER
The medication Dr. Francisca Shah prescribed was Bumetanide 2 mg, however still have a lot of swelling in ankles, feet and legs. Please advise.

## 2021-03-30 ENCOUNTER — HOSPITAL ENCOUNTER (OUTPATIENT)
Age: 76
Discharge: HOME | End: 2021-03-30
Payer: MEDICARE

## 2021-03-30 ENCOUNTER — HOSPITAL ENCOUNTER (OUTPATIENT)
Dept: PHARMACY | Age: 76
Setting detail: THERAPIES SERIES
Discharge: HOME | End: 2021-03-30
Payer: MEDICARE

## 2021-03-30 DIAGNOSIS — I10 ESSENTIAL HYPERTENSION: ICD-10-CM

## 2021-03-30 DIAGNOSIS — E78.01 FAMILIAL HYPERCHOLESTEROLEMIA: ICD-10-CM

## 2021-03-30 DIAGNOSIS — R06.02 SOB (SHORTNESS OF BREATH): ICD-10-CM

## 2021-03-30 DIAGNOSIS — Z51.81 ENCOUNTER FOR THERAPEUTIC DRUG MONITORING: ICD-10-CM

## 2021-03-30 DIAGNOSIS — Z95.2 MITRAL VALVE REPLACED: ICD-10-CM

## 2021-03-30 DIAGNOSIS — Z79.01 ANTICOAGULATED ON COUMADIN: ICD-10-CM

## 2021-03-30 DIAGNOSIS — Z95.2 H/O MITRAL VALVE REPLACEMENT: ICD-10-CM

## 2021-03-30 DIAGNOSIS — I48.20 CHRONIC ATRIAL FIBRILLATION (HCC): ICD-10-CM

## 2021-03-30 LAB
ANION GAP SERPL CALCULATED.3IONS-SCNC: 12 MEQ/L (ref 8–16)
BUN BLDV-MCNC: 36 MG/DL (ref 7–22)
CALCIUM SERPL-MCNC: 9.7 MG/DL (ref 8.5–10.5)
CHLORIDE BLD-SCNC: 103 MEQ/L (ref 98–111)
CO2: 27 MEQ/L (ref 23–33)
CREAT SERPL-MCNC: 1.5 MG/DL (ref 0.4–1.2)
GFR SERPL CREATININE-BSD FRML MDRD: 34 ML/MIN/1.73M2
GLUCOSE BLD-MCNC: 210 MG/DL (ref 70–108)
POC INR: 3.6 (ref 0.8–1.2)
POTASSIUM SERPL-SCNC: 3.9 MEQ/L (ref 3.5–5.2)
SODIUM BLD-SCNC: 142 MEQ/L (ref 135–145)

## 2021-03-30 PROCEDURE — 99211 OFF/OP EST MAY X REQ PHY/QHP: CPT

## 2021-03-30 PROCEDURE — 36416 COLLJ CAPILLARY BLOOD SPEC: CPT

## 2021-03-30 PROCEDURE — 83880 ASSAY OF NATRIURETIC PEPTIDE: CPT

## 2021-03-30 PROCEDURE — 85610 PROTHROMBIN TIME: CPT

## 2021-03-30 PROCEDURE — 36415 COLL VENOUS BLD VENIPUNCTURE: CPT

## 2021-03-30 PROCEDURE — 80048 BASIC METABOLIC PNL TOTAL CA: CPT

## 2021-03-30 NOTE — PROGRESS NOTES
Medication Management [address]  [phone] (phone)  [phone] (fax)      Ms. John Bernardo is a 67 y.o.  female with history of MVR, per Dr. Messi Arellano referral, who presents today for Warfarin monitoring and adjustment (5 week visit). Patient verifies current dosing regimen and tablet strength. No missed or extra doses. Patient denies bleeding/chest pain. Having more SOB/swelling of legs. Has usual easy bruising - states had a couple small bruises on legs she's not sure how she got. No blood in urine or stool. Dietary changes: had slightly more salad. Changes in medication/OTC agents/herbals: cardiologist changed Lasix to Bumex and Metoprolol to Coreg. Currently taking extra Bumex for 2 days. No change in alcohol use or tobacco use. No change in activity level. Patient denies headaches/falls. Has increased dizziness with position change. No vomiting or acute illness. Had 2 days of diarrhea over a week ago - took Imodium. No procedures scheduled in the future at this time, other than stress test and echo tomorrow. Had second COVID vaccine. Card copied - given to [facility] to scan in. Assessment:   Lab Results   Component Value Date    INR 3.60 (H) 03/30/2021    INR 2.90 (H) 02/23/2021    INR 2.60 (H) 01/19/2021    PROTIME 2.42 (H) 12/15/2011    PROTIME 1.97 (H) 12/01/2011    PROTIME 1.80 (H) 11/22/2011     INR supratherapeutic - goal 2.5-3.5. Recent Labs     03/30/21  1128   INR 3.60*     Plan:  POCT INR ordered/performed/result reviewed. 1 mg today, T (per policy), then continue PO  Coumadin 1 mg MF, 2 mg TWThSS. Recheck INR in 3 week(s), per policy. Patient reminded to call the Anticoagulation Clinic with any signs or symptoms of bleeding or with any medication changes. Patient given instructions utilizing the teach back method. Advised extra caution. Given routine H&H order.   Just had BMP done - verified with phlebotomist that H&H

## 2021-03-31 ENCOUNTER — HOSPITAL ENCOUNTER (OUTPATIENT)
Dept: NON INVASIVE DIAGNOSTICS | Age: 76
Discharge: HOME | End: 2021-03-31
Payer: MEDICARE

## 2021-03-31 VITALS — BODY MASS INDEX: 31.1 KG/M2 | HEIGHT: 69 IN | WEIGHT: 210 LBS

## 2021-03-31 DIAGNOSIS — I48.20 CHRONIC ATRIAL FIBRILLATION (HCC): ICD-10-CM

## 2021-03-31 DIAGNOSIS — I10 ESSENTIAL HYPERTENSION: ICD-10-CM

## 2021-03-31 DIAGNOSIS — E78.01 FAMILIAL HYPERCHOLESTEROLEMIA: ICD-10-CM

## 2021-03-31 DIAGNOSIS — R06.02 SOB (SHORTNESS OF BREATH): ICD-10-CM

## 2021-03-31 DIAGNOSIS — Z95.2 MITRAL VALVE REPLACED: ICD-10-CM

## 2021-03-31 LAB
LV EF: 55 %
LVEF MODALITY: NORMAL

## 2021-03-31 PROCEDURE — 93306 TTE W/DOPPLER COMPLETE: CPT

## 2021-03-31 PROCEDURE — 93017 CV STRESS TEST TRACING ONLY: CPT

## 2021-03-31 PROCEDURE — 3430000000 HC RX DIAGNOSTIC RADIOPHARMACEUTICAL: Performed by: NUCLEAR MEDICINE

## 2021-03-31 PROCEDURE — 78452 HT MUSCLE IMAGE SPECT MULT: CPT

## 2021-03-31 PROCEDURE — 93017 CV STRESS TEST TRACING ONLY: CPT | Performed by: NUCLEAR MEDICINE

## 2021-03-31 PROCEDURE — A9500 TC99M SESTAMIBI: HCPCS | Performed by: NUCLEAR MEDICINE

## 2021-03-31 PROCEDURE — 6360000002 HC RX W HCPCS

## 2021-03-31 RX ADMIN — Medication 10.1 MILLICURIE: at 10:35

## 2021-03-31 RX ADMIN — Medication 32 MILLICURIE: at 11:30

## 2021-04-01 ENCOUNTER — OFFICE VISIT (OUTPATIENT)
Dept: CARDIOLOGY CLINIC | Age: 76
End: 2021-04-01
Payer: MEDICARE

## 2021-04-01 VITALS
BODY MASS INDEX: 32.11 KG/M2 | WEIGHT: 216.8 LBS | SYSTOLIC BLOOD PRESSURE: 160 MMHG | HEIGHT: 69 IN | DIASTOLIC BLOOD PRESSURE: 76 MMHG | OXYGEN SATURATION: 97 % | HEART RATE: 80 BPM

## 2021-04-01 DIAGNOSIS — I48.19 PERSISTENT ATRIAL FIBRILLATION (HCC): ICD-10-CM

## 2021-04-01 DIAGNOSIS — I50.32 CHF (CONGESTIVE HEART FAILURE), NYHA CLASS II, CHRONIC, DIASTOLIC (HCC): ICD-10-CM

## 2021-04-01 DIAGNOSIS — I10 ESSENTIAL HYPERTENSION: ICD-10-CM

## 2021-04-01 DIAGNOSIS — I50.32 CHF (CONGESTIVE HEART FAILURE), NYHA CLASS II, CHRONIC, DIASTOLIC (HCC): Primary | ICD-10-CM

## 2021-04-01 DIAGNOSIS — Z95.2 S/P MVR (MITRAL VALVE REPLACEMENT): ICD-10-CM

## 2021-04-01 LAB — PRO-BNP: 1004 PG/ML (ref 0–1800)

## 2021-04-01 PROCEDURE — 1123F ACP DISCUSS/DSCN MKR DOCD: CPT | Performed by: NURSE PRACTITIONER

## 2021-04-01 PROCEDURE — 4040F PNEUMOC VAC/ADMIN/RCVD: CPT | Performed by: NURSE PRACTITIONER

## 2021-04-01 PROCEDURE — G8427 DOCREV CUR MEDS BY ELIG CLIN: HCPCS | Performed by: NURSE PRACTITIONER

## 2021-04-01 PROCEDURE — G8399 PT W/DXA RESULTS DOCUMENT: HCPCS | Performed by: NURSE PRACTITIONER

## 2021-04-01 PROCEDURE — G8417 CALC BMI ABV UP PARAM F/U: HCPCS | Performed by: NURSE PRACTITIONER

## 2021-04-01 PROCEDURE — 99215 OFFICE O/P EST HI 40 MIN: CPT | Performed by: NURSE PRACTITIONER

## 2021-04-01 PROCEDURE — 1090F PRES/ABSN URINE INCON ASSESS: CPT | Performed by: NURSE PRACTITIONER

## 2021-04-01 PROCEDURE — 1036F TOBACCO NON-USER: CPT | Performed by: NURSE PRACTITIONER

## 2021-04-01 RX ORDER — SPIRONOLACTONE 25 MG/1
25 TABLET ORAL DAILY
Qty: 30 TABLET | Refills: 3 | Status: SHIPPED
Start: 2021-04-01 | End: 2021-04-11 | Stop reason: SINTOL

## 2021-04-01 RX ORDER — ISOSORBIDE DINITRATE 10 MG/1
10 TABLET ORAL 3 TIMES DAILY
Qty: 90 TABLET | Refills: 3 | Status: SHIPPED | OUTPATIENT
Start: 2021-04-01 | End: 2021-07-08 | Stop reason: SDUPTHER

## 2021-04-01 RX ORDER — BUMETANIDE 2 MG/1
1 TABLET ORAL DAILY
Qty: 30 TABLET | Refills: 3
Start: 2021-04-01 | End: 2021-04-19 | Stop reason: SDUPTHER

## 2021-04-01 ASSESSMENT — ENCOUNTER SYMPTOMS
ABDOMINAL DISTENTION: 0
APNEA: 0
SHORTNESS OF BREATH: 1
COLOR CHANGE: 0
CHEST TIGHTNESS: 0
NAUSEA: 0
WHEEZING: 0
COUGH: 0
ABDOMINAL PAIN: 0

## 2021-04-01 NOTE — PROGRESS NOTES
[place]ner       Visit Date: 4/1/2021  Cardiologist:  Dr. Zora Mckinney  Primary Care Physician: Dr. Shay Merrill MD    Suzanne Huynh is a 67 y.o. female who presents today for:  Chief Complaint   Patient presents with    Congestive Heart Failure     new pt       HPI: Obtained from patient and chart    Suzanne Huynh is a 67 y.o. female who presents to the office for a new patient visit in the heart failure clinic. Hx A fib, MVR 2003, EF 55% (Mar 2021), negative stress test (Mar 2021), DM, left breast cancer 2013 treated with lumpectomy with radiation. She had been c/o increased SOB. She was placed on Bumex instead of Lasix. She has actually doubled the past 2 days with no change in symptoms. Had an ECHO with persevered EF and negative stress test. She sleeps with the [facility] elevated at 45 degrees for at least 2 years with 1 pillow. She is SOB with exertion. She can perform ADLs without SOB or fatigue, does tire with a shower \"sometimes\". She has naturally larger ankles. She has some ankle swelling left > right.        Accompanied by: self  Last hospital admission related to Heart Failure:  none  Chest Pain: no  Worsening SOB: yes but not new has been over the past year  Worsening Orthopnea/PND: see hpi  Edema: yes  Any extra diuretic use: see hpi  Weight gain: unknown   Fatigue: yes  Abdominal bloating: no  Appetite: good  POLINA: compliant with CPAP - CPM  Cough: no  Compliant checking home weight: not daily  Compliant checking blood pressure: no      Past Medical History:   Diagnosis Date    Arrhythmia     CHRONIC ATRIAL FIB    Cancer ([facility] [address]     breast ca left    Chronic kidney disease, stage III (moderate) 10/29/2018    Depression     Diabetes mellitus (HCC)     Generalized headaches     History of dizziness     History of sinus bradycardia     History of valvular heart disease     Hyperlipidemia     Hypertension     Medtronic dual pacemaker 5/23/2017    MI, old  Mitral valve replaced     Numbness and tingling     Obesity     Obstructive sleep apnea on CPAP 2011    Osteopenia     SOB (shortness of breath)     HX OF:     Past Surgical History:   Procedure Laterality Date    BREAST SURGERY  2011    right biopsy    BREAST SURGERY Left 1/15/13    re-excision cranial margin and mammosite spacer    CARDIAC CATHETERIZATION  10/06/2003    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 MV area by recent EKG was 1 sq. cm. and mean gradient across MV was 17mmHg. MV index was about 0.7 sq. cm. per body surface area. Moderate pulmonary artery systolic HTN. Patent coronary arteries.  CARDIAC VALVE REPLACEMENT  2003    MVR  33mm [place] Mechanical valve    CARDIOVASCULAR STRESS TEST  05/26/2011    Cannot exclude mild degree of ischemia in anterolateral wall. EF 58%. Mildly abnormal nuclear scan.  CARDIOVASCULAR STRESS TEST  7-09-10    Atrial fibrillation. Episodes of bradycardia mainly at night, which could be physiologic. Predominantly bradycardiac rhythm w/o significant pauses. Frequent PVC's     CARDIOVASCULAR STRESS TEST  1-14-08    Atrial fibrillation w/ controlled ventricular repsonse and an average heart rate of 58 bpm. 1 episode of 3 beat run of nonsustained ventricular tachyarrhythmia at rate of 110-158 bpm. 3 transient episodes of ventricular bigeminy w/ rate in mid 60's to low 70's.  CARDIOVASCULAR STRESS TEST  2-08-99    Unremarkable routine stress test. Chronic A-fib.  CARDIOVASCULAR STRESS TEST  2-08-00    Normal stress EKG. Hypertension, not well controlled. Patient's sitting BP was 160/94. At peak exercise BP was 180/102. Moderate degree of MVP.     CATARACT REMOVAL Right 7/16/14    CATARACT REMOVAL      COLONOSCOPY  2/2010    COLONOSCOPY  08/2017    KNEE ARTHROSCOPY  6/2011    KNEE SURGERY      MITRAL VALVE REPLACEMENT      PACEMAKER INSERTION Left 05/18/2017    by Dr Angelito Velázquez  [address] [facility] N/A 8/22/2017    COLONOSCOPY performed by Garrett Ayers MD at [facility]    ID EGD TRANSORAL BIOPSY SINGLE/MULTIPLE N/A 8/22/2017    EGD BIOPSY performed by Garrett Ayers MD at [address] [facility] ECHOCARDIOGRAM  05/26/2011    LV mildly dilated, systolic function mildly reduced. EF 40-45%. Mild diffuse hypokinesis. Mild lateral wall was hypokinetic. Apical lateral wall was dyskinetic. Wall thickness was at upper limits of normal. LA moderately dilated. RV and RA mildly dilated. Systolic function mildly reduced. Mild MR and TR.  TRANSTHORACIC ECHOCARDIOGRAM  01/14/2008    Biatrial enlargement, RV dilated. Atheromatous aortic root. Borderline LVH. Normal LV systolic function. EF 55%. Prosthetic MV appears to be functioning well. Mildly sclerosed AV. Trivial MR, mild TR, trivial PI, RVSP 47mmHg.  TRANSTHORACIC ECHOCARDIOGRAM  2-08-00    Moderate MV stenosis based on available echo doppler data.  UPPER GASTROINTESTINAL ENDOSCOPY  08/2017     Family History   Problem Relation Age of Onset    Hypertension Mother     Stroke Mother     Diabetes Mother     Cancer Father     Heart Attack Brother     Heart Disease Brother     High Blood Pressure Brother      Social History     Tobacco Use    Smoking status: Never Smoker    Smokeless tobacco: Never Used    Tobacco comment: Friends were heavy smokers   Substance Use Topics    Alcohol use:  Yes     Alcohol/week: 0.0 standard drinks     Comment: Occassionally     Current Outpatient Medications   Medication Sig Dispense Refill    isosorbide dinitrate (ISORDIL) 10 MG tablet Take 1 tablet by mouth 3 times daily 90 tablet 3    bumetanide (BUMEX) 2 MG tablet Take 0.5 tablets by mouth daily 30 tablet 3    spironolactone (ALDACTONE) 25 MG tablet Take 1 tablet by mouth daily 30 tablet 3    carvedilol (COREG) 12.5 MG tablet Take 1 tablet by mouth 2 times daily 60 tablet 3    omeprazole (PRILOSEC) 20 MG delayed release capsule Take 20 mg by mouth Daily       olmesartan (BENICAR) 40 MG Potassium-Containing Compounds      SHIMA    Tape Margretta Sciara Tape] Rash     Skin pulls off       SUBJECTIVE:   Review of Systems   Constitutional: Negative for activity change, appetite change, fatigue and fever. HENT: Negative for congestion. Respiratory: Positive for shortness of breath. Negative for apnea, cough, chest tightness and wheezing. Cardiovascular: Positive for leg swelling. Negative for chest pain and palpitations. Gastrointestinal: Negative for abdominal distention, abdominal pain and nausea. Genitourinary: Negative for difficulty urinating and dysuria. Musculoskeletal: Negative for arthralgias and gait problem. Skin: Negative for color change. Neurological: Negative for dizziness, numbness and headaches. Psychiatric/Behavioral: Negative for agitation, confusion and sleep disturbance. The patient is not nervous/anxious. OBJECTIVE:   Today's Vitals:  BP (!) 160/76   Pulse 80   Ht 5' 9\" (1.753 m)   Wt 216 lb 12.8 oz (98.3 kg)   SpO2 97%   BMI 32.02 kg/m²     Physical Exam  Vitals signs reviewed. Constitutional:       Appearance: She is well-developed. HENT:      Head: Normocephalic and atraumatic. Eyes:      Pupils: Pupils are equal, round, and reactive to light. Neck:      Musculoskeletal: Normal range of motion. Vascular: No JVD. Cardiovascular:      Rate and Rhythm: Normal rate. Rhythm irregular. Heart sounds: Normal heart sounds. No murmur. Comments: Click   Pulmonary:      Effort: Pulmonary effort is normal. No respiratory distress. Breath sounds: No rales. Abdominal:      General: There is no distension. Palpations: Abdomen is soft. Tenderness: There is no abdominal tenderness. Musculoskeletal:         General: No tenderness. Right lower leg: Edema (1+ ankle) present. Left lower leg: Edema (1+ ankle ) present. Skin:     General: Skin is warm and dry.       Capillary Refill: Capillary refill takes less than 2 seconds. Neurological:      Mental Status: She is alert and oriented to person, place, and time. Psychiatric:         Mood and Affect: Mood normal.         Behavior: Behavior normal.         Wt Readings from Last 3 Encounters:   04/01/21 216 lb 12.8 oz (98.3 kg)   03/31/21 210 lb (95.3 kg)   03/16/21 217 lb (98.4 kg)     BP Readings from Last 3 Encounters:   04/01/21 (!) 160/76   03/16/21 (!) 152/64   12/29/20 128/64     Pulse Readings from Last 3 Encounters:   04/01/21 80   03/16/21 82   12/29/20 83     Body mass index is 32.02 kg/m². ECHO:   3/31/21      Summary   Technically difficult examination. Ejection fraction is visually estimated at 55%. Overall left ventricular function is normal.   The aortic valve leaflets were not well visualized. Aortic valve appears tricuspid. Aortic valve leaflets are somewhat thickened. The mitral valve was not well visualized . Normally functioning prosthetic artificial valve in mitral position. Signature      ----------------------------------------------------------------   Electronically signed by Chad Edwards MD (Interpreting   physician) on 03/31/2021 at 02:49 PM   ----------------------------------------------------------------      Findings      Mitral Valve   The mitral valve was not well visualized . Normally functioning prosthetic artificial valve in mitral position. Aortic Valve   The aortic valve leaflets were not well visualized. Aortic valve appears tricuspid. Aortic valve leaflets are somewhat thickened. Tricuspid Valve   Mild tricuspid regurgitation. Pulmonic Valve   The pulmonic valve was not well visualized . Trivial pulmonic regurgitation visualized. Left Atrium   Moderately dilated left atrium. Left Ventricle   Ejection fraction is visually estimated at 55%.    Overall left ventricular function is normal.      Right Atrium   Right atrial size was normal.      Right Ventricle   The right ventricular size was normal with normal systolic function and   wall thickness. Pericardial Effusion   The pericardium was normal in appearance with no evidence of a pericardial   effusion. Pleural Effusion   No evidence of pleural effusion. Aorta / Great Vessels   -Aortic root dimension within normal limits.   -The Pulmonary artery is within normal limits. -IVC size is within normal limits with normal respiratory phasic changes. Results reviewed:  BNP:   Lab Results   Component Value Date    PROBNP 1004.0 03/30/2021     CBC:   Lab Results   Component Value Date    WBC 8.2 11/21/2019    RBC 4.36 11/21/2019    RBC 4.56 04/27/2012    HGB 11.0 09/10/2020    HCT 34.9 09/10/2020     11/21/2019     CMP:    Lab Results   Component Value Date     03/30/2021    K 3.9 03/30/2021     03/30/2021    CO2 27 03/30/2021    BUN 36 03/30/2021    CREATININE 1.5 03/30/2021    LABGLOM 34 03/30/2021    GLUCOSE 210 03/30/2021    GLUCOSE 129 12/15/2011    CALCIUM 9.7 03/30/2021     Hepatic Function Panel:    Lab Results   Component Value Date    ALKPHOS <5 07/20/2020    ALT 10 07/20/2020    AST 21 07/20/2020    PROT 7.0 07/20/2020    BILITOT 0.3 07/20/2020    BILIDIR 0.2 07/24/2012    LABALBU 4.3 07/20/2020     Magnesium:  No results found for: MG  PT/INR:    Lab Results   Component Value Date    PROTIME 2.42 12/15/2011    INR 3.60 03/30/2021    INR 6.74 09/10/2020     Lipids:    Lab Results   Component Value Date    TRIG 220 07/20/2020    HDL 35 07/20/2020    LDLCALC 71 07/20/2020       ASSESSMENT AND PLAN:   The patient's condition/symptoms are Stable      Diagnosis Orders   1. CHF (congestive heart failure), NYHA class II, chronic, diastolic (HCC)  Basic Metabolic Panel   2. S/P MVR (mitral valve replacement)     3. Essential hypertension     4.  Persistent atrial fibrillation (HCC)         Plan:  · GDMT   · ACE/ARB/ARNi: Olmesartan 40 mg daily  · Beta Blocker: Coreg 12.5 mg bid  · Aldosterone antagonist: add Aldactone 25 mg daily  · Diuretic/Potassium: decrease Bumex 1 mg daily (frm 2 mg)  · Hydralazine/Nitrate: Hydralazine 50 mg tid, add Isordil 10 mg tid  · Other: Coumadin   · She has some ankle swelling. She would benefit from wraps or compression but can not place her self and does not have anyone to help her. Not significant enough at this point for Lymphedema clinic. We will try to add Aldactone as above but need to monitor the kidney function. GFR has been 44 since 2017. Decrease the Bumex to 1 mg as 2 mg and even doubling to 2 mg bid the past 2 days has not helped the ankles. Kidney function has worsened with this GFR decreased to 34 BUN 36 and Cr 1.5 from 1.2. BNP was WNL 1004. Check BMP in 1 week. Add Isordil 10 mg tid. She is to monitor her BP at home and bring her BP cuff to her next OV. · HF Zones reviewed. · Daily weights and record  · Fluid restriction of 2 Liters per day (64 oz)   · Limit sodium in diet to 9110-9919 mg/day  · Healthier food options were discussed   · Monitor BP daily 1 hour after meds are taken  · Increase activity as tolerated     Patient was instructed to call the [address] for changes in the following symptoms:    Weight gain of 3 pounds in 1 day or 5 pounds in 1 week   Increased shortness of breath   Shortness of breath while laying down   Cough   Chest pain   Swelling in feet, ankles or legs   Tenderness or bloating in the abdomen   Fatigue    Decreased appetite or feeling \"full\"   Nausea    Confusion      Return in about 2 months (around 6/1/2021). or sooner if needed     Patient given educational materials - see patient instructions. We discussed the importance of weighing oneself and recording daily. We also discussed the importance of a low sodium diet, higher sodium foods to avoid and appropriate low sodium food choices. Discussed use, benefit, and side effects of prescribed medications. All patient questions answered.   Patient verbalizes understanding of plan of care using teach back method, and is agreeable to the treatment plan. Greater then 45 minutes of time was spent reviewing the chart and educating the patient about HF, medications, diet, exercise, and discussing the plan of care. I personally spent more then 50% of the appt time face to face with the patient counseling/coordinating patient's care.       Electronicallysigned by YANA Gibbs CNP on 4/1/2021 at 3:07 PM

## 2021-04-01 NOTE — PATIENT INSTRUCTIONS
You may receive a survey regarding the care you received during your visit. Your input is valuable to us. We encourage you to complete and return your survey. We hope you will choose us in the future for your healthcare needs. NEW ORDERS FROM TODAY:  Decrease Bumex to 1 mg daily (half og 2 mg tab)  Add Aldactone 25 mg daily  Add Isordil 10 mg three times daily with Hydralazine  Blood work next week Fri Apr 9 1 week after starting Aldactone  Bring BP cuff to next appt       Continue:  · Take all medications as prescribed   · Daily weights and record - please try to weigh yourself upon awakening before eating or drinking   · Fluid restriction of 2 Liters per day (64 oz)  · Limit sodium in diet to around 1641-3665 mg/day  · Monitor BP - take BP 1 hour after meds  · Increase activity as tolerated     Call the Heart Failure Clinic for any of the following symptoms: [phone]   Weight gain of 3 pounds in 1 day or 5 pounds in 1 week   Increased shortness of breath   Shortness of breath while laying down   Cough   Chest pain   Swelling in feet, ankles or legs   Tenderness or bloating in the abdomen   Fatigue    Decreased appetite or feeling \"full\"    Nausea    Confusion     **PLEASE bring all medications in original bottles with you to your next appointment**    Educational websites:    http://[email or web address]/. org (American Heart Association)    Promotionali.am.plus electronics.nl. com ([employer]is)    Secure[employer].com ([employer])    Too much sodium causes your body to hold on to extra water. This can raise your blood pressure and force your heart and kidneys to work harder. In very serious cases, this could cause you to be put in the hospital. It might even be life-threatening. By limiting sodium, you will feel better and lower your risk of serious problems. The most common source of sodium is salt. People get most of the salt in their diet from canned, prepared, and packaged foods.  Fast food and restaurant meals also are very high in sodium. Your doctor will probably limit your sodium to less than 2,000 milligrams (mg) a day. This limit counts all the sodium in prepared and packaged foods and any salt you add to your food. Follow-up care is a key part of your treatment and safety. Be sure to make and go to all appointments, and call your doctor if you are having problems. It's also a good idea to know your test results and keep a list of the medicines you take. How can you care for yourself at home? Read food labels  · Read labels on cans and food packages. The labels tell you how much sodium is in each serving. Make sure that you look at the serving size. If you eat more than the serving size, you have eaten more sodium. · Food labels also tell you the Percent Daily Value for sodium. Choose products with low Percent Daily Values for sodium. · Be aware that sodium can come in forms other than salt, including monosodium glutamate (MSG), sodium citrate, and sodium bicarbonate (baking soda). MSG is often added to Asian food. When you eat out, you can sometimes ask for food without MSG or added salt. Buy low-sodium foods  · Buy foods that are labeled \"unsalted\" (no salt added), \"sodium-free\" (less than 5 mg of sodium per serving), or \"low-sodium\" (less than 140 mg of sodium per serving). Foods labeled \"reduced-sodium\" and \"light sodium\" may still have too much sodium. Be sure to read the label to see how much sodium you are getting. · Buy fresh vegetables, or frozen vegetables without added sauces. Buy low-sodium versions of canned vegetables, soups, and other canned goods. Prepare low-sodium meals  · Cut back on the amount of salt you use in cooking. This will help you adjust to the taste. Do not add salt after cooking. One teaspoon of salt has about 2,300 mg of sodium. · Take the salt shaker off the table. · Flavor your food with garlic, lemon juice, onion, vinegar, herbs, and spices.  Do not use soy sauce, lite soy sauce, steak sauce, onion salt, garlic salt, celery salt, mustard, or ketchup on your food. · Use low-sodium salad dressings, sauces, and ketchup. Or make your own salad dressings and sauces without adding salt. · Use less salt (or none) when recipes call for it. You can often use half the salt a recipe calls for without losing flavor. Other foods such as rice, pasta, and grains do not need added salt. · Rinse canned vegetables, and cook them in fresh water. This removes somebut not allof the salt. · Avoid water that is naturally high in sodium or that has been treated with water softeners, which add sodium. Call your local water company to find out the sodium content of your water supply. If you buy bottled water, read the label and choose a sodium-free brand. Avoid high-sodium foods  · Avoid eating:  ? Smoked, cured, salted, and canned meat, fish, and poultry. ? Ham, acosta, hot dogs, and luncheon meats. ? Regular, hard, and processed cheese and regular peanut butter. ? Crackers with salted tops, and other salted snack foods such as pretzels, chips, and salted popcorn. ? Frozen prepared meals, unless labeled low-sodium. ? Canned and dried soups, broths, and bouillon, unless labeled sodium-free or low-sodium. ? Canned vegetables, unless labeled sodium-free or low-sodium. ? [employer] fries, pizza, tacos, and other fast foods. ? Pickles, olives, ketchup, and other condiments, especially soy sauce, unless labeled sodium-free or low-sodium.

## 2021-04-03 DIAGNOSIS — E78.5 HYPERLIPIDEMIA: ICD-10-CM

## 2021-04-05 RX ORDER — SIMVASTATIN 10 MG
TABLET ORAL
Qty: 90 TABLET | Refills: 3 | Status: SHIPPED | OUTPATIENT
Start: 2021-04-05 | End: 2022-03-31

## 2021-04-05 NOTE — TELEPHONE ENCOUNTER
This medication refill is regarding a electronic request by [facility]. Requested Prescriptions     Pending Prescriptions Disp Refills    simvastatin (ZOCOR) 10 MG tablet [Pharmacy Med Name: SIMVASTATIN TABS 10MG] 90 tablet 3     Sig: TAKE 1 TABLET AT BEDTIME       Date of last visit: 7/23/2020  Date of next visit: 7/26/2021  Date of last refill: 1/9/2020  90/4    Last Lipid Panel:    Lab Results   Component Value Date    CHOL 150 07/20/2020    TRIG 220 07/20/2020    HDL 35 07/20/2020    LDLCALC 71 07/20/2020     Last CMP:   Lab Results   Component Value Date     03/30/2021    K 3.9 03/30/2021     03/30/2021    CO2 27 03/30/2021    BUN 36 (H) 03/30/2021    CREATININE 1.5 (H) 03/30/2021    GLUCOSE 210 (H) 03/30/2021    CALCIUM 9.7 03/30/2021    PROT 7.0 07/20/2020    LABALBU 4.3 07/20/2020    BILITOT 0.3 07/20/2020    ALKPHOS <5 (L) 07/20/2020    AST 21 07/20/2020    ALT 10 (L) 07/20/2020    LABGLOM 34 (A) 03/30/2021       Rx verified, ordered and set to EP.

## 2021-04-09 ENCOUNTER — HOSPITAL ENCOUNTER (OUTPATIENT)
Age: 76
Discharge: HOME | End: 2021-04-09
Payer: MEDICARE

## 2021-04-09 DIAGNOSIS — I50.32 CHF (CONGESTIVE HEART FAILURE), NYHA CLASS II, CHRONIC, DIASTOLIC (HCC): ICD-10-CM

## 2021-04-09 DIAGNOSIS — Z95.2 H/O MITRAL VALVE REPLACEMENT: ICD-10-CM

## 2021-04-09 DIAGNOSIS — Z79.01 ANTICOAGULATED ON COUMADIN: ICD-10-CM

## 2021-04-09 LAB
ANION GAP SERPL CALCULATED.3IONS-SCNC: 14 MEQ/L (ref 8–16)
BUN BLDV-MCNC: 49 MG/DL (ref 7–22)
CALCIUM SERPL-MCNC: 9.7 MG/DL (ref 8.5–10.5)
CHLORIDE BLD-SCNC: 101 MEQ/L (ref 98–111)
CO2: 23 MEQ/L (ref 23–33)
CREAT SERPL-MCNC: 2.3 MG/DL (ref 0.4–1.2)
GFR SERPL CREATININE-BSD FRML MDRD: 21 ML/MIN/1.73M2
GLUCOSE BLD-MCNC: 234 MG/DL (ref 70–108)
HCT VFR BLD CALC: 36 % (ref 37–47)
HEMOGLOBIN: 11 GM/DL (ref 12–16)
POTASSIUM SERPL-SCNC: 4.9 MEQ/L (ref 3.5–5.2)
SODIUM BLD-SCNC: 138 MEQ/L (ref 135–145)

## 2021-04-09 PROCEDURE — 85018 HEMOGLOBIN: CPT

## 2021-04-09 PROCEDURE — 36415 COLL VENOUS BLD VENIPUNCTURE: CPT

## 2021-04-09 PROCEDURE — 80048 BASIC METABOLIC PNL TOTAL CA: CPT

## 2021-04-09 PROCEDURE — 85014 HEMATOCRIT: CPT

## 2021-04-11 ENCOUNTER — TELEPHONE (OUTPATIENT)
Dept: CARDIOLOGY CLINIC | Age: 76
End: 2021-04-11

## 2021-04-11 DIAGNOSIS — I50.32 CHF (CONGESTIVE HEART FAILURE), NYHA CLASS II, CHRONIC, DIASTOLIC (HCC): Primary | ICD-10-CM

## 2021-04-19 ENCOUNTER — HOSPITAL ENCOUNTER (OUTPATIENT)
Dept: PHARMACY | Age: 76
Setting detail: THERAPIES SERIES
Discharge: HOME | End: 2021-04-19
Payer: MEDICARE

## 2021-04-19 VITALS — DIASTOLIC BLOOD PRESSURE: 64 MMHG | SYSTOLIC BLOOD PRESSURE: 140 MMHG | TEMPERATURE: 59 F

## 2021-04-19 DIAGNOSIS — E11.9 TYPE 2 DIABETES MELLITUS WITHOUT COMPLICATION, WITHOUT LONG-TERM CURRENT USE OF INSULIN (HCC): ICD-10-CM

## 2021-04-19 DIAGNOSIS — I10 ESSENTIAL HYPERTENSION: ICD-10-CM

## 2021-04-19 DIAGNOSIS — Z79.01 ANTICOAGULATED ON COUMADIN: Primary | ICD-10-CM

## 2021-04-19 DIAGNOSIS — Z51.81 ENCOUNTER FOR THERAPEUTIC DRUG MONITORING: ICD-10-CM

## 2021-04-19 DIAGNOSIS — Z95.2 H/O MITRAL VALVE REPLACEMENT: ICD-10-CM

## 2021-04-19 DIAGNOSIS — Z79.01 ANTICOAGULATED ON COUMADIN: ICD-10-CM

## 2021-04-19 DIAGNOSIS — E78.5 HYPERLIPIDEMIA, UNSPECIFIED HYPERLIPIDEMIA TYPE: ICD-10-CM

## 2021-04-19 LAB
ANION GAP SERPL CALCULATED.3IONS-SCNC: 12 MEQ/L (ref 8–16)
BUN BLDV-MCNC: 42 MG/DL (ref 7–22)
CALCIUM SERPL-MCNC: 9.8 MG/DL (ref 8.5–10.5)
CHLORIDE BLD-SCNC: 105 MEQ/L (ref 98–111)
CO2: 21 MEQ/L (ref 23–33)
CREAT SERPL-MCNC: 1.7 MG/DL (ref 0.4–1.2)
GFR SERPL CREATININE-BSD FRML MDRD: 29 ML/MIN/1.73M2
GLUCOSE BLD-MCNC: 205 MG/DL (ref 70–108)
HCT VFR BLD CALC: 36.5 % (ref 37–47)
HEMOGLOBIN: 11.2 GM/DL (ref 12–16)
INR BLD: 4.92 (ref 0.85–1.13)
POTASSIUM SERPL-SCNC: 4.8 MEQ/L (ref 3.5–5.2)
SODIUM BLD-SCNC: 138 MEQ/L (ref 135–145)

## 2021-04-19 PROCEDURE — 99211 OFF/OP EST MAY X REQ PHY/QHP: CPT

## 2021-04-19 PROCEDURE — 85014 HEMATOCRIT: CPT

## 2021-04-19 PROCEDURE — 85018 HEMOGLOBIN: CPT

## 2021-04-19 PROCEDURE — 85610 PROTHROMBIN TIME: CPT

## 2021-04-19 PROCEDURE — 80048 BASIC METABOLIC PNL TOTAL CA: CPT

## 2021-04-19 RX ORDER — BUMETANIDE 1 MG/1
1 TABLET ORAL DAILY
Qty: 90 TABLET | Refills: 3 | Status: SHIPPED | OUTPATIENT
Start: 2021-04-19 | End: 2021-07-08 | Stop reason: SDUPTHER

## 2021-04-19 NOTE — PROGRESS NOTES
lab.  Hold PO Coumadin today and tomorrow, M/T. Recheck INR in 2 day(s). (Report given - orders entered by YO Nicholson, Naila.)   Patient reminded to call the Anticoagulation Clinic with any signs or symptoms of bleeding or with any medication changes. Patient given instructions utilizing the teach back method. Advised extra caution. Verified phone numbers; received permission to leave message on either if any new orders. Discharged ambulatory in no apparent distress, wearing mask. New orders from YO Nicholson PharmD., after lab result reviewed:  Still hold Coumadin 2 days, then decrease to 2 mg MWF, 1 mg TThSS (16.7% decrease) - check INR in 7-8 days. New orders called to patient at 1350 - verified understanding with teach back method. Canceled 4/21 visit - will see 4/26 at [phone]. After visit summary printed and reviewed with patient.       Medications reviewed and updated on home medication list.    Influenza vaccine:     [] given    [] declined   [] received previously   [] plans to receive at a later time   [] refused    [] documented in Epic

## 2021-04-19 NOTE — TELEPHONE ENCOUNTER
BMP reviewed   Kidney function is improving  We will keep the Bumex 1 mg daily - I sent a prescription for Bumex 1 mg tabs so she does not need to cut in half  Please notify her of dose change

## 2021-04-26 ENCOUNTER — HOSPITAL ENCOUNTER (OUTPATIENT)
Dept: PHARMACY | Age: 76
Setting detail: THERAPIES SERIES
Discharge: HOME | End: 2021-04-26
Payer: MEDICARE

## 2021-04-26 DIAGNOSIS — Z79.01 ANTICOAGULATED ON COUMADIN: ICD-10-CM

## 2021-04-26 DIAGNOSIS — Z51.81 ENCOUNTER FOR THERAPEUTIC DRUG MONITORING: ICD-10-CM

## 2021-04-26 DIAGNOSIS — Z95.2 H/O MITRAL VALVE REPLACEMENT: ICD-10-CM

## 2021-04-26 LAB — POC INR: 4.1 (ref 0.8–1.2)

## 2021-04-26 PROCEDURE — 85610 PROTHROMBIN TIME: CPT

## 2021-04-26 PROCEDURE — 36416 COLLJ CAPILLARY BLOOD SPEC: CPT

## 2021-04-26 PROCEDURE — 99211 OFF/OP EST MAY X REQ PHY/QHP: CPT

## 2021-04-26 NOTE — PROGRESS NOTES
previously   [] plans to receive at a later time   [] refused    [x] documented in [address] Intervention Detail: Dose adjustment - 1   Total # of Interventions Recommended: 1   Total # of Interventions Accepted: 1   Time Spent (min): [address], PharmD, BCPS  4/26/2021  11:39 AM

## 2021-04-28 ENCOUNTER — PATIENT MESSAGE (OUTPATIENT)
Dept: FAMILY MEDICINE CLINIC | Age: 76
End: 2021-04-28

## 2021-04-28 DIAGNOSIS — E11.21 TYPE 2 DIABETES MELLITUS WITH DIABETIC NEPHROPATHY, WITHOUT LONG-TERM CURRENT USE OF INSULIN (HCC): Primary | ICD-10-CM

## 2021-04-28 RX ORDER — BLOOD SUGAR DIAGNOSTIC
STRIP MISCELLANEOUS
Qty: 100 STRIP | Refills: 3 | Status: SHIPPED | OUTPATIENT
Start: 2021-04-28 | End: 2022-08-02

## 2021-04-28 NOTE — TELEPHONE ENCOUNTER
Rx EP'd to pharmacy. Please notify patient.       Requested Prescriptions     Signed Prescriptions Disp Refills    blood glucose test strips (ADVOCATE REDI-CODE) strip 100 strip 3     Sig: USE ONCE DAILY     Authorizing Provider: Gilda Ingram           Electronically signed by Milton Arreaga MD on 4/28/2021 at 1:43 PM

## 2021-04-28 NOTE — TELEPHONE ENCOUNTER
From: Hailee Burgos  To: Spencer Rebolledo MD  Sent: 4/28/2021 11:07 AM EDT  Subject: Prescription Question    Please send a refill prescription to [facility] for Advocate Redi - Code Test strips. I have a enough for a few more days. Thank You in advance.

## 2021-04-28 NOTE — TELEPHONE ENCOUNTER
This medication refill is regarding a MyChart request.  Refill requested by patient. Requested Prescriptions     Pending Prescriptions Disp Refills    blood glucose test strips (ADVOCATE REDI-CODE) strip 100 strip 0     Sig: USE ONCE DAILY       Date of last visit: 7/23/2020  Date of next visit: 7/26/2021  Date of last refill: 4/23/2020  100/3      Rx verified, ordered and set to EP.

## 2021-05-03 ENCOUNTER — OFFICE VISIT (OUTPATIENT)
Dept: CARDIOLOGY CLINIC | Age: 76
End: 2021-05-03
Payer: MEDICARE

## 2021-05-03 ENCOUNTER — OFFICE VISIT (OUTPATIENT)
Dept: PULMONOLOGY | Age: 76
End: 2021-05-03
Payer: MEDICARE

## 2021-05-03 VITALS
OXYGEN SATURATION: 99 % | WEIGHT: 215.6 LBS | HEIGHT: 69 IN | TEMPERATURE: 97.7 F | HEART RATE: 84 BPM | DIASTOLIC BLOOD PRESSURE: 64 MMHG | SYSTOLIC BLOOD PRESSURE: 136 MMHG | BODY MASS INDEX: 31.93 KG/M2

## 2021-05-03 VITALS
OXYGEN SATURATION: 97 % | HEIGHT: 69 IN | WEIGHT: 214 LBS | DIASTOLIC BLOOD PRESSURE: 64 MMHG | HEART RATE: 88 BPM | BODY MASS INDEX: 31.7 KG/M2 | SYSTOLIC BLOOD PRESSURE: 138 MMHG

## 2021-05-03 DIAGNOSIS — Z95.2 S/P MVR (MITRAL VALVE REPLACEMENT): ICD-10-CM

## 2021-05-03 DIAGNOSIS — Z99.89 OBSTRUCTIVE SLEEP APNEA ON CPAP: Primary | ICD-10-CM

## 2021-05-03 DIAGNOSIS — E66.9 OBESITY (BMI 30-39.9): ICD-10-CM

## 2021-05-03 DIAGNOSIS — Z95.0 PACEMAKER: ICD-10-CM

## 2021-05-03 DIAGNOSIS — G47.33 OBSTRUCTIVE SLEEP APNEA ON CPAP: Primary | ICD-10-CM

## 2021-05-03 DIAGNOSIS — I10 ESSENTIAL HYPERTENSION: ICD-10-CM

## 2021-05-03 DIAGNOSIS — I50.32 CHF (CONGESTIVE HEART FAILURE), NYHA CLASS II, CHRONIC, DIASTOLIC (HCC): Primary | ICD-10-CM

## 2021-05-03 DIAGNOSIS — I48.19 PERSISTENT ATRIAL FIBRILLATION (HCC): ICD-10-CM

## 2021-05-03 DIAGNOSIS — G47.09 OTHER INSOMNIA: ICD-10-CM

## 2021-05-03 PROCEDURE — G8427 DOCREV CUR MEDS BY ELIG CLIN: HCPCS | Performed by: PHYSICIAN ASSISTANT

## 2021-05-03 PROCEDURE — G8417 CALC BMI ABV UP PARAM F/U: HCPCS | Performed by: PHYSICIAN ASSISTANT

## 2021-05-03 PROCEDURE — 1090F PRES/ABSN URINE INCON ASSESS: CPT | Performed by: NURSE PRACTITIONER

## 2021-05-03 PROCEDURE — G8417 CALC BMI ABV UP PARAM F/U: HCPCS | Performed by: NURSE PRACTITIONER

## 2021-05-03 PROCEDURE — 99214 OFFICE O/P EST MOD 30 MIN: CPT | Performed by: NURSE PRACTITIONER

## 2021-05-03 PROCEDURE — G8427 DOCREV CUR MEDS BY ELIG CLIN: HCPCS | Performed by: NURSE PRACTITIONER

## 2021-05-03 PROCEDURE — G8399 PT W/DXA RESULTS DOCUMENT: HCPCS | Performed by: PHYSICIAN ASSISTANT

## 2021-05-03 PROCEDURE — 1036F TOBACCO NON-USER: CPT | Performed by: NURSE PRACTITIONER

## 2021-05-03 PROCEDURE — 1036F TOBACCO NON-USER: CPT | Performed by: PHYSICIAN ASSISTANT

## 2021-05-03 PROCEDURE — 4040F PNEUMOC VAC/ADMIN/RCVD: CPT | Performed by: PHYSICIAN ASSISTANT

## 2021-05-03 PROCEDURE — 99214 OFFICE O/P EST MOD 30 MIN: CPT | Performed by: PHYSICIAN ASSISTANT

## 2021-05-03 PROCEDURE — 1090F PRES/ABSN URINE INCON ASSESS: CPT | Performed by: PHYSICIAN ASSISTANT

## 2021-05-03 PROCEDURE — 1123F ACP DISCUSS/DSCN MKR DOCD: CPT | Performed by: NURSE PRACTITIONER

## 2021-05-03 PROCEDURE — 1123F ACP DISCUSS/DSCN MKR DOCD: CPT | Performed by: PHYSICIAN ASSISTANT

## 2021-05-03 PROCEDURE — G8399 PT W/DXA RESULTS DOCUMENT: HCPCS | Performed by: NURSE PRACTITIONER

## 2021-05-03 PROCEDURE — 4040F PNEUMOC VAC/ADMIN/RCVD: CPT | Performed by: NURSE PRACTITIONER

## 2021-05-03 ASSESSMENT — ENCOUNTER SYMPTOMS
ABDOMINAL PAIN: 0
WHEEZING: 0
BACK PAIN: 0
DIARRHEA: 0
COUGH: 0
ALLERGIC/IMMUNOLOGIC NEGATIVE: 1
CHEST TIGHTNESS: 0
NAUSEA: 0
SHORTNESS OF BREATH: 0
CHEST TIGHTNESS: 0
SHORTNESS OF BREATH: 1
ABDOMINAL DISTENTION: 0
EYES NEGATIVE: 1
COUGH: 0
NAUSEA: 0
COLOR CHANGE: 0
STRIDOR: 0
WHEEZING: 0
APNEA: 0

## 2021-05-03 NOTE — PATIENT INSTRUCTIONS
You may receive a survey regarding the care you received during your visit. Your input is valuable to us. We encourage you to complete and return your survey. We hope you will choose us in the future for your healthcare needs. NEW ORDERS FROM TODAY:      Continue:  · Take all medications as prescribed   · Daily weights and record - please try to weigh yourself upon awakening before eating or drinking   · Fluid restriction of 2 Liters per day (64 oz)  · Limit sodium in diet to around 3518-7953 mg/day  · Monitor BP - take BP 1 hour after meds  · Increase activity as tolerated     Call the Heart Failure Clinic for any of the following symptoms: [phone]   Weight gain of 3 pounds in 1 day or 5 pounds in 1 week   Increased shortness of breath   Shortness of breath while laying down   Cough   Chest pain   Swelling in feet, ankles or legs   Tenderness or bloating in the abdomen   Fatigue    Decreased appetite or feeling \"full\"    Nausea    Confusion     **PLEASE bring all medications in original bottles with you to your next appointment**    Educational websites:    http://www.Blaze.WorldWinger/. org (American Heart Association)    PromotionWidespace. com (Entresto/Novartis)    Foxtrot.com (CardioMEMS)    Too much sodium causes your body to hold on to extra water. This can raise your blood pressure and force your heart and kidneys to work harder. In very serious cases, this could cause you to be put in the hospital. It might even be life-threatening. By limiting sodium, you will feel better and lower your risk of serious problems. The most common source of sodium is salt. People get most of the salt in their diet from canned, prepared, and packaged foods. Fast food and restaurant meals also are very high in sodium. Your doctor will probably limit your sodium to less than 2,000 milligrams (mg) a day. This limit counts all the sodium in prepared and packaged foods and any salt you add to your food. Follow-up care is a key part of your treatment and safety. Be sure to make and go to all appointments, and call your doctor if you are having problems. It's also a good idea to know your test results and keep a list of the medicines you take. How can you care for yourself at home? Read food labels  · Read labels on cans and food packages. The labels tell you how much sodium is in each serving. Make sure that you look at the serving size. If you eat more than the serving size, you have eaten more sodium. · Food labels also tell you the Percent Daily Value for sodium. Choose products with low Percent Daily Values for sodium. · Be aware that sodium can come in forms other than salt, including monosodium glutamate (MSG), sodium citrate, and sodium bicarbonate (baking soda). MSG is often added to Asian food. When you eat out, you can sometimes ask for food without MSG or added salt. Buy low-sodium foods  · Buy foods that are labeled \"unsalted\" (no salt added), \"sodium-free\" (less than 5 mg of sodium per serving), or \"low-sodium\" (less than 140 mg of sodium per serving). Foods labeled \"reduced-sodium\" and \"light sodium\" may still have too much sodium. Be sure to read the label to see how much sodium you are getting. · Buy fresh vegetables, or frozen vegetables without added sauces. Buy low-sodium versions of canned vegetables, soups, and other canned goods. Prepare low-sodium meals  · Cut back on the amount of salt you use in cooking. This will help you adjust to the taste. Do not add salt after cooking. One teaspoon of salt has about 2,300 mg of sodium. · Take the salt shaker off the table. · Flavor your food with garlic, lemon juice, onion, vinegar, herbs, and spices. Do not use soy sauce, lite soy sauce, steak sauce, onion salt, garlic salt, celery salt, mustard, or ketchup on your food. · Use low-sodium salad dressings, sauces, and ketchup. Or make your own salad dressings and sauces without adding salt.   ·

## 2021-05-03 NOTE — PROGRESS NOTES
[facility]      Ciera Ramirez         [id number]  5/3/2021   Chief Complaint   Patient presents with    Follow-up     POLINA 4 month sleep follow up with download         Pt of Dr. Tamra ENRIQUEZ Download:   Original or initial AHI: 40.8     Date of initial study: 3/21/2011      Compliant  93%     Noncompliant 3 %     PAP Type Airsense 10 AutosetLevel  12   Avg Hrs/Day 6 hr 25 min  AHI: 2.5   Recorded compliance dates , 3/30/2021  to 4/28/2021   Machine/Mfg:   [x] ResMed    [] Respironics/Dreamstation   Interface:   [] Nasal    [] Nasal pillows   [x] FFM      Provider:      [x] [facility]     []Madhu     [] Vernon    [] Sumit Duarte    [] Schw[place]               [] [facility]      [] Adaptive    [] [address]      [] Other    Neck Size: 15.75  Mallampati Mallampati 3  ESS:  8  SAQLI: 44    Here is a scan of the most recent download:            Presentation:   Debora Hurd presents for sleep medicine follow up for obstructive sleep apnea  Since the last visit, Debora Hurd is doing better with PAP. Her compliance has improved. She is more rested. She is still tired at times. She continues to wake up every 2-3 hours at night. She is following with cardiology for CHF. Equipment issues: The pressure is  acceptable, the mask is acceptable     Sleep issues:  Do you feel better? Yes  More rested? Yes   Better concentration? yes    Progress History:   Since last visit any new medical issues? No  New ER or hospital visits? No  Any new or changes in medicines? No  Any new sleep medicines? No    Review of Systems -   Review of Systems   Constitutional: Negative for activity change, appetite change, chills and fever. HENT: Negative for congestion and postnasal drip. Eyes: Negative. Respiratory: Negative for cough, chest tightness, shortness of breath, wheezing and stridor. Cardiovascular: Positive for leg swelling. Negative for chest pain.    Gastrointestinal: Negative for

## 2021-05-03 NOTE — PROGRESS NOTES
[facility]       Visit Date: 5/3/2021  Cardiologist:  Dr. Johnathan Nunez  Primary Care Physician: Dr. Fritz Persaud MD    Letha Arce is a 67 y.o. female who presents today for:  Chief Complaint   Patient presents with    Congestive Heart Failure       HPI: Obtained from patient and chart    Letha Arce is a 67 y.o. female who presents to the office for a follow up visit in the heart failure clinic. This is her second visit here - first seen April 1, 2021. Hx A fib, MVR 2003, EF 55% (Mar 2021), negative stress test (Mar 2021), DM, left breast cancer 2013 treated with lumpectomy with radiation. She had been c/o increased SOB. She was placed on Bumex instead of Lasix. Had an ECHO with persevered EF and negative stress test. She sleeps with the [facility] elevated at 45 degrees for at least 2 years with 1 pillow. We tried Aldactone but this worsened her kidney function. We added Isordil. BPs have been ok at home but she does c/o feeling dizzy at times. She can perform ADLs without SOB or fatigue. She feels like her fatigue has improved. Pedal edema improved still has ankle swelling.      Accompanied by: self  Last hospital admission related to Heart Failure:  none  Chest Pain: no  Worsening SOB: no  Worsening Orthopnea/PND: no  Edema: slight improvement   Any extra diuretic use: see hpi  Weight gain: no  Fatigue: slight improvement   Abdominal bloating: no  Appetite: good  POLINA: compliant with CPAP  Cough: no  Compliant checking home weight: yes 210-213 lbs  Compliant checking blood pressure: yes 115-130      Past Medical History:   Diagnosis Date    Arrhythmia     CHRONIC ATRIAL FIB    Cancer ([facility] [address]     breast ca left    Chronic kidney disease, stage III (moderate) 10/29/2018    Depression     Diabetes mellitus (HCC)     Generalized headaches     History of dizziness     History of sinus bradycardia     History of valvular heart disease     Hyperlipidemia     Hypertension     Medtronic dual pacemaker 5/23/2017    MI, old     Mitral valve replaced     Numbness and tingling     Obesity     Obstructive sleep apnea on CPAP 2011    Osteopenia     SOB (shortness of breath)     HX OF:     Past Surgical History:   Procedure Laterality Date    BREAST SURGERY  2011    right biopsy    BREAST SURGERY Left 1/15/13    re-excision cranial margin and mammosite spacer    CARDIAC CATHETERIZATION  10/06/2003    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 MV area by recent EKG was 1 sq. cm. and mean gradient across MV was 17mmHg. MV index was about 0.7 sq. cm. per body surface area. Moderate pulmonary artery systolic HTN. Patent coronary arteries.  CARDIAC VALVE REPLACEMENT  2003    MVR  33mm [place] Mechanical valve    CARDIOVASCULAR STRESS TEST  05/26/2011    Cannot exclude mild degree of ischemia in anterolateral wall. EF 58%. Mildly abnormal nuclear scan.  CARDIOVASCULAR STRESS TEST  7-09-10    Atrial fibrillation. Episodes of bradycardia mainly at night, which could be physiologic. Predominantly bradycardiac rhythm w/o significant pauses. Frequent PVC's     CARDIOVASCULAR STRESS TEST  1-14-08    Atrial fibrillation w/ controlled ventricular repsonse and an average heart rate of 58 bpm. 1 episode of 3 beat run of nonsustained ventricular tachyarrhythmia at rate of 110-158 bpm. 3 transient episodes of ventricular bigeminy w/ rate in mid 60's to low 70's.  CARDIOVASCULAR STRESS TEST  2-08-99    Unremarkable routine stress test. Chronic A-fib.  CARDIOVASCULAR STRESS TEST  2-08-00    Normal stress EKG. Hypertension, not well controlled. Patient's sitting BP was 160/94. At peak exercise BP was 180/102. Moderate degree of MVP.     CATARACT REMOVAL Right 7/16/14    CATARACT REMOVAL      COLONOSCOPY  2/2010    COLONOSCOPY  08/2017    KNEE ARTHROSCOPY  6/2011    KNEE SURGERY      MITRAL VALVE REPLACEMENT      PACEMAKER INSERTION Left 05/18/2017    by Dr Jordi Mooney CA SCRN NOT HI RSK IND N/A 8/22/2017    COLONOSCOPY performed by Leslie Berkowitz MD at [facility]    MT EGD TRANSORAL BIOPSY SINGLE/MULTIPLE N/A 8/22/2017    EGD BIOPSY performed by Leslie Berkowitz MD at [address] [facility] ECHOCARDIOGRAM  05/26/2011    LV mildly dilated, systolic function mildly reduced. EF 40-45%. Mild diffuse hypokinesis. Mild lateral wall was hypokinetic. Apical lateral wall was dyskinetic. Wall thickness was at upper limits of normal. LA moderately dilated. RV and RA mildly dilated. Systolic function mildly reduced. Mild MR and TR.  TRANSTHORACIC ECHOCARDIOGRAM  01/14/2008    Biatrial enlargement, RV dilated. Atheromatous aortic root. Borderline LVH. Normal LV systolic function. EF 55%. Prosthetic MV appears to be functioning well. Mildly sclerosed AV. Trivial MR, mild TR, trivial PI, RVSP 47mmHg.  TRANSTHORACIC ECHOCARDIOGRAM  2-08-00    Moderate MV stenosis based on available echo doppler data.  UPPER GASTROINTESTINAL ENDOSCOPY  08/2017     Family History   Problem Relation Age of Onset    Hypertension Mother     Stroke Mother     Diabetes Mother     Cancer Father     Heart Attack Brother     Heart Disease Brother     High Blood Pressure Brother      Social History     Tobacco Use    Smoking status: Never Smoker    Smokeless tobacco: Never Used    Tobacco comment: Friends were heavy smokers   Substance Use Topics    Alcohol use:  Yes     Alcohol/week: 0.0 standard drinks     Comment: Occassionally     Current Outpatient Medications   Medication Sig Dispense Refill    bumetanide (BUMEX) 1 MG tablet Take 1 tablet by mouth daily 90 tablet 3    simvastatin (ZOCOR) 10 MG tablet TAKE 1 TABLET AT BEDTIME 90 tablet 3    isosorbide dinitrate (ISORDIL) 10 MG tablet Take 1 tablet by mouth 3 times daily 90 tablet 3    carvedilol (COREG) 12.5 MG tablet Take 1 tablet by mouth 2 times daily 60 tablet 3    omeprazole (PRILOSEC) 20 MG delayed release capsule Take 20 mg by mouth Daily       olmesartan (BENICAR) 40 MG tablet TAKE 1 TABLET DAILY 90 tablet 3    metFORMIN (GLUCOPHAGE-XR) 500 MG extended release tablet TAKE 4 TABLETS DAILY 360 tablet 3    mometasone (ELOCON) 0.1 % cream Apply topically daily. 50 g 0    hydrALAZINE (APRESOLINE) 50 MG tablet TAKE 1 TABLET THREE TIMES A  tablet 3    JANUVIA 100 MG tablet TAKE 1 TABLET DAILY 90 tablet 3    nitroGLYCERIN (NITROLINGUAL) 0.4 MG/SPRAY 0.4 mg spray Indications: Angina Pectoris 1 spray SL q5 minutes prn chest pain. If third spray does not relieve pain, call 9-1-1. 1 Bottle 0    warfarin (COUMADIN) 1 MG tablet TAKE 2 TABLETS DAILY EXCEPT TAKE 1 TABLET DAILY ON MONDAYS 180 tablet 3    glimepiride (AMARYL) 2 MG tablet TAKE 1 TABLET TWICE A  tablet 3    loperamide (IMODIUM) 2 MG capsule Take 2 mg by mouth 4 times daily as needed for Diarrhea      UNABLE TO FIND Apply topically See Admin Instructions Indications: Pain Lavendar oil      fluorouracil (EFUDEX) 5 % cream Apply topically daily Indications: precancer treatment Off it for intervals, then back on.  metroNIDAZOLE (METROCREAM) 0.75 % cream Apply topically See Admin Instructions       blood glucose test strips (ADVOCATE REDI-CODE) strip USE ONCE DAILY 100 strip 3    amoxicillin (AMOXIL) 500 MG capsule Take by mouth Prior to teeth cleaning.  Acetaminophen (TYLENOL PO) Take by mouth See Admin Instructions Don't take more than 3,000 mg each day.  fluticasone (FLONASE) 50 MCG/ACT nasal spray 2 sprays by Nasal route daily (Patient taking differently: 2 sprays by Nasal route daily as needed ) 1 Bottle 6    Blood Glucose Monitoring Suppl (ADVOCATE BLOOD GLUCOSE MONITOR) CONNIE Dx: 250.00 1 Device 0     No current facility-administered medications for this visit.       Allergies   Allergen Reactions    Adalat [Nifedipine] Itching     redness    Amlodipine Swelling     If take more than 5mg legs swell, hot and red    Potassium-Containing Compounds      SHIMA    Tape Isaac Franklin Tape] Rash     Skin pulls off       SUBJECTIVE:   Review of Systems   Constitutional: Negative for activity change, appetite change, fatigue and fever. HENT: Negative for congestion. Respiratory: Positive for shortness of breath. Negative for apnea, cough, chest tightness and wheezing. Cardiovascular: Positive for leg swelling. Negative for chest pain and palpitations. Gastrointestinal: Negative for abdominal distention, abdominal pain and nausea. Genitourinary: Negative for difficulty urinating and dysuria. Musculoskeletal: Negative for arthralgias and gait problem. Skin: Negative for color change. Neurological: Negative for dizziness, numbness and headaches. Psychiatric/Behavioral: Negative for agitation, confusion and sleep disturbance. The patient is not nervous/anxious. OBJECTIVE:   Today's Vitals:  /64   Pulse 88   Ht 5' 9\" (1.753 m)   Wt 214 lb (97.1 kg)   SpO2 97%   BMI 31.60 kg/m²     Physical Exam  Vitals signs reviewed. Constitutional:       Appearance: She is well-developed. HENT:      Head: Normocephalic and atraumatic. Eyes:      Pupils: Pupils are equal, round, and reactive to light. Neck:      Musculoskeletal: Normal range of motion. Vascular: No JVD. Cardiovascular:      Rate and Rhythm: Normal rate. Rhythm irregular. Heart sounds: Normal heart sounds. No murmur. Comments: Click   Pulmonary:      Effort: Pulmonary effort is normal. No respiratory distress. Breath sounds: No rales. Abdominal:      General: There is no distension. Palpations: Abdomen is soft. Tenderness: There is no abdominal tenderness. Musculoskeletal:         General: No tenderness. Right lower leg: Edema (ankle ) present. Left lower leg: Edema (ankle ) present. Skin:     General: Skin is warm and dry. Capillary Refill: Capillary refill takes less than 2 seconds.    Neurological:      Mental Status: She is alert and oriented to person, place, and time. Psychiatric:         Mood and Affect: Mood normal.         Behavior: Behavior normal.         Wt Readings from Last 3 Encounters:   05/03/21 214 lb (97.1 kg)   04/01/21 216 lb 12.8 oz (98.3 kg)   03/31/21 210 lb (95.3 kg)     BP Readings from Last 3 Encounters:   05/03/21 138/64   04/19/21 (!) 140/64   04/01/21 (!) 160/76     Pulse Readings from Last 3 Encounters:   05/03/21 88   04/01/21 80   03/16/21 82     Body mass index is 31.6 kg/m². ECHO:   3/31/21  Summary   Technically difficult examination. Ejection fraction is visually estimated at 55%. Overall left ventricular function is normal.   The aortic valve leaflets were not well visualized. Aortic valve appears tricuspid. Aortic valve leaflets are somewhat thickened. The mitral valve was not well visualized . Normally functioning prosthetic artificial valve in mitral position. Signature      ----------------------------------------------------------------   Electronically signed by Chelsi Durbin MD (Interpreting   physician) on 03/31/2021 at 02:49 PM   ----------------------------------------------------------------      Findings      Mitral Valve   The mitral valve was not well visualized . Normally functioning prosthetic artificial valve in mitral position. Aortic Valve   The aortic valve leaflets were not well visualized. Aortic valve appears tricuspid. Aortic valve leaflets are somewhat thickened. Tricuspid Valve   Mild tricuspid regurgitation. Pulmonic Valve   The pulmonic valve was not well visualized . Trivial pulmonic regurgitation visualized. Left Atrium   Moderately dilated left atrium. Left Ventricle   Ejection fraction is visually estimated at 55%.    Overall left ventricular function is normal.      Right Atrium   Right atrial size was normal.      Right Ventricle   The right ventricular size was normal with normal systolic caused worsened kidney function   · Diuretic/Potassium: Bumex 1 mg daily  · Hydralazine/Nitrate: Hydralazine 50 mg tid, Isordil 10 mg tid  · Other: Coumadin, Zocor  BP and HR stable. BP cuff comparable to manual. Her home BP cuff 136/58 HR 62 -- Manual 134/68 HR 64. She does have some dizziness at times. She was advised to motnor her HR, BP and blood sugars at home. ? Vestibular. LE edema with some improvement. She was advised to wear compression socks. Check a BMP in 3-4 weeks. · HF Zones reviewed. · Daily weights and record  · Fluid restriction of 2 Liters per day (64 oz)   · Limit sodium in diet to 9175-0161 mg/day  · Healthier food options were discussed   · Monitor BP daily 1 hour after meds are taken  · Increase activity as tolerated     Patient was instructed to call the [facility] for changes in the following symptoms:    Weight gain of 3 pounds in 1 day or 5 pounds in 1 week   Increased shortness of breath   Shortness of breath while laying down   Cough   Chest pain   Swelling in feet, ankles or legs   Tenderness or bloating in the abdomen   Fatigue    Decreased appetite or feeling \"full\"   Nausea    Confusion      Return in about 3 months (around 8/3/2021). or sooner if needed     Patient given educational materials - see patient instructions. We discussed the importance of weighing oneself and recording daily. We also discussed the importance of a low sodium diet, higher sodium foods to avoid and appropriate low sodium food choices. Discussed use, benefit, and side effects of prescribed medications. All patient questions answered. Patient verbalizes understanding of plan of care using teach back method, and is agreeable to the treatment plan. 30 minutes of time was spent reviewing the chart and educating the patient about HF, medications, diet, exercise, and discussing the plan of care.  I personally spent more then 50% of the appt time face to face with the patient counseling/coordinating patient's care.       Electronicallysigned by YANA Dorantes CNP on 5/3/2021 at 10:18 AM

## 2021-05-06 ENCOUNTER — HOSPITAL ENCOUNTER (OUTPATIENT)
Dept: PHARMACY | Age: 76
Setting detail: THERAPIES SERIES
Discharge: HOME | End: 2021-05-06
Payer: MEDICARE

## 2021-05-06 DIAGNOSIS — Z95.2 H/O MITRAL VALVE REPLACEMENT: ICD-10-CM

## 2021-05-06 DIAGNOSIS — Z51.81 ENCOUNTER FOR THERAPEUTIC DRUG MONITORING: ICD-10-CM

## 2021-05-06 DIAGNOSIS — Z79.01 ANTICOAGULATED ON COUMADIN: ICD-10-CM

## 2021-05-06 LAB — POC INR: 2.9 (ref 0.8–1.2)

## 2021-05-06 PROCEDURE — 36416 COLLJ CAPILLARY BLOOD SPEC: CPT | Performed by: PHARMACIST

## 2021-05-06 PROCEDURE — 99211 OFF/OP EST MAY X REQ PHY/QHP: CPT | Performed by: PHARMACIST

## 2021-05-06 PROCEDURE — 85610 PROTHROMBIN TIME: CPT | Performed by: PHARMACIST

## 2021-05-06 NOTE — PROGRESS NOTES
Medication Management [address]  [phone] (phone)  [phone] (fax)    Ms. Norman Stevens is a 67 y.o.  female with history of MVR who presents today for anticoagulation monitoring and adjustment. Patient verifies current dosing regimen and tablet strength. No missed or extra doses. Patient denies s/s bleeding/chest pain +SOB/Swelling, chronic; frequent bruising, typical for her. No blood in urine or stool. No dietary changes. No changes in medication/OTC agents/Herbals. No change in alcohol use or tobacco use. No change in activity level. Patient denies headaches/lightheadedness/falls. +dizziness, chronic, MDs aware  No vomiting/diarrhea or acute illness. No Procedures scheduled in the future at this time. Assessment:   Lab Results   Component Value Date    INR 2.90 (H) 05/06/2021    INR 4.10 (H) 04/26/2021    INR 4.92 (H) 04/19/2021    PROTIME 2.42 (H) 12/15/2011    PROTIME 1.97 (H) 12/01/2011    PROTIME 1.80 (H) 11/22/2011     INR therapeutic   Recent Labs     05/06/21  1025   INR 2.90*     Goal 2.5-3.5    Plan:  Continue Coumadin 2mg MF and 1mg TWThSaS. Recheck INR in 2 week(s). Patient reminded to call the Anticoagulation Clinic with any signs or symptoms of bleeding or with any medication changes. Patient given instructions utilizing the teach back method. After visit summary printed and reviewed with patient. Discharged ambulatory in no apparent distress.     Monae Anaya, PharmD, BCPS, CACP, CTTS 5/6/2021 11:34 AM      For Pharmacy Admin Tracking Only   Time Spent (min): 20

## 2021-05-20 ENCOUNTER — HOSPITAL ENCOUNTER (OUTPATIENT)
Dept: PHARMACY | Age: 76
Setting detail: THERAPIES SERIES
Discharge: HOME | End: 2021-05-20
Payer: MEDICARE

## 2021-05-20 ENCOUNTER — APPOINTMENT (OUTPATIENT)
Dept: PHARMACY | Age: 76
End: 2021-05-20
Payer: MEDICARE

## 2021-05-20 DIAGNOSIS — Z95.2 H/O MITRAL VALVE REPLACEMENT: Primary | ICD-10-CM

## 2021-05-20 DIAGNOSIS — Z79.01 ANTICOAGULATED ON COUMADIN: ICD-10-CM

## 2021-05-20 DIAGNOSIS — Z51.81 ENCOUNTER FOR THERAPEUTIC DRUG MONITORING: ICD-10-CM

## 2021-05-20 LAB — POC INR: 3 (ref 0.8–1.2)

## 2021-05-20 PROCEDURE — 99211 OFF/OP EST MAY X REQ PHY/QHP: CPT

## 2021-05-20 PROCEDURE — 85610 PROTHROMBIN TIME: CPT

## 2021-05-20 PROCEDURE — 36416 COLLJ CAPILLARY BLOOD SPEC: CPT

## 2021-05-20 NOTE — PROGRESS NOTES
Medication Management [address]  [phone] (phone)  [phone] (fax)      Medication Management [address]  [phone] (phone)  [phone] (fax)    Ms. Keeley Mclean is a 67 y.o.  female with history of MVR, per Dr. Felice Taylor referral, who presents today for Warfarin  monitoring and adjustment (2 week visit). Patient verifies current dosing regimen and tablet strength. No missed or extra doses. Patient denies bleeding/chest pain. Swelling of legs improving. Has slightly more SOB. Has usual easy bruising. No blood in urine or stool. No dietary changes. No changes in medication/OTC agents/herbals. No change in alcohol use or tobacco use. Change in activity level: increased. Patient denies headaches/falls. Has usual dizziness. No vomiting/diarrhea or acute illness. No procedures scheduled in the future at this time. Assessment:   Lab Results   Component Value Date    INR 3.00 (H) 05/20/2021    INR 2.90 (H) 05/06/2021    INR 4.10 (H) 04/26/2021    PROTIME 2.42 (H) 12/15/2011    PROTIME 1.97 (H) 12/01/2011    PROTIME 1.80 (H) 11/22/2011     INR therapeutic - goal 2.5-3.5. Recent Labs     05/20/21  1435   INR 3.00*       Plan:  POCT INR ordered/performed/result reviewed. Continue PO Coumadin 2 mg MF, 1 mg TWThSS. Recheck INR in 3 week(s). Patient reminded to call the Anticoagulation Clinic with any signs or symptoms of bleeding or with any medication changes. Patient given instructions utilizing the teach back method. After visit summary printed and reviewed with patient. Discharged ambulatory in no apparent distress, wearing mask.

## 2021-06-01 ENCOUNTER — TELEPHONE (OUTPATIENT)
Dept: CARDIOLOGY CLINIC | Age: 76
End: 2021-06-01

## 2021-06-01 ENCOUNTER — HOSPITAL ENCOUNTER (OUTPATIENT)
Age: 76
Discharge: HOME | End: 2021-06-01
Payer: MEDICARE

## 2021-06-01 DIAGNOSIS — I50.32 CHF (CONGESTIVE HEART FAILURE), NYHA CLASS II, CHRONIC, DIASTOLIC (HCC): ICD-10-CM

## 2021-06-01 LAB
ANION GAP SERPL CALCULATED.3IONS-SCNC: 10 MEQ/L (ref 8–16)
BUN BLDV-MCNC: 31 MG/DL (ref 7–22)
CALCIUM SERPL-MCNC: 9.7 MG/DL (ref 8.5–10.5)
CHLORIDE BLD-SCNC: 105 MEQ/L (ref 98–111)
CO2: 25 MEQ/L (ref 23–33)
CREAT SERPL-MCNC: 1.7 MG/DL (ref 0.4–1.2)
GFR SERPL CREATININE-BSD FRML MDRD: 29 ML/MIN/1.73M2
GLUCOSE BLD-MCNC: 101 MG/DL (ref 70–108)
POTASSIUM SERPL-SCNC: 4.2 MEQ/L (ref 3.5–5.2)
SODIUM BLD-SCNC: 140 MEQ/L (ref 135–145)

## 2021-06-01 PROCEDURE — 80048 BASIC METABOLIC PNL TOTAL CA: CPT

## 2021-06-01 PROCEDURE — 36415 COLL VENOUS BLD VENIPUNCTURE: CPT

## 2021-06-01 NOTE — TELEPHONE ENCOUNTER
Spoke with patient. Still has some dizziness but better then before. Weights stable at 211-212 lb  Denies swelling, wearing compression socks. SOB depends on what she is doing, has had for years.

## 2021-06-01 NOTE — TELEPHONE ENCOUNTER
EVELYNE reviewed  Kidney function at the higher end of her baseline  Can you please get an update on how she is feeling?  She had had some dizziness

## 2021-06-09 ENCOUNTER — HOSPITAL ENCOUNTER (OUTPATIENT)
Dept: PHARMACY | Age: 76
Setting detail: THERAPIES SERIES
Discharge: HOME | End: 2021-06-09
Payer: MEDICARE

## 2021-06-09 DIAGNOSIS — Z51.81 ENCOUNTER FOR THERAPEUTIC DRUG MONITORING: ICD-10-CM

## 2021-06-09 DIAGNOSIS — Z79.01 ANTICOAGULATED ON COUMADIN: ICD-10-CM

## 2021-06-09 DIAGNOSIS — Z95.2 H/O MITRAL VALVE REPLACEMENT: Primary | ICD-10-CM

## 2021-06-09 LAB — POC INR: 2.6 (ref 0.8–1.2)

## 2021-06-09 PROCEDURE — 99211 OFF/OP EST MAY X REQ PHY/QHP: CPT

## 2021-06-09 PROCEDURE — 85610 PROTHROMBIN TIME: CPT

## 2021-06-09 PROCEDURE — 36416 COLLJ CAPILLARY BLOOD SPEC: CPT

## 2021-06-09 NOTE — PROGRESS NOTES
Medication Management [address]  [phone] (phone)  [phone] (fax)    Ms. Donya Mei is a 67 y.o.  female with history of mechanical MVR, per Dr. Jassi Cisneros referral, who presents today for Warfarin monitoring and adjustment (3 week visit). Patient verifies current dosing regimen and tablet strength. No missed or extra doses. Patient denies bleeding/swelling/chest pain. Has usual SOB/easy bruising. Wears support socks bilat. No blood in urine or stool. No dietary changes. No changes in medication/OTC agents/herbals. No change in alcohol use or tobacco use. No change in activity level. Patient denies headaches/falls. Has usual dizziness. No vomiting/diarrhea or acute illness. No procedures scheduled in the future at this time. Assessment:   Lab Results   Component Value Date    INR 2.60 (H) 06/09/2021    INR 3.00 (H) 05/20/2021    INR 2.90 (H) 05/06/2021    PROTIME 2.42 (H) 12/15/2011    PROTIME 1.97 (H) 12/01/2011    PROTIME 1.80 (H) 11/22/2011     INR therapeutic - goal 2.5-3.5. Recent Labs     06/09/21  1044   INR 2.60*     Plan:  POCT INR ordered/performed/result reviewed. Continue PO Coumadin 2 mg MF, 1 mg TWThSS. Recheck INR in 4 week(s). Patient reminded to call the Anticoagulation Clinic with any signs or symptoms of bleeding or with any medication changes. Patient given instructions utilizing the teach back method. After visit summary printed and reviewed with patient. Discharged ambulatory in no apparent distress, wearing mask.

## 2021-06-16 ENCOUNTER — PROCEDURE VISIT (OUTPATIENT)
Dept: CARDIOLOGY CLINIC | Age: 76
End: 2021-06-16
Payer: MEDICARE

## 2021-06-16 DIAGNOSIS — Z95.0 PACEMAKER: Primary | ICD-10-CM

## 2021-06-16 PROCEDURE — 93296 REM INTERROG EVL PM/IDS: CPT | Performed by: NUCLEAR MEDICINE

## 2021-06-16 PROCEDURE — 93294 REM INTERROG EVL PM/LDLS PM: CPT | Performed by: NUCLEAR MEDICINE

## 2021-06-16 NOTE — PROGRESS NOTES
Carelink Medtronic Dual Pacemaker --Carelink every 3m  Patient of [facility]    Battery 2-4 years    Presenting rhythm Afib AS     A Impedance 399  RV Impedance 399    P wave sensing 1.9  R wave sensing 6.6    A Threshold --  A Amplitude --  RV Thresholds 1.0 @ 0.40  RV Amplitude 2.0 @ 0.40      A Paced 0%  V Paced 97.6%    Programmed Mode VVIR       Afib [place] 100%  Hx afib/warfarin    Episodes none

## 2021-07-07 DIAGNOSIS — Z95.2 MITRAL VALVE REPLACED: ICD-10-CM

## 2021-07-08 ENCOUNTER — OFFICE VISIT (OUTPATIENT)
Dept: CARDIOLOGY CLINIC | Age: 76
End: 2021-07-08
Payer: MEDICARE

## 2021-07-08 ENCOUNTER — HOSPITAL ENCOUNTER (OUTPATIENT)
Dept: PHARMACY | Age: 76
Setting detail: THERAPIES SERIES
Discharge: HOME | End: 2021-07-08
Payer: MEDICARE

## 2021-07-08 VITALS
WEIGHT: 205 LBS | SYSTOLIC BLOOD PRESSURE: 138 MMHG | BODY MASS INDEX: 30.36 KG/M2 | HEIGHT: 69 IN | DIASTOLIC BLOOD PRESSURE: 66 MMHG | HEART RATE: 80 BPM

## 2021-07-08 DIAGNOSIS — Z95.2 H/O MITRAL VALVE REPLACEMENT: Primary | ICD-10-CM

## 2021-07-08 DIAGNOSIS — Z95.2 S/P MVR (MITRAL VALVE REPLACEMENT): Primary | ICD-10-CM

## 2021-07-08 DIAGNOSIS — Z79.01 ANTICOAGULATED ON COUMADIN: ICD-10-CM

## 2021-07-08 DIAGNOSIS — I10 ESSENTIAL HYPERTENSION: ICD-10-CM

## 2021-07-08 DIAGNOSIS — I48.21 PERMANENT ATRIAL FIBRILLATION (HCC): ICD-10-CM

## 2021-07-08 DIAGNOSIS — Z51.81 ENCOUNTER FOR THERAPEUTIC DRUG MONITORING: ICD-10-CM

## 2021-07-08 LAB — POC INR: 2.6 (ref 0.8–1.2)

## 2021-07-08 PROCEDURE — 99213 OFFICE O/P EST LOW 20 MIN: CPT | Performed by: NUCLEAR MEDICINE

## 2021-07-08 PROCEDURE — G8427 DOCREV CUR MEDS BY ELIG CLIN: HCPCS | Performed by: NUCLEAR MEDICINE

## 2021-07-08 PROCEDURE — 99211 OFF/OP EST MAY X REQ PHY/QHP: CPT

## 2021-07-08 PROCEDURE — G8399 PT W/DXA RESULTS DOCUMENT: HCPCS | Performed by: NUCLEAR MEDICINE

## 2021-07-08 PROCEDURE — 85610 PROTHROMBIN TIME: CPT

## 2021-07-08 PROCEDURE — 36416 COLLJ CAPILLARY BLOOD SPEC: CPT

## 2021-07-08 PROCEDURE — G8417 CALC BMI ABV UP PARAM F/U: HCPCS | Performed by: NUCLEAR MEDICINE

## 2021-07-08 PROCEDURE — 1090F PRES/ABSN URINE INCON ASSESS: CPT | Performed by: NUCLEAR MEDICINE

## 2021-07-08 PROCEDURE — 1123F ACP DISCUSS/DSCN MKR DOCD: CPT | Performed by: NUCLEAR MEDICINE

## 2021-07-08 PROCEDURE — 4040F PNEUMOC VAC/ADMIN/RCVD: CPT | Performed by: NUCLEAR MEDICINE

## 2021-07-08 PROCEDURE — 1036F TOBACCO NON-USER: CPT | Performed by: NUCLEAR MEDICINE

## 2021-07-08 RX ORDER — CARVEDILOL 12.5 MG/1
12.5 TABLET ORAL 2 TIMES DAILY
Qty: 180 TABLET | Refills: 3 | Status: SHIPPED | OUTPATIENT
Start: 2021-07-08 | End: 2022-06-17

## 2021-07-08 RX ORDER — BUMETANIDE 1 MG/1
1 TABLET ORAL DAILY
Qty: 90 TABLET | Refills: 3 | Status: SHIPPED | OUTPATIENT
Start: 2021-07-08 | End: 2022-08-22

## 2021-07-08 RX ORDER — ISOSORBIDE DINITRATE 10 MG/1
10 TABLET ORAL 3 TIMES DAILY
Qty: 90 TABLET | Refills: 3 | Status: SHIPPED | OUTPATIENT
Start: 2021-07-08 | End: 2021-09-30 | Stop reason: SDUPTHER

## 2021-07-08 NOTE — PROGRESS NOTES
[id number] [facility] [address]  [address]  [facility] [address]  Dept: [phone]  Dept Fax: [phone]  Loc: [phone]    Visit Date: 7/8/2021    Sincere Medina is a 67 y.o. female who presents todayfor:  Chief Complaint   Patient presents with    3 Month Follow-Up    Atrial Fibrillation    Cardiac Valve Problem    Hypertension   does have CHF   Mainly diastolic   Stress test was okay   EF was okay  Known MVR and pacer  On CPAP   Known 1000% A fib   No chest pain  No changes in breathing  Bp is stable   No dizziness  No syncoope        HPI:  HPI  Past Medical History:   Diagnosis Date    Arrhythmia     CHRONIC ATRIAL FIB    Cancer ([facility] [address]     breast ca left    Chronic kidney disease, stage III (moderate) ([facility] [address] 10/29/2018    Depression     Diabetes mellitus (HCC)     Generalized headaches     History of dizziness     History of sinus bradycardia     History of valvular heart disease     Hyperlipidemia     Hypertension     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 MV area by recent EKG was 1 sq. cm. and mean gradient across MV was 17mmHg. MV index was about 0.7 sq. cm. per body surface area. Moderate pulmonary artery systolic HTN. Patent coronary arteries.  CARDIAC VALVE REPLACEMENT  2003    MVR  33mm [place] Mechanical valve    CARDIOVASCULAR STRESS TEST  05/26/2011    Cannot exclude mild degree of ischemia in anterolateral wall. EF 58%. Mildly abnormal nuclear scan.      CARDIOVASCULAR STRESS TEST  7-09-10 Atrial fibrillation. Episodes of bradycardia mainly at night, which could be physiologic. Predominantly bradycardiac rhythm w/o significant pauses. Frequent PVC's     CARDIOVASCULAR STRESS TEST  1-14-08    Atrial fibrillation w/ controlled ventricular repsonse and an average heart rate of 58 bpm. 1 episode of 3 beat run of nonsustained ventricular tachyarrhythmia at rate of 110-158 bpm. 3 transient episodes of ventricular bigeminy w/ rate in mid 60's to low 70's.  CARDIOVASCULAR STRESS TEST  2-08-99    Unremarkable routine stress test. Chronic A-fib.  CARDIOVASCULAR STRESS TEST  2-08-00    Normal stress EKG. Hypertension, not well controlled. Patient's sitting BP was 160/94. At peak exercise BP was 180/102. Moderate degree of MVP.  CATARACT REMOVAL Right 7/16/14    CATARACT REMOVAL      COLONOSCOPY  2/2010    COLONOSCOPY  08/2017    KNEE ARTHROSCOPY  6/2011    KNEE SURGERY      MITRAL VALVE REPLACEMENT      PACEMAKER INSERTION Left 05/18/2017    by Dr Kathleen Tompkins  [address] IND N/A 8/22/2017    COLONOSCOPY performed by Nevaeh Julien MD at [facility]    MI EGD TRANSORAL BIOPSY SINGLE/MULTIPLE N/A 8/22/2017    EGD BIOPSY performed by Nevaeh Julien MD at [address] ECHOCARDIOGRAM  05/26/2011    LV mildly dilated, systolic function mildly reduced. EF 40-45%. Mild diffuse hypokinesis. Mild lateral wall was hypokinetic. Apical lateral wall was dyskinetic. Wall thickness was at upper limits of normal. LA moderately dilated. RV and RA mildly dilated. Systolic function mildly reduced. Mild MR and TR.  TRANSTHORACIC ECHOCARDIOGRAM  01/14/2008    Biatrial enlargement, RV dilated. Atheromatous aortic root. Borderline LVH. Normal LV systolic function. EF 55%. Prosthetic MV appears to be functioning well. Mildly sclerosed AV. Trivial MR, mild TR, trivial PI, RVSP 47mmHg.     TRANSTHORACIC ECHOCARDIOGRAM  2-08-00    Moderate MV stenosis based on available echo doppler data.  UPPER GASTROINTESTINAL ENDOSCOPY  08/2017     Family History   Problem Relation Age of Onset    Hypertension Mother     Stroke Mother     Diabetes Mother     Cancer Father     Heart Attack Brother     Heart Disease Brother     High Blood Pressure Brother      Social History     Tobacco Use    Smoking status: Never Smoker    Smokeless tobacco: Never Used    Tobacco comment: Friends were heavy smokers   Substance Use Topics    Alcohol use: Yes     Alcohol/week: 0.0 standard drinks     Comment: Occassionally      Current Outpatient Medications   Medication Sig Dispense Refill    blood glucose test strips (ADVOCATE REDI-CODE) strip USE ONCE DAILY 100 strip 3    bumetanide (BUMEX) 1 MG tablet Take 1 tablet by mouth daily 90 tablet 3    simvastatin (ZOCOR) 10 MG tablet TAKE 1 TABLET AT BEDTIME 90 tablet 3    isosorbide dinitrate (ISORDIL) 10 MG tablet Take 1 tablet by mouth 3 times daily 90 tablet 3    carvedilol (COREG) 12.5 MG tablet Take 1 tablet by mouth 2 times daily 60 tablet 3    omeprazole (PRILOSEC) 20 MG delayed release capsule Take 20 mg by mouth daily as needed (for stomach pain)       olmesartan (BENICAR) 40 MG tablet TAKE 1 TABLET DAILY 90 tablet 3    metFORMIN (GLUCOPHAGE-XR) 500 MG extended release tablet TAKE 4 TABLETS DAILY 360 tablet 3    amoxicillin (AMOXIL) 500 MG capsule Take by mouth Prior to teeth cleaning.  mometasone (ELOCON) 0.1 % cream Apply topically daily. 50 g 0    Acetaminophen (TYLENOL PO) Take 500 mg by mouth every 4 hours as needed Don't take more than 3,000 mg each day.  hydrALAZINE (APRESOLINE) 50 MG tablet TAKE 1 TABLET THREE TIMES A  tablet 3    JANUVIA 100 MG tablet TAKE 1 TABLET DAILY 90 tablet 3    nitroGLYCERIN (NITROLINGUAL) 0.4 MG/SPRAY 0.4 mg spray Indications: Angina Pectoris 1 spray SL q5 minutes prn chest pain.   If third spray does not relieve pain, call 9-1-1. 1 Bottle 0    warfarin (COUMADIN) 1 MG tablet TAKE 2 TABLETS DAILY EXCEPT TAKE 1 TABLET DAILY ON MONDAYS 180 tablet 3    glimepiride (AMARYL) 2 MG tablet TAKE 1 TABLET TWICE A  tablet 3    loperamide (IMODIUM) 2 MG capsule Take 2 mg by mouth 4 times daily as needed for Diarrhea       UNABLE TO FIND Apply topically See Admin Instructions Indications: Pain, Disturbed Sleep Lavendar oil       fluorouracil (EFUDEX) 5 % cream Apply topically daily Indications: precancer treatment Off it for intervals, then back on.  metroNIDAZOLE (METROCREAM) 0.75 % cream Apply topically See Admin Instructions Indications: Skin Abnormalities As needed      fluticasone (FLONASE) 50 MCG/ACT nasal spray 2 sprays by Nasal route daily (Patient taking differently: 2 sprays by Nasal route daily as needed ) 1 Bottle 6    Blood Glucose Monitoring Suppl (ADVOCATE BLOOD GLUCOSE MONITOR) CONNIE Dx: 250.00 1 Device 0     No current facility-administered medications for this visit.      Allergies   Allergen Reactions    Adalat [Nifedipine] Itching     redness    Amlodipine Swelling     If take more than 5mg legs swell, hot and red    Potassium-Containing Compounds      SHIMA    Tape [Adhesive Tape] Rash     Skin pulls off     Health Maintenance   Topic Date Due    Hepatitis C screen  Never done    DTaP/Tdap/Td vaccine (1 - Tdap) Never done    Shingles Vaccine (2 of 3) 08/25/2015    COVID-19 Vaccine (2 - Pfizer 2-dose series) 02/24/2021    Lipid screen  07/20/2021    Annual Wellness Visit (AWV)  07/24/2021    Flu vaccine (1) 09/01/2021    Potassium monitoring  06/01/2022    Creatinine monitoring  06/01/2022    DEXA (modify frequency per FRAX score)  Completed    Pneumococcal 65+ years Vaccine  Completed    Hepatitis A vaccine  Aged Out    Hib vaccine  Aged Out    Meningococcal (ACWY) vaccine  Aged Out       Subjective:  Review of Systems  General:   No fever, no chills, some fatigue or weight loss  Pulmonary:    some dyspnea, no wheezing  Cardiac:    Denies recent chest pain,   GI:     No nausea or vomiting, no abdominal pain  Neuro:     No dizziness or light headedness,   Musculoskeletal:  No recent active issues  Extremities:   No edema, no obvious claudication       Objective:  Physical Exam  /66   Pulse 80   Ht 5' 9\" (1.753 m)   Wt 205 lb (93 kg)   BMI 30.27 kg/m²   General:   Well developed, well nourished  Lungs:    Clear to auscultation  Heart:    Normal S1 S2, Slight murmur. no rubs, no gallops  Abdomen:   Soft, non tender, no organomegalies, positive bowel sounds  Extremities:   No edema, no cyanosis, good peripheral pulses  Neurological:   Awake, alert, oriented. No obvious focal deficits  Musculoskelatal:  No obvious deformities    Assessment:      Diagnosis Orders   1. S/P MVR (mitral valve replacement)     2. Permanent atrial fibrillation ([address]     3. Essential hypertension     as above  Cardiac seems stable   Some drop in the BP at times     Plan:  No follow-ups on file. As above  Consider changing hydralazine to lower dose due to dizziness   Continue risk factor modification and medical management  Thank you for allowing me to participate in the care of your patient. Please don't hesitate to contact me regarding any further issues related to the patient care    Orders Placed:  No orders of the defined types were placed in this encounter. Medications Prescribed:  No orders of the defined types were placed in this encounter. Discussed use, benefit, and side effects of prescribed medications. All patient questions answered. Pt voicedunderstanding. Instructed to continue current medications, diet and exercise. Continue risk factor modification and medical management. Patient agreed with treatment plan. Follow up as directed.     Electronically signedby Idalia Araiza MD on 7/8/2021 at 11:55 AM

## 2021-07-08 NOTE — PROGRESS NOTES
Medication Management [address]  [phone] (phone)  [phone] (fax)    Ms. Loel Alpers is a 67 y.o.  female with history of mechanical MVR, per Dr. Satya Cochran referral, who presents today for Warfarin monitoring and adjustment (4 week visit). Patient verifies current dosing regimen and tablet strength. States has good supply. No missed or extra doses. Patient denies bleeding/chest pain. Has usual SOB/easy bruising. Has some minor swelling in left lower leg; wears support socks bilat. No blood in urine or stool. No dietary changes. No changes in medication/OTC agents/herbals. No change in alcohol use or tobacco use. No change in activity level. Patient denies headaches/falls. Dizziness is sometimes worse than usual.  Sees Dr. Qi Stephens next. No vomiting/diarrhea or acute illness. No procedures scheduled in the future at this time. Assessment:   Lab Results   Component Value Date    INR 2.60 (H) 07/08/2021    INR 2.60 (H) 06/09/2021    INR 3.00 (H) 05/20/2021    PROTIME 2.42 (H) 12/15/2011    PROTIME 1.97 (H) 12/01/2011    PROTIME 1.80 (H) 11/22/2011     INR therapeutic - goal 2.5-3.5. Recent Labs     07/08/21  1108   INR 2.60*       Plan:  POCT INR ordered/performed/result reviewed. Continue PO Coumadin 2 mg MF, 1 mg TWThSS. Recheck INR in 4 week(s). Patient reminded to call the Anticoagulation Clinic with any signs or symptoms of bleeding or with any medication changes. Patient given instructions utilizing the teach back method. After visit summary printed and reviewed with patient. Discharged ambulatory in no apparent distress, wearing mask.

## 2021-07-09 RX ORDER — WARFARIN SODIUM 1 MG/1
TABLET ORAL
Qty: 180 TABLET | Refills: 3 | OUTPATIENT
Start: 2021-07-09

## 2021-07-09 RX ORDER — GLIMEPIRIDE 2 MG/1
TABLET ORAL
Qty: 180 TABLET | Refills: 3 | OUTPATIENT
Start: 2021-07-09

## 2021-07-09 NOTE — TELEPHONE ENCOUNTER
This medication refill is regarding an electronic request from [facility]:    Requested Prescriptions     Pending Prescriptions Disp Refills    glimepiride (AMARYL) 2 MG tablet [Pharmacy Med Name: GLIMEPIRIDE TABS 2MG] 180 tablet 3     Sig: TAKE 1 TABLET TWICE A DAY    warfarin (COUMADIN) 1 MG tablet [Pharmacy Med Name: Virl Escoto 1MG] 180 tablet 3     Sig: TAKE 2 TABLETS DAILY EXCEPT TAKE 1 TABLET DAILY ON MONDAYS       Date of last visit: 7/23/2020   Date of next visit: 7/26/2021  Date of last refill: 7/16/20 for 180/3    Last Hemoglobin A1C:    Lab Results   Component Value Date    LABA1C 6.6 07/20/2020    [address] 07/20/2020     Called pt and she states that she has enough medication to last until her appt. Rx's refused.

## 2021-07-15 ENCOUNTER — HOSPITAL ENCOUNTER (OUTPATIENT)
Age: 76
Discharge: HOME | End: 2021-07-15
Payer: MEDICARE

## 2021-07-15 DIAGNOSIS — E78.5 HYPERLIPIDEMIA, UNSPECIFIED HYPERLIPIDEMIA TYPE: ICD-10-CM

## 2021-07-15 DIAGNOSIS — I10 ESSENTIAL HYPERTENSION: ICD-10-CM

## 2021-07-15 DIAGNOSIS — E11.9 TYPE 2 DIABETES MELLITUS WITHOUT COMPLICATION, WITHOUT LONG-TERM CURRENT USE OF INSULIN (HCC): ICD-10-CM

## 2021-07-15 LAB
ALBUMIN SERPL-MCNC: 4.2 G/DL (ref 3.5–5.1)
ALP BLD-CCNC: 71 U/L (ref 38–126)
ALT SERPL-CCNC: 9 U/L (ref 11–66)
ANION GAP SERPL CALCULATED.3IONS-SCNC: 12 MEQ/L (ref 8–16)
AST SERPL-CCNC: 17 U/L (ref 5–40)
AVERAGE GLUCOSE: 117 MG/DL (ref 70–126)
BILIRUB SERPL-MCNC: 0.2 MG/DL (ref 0.3–1.2)
BUN BLDV-MCNC: 29 MG/DL (ref 7–22)
CALCIUM SERPL-MCNC: 10.2 MG/DL (ref 8.5–10.5)
CHLORIDE BLD-SCNC: 105 MEQ/L (ref 98–111)
CHOLESTEROL, TOTAL: 152 MG/DL (ref 100–199)
CO2: 24 MEQ/L (ref 23–33)
CREAT SERPL-MCNC: 1.7 MG/DL (ref 0.4–1.2)
CREATININE, URINE: 109 MG/DL
ERYTHROCYTE [DISTWIDTH] IN BLOOD BY AUTOMATED COUNT: 14.6 % (ref 11.5–14.5)
ERYTHROCYTE [DISTWIDTH] IN BLOOD BY AUTOMATED COUNT: 50.4 FL (ref 35–45)
GFR SERPL CREATININE-BSD FRML MDRD: 29 ML/MIN/1.73M2
GLUCOSE BLD-MCNC: 113 MG/DL (ref 70–108)
HBA1C MFR BLD: 5.9 % (ref 4.4–6.4)
HCT VFR BLD CALC: 34.7 % (ref 37–47)
HDLC SERPL-MCNC: 37 MG/DL
HEMOGLOBIN: 10.5 GM/DL (ref 12–16)
LDL CHOLESTEROL CALCULATED: 69 MG/DL
MCH RBC QN AUTO: 28.5 PG (ref 26–33)
MCHC RBC AUTO-ENTMCNC: 30.3 GM/DL (ref 32.2–35.5)
MCV RBC AUTO: 94 FL (ref 81–99)
MICROALBUMIN UR-MCNC: < 1.2 MG/DL
MICROALBUMIN/CREAT UR-RTO: 11 MG/G (ref 0–30)
PLATELET # BLD: 176 THOU/MM3 (ref 130–400)
PMV BLD AUTO: 10.8 FL (ref 9.4–12.4)
POTASSIUM SERPL-SCNC: 4.6 MEQ/L (ref 3.5–5.2)
RBC # BLD: 3.69 MILL/MM3 (ref 4.2–5.4)
SODIUM BLD-SCNC: 141 MEQ/L (ref 135–145)
TOTAL PROTEIN: 6.7 G/DL (ref 6.1–8)
TRIGL SERPL-MCNC: 231 MG/DL (ref 0–199)
WBC # BLD: 5.2 THOU/MM3 (ref 4.8–10.8)

## 2021-07-15 PROCEDURE — 85027 COMPLETE CBC AUTOMATED: CPT

## 2021-07-15 PROCEDURE — 80053 COMPREHEN METABOLIC PANEL: CPT

## 2021-07-15 PROCEDURE — 36415 COLL VENOUS BLD VENIPUNCTURE: CPT

## 2021-07-15 PROCEDURE — 82043 UR ALBUMIN QUANTITATIVE: CPT

## 2021-07-15 PROCEDURE — 83036 HEMOGLOBIN GLYCOSYLATED A1C: CPT

## 2021-07-15 PROCEDURE — 80061 LIPID PANEL: CPT

## 2021-07-23 ASSESSMENT — PATIENT HEALTH QUESTIONNAIRE - PHQ9
SUM OF ALL RESPONSES TO PHQ9 QUESTIONS 1 & 2: 2
SUM OF ALL RESPONSES TO PHQ QUESTIONS 1-9: 2
2. FEELING DOWN, DEPRESSED OR HOPELESS: 1
1. LITTLE INTEREST OR PLEASURE IN DOING THINGS: 1

## 2021-07-23 ASSESSMENT — LIFESTYLE VARIABLES
HOW OFTEN DO YOU HAVE A DRINK CONTAINING ALCOHOL: NEVER
AUDIT TOTAL SCORE: INCOMPLETE
AUDIT-C TOTAL SCORE: INCOMPLETE
HOW OFTEN DO YOU HAVE A DRINK CONTAINING ALCOHOL: 0

## 2021-07-26 ENCOUNTER — OFFICE VISIT (OUTPATIENT)
Dept: FAMILY MEDICINE CLINIC | Age: 76
End: 2021-07-26
Payer: MEDICARE

## 2021-07-26 VITALS
HEART RATE: 72 BPM | RESPIRATION RATE: 16 BRPM | DIASTOLIC BLOOD PRESSURE: 60 MMHG | HEIGHT: 68 IN | BODY MASS INDEX: 31.46 KG/M2 | TEMPERATURE: 98.6 F | SYSTOLIC BLOOD PRESSURE: 116 MMHG | WEIGHT: 207.6 LBS

## 2021-07-26 DIAGNOSIS — L30.0 NUMMULAR ECZEMA: ICD-10-CM

## 2021-07-26 DIAGNOSIS — I10 ESSENTIAL HYPERTENSION: ICD-10-CM

## 2021-07-26 DIAGNOSIS — N18.4 CKD (CHRONIC KIDNEY DISEASE) STAGE 4, GFR 15-29 ML/MIN (HCC): ICD-10-CM

## 2021-07-26 DIAGNOSIS — Z00.00 ROUTINE GENERAL MEDICAL EXAMINATION AT A HEALTH CARE FACILITY: Primary | ICD-10-CM

## 2021-07-26 DIAGNOSIS — E11.21 TYPE 2 DIABETES MELLITUS WITH DIABETIC NEPHROPATHY, WITHOUT LONG-TERM CURRENT USE OF INSULIN (HCC): ICD-10-CM

## 2021-07-26 PROCEDURE — 1123F ACP DISCUSS/DSCN MKR DOCD: CPT | Performed by: FAMILY MEDICINE

## 2021-07-26 PROCEDURE — G0439 PPPS, SUBSEQ VISIT: HCPCS | Performed by: FAMILY MEDICINE

## 2021-07-26 PROCEDURE — 4040F PNEUMOC VAC/ADMIN/RCVD: CPT | Performed by: FAMILY MEDICINE

## 2021-07-26 RX ORDER — OLMESARTAN MEDOXOMIL 40 MG/1
TABLET ORAL
Qty: 90 TABLET | Refills: 3 | Status: CANCELLED | OUTPATIENT
Start: 2021-07-26

## 2021-07-26 RX ORDER — GLIMEPIRIDE 2 MG/1
TABLET ORAL
Qty: 180 TABLET | Refills: 3 | Status: SHIPPED | OUTPATIENT
Start: 2021-07-26 | End: 2022-07-15 | Stop reason: SDUPTHER

## 2021-07-26 RX ORDER — MOMETASONE FUROATE 1 MG/G
CREAM TOPICAL
Qty: 50 G | Refills: 0 | Status: SHIPPED | OUTPATIENT
Start: 2021-07-26

## 2021-07-26 RX ORDER — METFORMIN HYDROCHLORIDE 500 MG/1
1000 TABLET, EXTENDED RELEASE ORAL
COMMUNITY
Start: 2021-07-26 | End: 2022-03-30

## 2021-07-26 RX ORDER — HYDRALAZINE HYDROCHLORIDE 50 MG/1
TABLET, FILM COATED ORAL
Qty: 270 TABLET | Refills: 3 | Status: SHIPPED | OUTPATIENT
Start: 2021-07-26 | End: 2022-07-20

## 2021-07-26 SDOH — ECONOMIC STABILITY: FOOD INSECURITY: WITHIN THE PAST 12 MONTHS, THE FOOD YOU BOUGHT JUST DIDN'T LAST AND YOU DIDN'T HAVE MONEY TO GET MORE.: PATIENT DECLINED

## 2021-07-26 SDOH — ECONOMIC STABILITY: FOOD INSECURITY: WITHIN THE PAST 12 MONTHS, YOU WORRIED THAT YOUR FOOD WOULD RUN OUT BEFORE YOU GOT MONEY TO BUY MORE.: PATIENT DECLINED

## 2021-07-26 ASSESSMENT — SOCIAL DETERMINANTS OF HEALTH (SDOH): HOW HARD IS IT FOR YOU TO PAY FOR THE VERY BASICS LIKE FOOD, HOUSING, MEDICAL CARE, AND HEATING?: PATIENT DECLINED

## 2021-07-26 NOTE — PROGRESS NOTES
Medicare Annual Wellness Visit        Name: Everett Kwon Date: 2021   MRN: [id number] Sex: Female   Age: 67 y.o. Ethnicity: Non- / Non    : 1945 Race: White (non-)      Belle Russ is here for Medicare AWV and Check-Up (Annual check up. Labs done, results in epic. Would like to discuss new diuretic. )    Screenings for behavioral, psychosocial and functional/safety risks, and cognitive dysfunction are all negative except as indicated below. These results, as well as other patient data from the [employer] [address] form, are documented in Flowsheets linked to this Encounter. Doing well. Currently being seen at the CHF clinic. Her weight is now stable after a change in her diuretics. Her GFR is reduced some. Her blood sugars have been good. She denies hypoglycemia. Takes all meds as directed and denies side effects. No recent illnesses or hospitalizations. BPs stable. Nonsmoker. Body mass index is 31.65 kg/m². Allergies   Allergen Reactions    Adalat [Nifedipine] Itching     redness    Amlodipine Swelling     If take more than 5mg legs swell, hot and red    Potassium-Containing Compounds      SHIMA    Tape [Adhesive Tape] Rash     Skin pulls off       Prior to Visit Medications    Medication Sig Taking? Authorizing Provider   glimepiride (AMARYL) 2 MG tablet TAKE 1 TABLET TWICE A DAY Yes Romario Maza MD   mometasone (ELOCON) 0.1 % cream Apply topically daily.  Yes Romario Maza MD   metFORMIN (GLUCOPHAGE-XR) 500 MG extended release tablet TAKE 2 TABLETS DAILY Yes Romario Maza MD   bumetanide (BUMEX) 1 MG tablet Take 1 tablet by mouth daily Yes Heber Savage MD   carvedilol (COREG) 12.5 MG tablet Take 1 tablet by mouth 2 times daily Yes Heber Savage MD   isosorbide dinitrate (ISORDIL) 10 MG tablet Take 1 tablet by mouth 3 times daily Yes Heber Savage MD   blood glucose test strips (ADVOCATE REDI-CODE) strip USE ONCE DAILY Yes Pat Russell MD   simvastatin (ZOCOR) 10 MG tablet TAKE 1 TABLET AT BEDTIME Yes Pat Russell MD   omeprazole (PRILOSEC) 20 MG delayed release capsule Take 20 mg by mouth daily as needed (for stomach pain)  Yes Historical Provider, MD   olmesartan (BENICAR) 40 MG tablet TAKE 1 TABLET DAILY Yes Pat Russell MD   amoxicillin (AMOXIL) 500 MG capsule Take by mouth Prior to teeth cleaning. Yes Historical Provider, MD   Acetaminophen (TYLENOL PO) Take 500 mg by mouth every 4 hours as needed Don't take more than 3,000 mg each day. Yes Historical Provider, MD   hydrALAZINE (APRESOLINE) 50 MG tablet TAKE 1 TABLET THREE TIMES A DAY Yes Pat Russell MD   JANUVIA 100 MG tablet TAKE 1 TABLET DAILY Yes Pat Russell MD   nitroGLYCERIN (NITROLINGUAL) 0.4 MG/SPRAY 0.4 mg spray Indications: Angina Pectoris 1 spray SL q5 minutes prn chest pain. If third spray does not relieve pain, call 9-1-1. Yes Pat Russell MD   warfarin (COUMADIN) 1 MG tablet TAKE 2 TABLETS DAILY EXCEPT TAKE 1 TABLET DAILY ON MONDAYS Yes Pat Russell MD   loperamide (IMODIUM) 2 MG capsule Take 2 mg by mouth 4 times daily as needed for Diarrhea  Yes Historical Provider, MD   UNABLE TO FIND Apply topically See Admin Instructions Indications: Pain, Disturbed Sleep Lavendar oil  Yes Historical Provider, MD   fluorouracil (EFUDEX) 5 % cream Apply topically daily Indications: precancer treatment Off it for intervals, then back on.  Yes Historical Provider, MD   metroNIDAZOLE (METROCREAM) 0.75 % cream Apply topically See Admin Instructions Indications: Skin Abnormalities As needed Yes Historical Provider, MD   Blood Glucose Monitoring Suppl (ADVOCATE BLOOD GLUCOSE MONITOR) CONNIE Dx: 250.00 Yes Pat Russell MD   fluticasone (FLONASE) 50 MCG/ACT nasal spray 2 sprays by Nasal route daily  Patient taking differently: 2 sprays by Nasal route daily as needed   Efrain Ledesma MD       Past Medical History:   Diagnosis Date    Arrhythmia     CHRONIC ATRIAL FIB    Cancer ([address]     breast ca left    CHF (congestive heart failure) (HCC)     Chronic kidney disease, stage III (moderate) (HCC) 10/29/2018    Depression     Diabetes mellitus (HCC)     Generalized headaches     History of dizziness     History of sinus bradycardia     History of valvular heart disease     Hyperlipidemia     Hypertension     Medtronic dual pacemaker 5/23/2017    MI, old     Mitral valve replaced     Numbness and tingling     Obesity     Obstructive sleep apnea on CPAP 2011    Osteopenia     SOB (shortness of breath)     HX OF:       Past Surgical History:   Procedure Laterality Date    BREAST SURGERY  2011    right biopsy    BREAST SURGERY Left 1/15/13    re-excision cranial margin and mammosite spacer    CARDIAC CATHETERIZATION  10/06/2003    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 MV area by recent EKG was 1 sq. cm. and mean gradient across MV was 17mmHg. MV index was about 0.7 sq. cm. per body surface area. Moderate pulmonary artery systolic HTN. Patent coronary arteries.  CARDIAC VALVE REPLACEMENT  2003    MVR  33mm [place] Mechanical valve    CARDIOVASCULAR STRESS TEST  05/26/2011    Cannot exclude mild degree of ischemia in anterolateral wall. EF 58%. Mildly abnormal nuclear scan.  CARDIOVASCULAR STRESS TEST  7-09-10    Atrial fibrillation. Episodes of bradycardia mainly at night, which could be physiologic. Predominantly bradycardiac rhythm w/o significant pauses. Frequent PVC's     CARDIOVASCULAR STRESS TEST  1-14-08    Atrial fibrillation w/ controlled ventricular repsonse and an average heart rate of 58 bpm. 1 episode of 3 beat run of nonsustained ventricular tachyarrhythmia at rate of 110-158 bpm. 3 transient episodes of ventricular bigeminy w/ rate in mid 60's to low 70's.  CARDIOVASCULAR STRESS TEST  2-08-99    Unremarkable routine stress test. Chronic A-fib.      CARDIOVASCULAR STRESS TEST  2-08-00    Normal stress EKG. Hypertension, not well controlled. Patient's sitting BP was 160/94. At peak exercise BP was 180/102. Moderate degree of MVP.  CATARACT REMOVAL Right 7/16/14    CATARACT REMOVAL      COLONOSCOPY  2/2010    COLONOSCOPY  08/2017    KNEE ARTHROSCOPY  6/2011    KNEE SURGERY      MITRAL VALVE REPLACEMENT      PACEMAKER INSERTION Left 05/18/2017    by Dr Ilda Jimenez  [address] IND N/A 8/22/2017    COLONOSCOPY performed by Lino Irizarry MD at [facility]    ND EGD TRANSORAL BIOPSY SINGLE/MULTIPLE N/A 8/22/2017    EGD BIOPSY performed by Lino Irizarry MD at [address] ECHOCARDIOGRAM  05/26/2011    LV mildly dilated, systolic function mildly reduced. EF 40-45%. Mild diffuse hypokinesis. Mild lateral wall was hypokinetic. Apical lateral wall was dyskinetic. Wall thickness was at upper limits of normal. LA moderately dilated. RV and RA mildly dilated. Systolic function mildly reduced. Mild MR and TR.  TRANSTHORACIC ECHOCARDIOGRAM  01/14/2008    Biatrial enlargement, RV dilated. Atheromatous aortic root. Borderline LVH. Normal LV systolic function. EF 55%. Prosthetic MV appears to be functioning well. Mildly sclerosed AV. Trivial MR, mild TR, trivial PI, RVSP 47mmHg.  TRANSTHORACIC ECHOCARDIOGRAM  2-08-00    Moderate MV stenosis based on available echo doppler data.     UPPER GASTROINTESTINAL ENDOSCOPY  08/2017       Family History   Problem Relation Age of Onset    Hypertension Mother     Stroke Mother     Diabetes Mother     Cancer Father     Heart Attack Brother     Heart Disease Brother     High Blood Pressure Brother        CareTeam (Including outside providers/suppliers regularly involved in providing care):   Patient Care Team:  Ant Dubose MD as PCP - General (Family Medicine)  Ant Dubose MD as PCP - [facility] Empaneled Provider  Radha Vernon MD as Consulting Physician (Sleep Medicine)  Ruddy Clinton MD as Consulting Physician (Cardiology)  Sary Granda MD (Dermatology)    Wt Readings from Last 3 Encounters:   07/26/21 207 lb 9.6 oz (94.2 kg)   07/08/21 205 lb (93 kg)   05/03/21 215 lb 9.6 oz (97.8 kg)     Vitals:    07/26/21 1024   BP: 116/60   Pulse: 72   Resp: 16   Temp: 98.6 °F (37 °C)   TempSrc: Oral   Weight: 207 lb 9.6 oz (94.2 kg)   Height: 5' 7.91\" (1.725 m)     Body mass index is 31.65 kg/m². Based upon direct observation of the patient, evaluation of cognition reveals recent and remote memory intact. General Appearance: alert and oriented to person, place and time, well developed and well- nourished, in no acute distress  Skin: warm and dry, no rash or erythema  Head: normocephalic and atraumatic  Eyes: pupils equal, round, and reactive to light, extraocular eye movements intact, conjunctivae normal  ENT: tympanic membrane, external ear and ear canal normal bilaterally, nose without deformity, nasal mucosa and turbinates normal without polyps  Neck: supple and non-tender without mass, no thyromegaly or thyroid nodules, no cervical lymphadenopathy  Pulmonary/Chest: clear to auscultation bilaterally- no wheezes, rales or rhonchi, normal air movement, no respiratory distress  Cardiovascular: normal rate, regular rhythm, normal S1 and S2, no murmurs, rubs, or gallops, distal pulses intact, no carotid bruits. Artificial valve click noted.   Abdomen: soft, non-tender, non-distended, normal bowel sounds, no masses or organomegaly  Extremities: no cyanosis, clubbing or edema  Musculoskeletal: normal range of motion, no joint swelling, deformity or tenderness    Lab Results   Component Value Date    LABA1C 5.9 07/15/2021     No results found for: EAG  Lab Results   Component Value Date    WBC 5.2 07/15/2021    HGB 10.5 (L) 07/15/2021    HCT 34.7 (L) 07/15/2021    MCV 94.0 07/15/2021     07/15/2021     Lab Results   Component Value Date    CHOL 152 07/15/2021 TRIG 231 (H) 07/15/2021    HDL 37 07/15/2021    LDLCALC 69 07/15/2021     Lab Results   Component Value Date     07/15/2021    K 4.6 07/15/2021     07/15/2021    CO2 24 07/15/2021    BUN 29 (H) 07/15/2021    CREATININE 1.7 (H) 07/15/2021    GLUCOSE 113 (H) 07/15/2021    CALCIUM 10.2 07/15/2021    PROT 6.7 07/15/2021    LABALBU 4.2 07/15/2021    BILITOT 0.2 (L) 07/15/2021    ALKPHOS 71 07/15/2021    AST 17 07/15/2021    ALT 9 (L) 07/15/2021    LABGLOM 29 (A) 07/15/2021           Patient's complete Health Risk Assessment and screening values have been reviewed and are found in Flowsheets. The following problems were reviewed today and where indicated follow up appointments were made and/or referrals ordered. Positive Risk Factor Screenings with Interventions:          General Health and ACP:  General  In general, how would you say your health is?: Good  In the past 7 days, have you experienced any of the following?  New or Increased Pain, New or Increased Fatigue, Loneliness, Social Isolation, Stress or Anger?: None of These  Do you get the social and emotional support that you need?: Yes  Do you have a Living Will?: (!) No  Advance Directives     Power of [address] Will ACP-Advance Directive ACP-Power of     Not on File Not on File Not on File Not on File      General Health Risk Interventions:  · No Living Will: Advance Care Planning addressed with patient today    Health Habits/Nutrition:  Health Habits/Nutrition  Do you exercise for at least 20 minutes 2-3 times per week?: (!) No  Have you lost any weight without trying in the past 3 months?: No  Do you eat only one meal per day?: No  Have you seen the dentist within the past year?: Yes  Body mass index: (!) 31.64  Health Habits/Nutrition Interventions:  · Inadequate physical activity:  patient agrees to increase physical activity as follows: increase walking as able       Personalized Preventive Plan   Current Health Maintenance Status  Immunization History   Administered Date(s) Administered    COVID-19, Pfizer, PF, 30mcg/0.3mL 02/03/2021, 03/03/2021    Influenza 11/14/2013    Influenza Virus Vaccine 01/01/2011, 11/01/2012, 11/24/2014, 09/24/2016, 10/07/2017, 10/22/2020    Influenza, High Dose (Fluzone 65 yrs and older) 09/22/2016    Influenza, Quadv, IM, PF (6 mo and older Fluzone, Flulaval, Fluarix, and 3 yrs and older Afluria) 09/26/2019    Influenza, Quadv, adjuvanted, 65 yrs +, IM, PF (Fluad) 10/20/2020    Influenza, Triv, inactivated, subunit, adjuvanted, IM (Fluad 65 yrs and older) 10/26/2018    Pneumococcal Conjugate 13-valent ([id number]) 04/23/2015    Pneumococcal Polysaccharide ([id number]) 07/01/2009    Zoster Live (Zostavax) 06/30/2015        Health Maintenance   Topic Date Due    Hepatitis C screen  Never done    DTaP/Tdap/Td vaccine (1 - Tdap) Never done    Shingles Vaccine (2 of 3) 08/25/2015    Annual Wellness Visit (AWV)  Never done    Flu vaccine (1) 09/01/2021    Lipid screen  07/15/2022    Potassium monitoring  07/15/2022    Creatinine monitoring  07/15/2022    DEXA (modify frequency per FRAX score)  Completed    Pneumococcal 65+ years Vaccine  Completed    COVID-19 Vaccine  Completed    Hepatitis A vaccine  Aged Out    Hib vaccine  Aged Out    Meningococcal (ACWY) vaccine  Aged Out     Recommendations for [facility] Due: see orders and patient instructions/AVS.  . Recommended screening schedule for the next 5-10 years is provided to the patient in written form: see Patient Instructions/AVS.         Assessment/Plan:    1. Routine general medical examination at a health care facility  2. Nummular eczema  -     mometasone (ELOCON) 0.1 % cream; Apply topically daily. , Disp-50 g, R-0, Normal  3.  Type 2 diabetes mellitus with diabetic nephropathy, without long-term current use of insulin (HCC)  -     glimepiride (AMARYL) 2 MG tablet; TAKE 1 TABLET TWICE A DAY, Disp-180 tablet, R-3Normal  - Hemoglobin A1C; Future  4. Essential hypertension  5. CKD (chronic kidney disease) stage 4, GFR 15-29 ml/min (HCC)        Decrease metformin to 1000mg daily to help renal function. Continue other meds as previous    Follow up with specialists as planned    Check HbA1C in 3 months. Flu shot this fall    Return in about 6 months (around 1/26/2022).         Electronically signed by Pat Russell MD on 7/26/2021 at 11:18 AM

## 2021-07-26 NOTE — PATIENT INSTRUCTIONS
Personalized Preventive Plan for Belle Russ - 7/26/2021  Medicare offers a range of preventive health benefits. Some of the tests and screenings are paid in full while other may be subject to a deductible, co-insurance, and/or copay. Some of these benefits include a comprehensive review of your medical history including lifestyle, illnesses that may run in your family, and various assessments and screenings as appropriate. After reviewing your medical record and screening and assessments performed today your provider may have ordered immunizations, labs, imaging, and/or referrals for you. A list of these orders (if applicable) as well as your Preventive Care list are included within your After Visit Summary for your review. Other Preventive Recommendations:    · A preventive eye exam performed by an eye specialist is recommended every 1-2 years to screen for glaucoma; cataracts, macular degeneration, and other eye disorders. · A preventive dental visit is recommended every 6 months. · Try to get at least 150 minutes of exercise per week or 10,000 steps per day on a pedometer . · Order or download the FREE \"Exercise & Physical Activity: Your Everyday Guide\" from [employer]. Call 2-571.437.1082 or search [employer] online. · You need 9991-9214 mg of calcium and 5407-2856 IU of vitamin D per day. It is possible to meet your calcium requirement with diet alone, but a vitamin D supplement is usually necessary to meet this goal.  · When exposed to the sun, use a sunscreen that protects against both UVA and UVB radiation with an SPF of 30 or greater. Reapply every 2 to 3 hours or after sweating, drying off with a towel, or swimming. · Always wear a seat belt when traveling in a car. Always wear a helmet when riding a bicycle or motorcycle. Learning About Living [place]  What is a living will? A living will, also called a declaration, is a legal form. It tells your family and your doctor your wishes when you can't speak for yourself. It's used by the health professionals who will treat you as you near the end of your life or if you get seriously hurt or ill. If you put your wishes in writing, your loved ones and others will know what kind of care you want. They won't need to guess. This can ease your mind and be helpful to others. And you can change or cancel your living will at any time. A living will is not the same as an estate or property will. An estate will explains what you want to happen with your money and property after you die. How do you use it? A living will is used to describe the kinds of treatment or life support you want as you near the end of your life or if you get seriously hurt or ill. Keep these facts in mind about living barnhart. Your living will is used only if you can't speak or make decisions for yourself. Most often, one or more doctors must certify that you can't speak or decide for yourself before your living will takes effect. If you get better and can speak for yourself again, you can accept or refuse any treatment. It doesn't matter what you said in your living will. Some states may limit your right to refuse treatment in certain cases. For example, you may need to clearly state in your living will that you don't want artificial hydration and nutrition, such as being fed through a tube. Is a living will a legal document? A living will is a legal document. Each state has its own laws about living barn[place]. And a living will may be called something else in your state. Here are some things to know about living barnhart. You don't need an  to complete a living will. But legal advice can be helpful if your state's laws are unclear. It can also help if your health history is complicated or your family can't agree on what should be in your living will. You can change your living will at any time.  Some people find that their wishes about end-of-life care change as their health changes. If you make big changes to your living will, complete a new form. If you move to another state, make sure that your living will is legal in the state where you now live. In most cases, doctors will respect your wishes even if you have a form from a different state. You might use a universal form that has been approved by many states. This kind of form can sometimes be filled out and stored online. Your digital copy will then be available wherever you have a connection to the internet. The doctors and nurses who need to treat you can find it right away. Your state may offer an online registry. This is another place where you can store your living will online. It's a good idea to get your living will notarized. This means using a person called a [employer] to watch two people sign, or witness, your living will. What should you know when you create a living will? Here are some questions to ask yourself as you make your living will:  Do you know enough about life support methods that might be used? If not, talk to your doctor so you know what might be done if you can't breathe on your own, your heart stops, or you can't swallow. What things would you still want to be able to do after you receive life-support methods? Would you want to be able to walk? To speak? To eat on your own? To live without the help of machines? Do you want certain Voodoo practices performed if you become very ill? If you have a choice, where do you want to be cared for? In your home? At a hospital or nursing home? If you have a choice at the end of your life, where would you prefer to die? At home? In a hospital or nursing home? Somewhere else? Would you prefer to be buried or cremated? Do you want your organs to be donated after you die? What should you do with your living will?   Make sure that your family members and your health care agent have copies of your living will (also called a declaration). Give your doctor a copy of your living will. Ask him or her to keep it as part of your medical record. If you have more than one doctor, make sure that each one has a copy. Put a copy of your living will where it can be easily found. For example, some people may put a copy on their refrigerator door. If you are using a digital copy, be sure your doctor, family members, and health care agent know how to find and access it. Where can you learn more? Go to [email or web address]://[email or web address][email or web address] org and sign in to your [employer] account. Enter G537 in the [employer] box to learn more about \"Learning About Living Herminia Lute. \"     If you do not have an account, please click on the \"Sign Up Now\" link. Current as of: March 17, 2021               Content Version: 12.9  © 1967-3492 Healthwise, [employer]. Care instructions adapted under license by [facility] ([facility]). If you have questions about a medical condition or this instruction, always ask your healthcare professional. [place] [address] any warranty or liability for your use of this information.     ·

## 2021-07-29 DIAGNOSIS — E11.9 TYPE 2 DIABETES MELLITUS WITHOUT COMPLICATION, WITHOUT LONG-TERM CURRENT USE OF INSULIN (HCC): ICD-10-CM

## 2021-07-29 RX ORDER — SITAGLIPTIN 100 MG/1
TABLET, FILM COATED ORAL
Qty: 90 TABLET | Refills: 3 | Status: SHIPPED | OUTPATIENT
Start: 2021-07-29 | End: 2022-07-01

## 2021-07-29 NOTE — TELEPHONE ENCOUNTER
Request sent from [email or web address] for refill of Januvia 100 mg qd. Last seen 7/26/21, next appt 1/31/22. HgA1c done on 7/15/21. Med verified. Order pended.

## 2021-08-05 ENCOUNTER — HOSPITAL ENCOUNTER (OUTPATIENT)
Dept: PHARMACY | Age: 76
Setting detail: THERAPIES SERIES
Discharge: HOME | End: 2021-08-05
Payer: MEDICARE

## 2021-08-05 DIAGNOSIS — Z51.81 ENCOUNTER FOR THERAPEUTIC DRUG MONITORING: ICD-10-CM

## 2021-08-05 DIAGNOSIS — Z95.2 MITRAL VALVE REPLACED: ICD-10-CM

## 2021-08-05 DIAGNOSIS — Z79.01 ANTICOAGULATED ON COUMADIN: ICD-10-CM

## 2021-08-05 DIAGNOSIS — Z95.2 H/O MITRAL VALVE REPLACEMENT: Primary | ICD-10-CM

## 2021-08-05 LAB — POC INR: 2.2 (ref 0.8–1.2)

## 2021-08-05 PROCEDURE — 85610 PROTHROMBIN TIME: CPT

## 2021-08-05 PROCEDURE — 99212 OFFICE O/P EST SF 10 MIN: CPT

## 2021-08-05 PROCEDURE — 36416 COLLJ CAPILLARY BLOOD SPEC: CPT

## 2021-08-05 RX ORDER — WARFARIN SODIUM 1 MG/1
TABLET ORAL DAILY
COMMUNITY
End: 2021-08-26

## 2021-08-05 RX ORDER — WARFARIN SODIUM 1 MG/1
TABLET ORAL
Qty: 120 TABLET | Refills: 3 | Status: SHIPPED | OUTPATIENT
Start: 2021-08-05 | End: 2022-08-10 | Stop reason: SDUPTHER

## 2021-08-19 ENCOUNTER — HOSPITAL ENCOUNTER (OUTPATIENT)
Dept: GENERAL RADIOLOGY | Age: 76
Discharge: HOME | End: 2021-08-19
Payer: MEDICARE

## 2021-08-19 ENCOUNTER — HOSPITAL ENCOUNTER (OUTPATIENT)
Age: 76
Discharge: HOME | End: 2021-08-19
Payer: MEDICARE

## 2021-08-19 DIAGNOSIS — R10.30 LOWER ABDOMINAL PAIN: ICD-10-CM

## 2021-08-19 PROCEDURE — 74018 RADEX ABDOMEN 1 VIEW: CPT

## 2021-08-26 ENCOUNTER — HOSPITAL ENCOUNTER (OUTPATIENT)
Dept: PHARMACY | Age: 76
Setting detail: THERAPIES SERIES
Discharge: HOME | End: 2021-08-26
Payer: MEDICARE

## 2021-08-26 DIAGNOSIS — Z51.81 ENCOUNTER FOR THERAPEUTIC DRUG MONITORING: ICD-10-CM

## 2021-08-26 DIAGNOSIS — Z95.2 H/O MITRAL VALVE REPLACEMENT: Primary | ICD-10-CM

## 2021-08-26 DIAGNOSIS — Z79.01 ANTICOAGULATED ON COUMADIN: ICD-10-CM

## 2021-08-26 LAB — POC INR: 2.5 (ref 0.8–1.2)

## 2021-08-26 PROCEDURE — 85610 PROTHROMBIN TIME: CPT

## 2021-08-26 PROCEDURE — 99211 OFF/OP EST MAY X REQ PHY/QHP: CPT

## 2021-08-26 PROCEDURE — 36416 COLLJ CAPILLARY BLOOD SPEC: CPT

## 2021-08-26 NOTE — PROGRESS NOTES
Medication Management [address]  [phone] (phone)  [phone] (fax)    Ms. Dakota Singh is a 67 y.o.  female with history of mechanical MVR, per Dr. Zbigniew Winn referral, who presents today for Warfarin monitoring and adjustment (3 week visit). Patient verifies current dosing regimen and tablet strength. No missed or extra doses, except bolus ordered last visit. Patient denies bleeding/bruising/chest pain. Has usual swelling of legs/mainly ankles (wears compression socks bilat. ). Has usual SOB. No blood in urine or stool. No dietary changes. No changes in medication/OTC agents/herbals. No change in alcohol use or tobacco use. No change in activity level. Patient denies falls. Has usual dizziness. Takes nothing for usual headaches. No vomiting/diarrhea or acute illness. No procedures scheduled in the future at this time. Assessment:   Lab Results   Component Value Date    INR 2.50 (H) 08/26/2021    INR 2.20 (H) 08/05/2021    INR 2.60 (H) 07/08/2021    PROTIME 2.42 (H) 12/15/2011    PROTIME 1.97 (H) 12/01/2011    PROTIME 1.80 (H) 11/22/2011     INR therapeutic - goal 2.5-3.5. Recent Labs     08/26/21  1016   INR 2.50*       Plan:  POCT INR ordered/performed/result reviewed. Increase PO Coumadin to 1 mg MF, 1.5 mg TWThSS (from 2 mg MF, 1 mg TWThSS=5.6% increase). Recheck INR in 3 week(s). (Report given - orders entered by KEITH Cortes, PharmD.) Patient reminded to call the Anticoagulation Clinic with any signs or symptoms of bleeding or with any medication changes. Patient given instructions utilizing the teach back method. After visit summary printed and reviewed with patient. Discharged ambulatory in no apparent distress, wearing mask.     For Pharmacy Admin Tracking Only     Intervention Detail: Dose Adjustment: 1, reason: Therapy Optimization   Total # of Interventions Recommended: 1   Total # of Interventions Accepted: 1   Time Spent (min): 5

## 2021-09-08 ENCOUNTER — TELEPHONE (OUTPATIENT)
Dept: CARDIOLOGY CLINIC | Age: 76
End: 2021-09-08

## 2021-09-08 DIAGNOSIS — I48.21 PERMANENT ATRIAL FIBRILLATION (HCC): ICD-10-CM

## 2021-09-08 DIAGNOSIS — Z95.2 S/P MVR (MITRAL VALVE REPLACEMENT): Primary | ICD-10-CM

## 2021-09-08 NOTE — TELEPHONE ENCOUNTER
----- Message from Lexi Arce RN sent at 9/8/2021  4:15 PM EDT -----  Please renew referral for Anticoagulation Services. Thanks, L.  Ti Mazariegos, RN, BSN

## 2021-09-16 ENCOUNTER — HOSPITAL ENCOUNTER (OUTPATIENT)
Dept: PHARMACY | Age: 76
Setting detail: THERAPIES SERIES
Discharge: HOME | End: 2021-09-16
Payer: MEDICARE

## 2021-09-16 DIAGNOSIS — Z79.01 ANTICOAGULATED ON COUMADIN: ICD-10-CM

## 2021-09-16 DIAGNOSIS — Z51.81 ENCOUNTER FOR THERAPEUTIC DRUG MONITORING: ICD-10-CM

## 2021-09-16 DIAGNOSIS — Z95.2 H/O MITRAL VALVE REPLACEMENT: Primary | ICD-10-CM

## 2021-09-16 DIAGNOSIS — I48.91 ATRIAL FIBRILLATION, UNSPECIFIED TYPE (HCC): ICD-10-CM

## 2021-09-16 LAB — POC INR: 3 (ref 0.8–1.2)

## 2021-09-16 PROCEDURE — 36416 COLLJ CAPILLARY BLOOD SPEC: CPT

## 2021-09-16 PROCEDURE — 85610 PROTHROMBIN TIME: CPT

## 2021-09-16 PROCEDURE — 99211 OFF/OP EST MAY X REQ PHY/QHP: CPT

## 2021-09-30 ENCOUNTER — OFFICE VISIT (OUTPATIENT)
Dept: CARDIOLOGY CLINIC | Age: 76
End: 2021-09-30
Payer: MEDICARE

## 2021-09-30 ENCOUNTER — NURSE ONLY (OUTPATIENT)
Dept: CARDIOLOGY CLINIC | Age: 76
End: 2021-09-30
Payer: MEDICARE

## 2021-09-30 VITALS
HEART RATE: 88 BPM | BODY MASS INDEX: 30.87 KG/M2 | DIASTOLIC BLOOD PRESSURE: 60 MMHG | OXYGEN SATURATION: 97 % | HEIGHT: 69 IN | WEIGHT: 208.4 LBS | SYSTOLIC BLOOD PRESSURE: 140 MMHG

## 2021-09-30 DIAGNOSIS — I48.20 CHRONIC ATRIAL FIBRILLATION (HCC): ICD-10-CM

## 2021-09-30 DIAGNOSIS — Z95.0 PACEMAKER: Primary | ICD-10-CM

## 2021-09-30 DIAGNOSIS — I10 ESSENTIAL HYPERTENSION: ICD-10-CM

## 2021-09-30 DIAGNOSIS — I50.32 CHRONIC DIASTOLIC CONGESTIVE HEART FAILURE, NYHA CLASS 2 (HCC): Primary | ICD-10-CM

## 2021-09-30 PROCEDURE — 93280 PM DEVICE PROGR EVAL DUAL: CPT | Performed by: NUCLEAR MEDICINE

## 2021-09-30 PROCEDURE — 99214 OFFICE O/P EST MOD 30 MIN: CPT | Performed by: NURSE PRACTITIONER

## 2021-09-30 RX ORDER — ISOSORBIDE DINITRATE 10 MG/1
10 TABLET ORAL 3 TIMES DAILY
Qty: 270 TABLET | Refills: 3 | Status: SHIPPED | OUTPATIENT
Start: 2021-09-30 | End: 2022-10-07

## 2021-09-30 ASSESSMENT — ENCOUNTER SYMPTOMS
COUGH: 0
ABDOMINAL PAIN: 0
WHEEZING: 0
SHORTNESS OF BREATH: 0
ABDOMINAL DISTENTION: 0

## 2021-09-30 NOTE — PROGRESS NOTES
[employer] DUAL PACER PROGRAMMED vvir FOR CHRONIC AT [facility]     . Jaxon KellerLiftMetrix Battery longevity:  2.5 YEARS   Presenting rhythm  AT [facility] rvp    Atrial impedance 437  RV impedance 437    P wave sensing AT [facility]   R wave sensing RARE, MOSTLY DEPENDENT 12MV    98.2 % RV paced     RV threshold 1 V at 0.4ms

## 2021-09-30 NOTE — PATIENT INSTRUCTIONS
You may receive a survey regarding the care you received during your visit. Your input is valuable to us. We encourage you to complete and return your survey. We hope you will choose us in the future for your healthcare needs. Continue:  · Continue current medications  · Daily weights and record  · Fluid restriction of 2 Liters per day  · Limit sodium in diet to around 7707-4550 mg/day  · Monitor BP  · Activity as tolerated     Call the [facility] for any of the following symptoms: [phone]   Weight gain of 2-3 pounds in 1 day or 5 pounds in 1 week   Increased shortness of breath   Shortness of breath while laying down   Cough   Chest pain   Swelling in feet, ankles or legs   Tenderness or bloating in the abdomen   Fatigue    Decreased appetite or nausea    Confusion       Stable, appears Euvolemic  Lab reviewed - stable  Repeat blood work in 6 months  Call in two weeks w/ BP log 1 hr after medication. No med changes today   Work on low sodium diet  Continue daily wts.   F/U w/ Cardiology  F/U in clinic in 1 yr  Continue w/ compression socks

## 2021-10-13 ENCOUNTER — HOSPITAL ENCOUNTER (OUTPATIENT)
Dept: PHARMACY | Age: 76
Setting detail: THERAPIES SERIES
Discharge: HOME | End: 2021-10-13
Payer: MEDICARE

## 2021-10-13 DIAGNOSIS — Z51.81 ENCOUNTER FOR THERAPEUTIC DRUG MONITORING: ICD-10-CM

## 2021-10-13 DIAGNOSIS — I48.91 ATRIAL FIBRILLATION, UNSPECIFIED TYPE (HCC): ICD-10-CM

## 2021-10-13 DIAGNOSIS — Z79.01 ANTICOAGULATED ON COUMADIN: ICD-10-CM

## 2021-10-13 DIAGNOSIS — Z95.2 H/O MITRAL VALVE REPLACEMENT: Primary | ICD-10-CM

## 2021-10-13 LAB — POC INR: 2.8 (ref 0.8–1.2)

## 2021-10-13 PROCEDURE — 85610 PROTHROMBIN TIME: CPT

## 2021-10-13 PROCEDURE — 99211 OFF/OP EST MAY X REQ PHY/QHP: CPT

## 2021-10-13 PROCEDURE — 36416 COLLJ CAPILLARY BLOOD SPEC: CPT

## 2021-10-13 NOTE — PROGRESS NOTES
Medication Management [address]  [phone] (phone)  [phone] (fax)    Ms. Shira Cabrera is a 67 y.o.  female with history of atrial fib./mechanical MVR, per Dr. Сергей Vasquez referral, who presents today for Warfarin monitoring and adjustment (4 week visit). Patient verifies current dosing regimen and tablet strength. No missed or extra doses. Patient denies bleeding/chest pain. Has usual SOB and slight swelling of legs (left >right). Has a fading bruise on abdomen she's unsure how she got. No blood in urine or stool. Dietary changes: had slightly more salad. Changes in medication/OTC agents/herbals: taking Metamucil for 2 weeks  No change in alcohol use or tobacco use. Change in activity level: slightly increased. Patient denies headaches/falls. Has usual dizziness. No vomiting/diarrhea or acute illness. No procedures scheduled in the future at this time. Assessment:   Lab Results   Component Value Date    INR 2.80 (H) 10/13/2021    INR 3.00 (H) 09/16/2021    INR 2.50 (H) 08/26/2021    PROTIME 2.42 (H) 12/15/2011    PROTIME 1.97 (H) 12/01/2011    PROTIME 1.80 (H) 11/22/2011     INR therapeutic - goal 2.5-3.5. Recent Labs     10/13/21  1033   INR 2.80*       Plan:  POCT INR ordered/performed/result reviewed. Continue PO Coumadin 1 mg MF, 1.5 mg TWThSS. Recheck INR in 4 week(s). Patient reminded to call the Anticoagulation Clinic with any signs or symptoms of bleeding or with any medication changes. Patient given instructions utilizing the teach back method. After visit summary printed and reviewed with patient. Discharged ambulatory in no apparent distress, wearing mask.

## 2021-10-14 ENCOUNTER — TELEPHONE (OUTPATIENT)
Dept: CARDIOLOGY CLINIC | Age: 76
End: 2021-10-14

## 2021-10-14 NOTE — TELEPHONE ENCOUNTER
----- Message from YANA Brown CNP sent at 9/30/2021 11:01 AM EDT -----  Regarding: BP log  Please call for BP log thank you

## 2021-10-14 NOTE — TELEPHONE ENCOUNTER
Patient called in with bp log     10/2 98/43 76   10/3 118/52 79   10/4 147/66 71   10/5 111/49 71   10/6 131/61 83   10/7 129/62 76   10/8 109/46 85   10/9 115/46 79   10/10 112/50 85   10/11 125/49 83   10/12 137/63 80   10/13 127/41 93   10/14 133/55 87

## 2021-10-25 ENCOUNTER — HOSPITAL ENCOUNTER (OUTPATIENT)
Dept: CT IMAGING | Age: 76
Discharge: HOME | End: 2021-10-25
Payer: MEDICARE

## 2021-10-25 DIAGNOSIS — K57.92 DIVERTICULITIS: ICD-10-CM

## 2021-10-25 LAB
CREAT SERPL-MCNC: 1.5 MG/DL (ref 0.5–1.2)
GFR, ESTIMATED: 36 ML/MIN/1.73M2

## 2021-10-25 PROCEDURE — 6360000004 HC RX CONTRAST MEDICATION: Performed by: NURSE PRACTITIONER

## 2021-10-25 PROCEDURE — 74177 CT ABD & PELVIS W/CONTRAST: CPT

## 2021-10-25 PROCEDURE — 82565 ASSAY OF CREATININE: CPT

## 2021-10-25 RX ADMIN — IOPAMIDOL 85 ML: 755 INJECTION, SOLUTION INTRAVENOUS at 12:12

## 2021-10-25 RX ADMIN — IOHEXOL 50 ML: 240 INJECTION, SOLUTION INTRATHECAL; INTRAVASCULAR; INTRAVENOUS; ORAL at 12:12

## 2021-10-27 ENCOUNTER — HOSPITAL ENCOUNTER (OUTPATIENT)
Age: 76
Discharge: HOME | End: 2021-10-27
Payer: MEDICARE

## 2021-10-27 DIAGNOSIS — E11.21 TYPE 2 DIABETES MELLITUS WITH DIABETIC NEPHROPATHY, WITHOUT LONG-TERM CURRENT USE OF INSULIN (HCC): ICD-10-CM

## 2021-10-27 LAB
CREAT SERPL-MCNC: 1.7 MG/DL (ref 0.4–1.2)
GFR SERPL CREATININE-BSD FRML MDRD: 29 ML/MIN/1.73M2

## 2021-10-27 PROCEDURE — 36415 COLL VENOUS BLD VENIPUNCTURE: CPT

## 2021-10-27 PROCEDURE — 83036 HEMOGLOBIN GLYCOSYLATED A1C: CPT

## 2021-10-27 PROCEDURE — 82565 ASSAY OF CREATININE: CPT

## 2021-10-28 LAB
AVERAGE GLUCOSE: 165 MG/DL (ref 70–126)
HBA1C MFR BLD: 7.5 % (ref 4.4–6.4)

## 2021-10-31 ENCOUNTER — PATIENT MESSAGE (OUTPATIENT)
Dept: FAMILY MEDICINE CLINIC | Age: 76
End: 2021-10-31

## 2021-10-31 DIAGNOSIS — E11.21 TYPE 2 DIABETES MELLITUS WITH DIABETIC NEPHROPATHY, WITHOUT LONG-TERM CURRENT USE OF INSULIN (HCC): Primary | ICD-10-CM

## 2021-11-01 NOTE — TELEPHONE ENCOUNTER
From: Loreta Rod  To: Kennedi Guerrero MD  Sent: 10/31/2021 6:25 PM EDT  Subject: Non-Urgent Medical Question    DR Casey Dural I just wanted to make you aware that I had c scan on my lower abdomen on Monday October 25, 2021. This was ordered by Judi Stevenson at Dr De [facility] office. I did not take my metformin before this test at 11:00 a.m. . I was called at home after this test and told not to take metformin for 72 hours. Another blood test was also requested for a couple days later. Your request for an A1c was done at the same time or Wednesday 10/27 at about 1 p.m.. Not sure if this affected my A1C results but wanted you to know. Thank you Susanna Franco.

## 2021-11-01 NOTE — TELEPHONE ENCOUNTER
The HbA1C test gives us an average of the blood sugars over a 3 month period. Missing a couple days of metformin would only make a minimal difference in the test results. Her goal HbA1C for her age is <7.5%. Work on diet and exercise and recheck HbA1C in 3 months.  CG

## 2021-11-01 NOTE — TELEPHONE ENCOUNTER
----- Message from Yuli Clay MD sent at 10/28/2021 12:09 PM EDT -----  Sugars are a little higher with HbA1C 7.5%. Have her work on her diabetic diet a little harder and get more exercise. Recheck HbA1C in 3 months.  CG

## 2021-11-08 ENCOUNTER — OFFICE VISIT (OUTPATIENT)
Dept: PULMONOLOGY | Age: 76
End: 2021-11-08
Payer: MEDICARE

## 2021-11-08 ENCOUNTER — HOSPITAL ENCOUNTER (OUTPATIENT)
Dept: PHARMACY | Age: 76
Setting detail: THERAPIES SERIES
Discharge: HOME | End: 2021-11-08
Payer: MEDICARE

## 2021-11-08 VITALS
OXYGEN SATURATION: 98 % | TEMPERATURE: 97.7 F | BODY MASS INDEX: 31.19 KG/M2 | WEIGHT: 210.6 LBS | HEIGHT: 69 IN | SYSTOLIC BLOOD PRESSURE: 128 MMHG | HEART RATE: 68 BPM | DIASTOLIC BLOOD PRESSURE: 64 MMHG

## 2021-11-08 VITALS — TEMPERATURE: 97.3 F

## 2021-11-08 DIAGNOSIS — Z99.89 OBSTRUCTIVE SLEEP APNEA ON CPAP: Primary | ICD-10-CM

## 2021-11-08 DIAGNOSIS — Z95.2 H/O MITRAL VALVE REPLACEMENT: Primary | ICD-10-CM

## 2021-11-08 DIAGNOSIS — G47.33 OBSTRUCTIVE SLEEP APNEA ON CPAP: Primary | ICD-10-CM

## 2021-11-08 DIAGNOSIS — G47.09 OTHER INSOMNIA: ICD-10-CM

## 2021-11-08 DIAGNOSIS — Z79.01 ANTICOAGULATED ON COUMADIN: ICD-10-CM

## 2021-11-08 DIAGNOSIS — E66.9 OBESITY (BMI 30-39.9): ICD-10-CM

## 2021-11-08 DIAGNOSIS — Z51.81 ENCOUNTER FOR THERAPEUTIC DRUG MONITORING: ICD-10-CM

## 2021-11-08 DIAGNOSIS — I48.91 ATRIAL FIBRILLATION, UNSPECIFIED TYPE (HCC): ICD-10-CM

## 2021-11-08 LAB — POC INR: 2.8 (ref 0.8–1.2)

## 2021-11-08 PROCEDURE — G8482 FLU IMMUNIZE ORDER/ADMIN: HCPCS | Performed by: PHYSICIAN ASSISTANT

## 2021-11-08 PROCEDURE — 6360000002 HC RX W HCPCS

## 2021-11-08 PROCEDURE — 4040F PNEUMOC VAC/ADMIN/RCVD: CPT | Performed by: PHYSICIAN ASSISTANT

## 2021-11-08 PROCEDURE — G8399 PT W/DXA RESULTS DOCUMENT: HCPCS | Performed by: PHYSICIAN ASSISTANT

## 2021-11-08 PROCEDURE — 36416 COLLJ CAPILLARY BLOOD SPEC: CPT

## 2021-11-08 PROCEDURE — 1090F PRES/ABSN URINE INCON ASSESS: CPT | Performed by: PHYSICIAN ASSISTANT

## 2021-11-08 PROCEDURE — 85610 PROTHROMBIN TIME: CPT

## 2021-11-08 PROCEDURE — G8427 DOCREV CUR MEDS BY ELIG CLIN: HCPCS | Performed by: PHYSICIAN ASSISTANT

## 2021-11-08 PROCEDURE — 99211 OFF/OP EST MAY X REQ PHY/QHP: CPT

## 2021-11-08 PROCEDURE — 90686 IIV4 VACC NO PRSV 0.5 ML IM: CPT

## 2021-11-08 PROCEDURE — 1036F TOBACCO NON-USER: CPT | Performed by: PHYSICIAN ASSISTANT

## 2021-11-08 PROCEDURE — 1123F ACP DISCUSS/DSCN MKR DOCD: CPT | Performed by: PHYSICIAN ASSISTANT

## 2021-11-08 PROCEDURE — G8417 CALC BMI ABV UP PARAM F/U: HCPCS | Performed by: PHYSICIAN ASSISTANT

## 2021-11-08 PROCEDURE — 99214 OFFICE O/P EST MOD 30 MIN: CPT | Performed by: PHYSICIAN ASSISTANT

## 2021-11-08 PROCEDURE — G0008 ADMIN INFLUENZA VIRUS VAC: HCPCS

## 2021-11-08 PROCEDURE — 96372 THER/PROPH/DIAG INJ SC/IM: CPT

## 2021-11-08 RX ADMIN — INFLUENZA A VIRUS A/VICTORIA/2570/2019 IVR-215 (H1N1) ANTIGEN (PROPIOLACTONE INACTIVATED), INFLUENZA A VIRUS A/CAMBODIA/E0826360/2020 IVR-224 (H3N2) ANTIGEN (PROPIOLACTONE INACTIVATED), INFLUENZA B VIRUS B/VICTORIA/705/2018 BVR-11 ANTIGEN (PROPIOLACTONE INACTIVATED), INFLUENZA B VIRUS B/PHUKET/3073/2013 BVR-1B ANTIGEN (PROPIOLACTONE INACTIVATED) 0.5 ML: 15; 15; 15; 15 INJECTION, SUSPENSION INTRAMUSCULAR at 09:33

## 2021-11-08 ASSESSMENT — ENCOUNTER SYMPTOMS
STRIDOR: 0
EYES NEGATIVE: 1
DIARRHEA: 0
ALLERGIC/IMMUNOLOGIC NEGATIVE: 1
SHORTNESS OF BREATH: 0
BACK PAIN: 0
NAUSEA: 0
WHEEZING: 0
CHEST TIGHTNESS: 0
COUGH: 0

## 2021-11-08 NOTE — PROGRESS NOTES
Medication Management [address]   [phone] (phone)  [phone] (fax)    Ms. Bety Ponce is a 67 y.o.  female with history of atrial fib./mechanical MVR, per Dr. Hannah Ashley referral, who presents today for Warfarin monitoring and adjustment (4 week visit). Patient verifies current dosing regimen and tablet strength. No missed or extra doses. Patient denies bleeding/bruising. Has had fleeting chest pain - no Nitro. use. SOB sometimes worse than usual.  Has usual intermittent swelling - wears compression socks bilat. No blood in urine or stool. No dietary changes. No changes in medication/OTC agents/herbals. No change in alcohol use or tobacco use. No change in activity level. Patient denies headaches/dizziness/lightheadedness/falls. No vomiting/diarrhea or acute illness. No procedures scheduled in the future at this time. 97.3 temp. Had COVID booster 10/20. Assessment:   Lab Results   Component Value Date    INR 2.80 (H) 11/08/2021    INR 2.80 (H) 10/13/2021    INR 3.00 (H) 09/16/2021    PROTIME 2.42 (H) 12/15/2011    PROTIME 1.97 (H) 12/01/2011    PROTIME 1.80 (H) 11/22/2011     INR therapeutic - goal 2.5-3.5. Recent Labs     11/08/21  0929   INR 2.80*       Plan:  POCT INR ordered/performed/result reviewed. Continue PO Coumadin 1 mg MF, 1.5 mg TWThSS. Recheck INR in 4 week(s). Patient reminded to call the Anticoagulation Clinic with any signs or symptoms of bleeding or with any medication changes. Patient given instructions utilizing the teach back method. After obtaining consent, Afluria vaccine given IM left deltoid, per policy/procedure. Patient instructed to remain in clinic for 20 minutes afterwards, and to report any adverse reaction to staff immediately. [facility] Vaccine Information Sheet given to patient for immunization(s) administered. Patient tolerated well. See administration documentation on MAR.      After visit summary printed and reviewed with patient. Discharged ambulatory in no apparent distress, wearing mask. Has pulmonology appt. next.

## 2021-11-08 NOTE — PROGRESS NOTES
[facility]      Ubaldo Day         [id number]  11/8/2021   Chief Complaint   Patient presents with    6 Month Follow-Up     [place] with download         Pt of Dr. Kellie Espinosa    PAP Download:   Original or initial AHI: 40.8     Date of initial study: 3/21/2011      Compliant  87%     Noncompliant 10 %     PAP Type Airsense 10 autoset Level  12   Avg Hrs/Day 5 hr 20 min  AHI: 1.9   Recorded compliance dates , 10/6/2021  to 11/4/2021   Machine/Mfg:   [x] ResMed    [] Respironics/Dreamstation   Interface:   [] Nasal    [] Nasal pillows   [x] FFM      Provider:      [x] [facility]     [][employer]     [] [employer]    [] [place] [place]    [] [place]               [] [facility] [facility]      [] [employer]    [] [address]      [] Other    Neck Size: 15.75  Mallampati Mallampati 3  ESS:  5  SAQLI: 59    Here is a scan of the most recent download:            Presentation:   Marybel Cali presents for sleep medicine follow up for obstructive sleep apnea  Since the last visit, Marybel Cali is doing ok with PAP but states the PAP seems to increase pressure through the night. She thinks that might be what is waking her up. She is still waking every few hours at night. She feels mostly rested. Equipment issues: The pressure is  acceptable, the mask is acceptable     Sleep issues:  Do you feel better? Yes  More rested? Yes   Better concentration? yes    Progress History:   Since last visit any new medical issues? No  New ER or hospital visits? No  Any new or changes in medicines? No  Any new sleep medicines? No    Review of Systems -   Review of Systems   Constitutional: Negative for activity change, appetite change, chills and fever. HENT: Negative for congestion and postnasal drip. Eyes: Negative. Respiratory: Negative for cough, chest tightness, shortness of breath, wheezing and stridor. Cardiovascular: Negative for chest pain and leg swelling. Gastrointestinal: Negative for diarrhea and nausea. Endocrine: Negative. Genitourinary: Negative. Musculoskeletal: Negative. Negative for arthralgias and back pain. Skin: Negative. Allergic/Immunologic: Negative. Neurological: Negative. Negative for dizziness and light-headedness. Psychiatric/Behavioral: Negative. All other systems reviewed and are negative. Physical Exam:    BMI:  Body mass index is 31.1 kg/m². Wt Readings from Last 3 Encounters:   11/08/21 210 lb 9.6 oz (95.5 kg)   09/30/21 208 lb 6.4 oz (94.5 kg)   07/26/21 207 lb 9.6 oz (94.2 kg)     Weight stable / unchanged  Vitals: /64 (Site: Left Upper Arm, Position: Sitting, Cuff Size: Large Adult)   Pulse 68   Temp 97.7 °F (36.5 °C) (Temporal)   Ht 5' 9\" (1.753 m)   Wt 210 lb 9.6 oz (95.5 kg)   SpO2 98%   BMI 31.10 kg/m²       Physical Exam  Constitutional:       Appearance: She is well-developed. HENT:      Head: Normocephalic and atraumatic. Right Ear: External ear normal.      Left Ear: External ear normal.   Eyes:      Conjunctiva/sclera: Conjunctivae normal.      Pupils: Pupils are equal, round, and reactive to light. Cardiovascular:      Rate and Rhythm: Normal rate and regular rhythm. Heart sounds: Normal heart sounds. Pulmonary:      Effort: Pulmonary effort is normal.      Breath sounds: Normal breath sounds. Musculoskeletal:         General: Normal range of motion. Cervical back: Normal range of motion and neck supple. Skin:     General: Skin is warm and dry. Neurological:      Mental Status: She is alert and oriented to person, place, and time. Psychiatric:         Behavior: Behavior normal.         Thought Content: Thought content normal.         Judgment: Judgment normal.           ASSESSMENT/DIAGNOSIS     Diagnosis Orders   1. Obstructive sleep apnea on CPAP     2. Obesity (BMI 30-39.9)     3. Other insomnia            Plan   Do you need any equipment today?  Yes mask fitting  - Will see if improving pressure and mask fit improve insomnia  - Will have DME inspect PAP for proper functioning  - Download reviewed and discussed with patient  - She  was advised to continue current positive airway pressure therapy with above described pressure. - She  advised to keep good compliance with current recommended pressure to get optimal results and clinical improvement  - Recommend 7-9 hours of sleep with PAP  - She was advised to call DME company regarding supplies if needed.   -She call my office for earlier appointment if needed for worsening of sleep symptoms.   - She was instructed on weight loss  - Jose was educated about my impression and plan. Patient verbalizesunderstanding.   We will see [place] Altaf back in: 4 months with download    Information added by my medical assistant/LPN was reviewed today           Chino Jarvis PA-C, MPAS  11/8/2021

## 2021-12-06 ENCOUNTER — APPOINTMENT (OUTPATIENT)
Dept: PHARMACY | Age: 76
End: 2021-12-06
Payer: MEDICARE

## 2021-12-14 ENCOUNTER — HOSPITAL ENCOUNTER (OUTPATIENT)
Dept: PHARMACY | Age: 76
Setting detail: THERAPIES SERIES
Discharge: HOME | End: 2021-12-14
Payer: MEDICARE

## 2021-12-14 DIAGNOSIS — I48.91 ATRIAL FIBRILLATION, UNSPECIFIED TYPE (HCC): ICD-10-CM

## 2021-12-14 DIAGNOSIS — Z79.01 ANTICOAGULATED ON COUMADIN: ICD-10-CM

## 2021-12-14 DIAGNOSIS — Z95.2 H/O MITRAL VALVE REPLACEMENT: Primary | ICD-10-CM

## 2021-12-14 DIAGNOSIS — Z51.81 ENCOUNTER FOR THERAPEUTIC DRUG MONITORING: ICD-10-CM

## 2021-12-14 LAB — POC INR: 2.7 (ref 0.8–1.2)

## 2021-12-14 PROCEDURE — 85610 PROTHROMBIN TIME: CPT

## 2021-12-14 PROCEDURE — 99211 OFF/OP EST MAY X REQ PHY/QHP: CPT

## 2021-12-14 PROCEDURE — 36416 COLLJ CAPILLARY BLOOD SPEC: CPT

## 2021-12-14 NOTE — PROGRESS NOTES
Medication Management [address]  [phone] (phone)  [phone] (fax)    Ms. Curt Munoz is a 67 y.o.  female with history of atrial fib./mechanical MVR, per Dr. Morgan Prater, who presents today for Warfarin monitoring and adjustment (1 week late for 4 week visit - late for today's visit). Patient verifies current dosing regimen and tablet strength. No missed or extra doses. Patient denies bleeding/bruising/chest pain. Has usual swelling of legs/especially ankles. Thinks SOB is worsening. No blood in urine or stool. Dietary changes: had small amount of cranberry relish 11/25. No changes in medication/OTC agents/herbals. No change in alcohol use or tobacco use. Change in activity level: decreased. Patient denies falls. Had a little more dizziness than usual.  No vomiting/diarrhea or acute illness. Had congestion of head/chest late November into early last week - used Coricidin. Took Tylenol for headaches. No procedures scheduled in the future at this time. Assessment:   Lab Results   Component Value Date    INR 2.70 (H) 12/14/2021    INR 2.80 (H) 11/08/2021    INR 2.80 (H) 10/13/2021    PROTIME 2.42 (H) 12/15/2011    PROTIME 1.97 (H) 12/01/2011    PROTIME 1.80 (H) 11/22/2011     INR therapeutic - goal 2.5-3.5. Recent Labs     12/14/21  1002   INR 2.70*       Plan:  POCT INR ordered/performed/result reviewed. Continue PO Coumadin 1 mg MF, 1.5 mg TWThSS. Recheck INR in 5 week(s). Patient reminded to call the Anticoagulation Clinic with any signs or symptoms of bleeding or with any medication changes. Patient given instructions utilizing the teach back method. After visit summary printed and reviewed with patient. Discharged ambulatory in no apparent distress, wearing mask.

## 2021-12-20 ENCOUNTER — PROCEDURE VISIT (OUTPATIENT)
Dept: CARDIOLOGY CLINIC | Age: 76
End: 2021-12-20
Payer: MEDICARE

## 2021-12-20 DIAGNOSIS — Z95.0 PACEMAKER: Primary | ICD-10-CM

## 2021-12-20 PROCEDURE — 93294 REM INTERROG EVL PM/LDLS PM: CPT | Performed by: INTERNAL MEDICINE

## 2021-12-20 PROCEDURE — 93296 REM INTERROG EVL PM/IDS: CPT | Performed by: INTERNAL MEDICINE

## 2021-12-20 NOTE — PROGRESS NOTES
[employer] Medtronic Dual Pacemaker --  Patient of [employer]    Battery 2-3 years    Presenting rhythm AF     A Impedance 418  RV Impedance 418    P wave sensing 1.6  R wave sensing 2.0    A Threshold - @ -  A Amplitude - @ -  RV Thresholds 0.875 @ 0.40  RV Amplitude 2.0 @ 0.40      A Paced 0%  V Paced 99.3%    Programmed Mode VVIR       Afib [place] 100% // hx afib - taking warfarin     Episodes   afib

## 2022-01-03 RX ORDER — OLMESARTAN MEDOXOMIL 40 MG/1
TABLET ORAL
Qty: 90 TABLET | Refills: 3 | Status: SHIPPED | OUTPATIENT
Start: 2022-01-03

## 2022-01-03 NOTE — TELEPHONE ENCOUNTER
This medication refill is regarding a electronic request.  Refill requested by [facility]. Requested Prescriptions     Pending Prescriptions Disp Refills    olmesartan (BENICAR) 40 MG tablet [Pharmacy Med Name: OLMESARTAN MEDOXOMIL TABS 40MG] 90 tablet 3     Sig: TAKE 1 TABLET DAILY     Date of last visit: 7/26/2021   Date of next visit: 1/31/2022  Date of last refill: 1/14/2021 #90/3    Rx verified, ordered and set to EP.

## 2022-01-18 ENCOUNTER — APPOINTMENT (OUTPATIENT)
Dept: PHARMACY | Age: 77
End: 2022-01-18
Payer: MEDICARE

## 2022-01-21 ENCOUNTER — HOSPITAL ENCOUNTER (OUTPATIENT)
Dept: PHARMACY | Age: 77
Setting detail: THERAPIES SERIES
Discharge: HOME | End: 2022-01-21
Payer: MEDICARE

## 2022-01-21 DIAGNOSIS — Z95.2 H/O MITRAL VALVE REPLACEMENT: Primary | ICD-10-CM

## 2022-01-21 DIAGNOSIS — Z51.81 ENCOUNTER FOR THERAPEUTIC DRUG MONITORING: ICD-10-CM

## 2022-01-21 DIAGNOSIS — I48.91 ATRIAL FIBRILLATION, UNSPECIFIED TYPE (HCC): ICD-10-CM

## 2022-01-21 DIAGNOSIS — Z79.01 ANTICOAGULATED ON COUMADIN: ICD-10-CM

## 2022-01-21 LAB — POC INR: 3 (ref 0.8–1.2)

## 2022-01-21 PROCEDURE — 99211 OFF/OP EST MAY X REQ PHY/QHP: CPT | Performed by: PHARMACIST

## 2022-01-21 PROCEDURE — 85610 PROTHROMBIN TIME: CPT | Performed by: PHARMACIST

## 2022-01-21 PROCEDURE — 36416 COLLJ CAPILLARY BLOOD SPEC: CPT | Performed by: PHARMACIST

## 2022-01-21 NOTE — PROGRESS NOTES
Medication Management [address]  [phone] (phone)  [phone] (fax)    Ms. Mirella Espana is a 67 y.o.  female with history of Mechanical MVR who presents today for anticoagulation monitoring and adjustment. Patient verifies current dosing regimen and tablet strength. No missed or extra doses. Patient denies s/s bleeding/bruising/swelling/SOB/chest pain  No blood in urine or stool. No dietary changes. No changes in medication/OTC agents/Herbals. No change in alcohol use or tobacco use. Less active due to weather. Patient denies headaches/dizziness/lightheadedness/falls. No vomiting/diarrhea or acute illness. No Procedures scheduled in the future at this time. Assessment:   Lab Results   Component Value Date    INR 3.00 (H) 01/21/2022    INR 2.70 (H) 12/14/2021    INR 2.80 (H) 11/08/2021    PROTIME 2.42 (H) 12/15/2011    PROTIME 1.97 (H) 12/01/2011    PROTIME 1.80 (H) 11/22/2011     INR therapeutic   Recent Labs     01/21/22  0954   INR 3.00*         Plan:  Continue Coumadin 1mg MF and 1.5mg TWThSaS. Recheck INR in 5 week(s). Pt given lab order for H/H. Patient reminded to call the Anticoagulation Clinic with any signs or symptoms of bleeding or with any medication changes. Patient given instructions utilizing the teach back method. After visit summary printed and reviewed with patient. Discharged ambulatory in no apparent distress.     For Pharmacy Admin Tracking Only     Intervention Detail: Lab(s) Ordered   Total # of Interventions Recommended: 1   Total # of Interventions Accepted: 1   Time Spent (min): 20

## 2022-01-27 ENCOUNTER — HOSPITAL ENCOUNTER (OUTPATIENT)
Age: 77
Discharge: HOME | End: 2022-01-27
Payer: MEDICARE

## 2022-01-27 DIAGNOSIS — Z95.2 H/O MITRAL VALVE REPLACEMENT: ICD-10-CM

## 2022-01-27 DIAGNOSIS — E11.21 TYPE 2 DIABETES MELLITUS WITH DIABETIC NEPHROPATHY, WITHOUT LONG-TERM CURRENT USE OF INSULIN (HCC): ICD-10-CM

## 2022-01-27 DIAGNOSIS — Z79.01 ANTICOAGULATED ON COUMADIN: ICD-10-CM

## 2022-01-27 LAB
HCT VFR BLD CALC: 35 % (ref 37–47)
HEMOGLOBIN: 10.7 GM/DL (ref 12–16)

## 2022-01-27 PROCEDURE — 83036 HEMOGLOBIN GLYCOSYLATED A1C: CPT

## 2022-01-27 PROCEDURE — 85018 HEMOGLOBIN: CPT

## 2022-01-27 PROCEDURE — 85014 HEMATOCRIT: CPT

## 2022-01-27 PROCEDURE — 36415 COLL VENOUS BLD VENIPUNCTURE: CPT

## 2022-01-28 LAB
AVERAGE GLUCOSE: 132 MG/DL (ref 70–126)
HBA1C MFR BLD: 6.4 % (ref 4.4–6.4)

## 2022-01-31 ENCOUNTER — OFFICE VISIT (OUTPATIENT)
Dept: FAMILY MEDICINE CLINIC | Age: 77
End: 2022-01-31
Payer: MEDICARE

## 2022-01-31 VITALS
HEART RATE: 64 BPM | RESPIRATION RATE: 16 BRPM | TEMPERATURE: 98 F | DIASTOLIC BLOOD PRESSURE: 56 MMHG | BODY MASS INDEX: 30.57 KG/M2 | SYSTOLIC BLOOD PRESSURE: 124 MMHG | WEIGHT: 207 LBS

## 2022-01-31 DIAGNOSIS — I50.32 CHRONIC DIASTOLIC CONGESTIVE HEART FAILURE, NYHA CLASS 2 (HCC): ICD-10-CM

## 2022-01-31 DIAGNOSIS — I25.2 MI, OLD: ICD-10-CM

## 2022-01-31 DIAGNOSIS — I48.20 CHRONIC ATRIAL FIBRILLATION (HCC): ICD-10-CM

## 2022-01-31 DIAGNOSIS — I20.9 ANGINA PECTORIS (HCC): ICD-10-CM

## 2022-01-31 DIAGNOSIS — Z12.31 ENCOUNTER FOR SCREENING MAMMOGRAM FOR MALIGNANT NEOPLASM OF BREAST: ICD-10-CM

## 2022-01-31 DIAGNOSIS — E11.21 TYPE 2 DIABETES MELLITUS WITH DIABETIC NEPHROPATHY, WITHOUT LONG-TERM CURRENT USE OF INSULIN (HCC): Primary | ICD-10-CM

## 2022-01-31 DIAGNOSIS — N18.4 CKD (CHRONIC KIDNEY DISEASE) STAGE 4, GFR 15-29 ML/MIN (HCC): ICD-10-CM

## 2022-01-31 PROCEDURE — G8482 FLU IMMUNIZE ORDER/ADMIN: HCPCS | Performed by: FAMILY MEDICINE

## 2022-01-31 PROCEDURE — 1090F PRES/ABSN URINE INCON ASSESS: CPT | Performed by: FAMILY MEDICINE

## 2022-01-31 PROCEDURE — G8427 DOCREV CUR MEDS BY ELIG CLIN: HCPCS | Performed by: FAMILY MEDICINE

## 2022-01-31 PROCEDURE — G8417 CALC BMI ABV UP PARAM F/U: HCPCS | Performed by: FAMILY MEDICINE

## 2022-01-31 PROCEDURE — 4040F PNEUMOC VAC/ADMIN/RCVD: CPT | Performed by: FAMILY MEDICINE

## 2022-01-31 PROCEDURE — 1123F ACP DISCUSS/DSCN MKR DOCD: CPT | Performed by: FAMILY MEDICINE

## 2022-01-31 PROCEDURE — 99214 OFFICE O/P EST MOD 30 MIN: CPT | Performed by: FAMILY MEDICINE

## 2022-01-31 PROCEDURE — 1036F TOBACCO NON-USER: CPT | Performed by: FAMILY MEDICINE

## 2022-01-31 PROCEDURE — G8399 PT W/DXA RESULTS DOCUMENT: HCPCS | Performed by: FAMILY MEDICINE

## 2022-01-31 RX ORDER — NITROGLYCERIN 400 UG/1
SPRAY ORAL
Qty: 4.9 G | Refills: 0 | Status: SHIPPED | OUTPATIENT
Start: 2022-01-31 | End: 2022-10-20

## 2022-01-31 RX ORDER — FAMOTIDINE 40 MG/1
40 TABLET, FILM COATED ORAL DAILY PRN
COMMUNITY

## 2022-01-31 RX ORDER — OMEPRAZOLE 40 MG/1
40 CAPSULE, DELAYED RELEASE ORAL DAILY PRN
COMMUNITY
End: 2022-10-03

## 2022-01-31 ASSESSMENT — ENCOUNTER SYMPTOMS
DIARRHEA: 0
NAUSEA: 0
SHORTNESS OF BREATH: 0
ABDOMINAL PAIN: 0
BLOOD IN STOOL: 0
VOMITING: 0
EYES NEGATIVE: 1

## 2022-01-31 NOTE — PROGRESS NOTES
2022      Jordana Boone (:  1945) is a 67 y.o. female,Established patient, here for evaluation of the following chief complaint(s):  6 Month Follow-Up (Diabetes, HTN, CKD. Discuss recent labs. )        ASSESSMENT/PLAN     1. Type 2 diabetes mellitus with diabetic nephropathy, without long-term current use of insulin (HCC)  -     Comprehensive Metabolic Panel; Future  -     Hemoglobin A1C; Future  -     Lipid Panel; Future  -     Microalbumin / Creatinine Urine Ratio; Future  2. MI, old ([facility] [address]  3. Chronic diastolic congestive [facility] failure, [facility] class 2 [facility]  4. CKD (chronic kidney disease) stage 4, GFR 15-29 ml/min ([facility])  -     Comprehensive Metabolic Panel; Future  -     CBC Auto Differential; Future  5. Chronic atrial fibrillation (HCC)  -     Comprehensive Metabolic Panel; Future  -     CBC Auto Differential; Future  6. Encounter for screening mammogram for malignant neoplasm of breast  -     [facility] RAJEEV DIGITAL SCREEN BILATERAL; Future  7. Angina pectoris ([facility])  -     nitroGLYCERIN (NITROLINGUAL) 0.4 MG/SPRAY 0.4 mg spray; Indications: Angina Pectoris 1 spray SL q5 minutes prn chest pain. If third spray does not relieve pain, call 9-1-1., Disp-4.9 g, R-0Normal      Continue current medications    Med refill as above    She will report any increase in lower extremity edema to the CHF clinic. She is encouraged to continue checking daily weights. Update mammogram next month    Labs as above before next visit     Return in about 6 months (around 2022) for AWIAINLisset Palafox is a 67 y. o.female      Here for follow up of chronic health problems including:    Patient Active Problem List   Diagnosis    SOB (shortness of breath)    H/O mitral valve replacement    Type 2 diabetes mellitus with diabetic nephropathy (HCC)    Hyperlipidemia    Arrhythmia    History of valvular heart disease    History of sinus bradycardia    MI, old    Generalized headaches    Nasal septal deviation    Obesity (BMI 35.0-39.9 without comorbidity)    Cataract    Essential hypertension    Obstructive sleep apnea on CPAP    Anticoagulated on Coumadin    Symptomatic bradycardia    Medtronic dual pacemaker    Basal cell carcinoma of skin of neck    Chronic kidney disease, stage III (moderate) ([facility])    Chronic atrial fibrillation (HCC)    Ulcer, stomach peptic    Encounter for therapeutic drug monitoring    Atrial fibrillation (HCC)    Chronic diastolic congestive heart failure, NYHA class 2 ([facility])    CKD (chronic kidney disease) stage 4, GFR 15-29 ml/min ([facility])     Patient reports that she is doing well. She has noticed some increased leg edema recently but denies a change in her weight. She does have a history of CHF and is seen at the CHF clinic. She is wearing knee-high compression hose daily, and this helps somewhat. Her blood pressures have been stable. Fasting blood sugars are generally in the 140-150 range. She denies any symptomatic hypoglycemia. She has occasional blood sugars over 200 if she has some dietary indiscretions. She denies any chest pain or shortness of breath. She follows up with her specialists consistently and regularly. She takes all prescribed medications as directed and denies side effects. No recent illnesses or hospitalizations. Non-smoker. BMI 30.57. Review of Systems   Constitutional: Negative for chills, fatigue, fever and unexpected weight change. HENT: Negative. Eyes: Negative. Respiratory: Negative for shortness of breath. Cardiovascular: Positive for leg swelling. Negative for chest pain and palpitations. Gastrointestinal: Negative for abdominal pain, blood in stool, diarrhea, nausea and vomiting. Genitourinary: Negative for dysuria and hematuria. Musculoskeletal: Negative for arthralgias and myalgias. Skin: Negative for rash. Neurological: Positive for dizziness (occasional). Negative for headaches. Hematological: Negative. Psychiatric/Behavioral: Negative. All other systems reviewed and are negative. OBJECTIVE     BP (!) 124/56 (Site: Right Upper Arm)   Pulse 64   Temp 98 °F (36.7 °C) (Oral)   Resp 16   Wt 207 lb (93.9 kg)   BMI 30.57 kg/m²     Wt Readings from Last 3 Encounters:   01/31/22 207 lb (93.9 kg)   11/08/21 210 lb 9.6 oz (95.5 kg)   09/30/21 208 lb 6.4 oz (94.5 kg)       Physical Exam  Vitals reviewed. Constitutional:       General: She is not in acute distress. Appearance: She is well-developed. HENT:      Head: Normocephalic and atraumatic. Right Ear: Tympanic membrane normal.      Left Ear: Tympanic membrane normal.      Mouth/Throat:      Mouth: Mucous membranes are moist.      Pharynx: No posterior oropharyngeal erythema. Eyes:      Conjunctiva/sclera: Conjunctivae normal.   Cardiovascular:      Rate and Rhythm: Normal rate. Comments: Valve click noted  Pulmonary:      Breath sounds: Normal breath sounds. No wheezing. Abdominal:      Palpations: Abdomen is soft. Tenderness: There is no abdominal tenderness. There is no guarding or rebound. Musculoskeletal:      Right lower leg: Edema (1+) present. Left lower leg: Edema (1+) present. Lymphadenopathy:      Cervical: No cervical adenopathy. Skin:     General: Skin is warm and dry. Neurological:      Mental Status: She is alert.          Lab Results   Component Value Date    LABA1C 6.4 01/27/2022     No results found for: EAG  Lab Results   Component Value Date    WBC 5.2 07/15/2021    HGB 10.7 (L) 01/27/2022    HCT 35.0 (L) 01/27/2022    MCV 94.0 07/15/2021     07/15/2021     Lab Results   Component Value Date     07/15/2021    K 4.6 07/15/2021     07/15/2021    CO2 24 07/15/2021    BUN 29 (H) 07/15/2021    CREATININE 1.7 (H) 10/27/2021    GLUCOSE 113 (H) 07/15/2021    CALCIUM 10.2 07/15/2021    PROT 6.7 07/15/2021    LABALBU 4.2 07/15/2021    BILITOT 0.2 (L) 07/15/2021    ALKPHOS 71 07/15/2021    AST 17 07/15/2021    ALT 9 (L) 07/15/2021    LABGLOM 29 (A) 10/27/2021       Lab Results   Component Value Date    CHOL 152 07/15/2021    TRIG 231 (H) 07/15/2021    HDL 37 07/15/2021    LDLCALC 69 07/15/2021           Immunization History   Administered Date(s) Administered    COVID-19, Pfizer Purple top, DILUTE for use, 12+ yrs, 30mcg/0.3mL dose 02/03/2021, 03/03/2021, 10/20/2021    Influenza 11/14/2013    Influenza Virus Vaccine 01/01/2011, 11/01/2012, 11/24/2014, 09/24/2016, 10/07/2017, 10/22/2020    Influenza, High Dose (Fluzone 65 yrs and older) 09/22/2016    Influenza, Quadv, IM, PF (6 mo and older Fluzone, Flulaval, Fluarix, and 3 yrs and older Afluria) 09/26/2019, 11/08/2021    Influenza, Quadv, adjuvanted, 65 yrs +, IM, PF (Fluad) 10/20/2020    Influenza, Triv, inactivated, subunit, adjuvanted, IM (Fluad 65 yrs and older) 10/26/2018    Pneumococcal Conjugate 13-valent ([id number]) 04/23/2015    Pneumococcal Polysaccharide ([id number]) 07/01/2009    Zoster Live (Zostavax) 06/30/2015         Health Maintenance   Topic Date Due    Hepatitis C screen  Never done    DTaP/Tdap/Td vaccine (1 - Tdap) Never done    Shingles Vaccine (2 of 3) 08/25/2015    Lipid screen  07/15/2022    Potassium monitoring  07/15/2022    Depression Screen  07/23/2022    Annual Wellness Visit (AWV)  07/27/2022    Creatinine monitoring  10/27/2022    DEXA (modify frequency per FRAX score)  Completed    Flu vaccine  Completed    Pneumococcal 65+ years Vaccine  Completed    COVID-19 Vaccine  Completed    Hepatitis A vaccine  Aged Out    Hib vaccine  Aged Out    Meningococcal (ACWY) vaccine  Aged Out           An electronic signature was used to authenticate this note.               Electronically signed by Holly Vaz MD on 1/31/2022 at 11:30 AM

## 2022-02-09 ENCOUNTER — HOSPITAL ENCOUNTER (OUTPATIENT)
Dept: WOMENS IMAGING | Age: 77
Discharge: HOME | End: 2022-02-09
Payer: MEDICARE

## 2022-02-09 DIAGNOSIS — Z12.31 ENCOUNTER FOR SCREENING MAMMOGRAM FOR MALIGNANT NEOPLASM OF BREAST: ICD-10-CM

## 2022-02-09 PROCEDURE — 77063 BREAST TOMOSYNTHESIS BI: CPT

## 2022-02-22 ENCOUNTER — HOSPITAL ENCOUNTER (OUTPATIENT)
Dept: PHARMACY | Age: 77
Setting detail: THERAPIES SERIES
Discharge: HOME | End: 2022-02-22
Payer: MEDICARE

## 2022-02-22 DIAGNOSIS — Z79.01 ANTICOAGULATED ON COUMADIN: ICD-10-CM

## 2022-02-22 DIAGNOSIS — Z95.2 H/O MITRAL VALVE REPLACEMENT: Primary | ICD-10-CM

## 2022-02-22 DIAGNOSIS — I48.91 ATRIAL FIBRILLATION, UNSPECIFIED TYPE (HCC): ICD-10-CM

## 2022-02-22 DIAGNOSIS — Z51.81 ENCOUNTER FOR THERAPEUTIC DRUG MONITORING: ICD-10-CM

## 2022-02-22 LAB — POC INR: 3.3 (ref 0.8–1.2)

## 2022-02-22 PROCEDURE — 99211 OFF/OP EST MAY X REQ PHY/QHP: CPT

## 2022-02-22 PROCEDURE — 85610 PROTHROMBIN TIME: CPT

## 2022-02-22 PROCEDURE — 36416 COLLJ CAPILLARY BLOOD SPEC: CPT

## 2022-02-22 NOTE — PROGRESS NOTES
Medication Management [address]  [phone] (phone)  [phone] (fax)    Ms. Crow Sahu is a 67 y.o.  female with history of atrial fib./mechanical MVR, per Dr. Claudia Richardson referral, who presents today for Warfarin monitoring and adjustment (5 week visit). Patient verifies current dosing regimen and tablet strength. No missed or extra doses. Patient denies bleeding/bruising/chest pain. Has usual SOB/swelling of feet and legs. No blood in urine or stool. Dietary changes: had some extra broccoli lately. No changes in medication/OTC agents/herbals. No change in alcohol use or tobacco use. No change in activity level. Patient denies falls. Has usual headaches that start 1 hour after Isordil that go away without medication. Has usual intermittent dizziness when she first stands. No vomiting/diarrhea or acute illness. No procedures scheduled in the future at this time. Assessment:   Lab Results   Component Value Date    INR 3.30 (H) 02/22/2022    INR 3.00 (H) 01/21/2022    INR 2.70 (H) 12/14/2021    PROTIME 2.42 (H) 12/15/2011    PROTIME 1.97 (H) 12/01/2011    PROTIME 1.80 (H) 11/22/2011     INR therapeutic - goal 2.5-3.5. Recent Labs     02/22/22  1007   INR 3.30*     Did routine H&H 1/27:  10.7/35 (10.5/34.7 on 7/15/21). Plan:  POCT INR ordered/performed/result reviewed. Continue PO Coumadin 1 mg MF, 1.5 mg TWThSS. Recheck INR in 5 week(s). Patient reminded to call the Anticoagulation Clinic with any signs or symptoms of bleeding or with any medication changes. Patient given instructions utilizing the teach back method. After visit summary printed and reviewed with patient. Discharged ambulatory in no apparent distress, wearing mask.

## 2022-02-25 ENCOUNTER — HOSPITAL ENCOUNTER (OUTPATIENT)
Age: 77
Discharge: HOME | End: 2022-02-25
Payer: MEDICARE

## 2022-02-25 DIAGNOSIS — I50.32 CHRONIC DIASTOLIC CONGESTIVE HEART FAILURE, NYHA CLASS 2 (HCC): ICD-10-CM

## 2022-02-25 LAB
ANION GAP SERPL CALCULATED.3IONS-SCNC: 13 MEQ/L (ref 8–16)
BUN BLDV-MCNC: 34 MG/DL (ref 7–22)
CALCIUM SERPL-MCNC: 9.8 MG/DL (ref 8.5–10.5)
CHLORIDE BLD-SCNC: 103 MEQ/L (ref 98–111)
CO2: 23 MEQ/L (ref 23–33)
CREAT SERPL-MCNC: 1.6 MG/DL (ref 0.4–1.2)
GFR SERPL CREATININE-BSD FRML MDRD: 31 ML/MIN/1.73M2
GLUCOSE BLD-MCNC: 181 MG/DL (ref 70–108)
MAGNESIUM: 2.2 MG/DL (ref 1.6–2.4)
POTASSIUM SERPL-SCNC: 4.6 MEQ/L (ref 3.5–5.2)
PRO-BNP: 1060 PG/ML (ref 0–1800)
SODIUM BLD-SCNC: 139 MEQ/L (ref 135–145)

## 2022-02-25 PROCEDURE — 36415 COLL VENOUS BLD VENIPUNCTURE: CPT

## 2022-02-25 PROCEDURE — 83880 ASSAY OF NATRIURETIC PEPTIDE: CPT

## 2022-02-25 PROCEDURE — 80048 BASIC METABOLIC PNL TOTAL CA: CPT

## 2022-02-25 PROCEDURE — 83735 ASSAY OF MAGNESIUM: CPT

## 2022-03-01 ENCOUNTER — TELEPHONE (OUTPATIENT)
Dept: CARDIOLOGY CLINIC | Age: 77
End: 2022-03-01

## 2022-03-01 NOTE — TELEPHONE ENCOUNTER
----- Message from YANA Bradford CNP sent at 3/1/2022  8:35 AM EST -----  Labs are steady, drinking higher end of 2L restriction.

## 2022-03-07 ENCOUNTER — OFFICE VISIT (OUTPATIENT)
Dept: PULMONOLOGY | Age: 77
End: 2022-03-07
Payer: MEDICARE

## 2022-03-07 VITALS
SYSTOLIC BLOOD PRESSURE: 126 MMHG | TEMPERATURE: 97.8 F | WEIGHT: 212.4 LBS | HEIGHT: 69 IN | HEART RATE: 86 BPM | BODY MASS INDEX: 31.46 KG/M2 | DIASTOLIC BLOOD PRESSURE: 62 MMHG | OXYGEN SATURATION: 98 %

## 2022-03-07 DIAGNOSIS — G47.09 OTHER INSOMNIA: ICD-10-CM

## 2022-03-07 DIAGNOSIS — Z99.89 OBSTRUCTIVE SLEEP APNEA ON CPAP: Primary | ICD-10-CM

## 2022-03-07 DIAGNOSIS — E66.9 OBESITY (BMI 30-39.9): ICD-10-CM

## 2022-03-07 DIAGNOSIS — G47.33 OBSTRUCTIVE SLEEP APNEA ON CPAP: Primary | ICD-10-CM

## 2022-03-07 PROCEDURE — 99214 OFFICE O/P EST MOD 30 MIN: CPT | Performed by: PHYSICIAN ASSISTANT

## 2022-03-07 PROCEDURE — G8427 DOCREV CUR MEDS BY ELIG CLIN: HCPCS | Performed by: PHYSICIAN ASSISTANT

## 2022-03-07 PROCEDURE — G8399 PT W/DXA RESULTS DOCUMENT: HCPCS | Performed by: PHYSICIAN ASSISTANT

## 2022-03-07 PROCEDURE — G8417 CALC BMI ABV UP PARAM F/U: HCPCS | Performed by: PHYSICIAN ASSISTANT

## 2022-03-07 PROCEDURE — 1090F PRES/ABSN URINE INCON ASSESS: CPT | Performed by: PHYSICIAN ASSISTANT

## 2022-03-07 PROCEDURE — 4040F PNEUMOC VAC/ADMIN/RCVD: CPT | Performed by: PHYSICIAN ASSISTANT

## 2022-03-07 PROCEDURE — G8482 FLU IMMUNIZE ORDER/ADMIN: HCPCS | Performed by: PHYSICIAN ASSISTANT

## 2022-03-07 PROCEDURE — 1123F ACP DISCUSS/DSCN MKR DOCD: CPT | Performed by: PHYSICIAN ASSISTANT

## 2022-03-07 PROCEDURE — 1036F TOBACCO NON-USER: CPT | Performed by: PHYSICIAN ASSISTANT

## 2022-03-07 ASSESSMENT — ENCOUNTER SYMPTOMS
EYES NEGATIVE: 1
WHEEZING: 0
SHORTNESS OF BREATH: 0
ALLERGIC/IMMUNOLOGIC NEGATIVE: 1
STRIDOR: 0
COUGH: 0
DIARRHEA: 0
BACK PAIN: 0
CHEST TIGHTNESS: 0
NAUSEA: 0

## 2022-03-07 NOTE — PROGRESS NOTES
[facility]      Camille Davis         [id number]  3/7/2022   Chief Complaint   Patient presents with    Follow-up     POL[place] 4 month with download, mask fitting        Pt of Dr. Gaston ENRIQUEZ Download:   Original or initial AHI: 40.8     Date of initial study: 3/21/2011      Compliant  97%     Noncompliant 3 %     PAP Type Airsense 10 autoset Level  12   Avg Hrs/Day 5 hr 43 min  AHI: 0.4   Recorded compliance dates , 1/25/2022  to 2/23/2022   Machine/Mfg:   [x] ResMed    [] Respironics/Dreamstation   Interface:   [] Nasal    [] Nasal pillows   [x] FFM      Provider:      [x] [facility]     [][facility]     [] [employer]    [] Kathlynn Cure    [] [facility]               [] [facility]      [] [facility]    [] [address]      [] Other    Neck Size: 15.75  Mallampati Mallampati 3  ESS:  10  SAQLI: 51    Here is a scan of the most recent download:            Presentation:   Gwyn Garcia presents for sleep medicine follow up for obstructive sleep apnea  Since the last visit, Gwyn Garcia is doing ok with PAP. Mask is still leaking. She still feels like the pressure is ramping up. She is waking up from mask leak. She does go right back to sleep. She feels fatigued during the day. Equipment issues: The pressure is  acceptable, the mask is acceptable     Sleep issues:  Do you feel better? Yes  More rested? Yes   Better concentration? yes    Progress History:   Since last visit any new medical issues? Yes CHF  New ER or hospital visits? No  Any new or changes in medicines? No  Any new sleep medicines? No    Review of Systems -   Review of Systems   Constitutional: Negative for activity change, appetite change, chills and fever. HENT: Negative for congestion and postnasal drip. Eyes: Negative. Respiratory: Negative for cough, chest tightness, shortness of breath, wheezing and stridor. Cardiovascular: Positive for leg swelling. Negative for chest pain.    Gastrointestinal: Negative for diarrhea and nausea. Endocrine: Negative. Genitourinary: Negative. Musculoskeletal: Negative. Negative for arthralgias and back pain. Skin: Negative. Allergic/Immunologic: Negative. Neurological: Negative. Negative for dizziness and light-headedness. Psychiatric/Behavioral: Negative. All other systems reviewed and are negative. Physical Exam:    BMI:  Body mass index is 31.37 kg/m². Wt Readings from Last 3 Encounters:   03/07/22 212 lb 6.4 oz (96.3 kg)   01/31/22 207 lb (93.9 kg)   11/08/21 210 lb 9.6 oz (95.5 kg)     Weight stable / unchanged  Vitals: /62 (Site: Left Upper Arm, Position: Sitting, Cuff Size: Large Adult)   Pulse 86   Temp 97.8 °F (36.6 °C) (Temporal)   Ht 5' 9\" (1.753 m)   Wt 212 lb 6.4 oz (96.3 kg)   SpO2 98%   BMI 31.37 kg/m²       Physical Exam  Constitutional:       Appearance: She is well-developed. HENT:      Head: Normocephalic and atraumatic. Right Ear: External ear normal.      Left Ear: External ear normal.   Eyes:      Conjunctiva/sclera: Conjunctivae normal.      Pupils: Pupils are equal, round, and reactive to light. Cardiovascular:      Rate and Rhythm: Normal rate and regular rhythm. Heart sounds: Normal heart sounds. Pulmonary:      Effort: Pulmonary effort is normal.      Breath sounds: Normal breath sounds. Musculoskeletal:         General: Normal range of motion. Cervical back: Normal range of motion and neck supple. Right lower leg: Edema present. Left lower leg: Edema present. Skin:     General: Skin is warm and dry. Neurological:      Mental Status: She is alert and oriented to person, place, and time. Psychiatric:         Behavior: Behavior normal.         Thought Content: Thought content normal.         Judgment: Judgment normal.           ASSESSMENT/DIAGNOSIS     Diagnosis Orders   1. Obstructive sleep apnea on CPAP     2.  Obesity (BMI 30-39.9)     3. Other insomnia              Plan   Do you need any equipment today? No  - Will decrease pressure to try to improve mask leak  - Will follow download  - Hypersomnia may be related to interrupted sleep from mask leak and CHF  - Download reviewed and discussed with patient  - She  was advised to continue current positive airway pressure therapy with above described pressure. - She  advised to keep good compliance with current recommended pressure to get optimal results and clinical improvement  - Recommend 7-9 hours of sleep with PAP  - She was advised to call [employer] regarding supplies if needed.   -She call my office for earlier appointment if needed for worsening of sleep symptoms.   - She was instructed on weight loss  - Sury Kyle was educated about my impression and plan. Patient verbalizesunderstanding.   We will see Christal Lyn back in: 4 months with download    Information added by my medical assistant/LPN was reviewed today       Priyanka Moser PA-C, MPAS  3/7/2022

## 2022-03-09 ENCOUNTER — OFFICE VISIT (OUTPATIENT)
Dept: CARDIOLOGY CLINIC | Age: 77
End: 2022-03-09
Payer: MEDICARE

## 2022-03-09 VITALS
SYSTOLIC BLOOD PRESSURE: 138 MMHG | BODY MASS INDEX: 31.98 KG/M2 | WEIGHT: 211 LBS | HEIGHT: 68 IN | DIASTOLIC BLOOD PRESSURE: 68 MMHG | HEART RATE: 90 BPM

## 2022-03-09 DIAGNOSIS — Z95.2 S/P MVR (MITRAL VALVE REPLACEMENT): ICD-10-CM

## 2022-03-09 DIAGNOSIS — I10 PRIMARY HYPERTENSION: ICD-10-CM

## 2022-03-09 DIAGNOSIS — I42.0 DILATED CARDIOMYOPATHY (HCC): ICD-10-CM

## 2022-03-09 DIAGNOSIS — I48.20 CHRONIC ATRIAL FIBRILLATION (HCC): Primary | ICD-10-CM

## 2022-03-09 PROCEDURE — G8427 DOCREV CUR MEDS BY ELIG CLIN: HCPCS | Performed by: NUCLEAR MEDICINE

## 2022-03-09 PROCEDURE — 1036F TOBACCO NON-USER: CPT | Performed by: NUCLEAR MEDICINE

## 2022-03-09 PROCEDURE — G8482 FLU IMMUNIZE ORDER/ADMIN: HCPCS | Performed by: NUCLEAR MEDICINE

## 2022-03-09 PROCEDURE — 4040F PNEUMOC VAC/ADMIN/RCVD: CPT | Performed by: NUCLEAR MEDICINE

## 2022-03-09 PROCEDURE — 93000 ELECTROCARDIOGRAM COMPLETE: CPT | Performed by: NUCLEAR MEDICINE

## 2022-03-09 PROCEDURE — 1123F ACP DISCUSS/DSCN MKR DOCD: CPT | Performed by: NUCLEAR MEDICINE

## 2022-03-09 PROCEDURE — G8417 CALC BMI ABV UP PARAM F/U: HCPCS | Performed by: NUCLEAR MEDICINE

## 2022-03-09 PROCEDURE — 99214 OFFICE O/P EST MOD 30 MIN: CPT | Performed by: NUCLEAR MEDICINE

## 2022-03-09 PROCEDURE — 1090F PRES/ABSN URINE INCON ASSESS: CPT | Performed by: NUCLEAR MEDICINE

## 2022-03-09 PROCEDURE — G8399 PT W/DXA RESULTS DOCUMENT: HCPCS | Performed by: NUCLEAR MEDICINE

## 2022-03-09 NOTE — PROGRESS NOTES
[id number] [address]  [place] [address]  Dept: [phone]  Dept Fax: [phone]  Loc: [phone]    Visit Date: 3/9/2022    Elizabeth Del Valle is a 67 y.o. female who presents todayfor:  Chief Complaint   Patient presents with    Check-Up     EKG done today    Atrial Fibrillation    Cardiomyopathy    Cardiac Valve Problem    Hypertension     Known diastolic dysfunction   Follows at the [facility] clinic  Some chest pain at times  Radiated to the back   Some baseline dyspnea  Dyspnea on exertion   Possible worse than usual   No dizziness  No syncope  Bp is stable   On statins for hyperlipidemia  No issues     HPI:  HPI  Past Medical History:   Diagnosis Date    Arrhythmia     CHRONIC ATRIAL FIB    Breast cancer ([facility] 12/10/2012    [facility]    Cancer ([facility] [facility]     breast ca left    CHF (congestive heart failure) (HCC)     Chronic kidney disease, stage III (moderate) ([facility] [facility] 10/29/2018    Depression     Diabetes mellitus (HCC)     Generalized headaches     History of dizziness     History of sinus bradycardia     History of therapeutic radiation     History of valvular heart disease     Hyperlipidemia     Hypertension     Medtronic dual pacemaker 5/23/2017    MI, old     Mitral valve replaced     Numbness and tingling     Obesity     Obstructive sleep apnea on CPAP 2011    Osteopenia     SOB (shortness of breath)     HX OF:      Past Surgical History:   Procedure Laterality Date    BREAST LUMPECTOMY Left 01/09/2013    [facility]    BREAST SURGERY Left 1/15/13    re-excision cranial margin and mammosite spacer    CARDIAC CATHETERIZATION  10/06/2003    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 MV area by recent EKG was 1 sq. cm. and mean gradient across MV was 17mmHg. MV index was about 0.7 sq. cm. per body surface area. Moderate pulmonary artery systolic HTN. Patent coronary arteries.    [address] VALVE REPLACEMENT  2003 TR.     TRANSTHORACIC ECHOCARDIOGRAM  01/14/2008    Biatrial enlargement, RV dilated. Atheromatous aortic root. Borderline LVH. Normal LV systolic function. EF 55%. Prosthetic MV appears to be functioning well. Mildly sclerosed AV. Trivial MR, mild TR, trivial PI, RVSP 47mmHg.  TRANSTHORACIC ECHOCARDIOGRAM  2-08-00    Moderate MV stenosis based on available echo doppler data.  UPPER GASTROINTESTINAL ENDOSCOPY  08/2017     Family History   Problem Relation Age of Onset    Hypertension Mother     Stroke Mother     Diabetes Mother     Brain Cancer Father     Heart Attack Brother     Heart Disease Brother     High Blood Pressure Brother     Melanoma Brother     Breast Cancer Maternal Aunt     Breast Cancer Paternal Aunt     Kidney Cancer Sister     Melanoma Brother      Social History     Tobacco Use    Smoking status: Never Smoker    Smokeless tobacco: Never Used    Tobacco comment: Friends were heavy smokers   Substance Use Topics    Alcohol use: Yes     Alcohol/week: 0.0 standard drinks     Comment: Occassionally      Current Outpatient Medications   Medication Sig Dispense Refill    CPAP Machine MISC by Does not apply route Please change CPAP pressure to 10 cm H20. Please send download in 2 weeks 1 each 0    famotidine (PEPCID) 40 MG tablet Take 40 mg by mouth daily as needed Indications: Mildly Upset Stomach       omeprazole (PRILOSEC) 40 MG delayed release capsule Take 40 mg by mouth daily as needed Indications: Mildly Upset Stomach       nitroGLYCERIN (NITROLINGUAL) 0.4 MG/SPRAY 0.4 mg spray Indications: Angina Pectoris 1 spray SL q5 minutes prn chest pain.   If third spray does not relieve pain, call 9-1-1. 4.9 g 0    olmesartan (BENICAR) 40 MG tablet TAKE 1 TABLET DAILY 90 tablet 3    Psyllium (METAMUCIL PO) Take by mouth daily as needed Indications: Constipation       isosorbide dinitrate (ISORDIL) 10 MG tablet Take 1 tablet by mouth 3 times daily 270 tablet 3    warfarin (COUMADIN) 1 MG tablet Take as directed by Coumadin Clinic (#120 ~ 90 day supply) 120 tablet 3    JANUVIA 100 MG tablet TAKE 1 TABLET DAILY 90 tablet 3    hydrALAZINE (APRESOLINE) 50 MG tablet TAKE 1 TABLET THREE TIMES A  tablet 3    glimepiride (AMARYL) 2 MG tablet TAKE 1 TABLET TWICE A  tablet 3    mometasone (ELOCON) 0.1 % cream Apply topically daily. 50 g 0    metFORMIN (GLUCOPHAGE-XR) 500 MG extended release tablet Take 1,000 mg by mouth daily (with breakfast)       bumetanide (BUMEX) 1 MG tablet Take 1 tablet by mouth daily 90 tablet 3    carvedilol (COREG) 12.5 MG tablet Take 1 tablet by mouth 2 times daily 180 tablet 3    blood glucose test strips (ADVOCATE REDI-CODE) strip USE ONCE DAILY 100 strip 3    simvastatin (ZOCOR) 10 MG tablet TAKE 1 TABLET AT BEDTIME 90 tablet 3    amoxicillin (AMOXIL) 500 MG capsule Take by mouth Prior to teeth cleaning.  Acetaminophen (TYLENOL PO) Take 500 mg by mouth every 4 hours as needed Don't take more than 3,000 mg each day, including what's in Coricidin.  loperamide (IMODIUM) 2 MG capsule Take 2 mg by mouth 4 times daily as needed for Diarrhea       fluorouracil (EFUDEX) 5 % cream Apply topically daily Indications: precancer treatment Off it for intervals, then back on.  metroNIDAZOLE (METROCREAM) 0.75 % cream Apply topically See Admin Instructions Indications: Skin Abnormalities As needed      Blood Glucose Monitoring Suppl (ADVOCATE BLOOD GLUCOSE MONITOR) CONNIE Dx: 250.00 1 Device 0    fluticasone (FLONASE) 50 MCG/ACT nasal spray 2 sprays by Nasal route daily (Patient taking differently: 2 sprays by Nasal route daily as needed ) 1 Bottle 6     No current facility-administered medications for this visit.      Allergies   Allergen Reactions    Adalat [Nifedipine] Itching     redness    Amlodipine Swelling     If take more than 5mg legs swell, hot and red    Potassium-Containing Compounds      SHIMA    Tape [Adhesive Tape] Rash Skin pulls off     Health Maintenance   Topic Date Due    Hepatitis C screen  Never done    DTaP/Tdap/Td vaccine (1 - Tdap) Never done    Shingles Vaccine (2 of 3) 08/25/2015    Lipid screen  07/15/2022    Depression Screen  07/23/2022    Annual Wellness Visit (AWV)  07/27/2022    Potassium monitoring  02/25/2023    Creatinine monitoring  02/25/2023    DEXA (modify frequency per FRAX score)  Completed    Flu vaccine  Completed    Pneumococcal 65+ yrs at Risk Vaccine  Completed    COVID-19 Vaccine  Completed    Hepatitis A vaccine  Aged Out    Hib vaccine  Aged Out    Meningococcal (ACWY) vaccine  Aged Out       Subjective:  Review of Systems  General:   No fever, no chills, some fatigue or weight loss  Pulmonary:    some dyspnea, no wheezing  Cardiac:    Denies recent chest pain,   GI:     No nausea or vomiting, no abdominal pain  Neuro:     No dizziness or light headedness,   Musculoskeletal:  No recent active issues  Extremities:   No edema, no obvious claudication       Objective:  Physical Exam  /68   Pulse 90   Ht 5' 8\" (1.727 m)   Wt 211 lb (95.7 kg)   BMI 32.08 kg/m²   General:   Well developed, well nourished  Lungs:    Clear to auscultation  Heart:    Normal S1 S2, Slight murmur. no rubs, no gallops  Abdomen:   Soft, non tender, no organomegalies, positive bowel sounds  Extremities:   No edema, no cyanosis, good peripheral pulses  Neurological:   Awake, alert, oriented. No obvious focal deficits  Musculoskelatal:  No obvious deformities    Assessment:      Diagnosis Orders   1. Chronic atrial fibrillation (HCC)  EKG 12 Lead   2. Dilated cardiomyopathy ([address]     3. S/P MVR (mitral valve replacement)     4. Primary hypertension     as above  Multiple chronic medical issues  ECG in office was done today. I reviewed the ECG. No acute findings      Plan:  No follow-ups on file.   As above  Consider stopping imdur if no major CAD  Continue risk factor modification and medical management  Thank you for allowing me to participate in the care of your patient. Please don't hesitate to contact me regarding any further issues related to the patient care    Orders Placed:  Orders Placed This Encounter   Procedures    EKG 12 Lead     Order Specific Question:   Reason for Exam?     Answer: Other       Medications Prescribed:  No orders of the defined types were placed in this encounter. Discussed use, benefit, and side effects of prescribed medications. All patient questions answered. Pt voicedunderstanding. Instructed to continue current medications, diet and exercise. Continue risk factor modification and medical management. Patient agreed with treatment plan. Follow up as directed.     Electronically signedby Sarah Fam MD on 3/9/2022 at 12:01 PM

## 2022-03-23 ENCOUNTER — PROCEDURE VISIT (OUTPATIENT)
Dept: CARDIOLOGY CLINIC | Age: 77
End: 2022-03-23
Payer: MEDICARE

## 2022-03-23 DIAGNOSIS — Z95.0 PACEMAKER: Primary | ICD-10-CM

## 2022-03-23 PROCEDURE — 93296 REM INTERROG EVL PM/IDS: CPT | Performed by: NUCLEAR MEDICINE

## 2022-03-23 PROCEDURE — 93294 REM INTERROG EVL PM/LDLS PM: CPT | Performed by: NUCLEAR MEDICINE

## 2022-03-23 NOTE — PROGRESS NOTES
[employer] Medtronic Dual Pacemaker  Patient of [facility]aki    Battery 1.5-2.5 years    Presenting rhythm Af     A Impedance 399  RV Impedance 399    P wave sensing 1.4  R wave sensing 6.5    A Threshold - @ -  A Amplitude - @ -  RV Thresholds 0.875 @ 0.40  RV Amplitude 2.0 @ 0.40      A Paced 0%  V Paced 99.6%    Programmed Mode VVIR     Hx afib - taking warfarin  Afib Salix 100%    Episodes   none

## 2022-03-28 ENCOUNTER — HOSPITAL ENCOUNTER (OUTPATIENT)
Dept: PHARMACY | Age: 77
Setting detail: THERAPIES SERIES
Discharge: HOME | End: 2022-03-28
Payer: MEDICARE

## 2022-03-28 DIAGNOSIS — I48.91 ATRIAL FIBRILLATION, UNSPECIFIED TYPE (HCC): ICD-10-CM

## 2022-03-28 DIAGNOSIS — Z79.01 ANTICOAGULATED ON COUMADIN: ICD-10-CM

## 2022-03-28 DIAGNOSIS — Z51.81 ENCOUNTER FOR THERAPEUTIC DRUG MONITORING: ICD-10-CM

## 2022-03-28 DIAGNOSIS — Z95.2 H/O MITRAL VALVE REPLACEMENT: Primary | ICD-10-CM

## 2022-03-28 LAB — POC INR: 2.8 (ref 0.8–1.2)

## 2022-03-28 PROCEDURE — 85610 PROTHROMBIN TIME: CPT

## 2022-03-28 PROCEDURE — 99211 OFF/OP EST MAY X REQ PHY/QHP: CPT

## 2022-03-28 PROCEDURE — 36416 COLLJ CAPILLARY BLOOD SPEC: CPT

## 2022-03-28 NOTE — PROGRESS NOTES
Medication Management [address]  [phone] (phone)  [phone] (fax)    Ms. Ashley Chávez is a 67 y.o.  female with history of atrial fib./mechanical MVR, per Dr. Evita Jones referral, who presents today for Warfarin monitoring and adjustment (5 week visit). Patient verifies current dosing regimen and tablet strength. No missed or extra doses. Patient denies bleeding. Has usual easy bruising. Has usual SOB/swelling of legs (left>right). Has fleeting chest pain - stated Dr. Ree Garcia knows (hasn't used Nitro. ). No blood in urine or stool. No dietary changes. Changes in medication/OTC agents/herbals: states Dr. Ree Garcia told her she could gradually stop Isordil due to headaches - taking sporadically. Suggested she take Tylenol 1 hour before taking it. No change in alcohol use or tobacco use. No change in activity level. Patient denies falls. Sometimes takes Tylenol for usual headaches. Has usual dizziness. No vomiting/diarrhea or acute illness. No procedures scheduled in the future at this time. Assessment:   Lab Results   Component Value Date    INR 2.80 (H) 03/28/2022    INR 3.30 (H) 02/22/2022    INR 3.00 (H) 01/21/2022    PROTIME 2.42 (H) 12/15/2011    PROTIME 1.97 (H) 12/01/2011    PROTIME 1.80 (H) 11/22/2011     INR therapeutic - goal 2.5-3.5. Recent Labs     03/28/22  1008   INR 2.80*       Plan:  POCT INR ordered/performed/result reviewed. Continue PO Coumadin 1 mg MF, 1.5 mg TWThSS. Recheck INR in 6 week(s). Patient reminded to call the Anticoagulation Clinic with any signs or symptoms of bleeding or with any medication changes. Patient given instructions utilizing the teach back method. After visit summary printed and reviewed with patient. Discharged ambulatory in no apparent distress, wearing mask.

## 2022-03-30 DIAGNOSIS — E11.21 TYPE 2 DIABETES MELLITUS WITH DIABETIC NEPHROPATHY, WITHOUT LONG-TERM CURRENT USE OF INSULIN (HCC): ICD-10-CM

## 2022-03-30 RX ORDER — METFORMIN HYDROCHLORIDE 500 MG/1
TABLET, EXTENDED RELEASE ORAL
Qty: 360 TABLET | Refills: 3 | Status: SHIPPED | OUTPATIENT
Start: 2022-03-30 | End: 2022-08-02

## 2022-03-30 NOTE — TELEPHONE ENCOUNTER
This medication refill is regarding a electronic request.  Refill requested by [facility]. Requested Prescriptions     Pending Prescriptions Disp Refills    metFORMIN (GLUCOPHAGE-XR) 500 MG extended release tablet [Pharmacy Med Name: METFORMIN HCL ER TABS 500MG] 360 tablet 3     Sig: TAKE 4 TABLETS DAILY     Date of last visit: 1/31/22  Date of next visit: 8/2/2022  Date of last refill: 7/26/21    Last Hemoglobin A1C:    Lab Results   Component Value Date    LABA1C 6.4 01/27/2022    AVGG 132 01/27/2022     Rx verified, ordered and set to EP.

## 2022-03-31 DIAGNOSIS — E78.5 HYPERLIPIDEMIA: ICD-10-CM

## 2022-03-31 RX ORDER — SIMVASTATIN 10 MG
TABLET ORAL
Qty: 90 TABLET | Refills: 3 | Status: SHIPPED | OUTPATIENT
Start: 2022-03-31

## 2022-03-31 NOTE — TELEPHONE ENCOUNTER
Lena Curtis needs refill of   Requested Prescriptions     Pending Prescriptions Disp Refills    simvastatin (ZOCOR) 10 MG tablet [Pharmacy Med Name: SIMVASTATIN TABS 10MG] 90 tablet 3     Sig: TAKE 1 TABLET AT BEDTIME       Last Filled on:  4/5/2021    Last Visit Date:  1/31/2022    Next Visit Date:    Visit date not found

## 2022-05-10 ENCOUNTER — HOSPITAL ENCOUNTER (OUTPATIENT)
Dept: PHARMACY | Age: 77
Setting detail: THERAPIES SERIES
Discharge: HOME | End: 2022-05-10
Payer: MEDICARE

## 2022-05-10 DIAGNOSIS — Z95.2 H/O MITRAL VALVE REPLACEMENT: Primary | ICD-10-CM

## 2022-05-10 DIAGNOSIS — Z51.81 ENCOUNTER FOR THERAPEUTIC DRUG MONITORING: ICD-10-CM

## 2022-05-10 DIAGNOSIS — I48.91 ATRIAL FIBRILLATION, UNSPECIFIED TYPE (HCC): ICD-10-CM

## 2022-05-10 DIAGNOSIS — Z79.01 ANTICOAGULATED ON COUMADIN: ICD-10-CM

## 2022-05-10 LAB — POC INR: 3.3 (ref 0.8–1.2)

## 2022-05-10 PROCEDURE — 36416 COLLJ CAPILLARY BLOOD SPEC: CPT

## 2022-05-10 PROCEDURE — 99211 OFF/OP EST MAY X REQ PHY/QHP: CPT

## 2022-05-10 PROCEDURE — 85610 PROTHROMBIN TIME: CPT

## 2022-06-17 RX ORDER — CARVEDILOL 12.5 MG/1
TABLET ORAL
Qty: 180 TABLET | Refills: 2 | Status: SHIPPED | OUTPATIENT
Start: 2022-06-17

## 2022-06-21 ENCOUNTER — HOSPITAL ENCOUNTER (OUTPATIENT)
Dept: PHARMACY | Age: 77
Setting detail: THERAPIES SERIES
Discharge: HOME | End: 2022-06-21
Payer: MEDICARE

## 2022-06-21 DIAGNOSIS — Z79.01 ANTICOAGULATED ON COUMADIN: ICD-10-CM

## 2022-06-21 DIAGNOSIS — I48.91 ATRIAL FIBRILLATION, UNSPECIFIED TYPE (HCC): ICD-10-CM

## 2022-06-21 DIAGNOSIS — Z51.81 ENCOUNTER FOR THERAPEUTIC DRUG MONITORING: ICD-10-CM

## 2022-06-21 DIAGNOSIS — Z95.2 H/O MITRAL VALVE REPLACEMENT: Primary | ICD-10-CM

## 2022-06-21 LAB — POC INR: 3.9 (ref 0.8–1.2)

## 2022-06-21 PROCEDURE — 36416 COLLJ CAPILLARY BLOOD SPEC: CPT

## 2022-06-21 PROCEDURE — 85610 PROTHROMBIN TIME: CPT

## 2022-06-21 PROCEDURE — 99212 OFFICE O/P EST SF 10 MIN: CPT

## 2022-06-21 NOTE — PROGRESS NOTES
Medication Management [address]   [phone] (phone)  [phone] (fax)    Ms. Bebeto Méndez is a 65 y.o.  female with history of Afib, Mechanical MVR who presents today for anticoagulation monitoring and adjustment. Patient verifies current dosing regimen and tablet strength. No missed or extra doses. Patient denies s/s bleeding/bruising/swelling/SOB/chest pain  - Getting bruises more easily on hands. Just have to bumped into something and a bruise will appear. No blood in urine or stool. No dietary changes. - Have not been eating as many greens lately. No changes in medication/OTC agents/Herbals. No change in alcohol use or tobacco use. No change in activity level. - A lot more active. Helping her sister out lately who has stage 4 cancer. Patient denies headaches/dizziness/lightheadedness/falls. - Some headaches/dizziness lately. Been happening more lately, but believes it is secondary to being up and moving more lately. Also been worrying more. No vomiting/diarrhea or acute illness. No Procedures scheduled in the future at this time. Assessment:   Lab Results   Component Value Date    INR 3.90 (H) 2022    INR 3.30 (H) 05/10/2022    INR 2.80 (H) 2022     INR supratherapeutic   Recent Labs     22  1004   INR 3.90*   Goal INR 2.5-3.5    Plan:  Hold Coumadin today, then continue Coumadin 1 mg MF, 1.5 mg all other days. Recheck INR in 3 week(s). Patient reminded to call the Anticoagulation Clinic with any signs or symptoms of bleeding or with any medication changes. Patient given instructions utilizing the teach back method. After visit summary printed and reviewed with patient. Discharged ambulatory in no apparent distress.     For Pharmacy Admin Tracking Only     Intervention Detail: Adherence Monitorin and Dose Adjustment: 1, reason: Therapy Optimization   Total # of Interventions Recommended: 2   Total # of Interventions Accepted: 2   Time Spent (min): 1975 Alpha,Ami [address], PharmD, BCPS  6/21/2022  12:09 PM

## 2022-06-29 ENCOUNTER — PROCEDURE VISIT (OUTPATIENT)
Dept: CARDIOLOGY CLINIC | Age: 77
End: 2022-06-29
Payer: MEDICARE

## 2022-06-29 DIAGNOSIS — Z95.0 PACEMAKER: Primary | ICD-10-CM

## 2022-06-29 PROCEDURE — 93294 REM INTERROG EVL PM/LDLS PM: CPT | Performed by: NUCLEAR MEDICINE

## 2022-06-29 PROCEDURE — 93296 REM INTERROG EVL PM/IDS: CPT | Performed by: NUCLEAR MEDICINE

## 2022-06-29 NOTE — PROGRESS NOTES
[employer] dual pacer     . Battery longevity:  2 years on device   Presenting rhythm  AF     Atrial impedance 399  RV impedance 399    P wave sensing 0  R wave sensing 6.4    0 % atrial paced  99.2 % RV paced     Atrial threshold AF   RV threshold 1.125 V at 0.4ms  Mode switches   Coumadin

## 2022-07-01 DIAGNOSIS — E11.9 TYPE 2 DIABETES MELLITUS WITHOUT COMPLICATION, WITHOUT LONG-TERM CURRENT USE OF INSULIN (HCC): ICD-10-CM

## 2022-07-01 RX ORDER — SITAGLIPTIN 100 MG/1
TABLET, FILM COATED ORAL
Qty: 90 TABLET | Refills: 0 | Status: SHIPPED | OUTPATIENT
Start: 2022-07-01 | End: 2022-09-29

## 2022-07-11 ENCOUNTER — HOSPITAL ENCOUNTER (OUTPATIENT)
Dept: PHARMACY | Age: 77
Setting detail: THERAPIES SERIES
Discharge: HOME | End: 2022-07-11
Payer: MEDICARE

## 2022-07-11 ENCOUNTER — OFFICE VISIT (OUTPATIENT)
Dept: PULMONOLOGY | Age: 77
End: 2022-07-11
Payer: MEDICARE

## 2022-07-11 VITALS
BODY MASS INDEX: 32.13 KG/M2 | OXYGEN SATURATION: 96 % | HEIGHT: 68 IN | HEART RATE: 89 BPM | WEIGHT: 212 LBS | DIASTOLIC BLOOD PRESSURE: 74 MMHG | SYSTOLIC BLOOD PRESSURE: 128 MMHG | TEMPERATURE: 97.4 F

## 2022-07-11 DIAGNOSIS — Z79.01 ANTICOAGULATED ON COUMADIN: ICD-10-CM

## 2022-07-11 DIAGNOSIS — E66.9 OBESITY (BMI 30-39.9): ICD-10-CM

## 2022-07-11 DIAGNOSIS — Z99.89 OBSTRUCTIVE SLEEP APNEA ON CPAP: Primary | ICD-10-CM

## 2022-07-11 DIAGNOSIS — Z51.81 ENCOUNTER FOR THERAPEUTIC DRUG MONITORING: ICD-10-CM

## 2022-07-11 DIAGNOSIS — I48.91 ATRIAL FIBRILLATION, UNSPECIFIED TYPE (HCC): ICD-10-CM

## 2022-07-11 DIAGNOSIS — G47.09 OTHER INSOMNIA: ICD-10-CM

## 2022-07-11 DIAGNOSIS — Z95.2 H/O MITRAL VALVE REPLACEMENT: Primary | ICD-10-CM

## 2022-07-11 DIAGNOSIS — G47.33 OBSTRUCTIVE SLEEP APNEA ON CPAP: Primary | ICD-10-CM

## 2022-07-11 LAB — POC INR: 3 (ref 0.8–1.2)

## 2022-07-11 PROCEDURE — 85610 PROTHROMBIN TIME: CPT

## 2022-07-11 PROCEDURE — G8399 PT W/DXA RESULTS DOCUMENT: HCPCS | Performed by: PHYSICIAN ASSISTANT

## 2022-07-11 PROCEDURE — 36416 COLLJ CAPILLARY BLOOD SPEC: CPT

## 2022-07-11 PROCEDURE — G8417 CALC BMI ABV UP PARAM F/U: HCPCS | Performed by: PHYSICIAN ASSISTANT

## 2022-07-11 PROCEDURE — 1123F ACP DISCUSS/DSCN MKR DOCD: CPT | Performed by: PHYSICIAN ASSISTANT

## 2022-07-11 PROCEDURE — 1090F PRES/ABSN URINE INCON ASSESS: CPT | Performed by: PHYSICIAN ASSISTANT

## 2022-07-11 PROCEDURE — G8427 DOCREV CUR MEDS BY ELIG CLIN: HCPCS | Performed by: PHYSICIAN ASSISTANT

## 2022-07-11 PROCEDURE — 99211 OFF/OP EST MAY X REQ PHY/QHP: CPT

## 2022-07-11 PROCEDURE — 99213 OFFICE O/P EST LOW 20 MIN: CPT | Performed by: PHYSICIAN ASSISTANT

## 2022-07-11 PROCEDURE — 1036F TOBACCO NON-USER: CPT | Performed by: PHYSICIAN ASSISTANT

## 2022-07-11 ASSESSMENT — ENCOUNTER SYMPTOMS
ALLERGIC/IMMUNOLOGIC NEGATIVE: 1
SHORTNESS OF BREATH: 0
BACK PAIN: 0
WHEEZING: 0
EYES NEGATIVE: 1
NAUSEA: 0
COUGH: 0
CHEST TIGHTNESS: 0
STRIDOR: 0
DIARRHEA: 0

## 2022-07-11 NOTE — PROGRESS NOTES
[facility]      Yasmani Jones         [id number]  7/11/2022   Chief Complaint   Patient presents with    Follow-up     4 month POLINA follow up after pressure change         Pt of Dr. Lloyd Muniz     PAP Download:   Original or initial AHI: 40.8     Date of initial study: 03/21/2011      Compliant  97%     Noncompliant 3 %     PAP Type Cpap   Level  10 cmH2O    Avg Hrs/Day 5 hours 40 minutes   AHI: 2.6   Recorded compliance dates , 06/06/2022  to 07/05/2022  Machine/Mfg:   [x] ResMed    [] Respironics/Dreamstation   Interface:   [] Nasal    [] Nasal pillows   [x] FFM      Provider:      [x] -CHARLENE     []Madhu     [] Vernon    [] Patrice Jaimes    [] Masood               [] [facility]      [] Adaptive    [] [facility]:      [] Other    Neck Size: 15.75  Mallampati Mallampati 3  ESS:  8  SAQLI: 49    Here is a scan of the most recent download:            Presentation:   Lindalou Prader presents for sleep medicine follow up for obstructive sleep apnea  Since the last visit, Lindalou Prader is doing better with PAP since pressure was decreased. She is waking at night but not as often. She sleeps about 5-6 hours with PAP then gets up and sleeps about 2 hours in recliner. Equipment issues: The pressure is  acceptable, the mask is acceptable     Sleep issues:  Do you feel better? Yes  More rested? Yes   Better concentration? yes    Progress History:   Since last visit any new medical issues? No  New ER or hospital visits? No  Any new or changes in medicines? No  Any new sleep medicines? No    Review of Systems -   Review of Systems   Constitutional: Negative for activity change, appetite change, chills and fever. HENT: Negative for congestion and postnasal drip. Eyes: Negative. Respiratory: Negative for cough, chest tightness, shortness of breath, wheezing and stridor. Cardiovascular: Positive for leg swelling. Negative for chest pain.    Gastrointestinal: Negative for diarrhea and nausea. Endocrine: Negative. Genitourinary: Negative. Musculoskeletal: Negative. Negative for arthralgias and back pain. Skin: Negative. Allergic/Immunologic: Negative. Neurological: Negative. Negative for dizziness and light-headedness. Psychiatric/Behavioral: Negative. All other systems reviewed and are negative. Physical Exam:    BMI:  Body mass index is 32.23 kg/m². Wt Readings from Last 3 Encounters:   07/11/22 212 lb (96.2 kg)   03/09/22 211 lb (95.7 kg)   03/07/22 212 lb 6.4 oz (96.3 kg)     Weight stable / unchanged  Vitals: /74   Pulse 89   Temp 97.4 °F (36.3 °C)   Ht 5' 8\" (1.727 m)   Wt 212 lb (96.2 kg)   SpO2 96% Comment: r/a  BMI 32.23 kg/m²       Physical Exam  Constitutional:       General: She is not in acute distress. Appearance: Normal appearance. She is normal weight. She is not ill-appearing. HENT:      Head: Normocephalic and atraumatic. Nose: Nose normal.   Eyes:      Extraocular Movements: Extraocular movements intact. Pupils: Pupils are equal, round, and reactive to light. Pulmonary:      Effort: Pulmonary effort is normal.   Musculoskeletal:      Right lower leg: Edema present. Left lower leg: Edema present. Neurological:      Mental Status: She is alert and oriented to person, place, and time. Psychiatric:         Attention and Perception: Attention and perception normal.         Mood and Affect: Mood and affect normal.         Speech: Speech normal.         Behavior: Behavior normal.         Thought Content: Thought content normal.         Cognition and Memory: Cognition and memory normal.         Judgment: Judgment normal.           ASSESSMENT/DIAGNOSIS     Diagnosis Orders   1. Obstructive sleep apnea on CPAP     2. Obesity (BMI 30-39.9)     3. Other insomnia              Plan   Do you need any equipment today?  No  - Download reviewed and discussed with patient  - She  was advised to continue current positive airway pressure therapy with above described pressure. - She  advised to keep good compliance with current recommended pressure to get optimal results and clinical improvement  - Recommend 7-9 hours of sleep with PAP  - She was advised to call [employer] regarding supplies if needed.   -She call my office for earlier appointment if needed for worsening of sleep symptoms.   - She was instructed on weight loss  - Tali Amado was educated about my impression and plan. Patient verbalizesunderstanding.   We will see Graciela Cherry back in: 1 year with download    Information added by my medical assistant/LPN was reviewed today         Agata Coronel PA-C, MPAS  7/11/2022

## 2022-07-11 NOTE — PROGRESS NOTES
Medication Management [address]  [phone] (phone)  [phone] (fax)    Ms. Luke Mcgraw is a 65 y.o.  female with history of Afib, Mechanical MVR who presents today for anticoagulation monitoring and adjustment. Patient verifies current dosing regimen and tablet strength. No missed or extra doses. Patient denies s/s bleeding/bruising/swelling/SOB/chest pain  No blood in urine or stool. No dietary changes. - Less green leafy vegetables lately  No changes in medication/OTC agents/Herbals. No change in alcohol use or tobacco use. No change in activity level. Patient denies headaches/dizziness/lightheadedness/falls. No vomiting/diarrhea or acute illness. No Procedures scheduled in the future at this time. Assessment:   Lab Results   Component Value Date    INR 3.00 (H) 07/11/2022    INR 3.90 (H) 06/21/2022    INR 3.30 (H) 05/10/2022     INR therapeutic   Recent Labs     07/11/22  0950   INR 3.00*   Goal INR: 2.5-3.5    Plan:  Continue Coumadin 1 mg MF, 1.5 mg all other days. Recheck INR in 6 week(s). Patient has been therapeutic besides the last appointment. Will resume checking INR every 6 weeks. Patient reminded to call the Anticoagulation Clinic with any signs or symptoms of bleeding or with any medication changes. Patient given instructions utilizing the teach back method. After visit summary printed and reviewed with patient. Discharged ambulatory in no apparent distress.     For Pharmacy Admin Tracking Only   Time Spent (min): [address], PharmD, BCPS  7/11/2022  10:17 AM

## 2022-07-15 DIAGNOSIS — E11.21 TYPE 2 DIABETES MELLITUS WITH DIABETIC NEPHROPATHY, WITHOUT LONG-TERM CURRENT USE OF INSULIN (HCC): ICD-10-CM

## 2022-07-15 RX ORDER — GLIMEPIRIDE 2 MG/1
TABLET ORAL
Qty: 180 TABLET | Refills: 3 | Status: SHIPPED | OUTPATIENT
Start: 2022-07-15 | End: 2022-08-22

## 2022-07-15 RX ORDER — GLIMEPIRIDE 2 MG/1
TABLET ORAL
Qty: 180 TABLET | Refills: 3 | Status: CANCELLED | OUTPATIENT
Start: 2022-07-15

## 2022-07-15 NOTE — TELEPHONE ENCOUNTER
This medication refill is regarding a fax request.  Refill requested by  [facility] . Requested Prescriptions     Pending Prescriptions Disp Refills    glimepiride (AMARYL) 2 MG tablet 180 tablet 3     Sig: TAKE 1 TABLET TWICE A DAY     Date of last visit: 1/31/2022   Date of next visit: 8/2/2022  Date of last refill: 7/26/21 #180/3    Last Hemoglobin A1C:    Lab Results   Component Value Date/Time    LABA1C 6.4 01/27/2022 12:05 PM    AVGG 132 01/27/2022 12:05 PM     Rx verified, ordered and set to EP.

## 2022-07-20 DIAGNOSIS — I10 ESSENTIAL HYPERTENSION: ICD-10-CM

## 2022-07-20 RX ORDER — HYDRALAZINE HYDROCHLORIDE 50 MG/1
TABLET, FILM COATED ORAL
Qty: 270 TABLET | Refills: 3 | Status: SHIPPED | OUTPATIENT
Start: 2022-07-20

## 2022-07-28 ENCOUNTER — HOSPITAL ENCOUNTER (OUTPATIENT)
Age: 77
Discharge: HOME | End: 2022-07-28
Payer: MEDICARE

## 2022-07-28 DIAGNOSIS — N18.4 CKD (CHRONIC KIDNEY DISEASE) STAGE 4, GFR 15-29 ML/MIN (HCC): ICD-10-CM

## 2022-07-28 DIAGNOSIS — E11.21 TYPE 2 DIABETES MELLITUS WITH DIABETIC NEPHROPATHY, WITHOUT LONG-TERM CURRENT USE OF INSULIN (HCC): ICD-10-CM

## 2022-07-28 DIAGNOSIS — I48.20 CHRONIC ATRIAL FIBRILLATION (HCC): ICD-10-CM

## 2022-07-28 LAB
ALBUMIN SERPL-MCNC: 4.3 G/DL (ref 3.5–5.1)
ALP BLD-CCNC: 77 U/L (ref 38–126)
ALT SERPL-CCNC: 9 U/L (ref 11–66)
ANION GAP SERPL CALCULATED.3IONS-SCNC: 9 MEQ/L (ref 8–16)
AST SERPL-CCNC: 19 U/L (ref 5–40)
AVERAGE GLUCOSE: 138 MG/DL (ref 70–126)
BASOPHILS # BLD: 0.6 %
BASOPHILS ABSOLUTE: 0 THOU/MM3 (ref 0–0.1)
BILIRUB SERPL-MCNC: 0.4 MG/DL (ref 0.3–1.2)
BUN BLDV-MCNC: 31 MG/DL (ref 7–22)
CALCIUM SERPL-MCNC: 9.7 MG/DL (ref 8.5–10.5)
CHLORIDE BLD-SCNC: 109 MEQ/L (ref 98–111)
CHOLESTEROL, TOTAL: 148 MG/DL (ref 100–199)
CO2: 25 MEQ/L (ref 23–33)
CREAT SERPL-MCNC: 1.6 MG/DL (ref 0.4–1.2)
CREATININE, URINE: 173.9 MG/DL
EOSINOPHIL # BLD: 1.7 %
EOSINOPHILS ABSOLUTE: 0.1 THOU/MM3 (ref 0–0.4)
ERYTHROCYTE [DISTWIDTH] IN BLOOD BY AUTOMATED COUNT: 14.6 % (ref 11.5–14.5)
ERYTHROCYTE [DISTWIDTH] IN BLOOD BY AUTOMATED COUNT: 51.3 FL (ref 35–45)
GFR SERPL CREATININE-BSD FRML MDRD: 31 ML/MIN/1.73M2
GLUCOSE BLD-MCNC: 156 MG/DL (ref 70–108)
HBA1C MFR BLD: 6.6 % (ref 4.4–6.4)
HCT VFR BLD CALC: 36.7 % (ref 37–47)
HDLC SERPL-MCNC: 39 MG/DL
HEMOGLOBIN: 11 GM/DL (ref 12–16)
IMMATURE GRANS (ABS): 0.02 THOU/MM3 (ref 0–0.07)
IMMATURE GRANULOCYTES: 0.3 %
LDL CHOLESTEROL CALCULATED: 72 MG/DL
LYMPHOCYTES # BLD: 12.5 %
LYMPHOCYTES ABSOLUTE: 0.9 THOU/MM3 (ref 1–4.8)
MCH RBC QN AUTO: 28.5 PG (ref 26–33)
MCHC RBC AUTO-ENTMCNC: 30 GM/DL (ref 32.2–35.5)
MCV RBC AUTO: 95.1 FL (ref 81–99)
MICROALBUMIN UR-MCNC: 3.84 MG/DL
MICROALBUMIN/CREAT UR-RTO: 22 MG/G (ref 0–30)
MONOCYTES # BLD: 7.3 %
MONOCYTES ABSOLUTE: 0.5 THOU/MM3 (ref 0.4–1.3)
NUCLEATED RED BLOOD CELLS: 0 /100 WBC
PLATELET # BLD: 195 THOU/MM3 (ref 130–400)
PMV BLD AUTO: 11 FL (ref 9.4–12.4)
POTASSIUM SERPL-SCNC: 4.2 MEQ/L (ref 3.5–5.2)
RBC # BLD: 3.86 MILL/MM3 (ref 4.2–5.4)
SEG NEUTROPHILS: 77.6 %
SEGMENTED NEUTROPHILS ABSOLUTE COUNT: 5.4 THOU/MM3 (ref 1.8–7.7)
SODIUM BLD-SCNC: 143 MEQ/L (ref 135–145)
TOTAL PROTEIN: 7.3 G/DL (ref 6.1–8)
TRIGL SERPL-MCNC: 186 MG/DL (ref 0–199)
WBC # BLD: 7 THOU/MM3 (ref 4.8–10.8)

## 2022-07-28 PROCEDURE — 83036 HEMOGLOBIN GLYCOSYLATED A1C: CPT

## 2022-07-28 PROCEDURE — 80061 LIPID PANEL: CPT

## 2022-07-28 PROCEDURE — 82043 UR ALBUMIN QUANTITATIVE: CPT

## 2022-07-28 PROCEDURE — 85025 COMPLETE CBC W/AUTO DIFF WBC: CPT

## 2022-07-28 PROCEDURE — 80053 COMPREHEN METABOLIC PANEL: CPT

## 2022-07-28 PROCEDURE — 36415 COLL VENOUS BLD VENIPUNCTURE: CPT

## 2022-08-02 ENCOUNTER — OFFICE VISIT (OUTPATIENT)
Dept: FAMILY MEDICINE CLINIC | Age: 77
End: 2022-08-02
Payer: MEDICARE

## 2022-08-02 VITALS
SYSTOLIC BLOOD PRESSURE: 124 MMHG | HEIGHT: 68 IN | HEART RATE: 56 BPM | RESPIRATION RATE: 14 BRPM | DIASTOLIC BLOOD PRESSURE: 62 MMHG | BODY MASS INDEX: 31.52 KG/M2 | WEIGHT: 208 LBS

## 2022-08-02 DIAGNOSIS — N18.32 STAGE 3B CHRONIC KIDNEY DISEASE (HCC): ICD-10-CM

## 2022-08-02 DIAGNOSIS — Z13.31 POSITIVE DEPRESSION SCREENING: ICD-10-CM

## 2022-08-02 DIAGNOSIS — Z00.00 MEDICARE ANNUAL WELLNESS VISIT, SUBSEQUENT: Primary | ICD-10-CM

## 2022-08-02 DIAGNOSIS — Z71.89 ADVANCE CARE PLANNING: ICD-10-CM

## 2022-08-02 DIAGNOSIS — E11.21 TYPE 2 DIABETES MELLITUS WITH DIABETIC NEPHROPATHY, WITHOUT LONG-TERM CURRENT USE OF INSULIN (HCC): ICD-10-CM

## 2022-08-02 PROCEDURE — 1123F ACP DISCUSS/DSCN MKR DOCD: CPT | Performed by: FAMILY MEDICINE

## 2022-08-02 PROCEDURE — G0439 PPPS, SUBSEQ VISIT: HCPCS | Performed by: FAMILY MEDICINE

## 2022-08-02 PROCEDURE — 3044F HG A1C LEVEL LT 7.0%: CPT | Performed by: FAMILY MEDICINE

## 2022-08-02 RX ORDER — BLOOD SUGAR DIAGNOSTIC
STRIP MISCELLANEOUS
Qty: 100 STRIP | Refills: 3 | Status: CANCELLED | OUTPATIENT
Start: 2022-08-02

## 2022-08-02 RX ORDER — BLOOD SUGAR DIAGNOSTIC
STRIP MISCELLANEOUS
Qty: 1 EACH | Refills: 0 | Status: CANCELLED | OUTPATIENT
Start: 2022-08-02

## 2022-08-02 RX ORDER — GLUCOSAMINE HCL/CHONDROITIN SU 500-400 MG
CAPSULE ORAL
Qty: 100 STRIP | Refills: 3 | Status: SHIPPED | OUTPATIENT
Start: 2022-08-02

## 2022-08-02 RX ORDER — BLOOD-GLUCOSE METER
1 KIT MISCELLANEOUS DAILY
Qty: 1 KIT | Refills: 0 | Status: SHIPPED | OUTPATIENT
Start: 2022-08-02

## 2022-08-02 SDOH — ECONOMIC STABILITY: FOOD INSECURITY: WITHIN THE PAST 12 MONTHS, THE FOOD YOU BOUGHT JUST DIDN'T LAST AND YOU DIDN'T HAVE MONEY TO GET MORE.: NEVER TRUE

## 2022-08-02 SDOH — ECONOMIC STABILITY: FOOD INSECURITY: WITHIN THE PAST 12 MONTHS, YOU WORRIED THAT YOUR FOOD WOULD RUN OUT BEFORE YOU GOT MONEY TO BUY MORE.: NEVER TRUE

## 2022-08-02 ASSESSMENT — PATIENT HEALTH QUESTIONNAIRE - PHQ9
5. POOR APPETITE OR OVEREATING: 1
SUM OF ALL RESPONSES TO PHQ QUESTIONS 1-9: 12
4. FEELING TIRED OR HAVING LITTLE ENERGY: 2
9. THOUGHTS THAT YOU WOULD BE BETTER OFF DEAD, OR OF HURTING YOURSELF: 0
2. FEELING DOWN, DEPRESSED OR HOPELESS: 3
SUM OF ALL RESPONSES TO PHQ9 QUESTIONS 1 & 2: 6
7. TROUBLE CONCENTRATING ON THINGS, SUCH AS READING THE NEWSPAPER OR WATCHING TELEVISION: 1
6. FEELING BAD ABOUT YOURSELF - OR THAT YOU ARE A FAILURE OR HAVE LET YOURSELF OR YOUR FAMILY DOWN: 0
10. IF YOU CHECKED OFF ANY PROBLEMS, HOW DIFFICULT HAVE THESE PROBLEMS MADE IT FOR YOU TO DO YOUR WORK, TAKE CARE OF THINGS AT HOME, OR GET ALONG WITH OTHER PEOPLE: 0
8. MOVING OR SPEAKING SO SLOWLY THAT OTHER PEOPLE COULD HAVE NOTICED. OR THE OPPOSITE, BEING SO FIGETY OR RESTLESS THAT YOU HAVE BEEN MOVING AROUND A LOT MORE THAN USUAL: 0
SUM OF ALL RESPONSES TO PHQ QUESTIONS 1-9: 12
3. TROUBLE FALLING OR STAYING ASLEEP: 2
SUM OF ALL RESPONSES TO PHQ QUESTIONS 1-9: 12
1. LITTLE INTEREST OR PLEASURE IN DOING THINGS: 3
SUM OF ALL RESPONSES TO PHQ QUESTIONS 1-9: 12

## 2022-08-02 ASSESSMENT — LIFESTYLE VARIABLES
HOW MANY STANDARD DRINKS CONTAINING ALCOHOL DO YOU HAVE ON A TYPICAL DAY: PATIENT DOES NOT DRINK
HOW OFTEN DO YOU HAVE A DRINK CONTAINING ALCOHOL: NEVER

## 2022-08-02 ASSESSMENT — SOCIAL DETERMINANTS OF HEALTH (SDOH): HOW HARD IS IT FOR YOU TO PAY FOR THE VERY BASICS LIKE FOOD, HOUSING, MEDICAL CARE, AND HEATING?: NOT HARD AT ALL

## 2022-08-02 NOTE — PROGRESS NOTES
Medicare Annual Wellness Visit    Colon Kinsey is here for Medicare AWV (Wants to discuss concerns with CG only), Discuss Labs, and Other (Discuss Handicap letter)    Assessment & Plan     1. Medicare annual wellness visit, subsequent  2. Type 2 diabetes mellitus with diabetic nephropathy, without long-term current use of insulin (HCC)  -     glucose monitoring (FREESTYLE FREEDOM) kit; DAILY Starting Tue 8/2/2022, Disp-1 kit, R-0, NormalGlucometer brand per insurance and patient preference. -     blood glucose monitor strips; Dispense brand per insurance and patient preference. Test daily. , Disp-100 strip, R-3, Normal  -     Hemoglobin A1C; Future  3. Positive depression screening  4. Advance care [facility] Referral to ACP Clinical Specialist  5. Stage 3b chronic kidney disease ([address]  -     Basic Metabolic Panel; Future    Stop metformin due to CKD    Log blood sugars over the next 2 weeks and forward them to the office for my review. We will likely need to adjust her diabetes medications. Avoid nephrotoxic medications    Refer to advance care  for living will completion    Prescription sent to the pharmacy for new glucometer and test strips    Follow-up with specialists as planned    Labs as above before next visit     Recommendations for Preventive Services Due: see orders and patient instructions/AVS.    Recommended screening schedule for the next 5-10 years is provided to the patient in written form: see Patient Instructions/AVS.     Return in about 3 months (around 11/2/2022). Subjective     Patient reports that she has been under more stress recently with her sister dealing with advanced cancer and currently in a nursing home. The patient feels responsible for taking care of her sister and this is caused her increased stress. She feels overwhelmed at times.   She denies specific depression and feels that most of her symptoms are situational related to the stress of her sister's illness. Her diabetes has been stable and she states that she continues to take additional doses of metformin if she feels that her sugars are high. She understands that we have been trying to decrease her metformin due to her kidney function. She continues to follow-up with her specialists regularly and frequently. Blood pressures been stable. She takes her prescribed medications consistently and denies side effects. No recent illnesses or hospitalizations. Non-smoker. BMI 31.63. Patient's complete Health Risk Assessment and screening values have been reviewed and are found in Flowsheets. The following problems were reviewed today and where indicated follow up appointments were made and/or referrals ordered.     Positive Risk Factor Screenings with Interventions:    Fall Risk:  Do you feel unsteady or are you worried about falling? : (!) yes (at times)  2 or more falls in past year?: no  Fall with injury in past year?: no   Fall Risk Interventions:    Home safety tips provided     Depression:  PHQ-2 Score: 6  PHQ-9 Total Score: 12    Severity:1-4 = minimal depression, 5-9 = mild depression, 10-14 = moderate depression, 15-19 = moderately severe depression, 20-27 = severe depression  Depression Interventions:  Patient declines any further evaluation/treatment for this issue          General Health and ACP:  General  In general, how would you say your health is?: Fair  In the past 7 days, have you experienced any of the following: New or Increased Pain, New or Increased Fatigue, Loneliness, Social Isolation, Stress or Anger?: (!) Yes  Select all that apply: (!) New or Increased Fatigue, Stress  Do you get the social and emotional support that you need?:  (at times)  Do you have a Living Will?: (!) No    Advance Directives       Power of  Living Will ACP-Advance Directive ACP-Power of     Not on File Not on File Not on File Not on [address] [facility] Interventions:  Stress: patient's comments regarding reasons for stress and/or anger: see above  No Living Will: Patient referred to [place] Habits/Nutrition:  Physical Activity: Inactive    Days of Exercise per Week: 0 days    Minutes of Exercise per Session: 0 min     Have you lost any weight without trying in the past 3 months?: No  Body mass index: (!) 31.62  Have you seen the dentist within the past year?: Yes  Health Habits/Nutrition Interventions:  Inadequate physical activity:  patient agrees to increase physical activity as follows: walk as able             Objective   Vitals:    08/02/22 0951   BP: (!) 144/64   Pulse: 56   Resp: 14   Weight: 208 lb (94.3 kg)   Height: 5' 8\" (1.727 m)      Body mass index is 31.63 kg/m². General Appearance: alert and oriented to person, place and time, well developed and well- nourished, in no acute distress  Skin: warm and dry, no rash or erythema  Head: normocephalic and atraumatic  Eyes: pupils equal, round, and reactive to light, extraocular eye movements intact, conjunctivae normal  ENT: tympanic membrane, external ear and ear canal normal bilaterally, nose without deformity, nasal mucosa and turbinates normal without polyps  Neck: supple and non-tender without mass, no thyromegaly or thyroid nodules, no cervical lymphadenopathy  Pulmonary/Chest: clear to auscultation bilaterally- no wheezes, rales or rhonchi, normal air movement, no respiratory distress  Cardiovascular: normal rate, regular rhythm, normal S1 and S2, no murmurs, rubs, clicks, or gallops, distal pulses intact, no carotid bruits.   Artificial valve click noted  Musculoskeletal: normal range of motion, no joint swelling, deformity or tenderness   Component      Latest Ref Rng & Units 7/28/2022   WBC      4.8 - 10.8 thou/mm3 7.0   RBC      4.20 - 5.40 mill/mm3 3.86 (L)   Hemoglobin Quant      12.0 - 16.0 gm/dl 11.0 (L)   Hematocrit      37.0 - 47.0 % 36.7 (L)   MCV      81.0 - 99.0 fL 95.1   MCH      26.0 - 33.0 pg 28.5   MCHC      32.2 - 35.5 gm/dl 30.0 (L)   RDW-CV      11.5 - 14.5 % 14.6 (H)   RDW-SD      35.0 - 45.0 fL 51.3 (H)   Platelet Count      818 - 400 thou/mm3 195   MPV      9.4 - 12.4 fL 11.0   Seg Neutrophils      % 77.6   Lymphocytes      % 12.5   Monocytes      % 7.3   Eosinophils      % 1.7   Basophils      % 0.6   Immature Granulocytes      % 0.3   Segs Absolute      1.8 - 7.7 thou/mm3 5.4   Lymphocytes Absolute      1.0 - 4.8 thou/mm3 0.9 (L)   Monocytes Absolute      0.4 - 1.3 thou/mm3 0.5   Eosinophils Absolute      0.0 - 0.4 thou/mm3 0.1   Basophils Absolute      0.0 - 0.1 thou/mm3 0.0   Immature Grans (Abs)      0.00 - 0.07 thou/mm3 0.02   Nucleated Red Blood Cells      /100 wbc 0   GLUCOSE, FASTING,GF      70 - 108 mg/dL 156 (H)   Creatinine      0.4 - 1.2 mg/dL 1.6 (H)   BUN,BUNPL      7 - 22 mg/dL 31 (H)   Sodium      135 - 145 meq/L 143   Potassium      3.5 - 5.2 meq/L 4.2   Chloride      98 - 111 meq/L 109   CO2      23 - 33 meq/L 25   CALCIUM, SERUM, [id number]      8.5 - 10.5 mg/dL 9.7   AST      5 - 40 U/L 19   Alk Phos      38 - 126 U/L 77   Total Protein      6.1 - 8.0 g/dL 7.3   Albumin      3.5 - 5.1 g/dL 4.3   Bilirubin      0.3 - 1.2 mg/dL 0.4   ALT      11 - 66 U/L 9 (L)   CHOLESTEROL, TOTAL, [id number]      100 - 199 mg/dL 148   Triglycerides      0 - 199 mg/dL 186   HDL Cholesterol      mg/dL 39   LDL Calculated      mg/dL 72   Microalbumin, Random Urine      mg/dL 3.84   Creatinine, Urine      mg/dL 173.9   Microalb/Creat Ratio      0 - 30 mg/g 22   Hemoglobin A1C      4.4 - 6.4 % 6.6 (H)   AVERAGE GLUCOSE      70 - 126 mg/dL 138 (H)   Anion Gap      8.0 - 16.0 meq/L 9.0   Est, Glom Filt Rate      ml/min/1.73m2 31 (A)             Allergies   Allergen Reactions    Adalat [Nifedipine] Itching     redness    Amlodipine Swelling     If take more than 5mg legs swell, hot and red    Potassium-Containing Compounds      SHIMA    Tape [Adhesive Tape] Rash     Skin Keara Oconnor MD   bumetanide (BUMEX) 1 MG tablet Take 1 tablet by mouth daily Yes [address], MD   amoxicillin (AMOXIL) 500 MG capsule Take by mouth Prior to teeth cleaning. Yes Historical Provider, MD   Acetaminophen (TYLENOL PO) Take 500 mg by mouth every 4 hours as needed Don't take more than 3,000 mg each day, including what's in Coricidin. Yes Historical Provider, MD   loperamide (IMODIUM) 2 MG capsule Take 2 mg by mouth 4 times daily as needed for Diarrhea  Yes Historical Provider, MD   fluorouracil (EFUDEX) 5 % cream Apply topically daily Indications: precancer treatment Off it for intervals, then back on. Yes Historical Provider, MD   metroNIDAZOLE (METROCREAM) 0.75 % cream Apply topically See Admin Instructions Indications: Skin Abnormalities As needed Yes Historical Provider, MD   fluticasone (FLONASE) 50 MCG/ACT nasal spray 2 sprays by Nasal route daily  Patient taking differently: 2 sprays by Nasal route daily as needed Yes Oralia Rocha MD   Blood Glucose Monitoring Suppl (ADVOCATE BLOOD GLUCOSE MONITOR) CONNIE Dx: 250.00 Yes Keara Oconnor MD       [facility] (Including outside providers/suppliers regularly involved in providing care):   Patient Care Team:  Keara Oconnor MD as PCP - General (Family Medicine)  Keara Oconnor MD as PCP - [facility] Empaneled Provider  Oralia Rocha MD as Consulting Physician (Sleep Medicine Family Practice)  [address], MD as Cardiologist (Cardiology)  Precious Curry MD (Dermatology)     Reviewed and updated this visit:  Tobacco  Allergies  Meds  Med Hx  Surg Hx  Soc Hx  Fam Hx             PHQ-9 score today: (PHQ-9 Total Score: 12), additional evaluation and assessment performed, follow-up plan includes monitoring for now.   Patient feels that her symptoms are situational.        Electronically signed by Keara Oconnor MD on 8/2/2022 at 12:18 PM

## 2022-08-02 NOTE — PATIENT INSTRUCTIONS
Personalized Preventive Plan for Jose Sumner - 8/2/2022  Medicare offers a range of preventive health benefits. Some of the tests and screenings are paid in full while other may be subject to a deductible, co-insurance, and/or copay. Some of these benefits include a comprehensive review of your medical history including lifestyle, illnesses that may run in your family, and various assessments and screenings as appropriate. After reviewing your medical record and screening and assessments performed today your provider may have ordered immunizations, labs, imaging, and/or referrals for you. A list of these orders (if applicable) as well as your Preventive Care list are included within your After Visit Summary for your review. Other Preventive Recommendations:    A preventive eye exam performed by an eye specialist is recommended every 1-2 years to screen for glaucoma; cataracts, macular degeneration, and other eye disorders. A preventive dental visit is recommended every 6 months. Try to get at least 150 minutes of exercise per week or 10,000 steps per day on a pedometer . Order or download the FREE \"Exercise & Physical Activity: Your Everyday Guide\" from [employer]. Call 7-864.128.7919 or search [employer] online. You need 4178-8515 mg of calcium and 8117-8425 IU of vitamin D per day. It is possible to meet your calcium requirement with diet alone, but a vitamin D supplement is usually necessary to meet this goal.  When exposed to the sun, use a sunscreen that protects against both UVA and UVB radiation with an SPF of 30 or greater. Reapply every 2 to 3 hours or after sweating, drying off with a towel, or swimming. Always wear a seat belt when traveling in a car. Always wear a helmet when riding a bicycle or motorcycle.

## 2022-08-08 RX ORDER — LANCETS 30 GAUGE
EACH MISCELLANEOUS
Qty: 100 EACH | Refills: 3 | Status: SHIPPED | OUTPATIENT
Start: 2022-08-08

## 2022-08-08 NOTE — TELEPHONE ENCOUNTER
This medication refill is regarding a telephone request.  Refill requested by patient. Recently written Rx's for new meter and strips but lancets were not ordered. Requested Prescriptions     Pending Prescriptions Disp Refills    Lancets MISC 100 each 3     Sig: Freestyle Freedom lancets, use daily, DX: E11.21       Date of last visit: 8/2/2022   Date of next visit: none  Pharmacy Name: [facility]    Rx verified, ordered and set to EP. WCP    Pt also requesting an Rx for a handicapped placard due to not being able to walk distances. OK for Rx? Please advise.

## 2022-08-10 ENCOUNTER — TELEPHONE (OUTPATIENT)
Dept: PHARMACY | Age: 77
End: 2022-08-10

## 2022-08-10 DIAGNOSIS — Z95.2 MITRAL VALVE REPLACED: ICD-10-CM

## 2022-08-10 RX ORDER — WARFARIN SODIUM 1 MG/1
TABLET ORAL
Qty: 130 TABLET | Refills: 2 | Status: SHIPPED | OUTPATIENT
Start: 2022-08-10

## 2022-08-10 NOTE — TELEPHONE ENCOUNTER
Patient called and left a message that she needs a [facility] called into [facility] for her Coumadin   Call patient if you have any questions.

## 2022-08-12 ENCOUNTER — CLINICAL DOCUMENTATION (OUTPATIENT)
Dept: SPIRITUAL SERVICES | Age: 77
End: 2022-08-12

## 2022-08-12 RX ORDER — WARFARIN SODIUM 1 MG/1
TABLET ORAL
Qty: 120 TABLET | Refills: 3 | OUTPATIENT
Start: 2022-08-12

## 2022-08-12 NOTE — ACP (ADVANCE CARE PLANNING)
Advance Care Planning   Ambulatory ACP Specialist Patient Outreach    Date:  8/12/2022  ACP Specialist:  TRICIA Finnegan    Outreach call to patient in follow-up to ACP Specialist referral from: Qi Camejo MD    [x] PCP  [] Provider   [] Ambulatory Care Management [] Other for Reason:    [x] Advance Directive Assistance  [] Code Status Discussion  [] Complete Portable DNR Order  [] Discuss Goals of Care  [] Complete POST/MOST  [] Early ACP Decision-Making  [] Other    Date Referral Received:08/02/2022    Today's Outreach:  [x] First   [] Second  [] Third                               Third outreach made by []  phone  [] email []   [employer]     Intervention:  [] Spoke with Patient  [x] Left VM requesting return call      Outcome:Placed initial outreach call to pt re: ACP Specialist Referral. Left VM on \"Es\" mailbox and will make next attempt outreach as per process. Next Step:   [] ACP scheduled conversation  [x] Outreach again in one week               [] Email / Mail ACP Info Sheets  [] Email / Mail Advance Directive            [] Close Referral. Routing closure to referring provider/staff and to ACP Specialist . [] Closure Letter mailed to Patient with Invitation to Contact ACP Specialist if/when ready.     Thank you for this referral.

## 2022-08-16 ENCOUNTER — TELEPHONE (OUTPATIENT)
Dept: CARDIOLOGY CLINIC | Age: 77
End: 2022-08-16

## 2022-08-16 DIAGNOSIS — Z95.2 S/P MVR (MITRAL VALVE REPLACEMENT): ICD-10-CM

## 2022-08-16 DIAGNOSIS — I48.20 CHRONIC ATRIAL FIBRILLATION (HCC): Primary | ICD-10-CM

## 2022-08-16 NOTE — TELEPHONE ENCOUNTER
----- Message from Benito Horner RN sent at 8/16/2022 11:19 AM EDT -----  Please renew referral for Anticoagulation Monitoring, #111. Thanks, KEITH Glass RN BSN

## 2022-08-18 ENCOUNTER — CLINICAL DOCUMENTATION (OUTPATIENT)
Dept: SPIRITUAL SERVICES | Age: 77
End: 2022-08-18

## 2022-08-18 NOTE — ACP (ADVANCE CARE PLANNING)
Advance Care Planning   Ambulatory ACP Specialist Patient Outreach    Date:  8/18/2022  ACP Specialist:  TRICIA Nevarez    Outreach call to patient in follow-up to ACP Specialist referral from: Leslee Harris MD    [x] PCP  [] Provider   [] Ambulatory Care Management [] Other for Reason:    [x] Advance Directive Assistance  [] Code Status Discussion  [] Complete Portable DNR Order  [] Discuss Goals of Care  [] Complete POST/MOST  [] Early ACP Decision-Making  [] Other    Date Referral Received:08/02/2022    Today's Outreach:  [] First   [x] Second  [] Third                               Third outreach made by []  phone  [] email []   [employer]     Intervention:  [] Spoke with Patient  [x] Left VM requesting return call      Outcome:Placed second attempt outreach to pt re: ACP support, referral. Left VM on \"Es\" mailbox and will make next attempt outreach as per process. Next Step:   [] ACP scheduled conversation  [x] Outreach again in about one week               [] Email / Mail ACP Info Sheets  [] Email / Mail Advance Directive            [] Close Referral. Routing closure to referring provider/staff and to ACP Specialist . [] Closure Letter mailed to Patient with Invitation to Contact ACP Specialist if/when ready.     Thank you for this referral.

## 2022-08-22 ENCOUNTER — HOSPITAL ENCOUNTER (OUTPATIENT)
Dept: PHARMACY | Age: 77
Setting detail: THERAPIES SERIES
Discharge: HOME | End: 2022-08-22
Payer: MEDICARE

## 2022-08-22 DIAGNOSIS — Z95.2 H/O MITRAL VALVE REPLACEMENT: Primary | ICD-10-CM

## 2022-08-22 DIAGNOSIS — Z51.81 ENCOUNTER FOR THERAPEUTIC DRUG MONITORING: ICD-10-CM

## 2022-08-22 DIAGNOSIS — Z79.01 ANTICOAGULATED ON COUMADIN: ICD-10-CM

## 2022-08-22 LAB — POC INR: 2.6 (ref 0.8–1.2)

## 2022-08-22 PROCEDURE — 85610 PROTHROMBIN TIME: CPT

## 2022-08-22 PROCEDURE — 36416 COLLJ CAPILLARY BLOOD SPEC: CPT

## 2022-08-22 PROCEDURE — 99211 OFF/OP EST MAY X REQ PHY/QHP: CPT

## 2022-08-22 RX ORDER — BUMETANIDE 1 MG/1
TABLET ORAL
Qty: 90 TABLET | Refills: 3 | Status: SHIPPED | OUTPATIENT
Start: 2022-08-22

## 2022-08-22 NOTE — PROGRESS NOTES
Medication Management [address]  [phone] (phone)  [phone] (fax)    Ms. Sixto Craft is a 65 y.o.  female with history of Afib, Mechanical MVR who presents today for anticoagulation monitoring and adjustment. Patient verifies current dosing regimen and tablet strength. No missed or extra doses. Patient denies s/s bleeding/bruising/swelling/SOB/chest pain  No blood in urine or stool. No dietary changes. No changes in medication/OTC agents/Herbals. No change in alcohol use or tobacco use. Patient has been more active. Patient denies headaches/dizziness/lightheadedness/falls. No vomiting/diarrhea or acute illness. No Procedures scheduled in the future at this time. Assessment:   Lab Results   Component Value Date    INR 2.60 (H) 08/22/2022    INR 3.00 (H) 07/11/2022    INR 3.90 (H) 06/21/2022     INR therapeutic   Recent Labs     08/22/22  0956   INR 2.60*     Patient has been stable while on current regimen since the end of August 2021 with only one supratherapeutic INR since that time. Plan:  Continue Coumadin 1 mg MF and 1.5 mg [id number]. Recheck INR in 6 week(s). Patient reminded to call the Anticoagulation Clinic with any signs or symptoms of bleeding or with any medication changes. Patient given instructions utilizing the teach back method. After visit summary printed and reviewed with patient. Discharged ambulatory in no apparent distress.     For Pharmacy Admin Tracking Only    Total # of Interventions Recommended: 0  Total # of Interventions Accepted: 0  Time Spent (min): 7088  [facility], Naila, BCPS  8/22/2022  10:40 AM

## 2022-08-26 ENCOUNTER — CLINICAL DOCUMENTATION (OUTPATIENT)
Dept: SPIRITUAL SERVICES | Age: 77
End: 2022-08-26

## 2022-08-26 NOTE — ACP (ADVANCE CARE PLANNING)
Advance Care Planning   Ambulatory ACP Specialist Patient Outreach    Date:  8/26/2022  ACP Specialist:  TRICIA Merritt    Outreach call to patient in follow-up to ACP Specialist referral from: Won Alejo MD    [x] PCP  [] Provider   [] Ambulatory Care Management [] Other for Reason:    [x] Advance Directive Assistance  [] Code Status Discussion  [] Complete Portable DNR Order  [] Discuss Goals of Care  [] Complete POST/MOST  [] Early ACP Decision-Making  [] Other    Date Referral Received:08/02/2022    Today's Outreach:  [] First   [] Second  [x] Third                               Third outreach made by [x]  phone  [] email []   [employer]     Intervention:  [x] Spoke with Patient  [] Left VM requesting return call      Outcome:Spoke with pt today and she apologized for not returning call. Pt shared she is not available to talk and would rather reach out to ACP team if/when she is ready to discuss ACP. Pt shared she recently lost her sister and is preparing for the services today. Offered my condolences and thanked pt for taking my call today. Referral can be closed; pt stated she will call when she is ready to complete ACP. Next Step:   [] ACP scheduled conversation  [] Outreach again in one week               [] Email / Mail ACP Info Sheets  [] Email / Mail Advance Directive            [x] Close Referral. Routing closure to referring provider/staff and to ACP Specialist . [] Closure Letter mailed to Patient with Invitation to Contact ACP Specialist if/when ready.     Thank you for this referral.

## 2022-09-29 DIAGNOSIS — E11.9 TYPE 2 DIABETES MELLITUS WITHOUT COMPLICATION, WITHOUT LONG-TERM CURRENT USE OF INSULIN (HCC): ICD-10-CM

## 2022-09-29 RX ORDER — SITAGLIPTIN 100 MG/1
TABLET, FILM COATED ORAL
Qty: 90 TABLET | Refills: 3 | Status: SHIPPED | OUTPATIENT
Start: 2022-09-29

## 2022-09-29 NOTE — TELEPHONE ENCOUNTER
This medication refill is regarding a electronic request.  Refill requested by  [facility] . Requested Prescriptions     Pending Prescriptions Disp Refills    JANUVIA 100 MG tablet [Pharmacy Med Name: Laurent Corbin TABS 100MG] 90 tablet 0     Sig: TAKE 1 TABLET DAILY     Date of last visit: 8/2/22   Date of next visit: None  Date of last refill: 7/1/22 #90/0    Last Hemoglobin A1C:    Lab Results   Component Value Date/Time    LABA1C 6.6 07/28/2022 08:45 AM    AVGG 138 07/28/2022 08:45 AM     Rx verified, ordered and set to EP.

## 2022-10-03 ENCOUNTER — HOSPITAL ENCOUNTER (OUTPATIENT)
Dept: PHARMACY | Age: 77
Setting detail: THERAPIES SERIES
Discharge: HOME | End: 2022-10-03
Payer: MEDICARE

## 2022-10-03 DIAGNOSIS — Z95.2 H/O MITRAL VALVE REPLACEMENT: Primary | ICD-10-CM

## 2022-10-03 DIAGNOSIS — Z79.01 ANTICOAGULATED ON COUMADIN: ICD-10-CM

## 2022-10-03 DIAGNOSIS — Z51.81 ENCOUNTER FOR THERAPEUTIC DRUG MONITORING: ICD-10-CM

## 2022-10-03 DIAGNOSIS — I48.20 CHRONIC ATRIAL FIBRILLATION (HCC): ICD-10-CM

## 2022-10-03 LAB — POC INR: 3.8 (ref 0.8–1.2)

## 2022-10-03 PROCEDURE — 99212 OFFICE O/P EST SF 10 MIN: CPT

## 2022-10-03 PROCEDURE — 36416 COLLJ CAPILLARY BLOOD SPEC: CPT

## 2022-10-03 PROCEDURE — 85610 PROTHROMBIN TIME: CPT

## 2022-10-03 NOTE — PROGRESS NOTES
Medication Management [address]  [phone] (phone)  [phone] (fax)    Ms. Zelda Muse is a 65 y.o.  female with history of mechanical MVR/chronic atrial fib. , per Dr. Yelitza Luis referral, who presents today for Warfarin monitoring and adjustment (6 week visit). Patient verifies current dosing regimen and tablet strength. No missed or extra doses. Patient denies bleeding/chest pain. Had less swelling, but usual SOB/easy bruising. No blood in urine or stool. Dietary changes: had less salad, but plans to resume usual amount. Changes in medication/OTC agents/herbals: off Metformin, on Jardiance and higher dose of Amaryl. No change in alcohol use or tobacco use. No change in activity level. Patient denies headaches/falls. Has usual dizziness. No vomiting/diarrhea or acute illness. No procedures scheduled in the future at this time. Assessment:   Lab Results   Component Value Date    INR 3.80 (H) 10/03/2022    INR 2.60 (H) 08/22/2022    INR 3.00 (H) 07/11/2022     INR supratherapeutic - goal 2.5-3.5. Recent Labs     10/03/22  1011   INR 3.80*        Plan:  POCT INR ordered/performed/result reviewed. Hold today, M, then continue PO Coumadin 1 mg MF, 1.5 mg TWThSS. Recheck INR in 4-5 week(s). (Report given - orders entered by YO Damon, PharmD.)   Patient reminded to call the Anticoagulation Clinic with any signs or symptoms of bleeding or with any medication changes. Patient given instructions utilizing the teach back method. After visit summary printed and reviewed with patient. Advised extra caution. Discharged ambulatory in no apparent distress.     For Pharmacy Admin Tracking Only    Intervention Detail: Dose Adjustment: 1, reason: Therapy De-escalation  Total # of Interventions Recommended: 1  Total # of Interventions Accepted: 1  Time Spent (min):  0.75

## 2022-10-06 ENCOUNTER — NURSE ONLY (OUTPATIENT)
Dept: CARDIOLOGY CLINIC | Age: 77
End: 2022-10-06
Payer: MEDICARE

## 2022-10-06 DIAGNOSIS — Z95.0 PACEMAKER: Primary | ICD-10-CM

## 2022-10-06 PROCEDURE — 93280 PM DEVICE PROGR EVAL DUAL: CPT | Performed by: NUCLEAR MEDICINE

## 2022-10-06 NOTE — PROGRESS NOTES
In Office Medtronic Dual Pacemaker   Patient of [employer]    Battery 1.5-2.5 years    Presenting rhythm AF   Underlying dependent in ventricles at 30    A Impedance 437  RV Impedance 475    P wave sensing 0.8  R wave sensing - dependent    A Threshold - @ -  A Amplitude - @ -  RV Thresholds 1.25 @ 0.40  RV Amplitude 2.0 @ 0.40 adaptive      A Paced 0%  V Paced 99.1%    Programmed Mode VVIR 60      Afib [place] 100%  Hx afib - taking coumadin    Episodes   afib

## 2022-10-07 RX ORDER — ISOSORBIDE DINITRATE 10 MG/1
TABLET ORAL
Qty: 270 TABLET | Refills: 3 | Status: SHIPPED | OUTPATIENT
Start: 2022-10-07 | End: 2022-10-21

## 2022-10-18 ENCOUNTER — OFFICE VISIT (OUTPATIENT)
Dept: CARDIOLOGY CLINIC | Age: 77
End: 2022-10-18
Payer: MEDICARE

## 2022-10-18 VITALS
BODY MASS INDEX: 29.92 KG/M2 | DIASTOLIC BLOOD PRESSURE: 60 MMHG | HEIGHT: 69 IN | OXYGEN SATURATION: 97 % | SYSTOLIC BLOOD PRESSURE: 130 MMHG | WEIGHT: 202 LBS | HEART RATE: 76 BPM

## 2022-10-18 DIAGNOSIS — I50.32 CHRONIC DIASTOLIC CONGESTIVE HEART FAILURE, NYHA CLASS 2 (HCC): Primary | ICD-10-CM

## 2022-10-18 PROCEDURE — 1036F TOBACCO NON-USER: CPT | Performed by: NURSE PRACTITIONER

## 2022-10-18 PROCEDURE — 1123F ACP DISCUSS/DSCN MKR DOCD: CPT | Performed by: NURSE PRACTITIONER

## 2022-10-18 PROCEDURE — 99213 OFFICE O/P EST LOW 20 MIN: CPT | Performed by: NURSE PRACTITIONER

## 2022-10-18 PROCEDURE — G8399 PT W/DXA RESULTS DOCUMENT: HCPCS | Performed by: NURSE PRACTITIONER

## 2022-10-18 PROCEDURE — G8484 FLU IMMUNIZE NO ADMIN: HCPCS | Performed by: NURSE PRACTITIONER

## 2022-10-18 PROCEDURE — G8427 DOCREV CUR MEDS BY ELIG CLIN: HCPCS | Performed by: NURSE PRACTITIONER

## 2022-10-18 PROCEDURE — 1090F PRES/ABSN URINE INCON ASSESS: CPT | Performed by: NURSE PRACTITIONER

## 2022-10-18 PROCEDURE — G8417 CALC BMI ABV UP PARAM F/U: HCPCS | Performed by: NURSE PRACTITIONER

## 2022-10-18 ASSESSMENT — ENCOUNTER SYMPTOMS
ABDOMINAL DISTENTION: 0
SHORTNESS OF BREATH: 0
WHEEZING: 0
COUGH: 0
ABDOMINAL PAIN: 0

## 2022-10-18 NOTE — PROGRESS NOTES
Heart Failure Clinic       Visit Date: 10/18/2022  Cardiologist:  Dr. Jacey Talbert  Primary Care Physician: Dr. Karl Yepez MD    Cassy Myers is a 65 y.o. female who presents today for:  Chief Complaint   Patient presents with    Congestive Heart Failure       HPI:   Cassy Myers is a 65 y.o. female who presents to the office for a follow up patient visit in the heart failure clinic. Last seen by Dr. aJcey Talbert on 7/8, no changes made. Accompanied by self    TYPE HF: HFpEF (55%)  Cause: nonischemic  Device: Pacemaker  HX: MVR (2003), POLINA on CPAP, DM, HLD, HTN, Afib (coumadin)    Dry Wt:  205 202 on 10/18/22    Hospitalization:  > 6 months    Concerns today: here today for her 1 year f/u. Notes that she feels lightheaded/dizzy after taking her morning medications. It lasts for about 2 hrs. Continued SOB, no changes from baseline. Notes LE swelling if she does not wear her socks, no different than her normal. Still taking her Bumex and urinating well on it. Visit on 9/30/21: c/o of dizziness and feeling lightheaded going from a sitting to standing position. Activity: ADLs, LAND w/ long distances, no breaks needed  Diet: does not add salt, stays around 2500mg/day, stays under 2L/day    Patient has:  Chest Pain: no  SOB: no, chronic LAND  Orthopnea/PND: yes, elevates bed    POLINA: yes CPAP wears it most of the time.    Edema: yes  Fatigue: no  Abdominal bloating: no  Cough: no  Appetite: good      Past Medical History:   Diagnosis Date    Arrhythmia     CHRONIC ATRIAL FIB    Breast cancer ([facility] 12/10/2012    [facility]    Cancer ([facility] [address]     breast ca left    CHF (congestive heart failure) (HCC)     Chronic kidney disease, stage III (moderate) ([facility] [address] 10/29/2018    Depression     Diabetes mellitus (HCC)     Generalized headaches     History of dizziness     History of sinus bradycardia     History of therapeutic radiation     History of valvular heart disease     Hyperlipidemia     Hypertension Medtronic dual pacemaker 5/23/2017    MI, old     Mitral valve replaced     Numbness and tingling     Obesity     Obstructive sleep apnea on CPAP 2011    Osteopenia     SOB (shortness of breath)     HX OF:     Past Surgical History:   Procedure Laterality Date    BREAST LUMPECTOMY Left 01/09/2013    [facility]    BREAST SURGERY Left 1/15/13    re-excision cranial margin and mammosite spacer    CARDIAC CATHETERIZATION  10/06/2003    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 MV area by recent EKG was 1 sq. cm. and mean gradient across MV was 17mmHg. MV index was about 0.7 sq. cm. per body surface area. Moderate pulmonary artery systolic HTN. Patent coronary arteries. CARDIAC VALVE REPLACEMENT  2003    MVR  33mm [place] Mechanical valve    CARDIOVASCULAR STRESS TEST  05/26/2011    Cannot exclude mild degree of ischemia in anterolateral wall. EF 58%. Mildly abnormal nuclear scan. CARDIOVASCULAR STRESS TEST  7-09-10    Atrial fibrillation. Episodes of bradycardia mainly at night, which could be physiologic. Predominantly bradycardiac rhythm w/o significant pauses. Frequent PVC's     CARDIOVASCULAR STRESS TEST  1-14-08    Atrial fibrillation w/ controlled ventricular repsonse and an average heart rate of 58 bpm. 1 episode of 3 beat run of nonsustained ventricular tachyarrhythmia at rate of 110-158 bpm. 3 transient episodes of ventricular bigeminy w/ rate in mid 60's to low 70's. CARDIOVASCULAR STRESS TEST  2-08-99    Unremarkable routine stress test. Chronic A-fib. CARDIOVASCULAR STRESS TEST  2-08-00    Normal stress EKG. Hypertension, not well controlled. Patient's sitting BP was 160/94. At peak exercise BP was 180/102. Moderate degree of MVP.     CATARACT REMOVAL Right 7/16/14    CATARACT REMOVAL      COLONOSCOPY  2/2010    COLONOSCOPY  08/2017    KNEE ARTHROSCOPY  6/2011    KNEE SURGERY      GARTH STEROTACTIC LOC BREAST BIOPSY LEFT Left 12/10/2012    [facility]    GARTH STEROTACTIC LOC BREAST BIOPSY RIGHT Right 11/16/2011    benign    MITRAL VALVE REPLACEMENT      PACEMAKER INSERTION Left 05/18/2017    by Dr Iona Mehta 8/22/2017    COLONOSCOPY performed by Angie Denney MD at [facility]    OR EGD TRANSORAL BIOPSY SINGLE/MULTIPLE N/A 8/22/2017    EGD BIOPSY performed by Angie Denney MD at [address] [place] [address] ECHOCARDIOGRAM  05/26/2011    LV mildly dilated, systolic function mildly reduced. EF 40-45%. Mild diffuse hypokinesis. Mild lateral wall was hypokinetic. Apical lateral wall was dyskinetic. Wall thickness was at upper limits of normal. LA moderately dilated. RV and RA mildly dilated. Systolic function mildly reduced. Mild MR and TR.     TRANSTHORACIC ECHOCARDIOGRAM  01/14/2008    Biatrial enlargement, RV dilated. Atheromatous aortic root. Borderline LVH. Normal LV systolic function. EF 55%. Prosthetic MV appears to be functioning well. Mildly sclerosed AV. Trivial MR, mild TR, trivial PI, RVSP 47mmHg. TRANSTHORACIC ECHOCARDIOGRAM  2-08-00    Moderate MV stenosis based on available echo doppler data. UPPER GASTROINTESTINAL ENDOSCOPY  08/2017     Family History   Problem Relation Age of Onset    Hypertension Mother     Stroke Mother     Diabetes Mother     Brain Cancer Father     Cancer Sister     Kidney Cancer Sister     Heart Attack Brother     Heart Disease Brother     High Blood Pressure Brother     Melanoma Brother     Melanoma Brother     Breast Cancer Maternal Aunt     Breast Cancer Paternal Aunt      Social History     Tobacco Use    Smoking status: Never    Smokeless tobacco: Never    Tobacco comments:     Friends were heavy smokers   Substance Use Topics    Alcohol use:  Yes     Alcohol/week: 0.0 standard drinks     Comment: Occassionally     Current Outpatient Medications   Medication Sig Dispense Refill    isosorbide dinitrate (ISORDIL) 10 MG tablet TAKE 1 TABLET THREE TIMES A  tablet 3    SITagliptin (JANUVIA) 100 MG tablet TAKE 1 TABLET DAILY 90 tablet 3    glimepiride (AMARYL) 2 MG tablet Take 2 tablets twice daily 120 tablet 3    empagliflozin (JARDIANCE) 10 MG tablet Take 1 tablet by mouth daily 90 tablet 3    bumetanide (BUMEX) 1 MG tablet TAKE 1 TABLET DAILY 90 tablet 3    warfarin (COUMADIN) 1 MG tablet Take as directed by [facility] (#130 ~ 90 day supply) 130 tablet 2    Lancets MISC Freestyle Freedom lancets, use daily, DX: E11.21 100 each 3    glucose monitoring (FREESTYLE FREEDOM) kit 1 kit by Does not apply route daily Glucometer brand per insurance and patient preference. 1 kit 0    blood glucose monitor strips Dispense brand per insurance and patient preference. Test daily. 100 strip 3    hydrALAZINE (APRESOLINE) 50 MG tablet TAKE 1 TABLET THREE TIMES A  tablet 3    carvedilol (COREG) 12.5 MG tablet TAKE 1 TABLET TWICE A  tablet 2    simvastatin (ZOCOR) 10 MG tablet TAKE 1 TABLET AT BEDTIME 90 tablet 3    CPAP Machine MISC by Does not apply route Please change CPAP pressure to 10 cm H20. Please send download in 2 weeks 1 each 0    famotidine (PEPCID) 40 MG tablet Take 40 mg by mouth daily as needed Indications: Mildly Upset Stomach       nitroGLYCERIN (NITROLINGUAL) 0.4 MG/SPRAY 0.4 mg spray Indications: Angina Pectoris 1 spray SL q5 minutes prn chest pain. If third spray does not relieve pain, call 9-1-1. (Patient taking differently: Indications: Angina Pectoris 1 spray SL q5 minutes prn chest pain. If third spray does not relieve pain, call 9-1-1.) 4.9 g 0    olmesartan (BENICAR) 40 MG tablet TAKE 1 TABLET DAILY 90 tablet 3    Psyllium (METAMUCIL PO) Take by mouth daily as needed Indications: Constipation       mometasone (ELOCON) 0.1 % cream Apply topically daily. 50 g 0    Acetaminophen (TYLENOL PO) Take 500 mg by mouth every 4 hours as needed Don't take more than 3,000 mg each day, including what's in Coricidin.       loperamide (IMODIUM) 2 MG capsule Take 2 mg by mouth 4 times daily as needed for Diarrhea      fluorouracil (EFUDEX) 5 % cream Apply topically daily Indications: precancer treatment Off it for intervals, then back on.      metroNIDAZOLE (METROCREAM) 0.75 % cream Apply topically See Admin Instructions Indications: Skin Abnormalities As needed      fluticasone (FLONASE) 50 MCG/ACT nasal spray 2 sprays by Nasal route daily (Patient taking differently: 2 sprays by Nasal route daily as needed) 1 Bottle 6    Blood Glucose Monitoring Suppl (ADVOCATE BLOOD GLUCOSE MONITOR) CONNIE Dx: 250.00 1 Device 0    amoxicillin (AMOXIL) 500 MG capsule Take by mouth Prior to teeth cleaning. (Patient not taking: No sig reported)       No current facility-administered medications for this visit. Allergies   Allergen Reactions    Adalat [Nifedipine] Itching     redness    Amlodipine Swelling     If take more than 5mg legs swell, hot and red    Potassium-Containing Compounds      SHIMA    Tape [Adhesive Tape] Rash     Skin pulls off       SUBJECTIVE:   Review of Systems   Constitutional:  Negative for activity change, appetite change and fatigue. Respiratory:  Negative for cough, shortness of breath and wheezing. Cardiovascular:  Negative for chest pain, palpitations and leg swelling. Gastrointestinal:  Negative for abdominal distention and abdominal pain. Musculoskeletal:  Negative for gait problem. Neurological:  Positive for dizziness and light-headedness. Negative for weakness and headaches. Psychiatric/Behavioral:  Negative for sleep disturbance. OBJECTIVE:   Today's Vitals:  /60   Pulse 76   Ht 5' 9\" (1.753 m)   Wt 202 lb (91.6 kg)   SpO2 97%   BMI 29.83 kg/m²     Physical Exam  Vitals reviewed. Constitutional:       General: She is not in acute distress. Appearance: Normal appearance. She is well-developed. She is not diaphoretic. HENT:      Head: Normocephalic and atraumatic.    Eyes:      Conjunctiva/sclera: Conjunctivae normal.   Cardiovascular:      Rate and Rhythm: Normal reviewed:  BNP: No results found for: BNP  CBC:   Lab Results   Component Value Date/Time    WBC 7.0 07/28/2022 09:25 AM    RBC 3.86 07/28/2022 09:25 AM    RBC 4.56 04/27/2012 12:32 PM    HGB 11.0 07/28/2022 09:25 AM    HCT 36.7 07/28/2022 09:25 AM     07/28/2022 09:25 AM     CMP:    Lab Results   Component Value Date/Time     07/28/2022 09:25 AM    K 4.2 07/28/2022 09:25 AM     07/28/2022 09:25 AM    CO2 25 07/28/2022 09:25 AM    BUN 31 07/28/2022 09:25 AM    CREATININE 1.6 07/28/2022 09:25 AM    LABGLOM 31 07/28/2022 09:25 AM    GLUCOSE 156 07/28/2022 09:25 AM    GLUCOSE 129 12/15/2011 10:00 AM    CALCIUM 9.7 07/28/2022 09:25 AM     Hepatic Function Panel:    Lab Results   Component Value Date/Time    ALKPHOS 77 07/28/2022 09:25 AM    ALT 9 07/28/2022 09:25 AM    AST 19 07/28/2022 09:25 AM    PROT 7.3 07/28/2022 09:25 AM    BILITOT 0.4 07/28/2022 09:25 AM    BILIDIR 0.2 07/24/2012 10:13 AM    LABALBU 4.3 07/28/2022 09:25 AM     Magnesium:    Lab Results   Component Value Date/Time    MG 2.2 02/25/2022 10:38 AM     PT/INR:    Lab Results   Component Value Date/Time    PROTIME 2.42 12/15/2011 10:00 AM    INR 3.80 10/03/2022 10:11 AM    INR 4.92 04/19/2021 11:39 AM     Lipids:    Lab Results   Component Value Date/Time    TRIG 186 07/28/2022 09:25 AM    HDL 39 07/28/2022 09:25 AM    LDLCALC 72 07/28/2022 09:25 AM       ASSESSMENT AND PLAN:   The patient's condition/symptoms are Stable: No clinical evidence of fluid overload today. Continue current medical regimen without changes at present time. Diagnosis Orders   1.  Chronic diastolic congestive heart failure, NYHA class 2 (HCC)            Continue:  GDMT:   ACE/ARB/ARNI - Olmesartan 40 mg/day   BB - Coreg 12.5 mg BId   Diuretic - Bumex 1 gm daily  AA - Aldactone was tried, worsened kidneys  SGLT2 -  Jardiance  Vasodilator - Hydralazine 50 TID, Isordil 10 TID, nitro  Other - Coumadin (AFIB)    Diastolic dysfunction, EF of 55%  Afib on coumadin  Stable, no hospitalizations or weight gain. Urinating well. We will try holding her Isordil to see if this helps w/ symptoms. ECHO 2021: Somewhat thickened aortic valve, moderately dilated LA  Cath: none  Negative stress 2021  Lab reviewed - K 4.2 Cr 1.6 mag 2.2 Hgb 11.0    Repeat blood work with primary care physician  Holding isordil to see if this helps with your symptoms  Call on Friday w/ blood pressure log. No med changes today   Work on low sodium diet  Continue daily wts. F/U w/ Cardiology  F/U in clinic in 1 yr  Continue w/ compression socks          Tolerating above noted HF meds, no ill side effects noted. Will continue to monitor kidney function and electrolytes. Will optimize as tolerated. Pt is compliant w/ medications. Total visit time of 20 minutes has been spent with patient on education of symptoms, management, medication, and plan of care; as well as review of chart: labs, ECHO, radiology reports, etc.   I personally spent more then 50% of the appt time face to face with the patient. Daily weights  Fluid restriction of 2 Liters per day  Limit sodium in diet to around 6116-4237 mg/day  Monitor BP  Activity as tolerated       Patient was instructed to call the [address] for any changes in symptoms as noted in AVS.      Return in about 1 year (around 10/18/2023). or sooner if needed     Patient given educational materials - see patient instructions. We discussed the importance of weighing oneself and recording daily. We also discussed the importance of a low sodium diet, higher sodium foods to avoid and better low sodium food options. Patient verbalizes understanding of plan of care using teach back method, and is agreeable to the treatment plan.        Electronically signed by YANA Corrigan CNP on 10/18/2022 at 1:47 PM

## 2022-10-18 NOTE — PATIENT INSTRUCTIONS
You may receive a survey regarding the care you received during your visit. Your input is valuable to us. We encourage you to complete and return your survey. We hope you will choose us in the future for your healthcare needs. Continue:  Continue current medications  Daily weights and record  Fluid restriction of 2 Liters per day  Limit sodium in diet to around 4967-8703 mg/day  Monitor BP  Activity as tolerated     Call the Heart Failure Clinic for any of the following symptoms: [phone]  Weight gain of 2-3 pounds in 1 day or 5 pounds in 1 week  Increased shortness of breath  Shortness of breath while laying down  Cough  Chest pain  Swelling in feet, ankles or legs  Tenderness or bloating in the abdomen  Fatigue   Decreased appetite or nausea   Confusion      Repeat blood work with primary care physician  Holding isordil to see if this helps with your symptoms  Call on Friday w/ blood pressure log. No med changes today   Work on low sodium diet  Continue daily wts.   F/U w/ Cardiology  F/U in clinic in 1 yr  Continue w/ compression socks

## 2022-10-20 DIAGNOSIS — I20.9 ANGINA PECTORIS (HCC): ICD-10-CM

## 2022-10-20 RX ORDER — NITROGLYCERIN 400 UG/1
SPRAY ORAL
Qty: 4.9 G | Refills: 0 | Status: SHIPPED | OUTPATIENT
Start: 2022-10-20

## 2022-10-20 NOTE — TELEPHONE ENCOUNTER
This medication refill is regarding a electronic request.  Refill requested by  [facility] . Requested Prescriptions     Pending Prescriptions Disp Refills    nitroGLYCERIN (NITROLINGUAL) 0.4 MG/SPRAY 0.4 mg spray [Pharmacy Med Name: [address] [place] [address]. 9G 400MCG] 4.9 g 0     Sig: USE 1 SPRAY UNDER THE TONGUE EVERY 5 MINUTES AS NEEDED FOR CHEST PAIN. IF THIRD SPRAY DOES NOT RELIEVE PAIN, CALL 911 (FOR ANGINA PECTORIS)     Date of last visit: 8/2/2022   Date of next visit: None  Date of last refill: 1/31/22 #4.9g/0    Rx verified, ordered and set to EP.

## 2022-10-21 ENCOUNTER — TELEPHONE (OUTPATIENT)
Dept: CARDIOLOGY CLINIC | Age: 77
End: 2022-10-21

## 2022-10-21 NOTE — TELEPHONE ENCOUNTER
Does not feel like she is in \"outer space\" anymore since stopping Isordil. Medication is scored- if needs to cut in half.   BP 1 hr after medications  118/48. 78  128/58. 82  126/46. 87

## 2022-10-28 ENCOUNTER — HOSPITAL ENCOUNTER (OUTPATIENT)
Age: 77
Discharge: HOME | End: 2022-10-28
Payer: MEDICARE

## 2022-10-28 DIAGNOSIS — N18.32 STAGE 3B CHRONIC KIDNEY DISEASE (HCC): ICD-10-CM

## 2022-10-28 DIAGNOSIS — E11.21 TYPE 2 DIABETES MELLITUS WITH DIABETIC NEPHROPATHY, WITHOUT LONG-TERM CURRENT USE OF INSULIN (HCC): ICD-10-CM

## 2022-10-28 LAB
ANION GAP SERPL CALCULATED.3IONS-SCNC: 13 MEQ/L (ref 8–16)
AVERAGE GLUCOSE: 195 MG/DL (ref 70–126)
BUN BLDV-MCNC: 38 MG/DL (ref 7–22)
CALCIUM SERPL-MCNC: 9.6 MG/DL (ref 8.5–10.5)
CHLORIDE BLD-SCNC: 107 MEQ/L (ref 98–111)
CO2: 23 MEQ/L (ref 23–33)
CREAT SERPL-MCNC: 1.9 MG/DL (ref 0.4–1.2)
GFR SERPL CREATININE-BSD FRML MDRD: 27 ML/MIN/1.73M2
GLUCOSE BLD-MCNC: 170 MG/DL (ref 70–108)
HBA1C MFR BLD: 8.5 % (ref 4.4–6.4)
POTASSIUM SERPL-SCNC: 5.2 MEQ/L (ref 3.5–5.2)
SODIUM BLD-SCNC: 143 MEQ/L (ref 135–145)

## 2022-10-28 PROCEDURE — 83036 HEMOGLOBIN GLYCOSYLATED A1C: CPT

## 2022-10-28 PROCEDURE — 80048 BASIC METABOLIC PNL TOTAL CA: CPT

## 2022-10-28 PROCEDURE — 36415 COLL VENOUS BLD VENIPUNCTURE: CPT

## 2022-11-01 ENCOUNTER — HOSPITAL ENCOUNTER (OUTPATIENT)
Dept: PHARMACY | Age: 77
Setting detail: THERAPIES SERIES
Discharge: HOME | End: 2022-11-01
Payer: MEDICARE

## 2022-11-01 ENCOUNTER — TELEPHONE (OUTPATIENT)
Dept: FAMILY MEDICINE CLINIC | Age: 77
End: 2022-11-01

## 2022-11-01 DIAGNOSIS — I48.20 CHRONIC ATRIAL FIBRILLATION (HCC): ICD-10-CM

## 2022-11-01 DIAGNOSIS — Z95.2 H/O MITRAL VALVE REPLACEMENT: Primary | ICD-10-CM

## 2022-11-01 DIAGNOSIS — Z79.01 ANTICOAGULATED ON COUMADIN: ICD-10-CM

## 2022-11-01 DIAGNOSIS — Z51.81 ENCOUNTER FOR THERAPEUTIC DRUG MONITORING: ICD-10-CM

## 2022-11-01 LAB — POC INR: 3.8 (ref 0.8–1.2)

## 2022-11-01 PROCEDURE — 85610 PROTHROMBIN TIME: CPT | Performed by: PHARMACIST

## 2022-11-01 PROCEDURE — 99212 OFFICE O/P EST SF 10 MIN: CPT | Performed by: PHARMACIST

## 2022-11-01 PROCEDURE — 36416 COLLJ CAPILLARY BLOOD SPEC: CPT | Performed by: PHARMACIST

## 2022-11-01 NOTE — PROGRESS NOTES
Medication Management [address]  [phone] (phone)  [phone] (fax)    Ms. Nicol Calvillo is a 65 y.o.  female with history of Mechanical MVR who presents today for anticoagulation monitoring and adjustment. Patient verifies current dosing regimen and tablet strength. No missed or extra doses. Patient denies s/s bleeding/bruising/swelling/SOB/chest pain  No blood in urine or stool. No dietary changes. No changes in medication/OTC agents/Herbals. No change in alcohol use or tobacco use. No change in activity level. Patient denies headaches/lightheadedness/falls. - typical dizziness  No vomiting/diarrhea or acute illness. No Procedures scheduled in the future at this time. Assessment:   Lab Results   Component Value Date    INR 3.80 (H) 11/01/2022    INR 3.80 (H) 10/03/2022    INR 2.60 (H) 08/22/2022     INR supratherapeutic   Recent Labs     11/01/22  1005   INR 3.80*        Plan:  Coumadin 0.5mg today, then decrease Coumadin 1.5mg MWF and 1mg TThSaS (10.5% decrease). Recheck INR in 2 week(s). Patient reminded to call the Anticoagulation Clinic with signs or symptoms of bleeding or with any medication changes. Patient given instructions utilizing the teach back method. After visit summary printed and reviewed with patient. Discharged ambulatory in no apparent distress.       For Pharmacy Admin Tracking Only    Intervention Detail: Dose Adjustment: 1, reason: Therapy De-escalation  Total # of Interventions Recommended: 1  Total # of Interventions Accepted: 1  Time Spent (min): 20

## 2022-11-01 NOTE — TELEPHONE ENCOUNTER
----- Message from Michael Brambila MD sent at 10/30/2022  5:06 PM EDT -----  Renal function worsening. Refer to nephrology to establish care.  CG

## 2022-11-01 NOTE — TELEPHONE ENCOUNTER
Pt notified. She is going to research Nephrologist and contact us via [employer] on who she would like to see.

## 2022-11-02 ENCOUNTER — PATIENT MESSAGE (OUTPATIENT)
Dept: FAMILY MEDICINE CLINIC | Age: 77
End: 2022-11-02

## 2022-11-02 DIAGNOSIS — N18.32 STAGE 3B CHRONIC KIDNEY DISEASE (HCC): Primary | ICD-10-CM

## 2022-11-02 NOTE — TELEPHONE ENCOUNTER
From: Nicol Calvillo  To: Dr. Princess Manzo: 11/2/2022 11:04 AM EDT  Subject: Response to your call on Tuesday Kidney specialist    Your staff called me yesterday (Tuesday) about seeing a Kidney specialist. Romero Donato to schedule something with Dr. Jerry Grey called your office but call was dropped.

## 2022-11-17 ENCOUNTER — HOSPITAL ENCOUNTER (OUTPATIENT)
Dept: PHARMACY | Age: 77
Setting detail: THERAPIES SERIES
Discharge: HOME | End: 2022-11-17
Payer: MEDICARE

## 2022-11-17 DIAGNOSIS — Z51.81 ENCOUNTER FOR THERAPEUTIC DRUG MONITORING: ICD-10-CM

## 2022-11-17 DIAGNOSIS — Z95.2 H/O MITRAL VALVE REPLACEMENT: Primary | ICD-10-CM

## 2022-11-17 DIAGNOSIS — Z79.01 ANTICOAGULATED ON COUMADIN: ICD-10-CM

## 2022-11-17 DIAGNOSIS — I48.20 CHRONIC ATRIAL FIBRILLATION (HCC): ICD-10-CM

## 2022-11-17 LAB — POC INR: 3.1 (ref 0.8–1.2)

## 2022-11-17 PROCEDURE — 85610 PROTHROMBIN TIME: CPT | Performed by: PHARMACIST

## 2022-11-17 PROCEDURE — 36416 COLLJ CAPILLARY BLOOD SPEC: CPT | Performed by: PHARMACIST

## 2022-11-17 PROCEDURE — 99211 OFF/OP EST MAY X REQ PHY/QHP: CPT | Performed by: PHARMACIST

## 2022-11-17 NOTE — PROGRESS NOTES
Medication Management [address]  [phone] (phone)  [phone] (fax)    Ms. Shey Lee is a 65 y.o.  female with history of Afib, Mechanical MVR who presents today for anticoagulation monitoring and adjustment. Patient verifies current dosing regimen and tablet strength. No missed or extra doses. Patient denies s/s bleeding/bruising/chest pain - typical SOB/swelling  No blood in urine or stool. No dietary changes. No changes in medication/OTC agents/Herbals. No change in alcohol use or tobacco use. No change in activity level. Patient denies headaches/lightheadedness/falls. -typical dizziness  No vomiting/diarrhea or acute illness. No Procedures scheduled in the future at this time. Assessment:   Lab Results   Component Value Date    INR 3.10 (H) 11/17/2022    INR 3.80 (H) 11/01/2022    INR 3.80 (H) 10/03/2022     INR therapeutic   Recent Labs     11/17/22  1048   INR 3.10*     Goal 2.5-3.5    First therapeutic INR after a dose change. Plan:  Continue Coumadin 1.5mg MWF and 1mg TThSaS. Recheck INR in 3 week(s). Patient reminded to call the Anticoagulation Clinic with any signs or symptoms of bleeding or with any medication changes. Patient given instructions utilizing the teach back method. After visit summary printed and reviewed with patient. Discharged ambulatory in no apparent distress.     For Pharmacy Admin Tracking Only    Time Spent (min): 20

## 2022-12-08 ENCOUNTER — HOSPITAL ENCOUNTER (OUTPATIENT)
Dept: PHARMACY | Age: 77
Setting detail: THERAPIES SERIES
Discharge: HOME | End: 2022-12-08
Payer: MEDICARE

## 2022-12-08 DIAGNOSIS — Z95.2 H/O MITRAL VALVE REPLACEMENT: Primary | ICD-10-CM

## 2022-12-08 DIAGNOSIS — I48.20 CHRONIC ATRIAL FIBRILLATION (HCC): ICD-10-CM

## 2022-12-08 DIAGNOSIS — Z51.81 ENCOUNTER FOR THERAPEUTIC DRUG MONITORING: ICD-10-CM

## 2022-12-08 DIAGNOSIS — Z79.01 ANTICOAGULATED ON COUMADIN: ICD-10-CM

## 2022-12-08 LAB — POC INR: 2.7 (ref 0.8–1.2)

## 2022-12-08 PROCEDURE — 85610 PROTHROMBIN TIME: CPT

## 2022-12-08 PROCEDURE — 99211 OFF/OP EST MAY X REQ PHY/QHP: CPT

## 2022-12-08 PROCEDURE — 36416 COLLJ CAPILLARY BLOOD SPEC: CPT

## 2022-12-29 RX ORDER — OLMESARTAN MEDOXOMIL 40 MG/1
TABLET ORAL
Qty: 90 TABLET | Refills: 3 | Status: SHIPPED | OUTPATIENT
Start: 2022-12-29

## 2022-12-29 NOTE — TELEPHONE ENCOUNTER
This medication refill is regarding a electronic request. Refill requested by  [facility] . Requested Prescriptions     Pending Prescriptions Disp Refills    olmesartan (BENICAR) 40 MG tablet [Pharmacy Med Name: OLMESARTAN MEDOXOMIL TABS 40MG] 90 tablet 3     Sig: TAKE 1 TABLET DAILY     Date of last visit: 8/2/2022   Date of next visit: None  Date of last refill: 1/3/22 #90/3    Rx verified, ordered and set to EP.

## 2023-01-05 ENCOUNTER — HOSPITAL ENCOUNTER (OUTPATIENT)
Dept: PHARMACY | Age: 78
Setting detail: THERAPIES SERIES
Discharge: HOME | End: 2023-01-05
Payer: MEDICARE

## 2023-01-05 DIAGNOSIS — Z51.81 ENCOUNTER FOR THERAPEUTIC DRUG MONITORING: ICD-10-CM

## 2023-01-05 DIAGNOSIS — I48.20 CHRONIC ATRIAL FIBRILLATION (HCC): ICD-10-CM

## 2023-01-05 DIAGNOSIS — Z95.2 H/O MITRAL VALVE REPLACEMENT: Primary | ICD-10-CM

## 2023-01-05 DIAGNOSIS — Z79.01 ANTICOAGULATED ON COUMADIN: ICD-10-CM

## 2023-01-05 LAB — POC INR: 2.5 (ref 0.8–1.2)

## 2023-01-05 PROCEDURE — 36416 COLLJ CAPILLARY BLOOD SPEC: CPT

## 2023-01-05 PROCEDURE — 99211 OFF/OP EST MAY X REQ PHY/QHP: CPT

## 2023-01-05 PROCEDURE — 85610 PROTHROMBIN TIME: CPT

## 2023-01-05 NOTE — PROGRESS NOTES
[facility]  [facility]  Anticoagulation Clinic  [phone] (phone)  [phone] (fax)    Ms. Es Curtis is a 77 y.o.  female with history of mechanical MVR/chronic atrial fib., per Dr. Rudolph's referral, who presents today for Warfarin monitoring and adjustment (4 week visit).    Patient verifies tablet strength.  Followed printed instructions for dose to put in pill box.  No missed or extra doses.  Patient denies bleeding/chest pain. Has usual SOB/swelling of legs/easy bruising.  \"Legs and everything hurts.\"  Does not take anything.  No blood in urine or stool.  No dietary changes.  No changes in medication/OTC agents/herbals.  No change in alcohol use or tobacco use.  No change in activity level.  Patient denies falls. Has usual dizziness.  Sometimes takes Tylenol for usual headaches.  No vomiting/diarrhea or acute illness.   No procedures scheduled in the future at this time.    Assessment:   Lab Results   Component Value Date    INR 2.50 (H) 01/05/2023    INR 2.70 (H) 12/08/2022    INR 3.10 (H) 11/17/2022     INR therapeutic - goal 2.5-3.5.   Recent Labs     01/05/23  1046   INR 2.50*        Plan:  POCT INR ordered/performed/result reviewed.  Continue PO Coumadin 1.5 mg MWF, 1 mg TThSS.  Recheck INR in 4 week(s).  Patient reminded to call the Anticoagulation Clinic with any signs or symptoms of bleeding or with any medication changes.  Patient given instructions utilizing the teach back method.       After visit summary printed and reviewed with patient.      Discharged ambulatory in no apparent distress.

## 2023-01-09 DIAGNOSIS — E11.21 TYPE 2 DIABETES MELLITUS WITH DIABETIC NEPHROPATHY, WITHOUT LONG-TERM CURRENT USE OF INSULIN (HCC): ICD-10-CM

## 2023-01-09 RX ORDER — GLIMEPIRIDE 2 MG/1
TABLET ORAL
Qty: 360 TABLET | Refills: 3 | Status: SHIPPED | OUTPATIENT
Start: 2023-01-09

## 2023-01-09 RX ORDER — GLIMEPIRIDE 2 MG/1
TABLET ORAL
Qty: 360 TABLET | Refills: 3 | Status: SHIPPED | OUTPATIENT
Start: 2023-01-09 | End: 2023-01-09 | Stop reason: SDUPTHER

## 2023-01-09 NOTE — TELEPHONE ENCOUNTER
Fax was received from [facility] on 1/5/23 requesting clarification of an Rx for Glimepiride; they have 2 Rx's for Glimepiride 2 mg, 1 tablet BID #180/1 and 2 tablets BID #120/3. CG reviewed the Rx and the correct dose is 2 mg 2 tablets BID #360/3. The Rx was EP'd to [facility] today in error, was supposed to be sent to [facility]. Rx sent to [facility].

## 2023-01-11 ENCOUNTER — PROCEDURE VISIT (OUTPATIENT)
Dept: CARDIOLOGY CLINIC | Age: 78
End: 2023-01-11
Payer: MEDICARE

## 2023-01-11 DIAGNOSIS — Z95.0 PACEMAKER: Primary | ICD-10-CM

## 2023-01-11 PROCEDURE — 93296 REM INTERROG EVL PM/IDS: CPT | Performed by: NUCLEAR MEDICINE

## 2023-01-11 PROCEDURE — 93294 REM INTERROG EVL PM/LDLS PM: CPT | Performed by: NUCLEAR MEDICINE

## 2023-01-11 NOTE — PROGRESS NOTES
[employer] Medtronic Dual Pacemaker   Patient of [facility]    Battery 1.5 years    Presenting rhythm ASVP    A Impedance 418  RV Impedance 437    P wave sensing 2.1  R wave sensing 8.1    A Threshold - @ -  A Amplitude - @ -  RV Thresholds 1.125 @ 0.40  RV Amplitude 2.25 @ 0.40      A Paced 0%  V Paced 99.3%    Programmed Mode VVIR       Afib [place] 91.5%  Hx afib- taking coumadin    Episodes   afib

## 2023-01-17 ENCOUNTER — OFFICE VISIT (OUTPATIENT)
Dept: NEPHROLOGY | Age: 78
End: 2023-01-17
Payer: MEDICARE

## 2023-01-17 VITALS
SYSTOLIC BLOOD PRESSURE: 140 MMHG | DIASTOLIC BLOOD PRESSURE: 78 MMHG | WEIGHT: 207 LBS | OXYGEN SATURATION: 96 % | BODY MASS INDEX: 30.57 KG/M2 | HEART RATE: 73 BPM

## 2023-01-17 DIAGNOSIS — I10 HTN (HYPERTENSION), BENIGN: ICD-10-CM

## 2023-01-17 DIAGNOSIS — N28.1 RENAL CYST: ICD-10-CM

## 2023-01-17 DIAGNOSIS — N18.4 STAGE 4 CHRONIC KIDNEY DISEASE (HCC): Primary | ICD-10-CM

## 2023-01-17 PROCEDURE — 3077F SYST BP >= 140 MM HG: CPT | Performed by: INTERNAL MEDICINE

## 2023-01-17 PROCEDURE — 1123F ACP DISCUSS/DSCN MKR DOCD: CPT | Performed by: INTERNAL MEDICINE

## 2023-01-17 PROCEDURE — 3078F DIAST BP <80 MM HG: CPT | Performed by: INTERNAL MEDICINE

## 2023-01-17 PROCEDURE — G8484 FLU IMMUNIZE NO ADMIN: HCPCS | Performed by: INTERNAL MEDICINE

## 2023-01-17 PROCEDURE — G8427 DOCREV CUR MEDS BY ELIG CLIN: HCPCS | Performed by: INTERNAL MEDICINE

## 2023-01-17 PROCEDURE — G8417 CALC BMI ABV UP PARAM F/U: HCPCS | Performed by: INTERNAL MEDICINE

## 2023-01-17 PROCEDURE — 99204 OFFICE O/P NEW MOD 45 MIN: CPT | Performed by: INTERNAL MEDICINE

## 2023-01-17 PROCEDURE — G8399 PT W/DXA RESULTS DOCUMENT: HCPCS | Performed by: INTERNAL MEDICINE

## 2023-01-17 PROCEDURE — 1036F TOBACCO NON-USER: CPT | Performed by: INTERNAL MEDICINE

## 2023-01-17 PROCEDURE — 1090F PRES/ABSN URINE INCON ASSESS: CPT | Performed by: INTERNAL MEDICINE

## 2023-01-17 RX ORDER — ISOSORBIDE DINITRATE 10 MG/1
10 TABLET ORAL 3 TIMES DAILY
COMMUNITY

## 2023-01-17 ASSESSMENT — ENCOUNTER SYMPTOMS
DIARRHEA: 0
ABDOMINAL PAIN: 0
SHORTNESS OF BREATH: 1
COUGH: 0
NAUSEA: 0
EYE PAIN: 0
VOMITING: 0
EYES NEGATIVE: 1
BACK PAIN: 0

## 2023-01-17 NOTE — PROGRESS NOTES
[facility]         Outpatient Initial consult note         1/17/2023 2:43 PM    [address] AND HYPERTENSION  [address]  Dept: [phone]  Loc: [phone]      Patient Name:   Felicia Lam  YOB: 1945  Primary Care Physician:  Laina Nunn MD     Chief Complaint : Evaluation of   worsening renal function     History of presenting illness :Felicia Lam is a 65 y.o.   female with Past Medical History as stated below was sent / referred by Laina Nunn MD forevaluation of the above problem. Patient has a history of hypertension for 30 years. Patient has history of diabetes mellitus, with neuropathy, and for 10 years. The patient denies history of renal stones anddenies NSAID use. Patient has no history of proteinuria. Patient Never had a kidney biopsy done. Patient does use Herbal remedies/vitamin supplements. Patient denies frequency, hematuria, hesitancy, retention. Patient has a family history of kidney disease , brother has kidney Dz. Patient's chronic medical conditions of coronary artery disease, congestive heart failure and mitral disease are reasonably stable at this time.      Past History :     Past Medical History:   Diagnosis Date    Arrhythmia     CHRONIC ATRIAL FIB    Breast cancer ([facility] [address] 12/10/2012    [facility]    Cancer ([facility] [address]     breast ca left    CHF (congestive heart failure) (HCC)     Chronic kidney disease, stage III (moderate) ([facility] [address] 10/29/2018    Depression     Diabetes mellitus (HCC)     Generalized headaches     History of dizziness     History of sinus bradycardia     History of therapeutic radiation     History of valvular heart disease     Hyperlipidemia     Hypertension     Medtronic dual pacemaker 5/23/2017    MI, old     Mitral valve replaced     Numbness and tingling     Obesity     Obstructive sleep apnea on CPAP 2011    Osteopenia     SOB (shortness of breath)     HX OF:     Past Surgical History:   Procedure Laterality Date    BREAST LUMPECTOMY Left 01/09/2013    [facility]    BREAST SURGERY Left 1/15/13    re-excision cranial margin and mammosite spacer    CARDIAC CATHETERIZATION  10/06/2003    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 MV area by recent EKG was 1 sq. cm. and mean gradient across MV was 17mmHg. MV index was about 0.7 sq. cm. per body surface area. Moderate pulmonary artery systolic HTN. Patent coronary arteries. CARDIAC VALVE REPLACEMENT  2003    MVR  33mm [place] Mechanical valve    CARDIOVASCULAR STRESS TEST  05/26/2011    Cannot exclude mild degree of ischemia in anterolateral wall. EF 58%. Mildly abnormal nuclear scan. CARDIOVASCULAR STRESS TEST  7-09-10    Atrial fibrillation. Episodes of bradycardia mainly at night, which could be physiologic. Predominantly bradycardiac rhythm w/o significant pauses. Frequent PVC's     CARDIOVASCULAR STRESS TEST  1-14-08    Atrial fibrillation w/ controlled ventricular repsonse and an average heart rate of 58 bpm. 1 episode of 3 beat run of nonsustained ventricular tachyarrhythmia at rate of 110-158 bpm. 3 transient episodes of ventricular bigeminy w/ rate in mid 60's to low 70's. CARDIOVASCULAR STRESS TEST  2-08-99    Unremarkable routine stress test. Chronic A-fib. CARDIOVASCULAR STRESS TEST  2-08-00    Normal stress EKG. Hypertension, not well controlled. Patient's sitting BP was 160/94. At peak exercise BP was 180/102. Moderate degree of MVP.     CATARACT REMOVAL Right 7/16/14    CATARACT REMOVAL      COLONOSCOPY  2/2010    COLONOSCOPY  08/2017    KNEE ARTHROSCOPY  6/2011    KNEE SURGERY      GARTH STEROTACTIC LOC BREAST BIOPSY LEFT Left 12/10/2012    [facility]    GARTH STEROTACTIC LOC BREAST BIOPSY RIGHT Right 11/16/2011    benign    MITRAL VALVE REPLACEMENT      PACEMAKER INSERTION Left 05/18/2017    by Dr April Ndiaye 8/22/2017    COLONOSCOPY performed by Marii Campbell MD at [facility]    MI EGD TRANSORAL BIOPSY SINGLE/MULTIPLE N/A 8/22/2017    EGD BIOPSY performed by Marii Campbell MD at [facility] ECHOCARDIOGRAM  05/26/2011    LV mildly dilated, systolic function mildly reduced. EF 40-45%. Mild diffuse hypokinesis. Mild lateral wall was hypokinetic. Apical lateral wall was dyskinetic. Wall thickness was at upper limits of normal. LA moderately dilated. RV and RA mildly dilated. Systolic function mildly reduced. Mild MR and TR.     TRANSTHORACIC ECHOCARDIOGRAM  01/14/2008    Biatrial enlargement, RV dilated. Atheromatous aortic root. Borderline LVH. Normal LV systolic function. EF 55%. Prosthetic MV appears to be functioning well. Mildly sclerosed AV. Trivial MR, mild TR, trivial PI, RVSP 47mmHg. TRANSTHORACIC ECHOCARDIOGRAM  2-08-00    Moderate MV stenosis based on available echo doppler data. UPPER GASTROINTESTINAL ENDOSCOPY  08/2017     Social History     Socioeconomic History    Marital status:      Spouse name: Not on file    Number of children: Not on file    Years of education: Not on file    Highest education level: Not on file   Occupational History    Not on file   Tobacco Use    Smoking status: Never    Smokeless tobacco: Never    Tobacco comments:     Friends were heavy smokers   Substance and Sexual Activity    Alcohol use:  Yes     Alcohol/week: 0.0 standard drinks     Comment: Occassionally    Drug use: No    Sexual activity: Never   Other Topics Concern    Not on file   Social History Narrative    Not on file     Social Determinants of Health     Financial Resource Strain: Low Risk     Difficulty of Paying Living Expenses: Not hard at all   Food Insecurity: No Food Insecurity    Worried About Running Out of Food in the Last Year: Never true    Ran Out of Food in the Last Year: Never true   Transportation Needs: Not on file   Physical Activity: Inactive    Days of Exercise per Week: 0 days    Minutes of Exercise per Session: 0 min   Stress: Not on file   Social Connections: Not on file   Intimate Partner Violence: Not on file   Housing Stability: Not on file     Family History   Problem Relation Age of Onset    Hypertension Mother     Stroke Mother     Diabetes Mother     Brain Cancer Father     Cancer Sister     Kidney Cancer Sister     Heart Attack Brother     Heart Disease Brother     High Blood Pressure Brother     Melanoma Brother     Melanoma Brother     Breast Cancer Maternal Aunt     Breast Cancer Paternal Aunt      Medications & Allergies :      Prior to Admission medications    Medication Sig Start Date End Date Taking? Authorizing Provider   isosorbide dinitrate (ISORDIL) 10 MG tablet Take 10 mg by mouth 3 times daily   Yes Historical Provider, MD   glimepiride (AMARYL) 2 MG tablet Take 2 tablets twice daily 1/9/23  Yes Roseann Naranjo MD   olmesartan (BENICAR) 40 MG tablet TAKE 1 TABLET DAILY 12/29/22  Yes Roseann Naranjo MD   nitroGLYCERIN (NITROLINGUAL) 0.4 MG/SPRAY 0.4 mg spray USE 1 SPRAY UNDER THE TONGUE EVERY 5 MINUTES AS NEEDED FOR CHEST PAIN. IF THIRD SPRAY DOES NOT RELIEVE PAIN, CALL 911 (FOR ANGINA PECTORIS) 10/20/22  Yes Roseann Naranjo MD   SITagliptin (JANUVIA) 100 MG tablet TAKE 1 TABLET DAILY 9/29/22  Yes Roseann Naranjo MD   empagliflozin (JARDIANCE) 10 MG tablet Take 1 tablet by mouth daily 9/26/22  Yes Roseann Naranjo MD   bumetanide (BUMEX) 1 MG tablet TAKE 1 TABLET DAILY 8/22/22  Yes Sloan Apley, MD   warfarin (COUMADIN) 1 MG tablet Take as directed by [facility] (#130 ~ 90 day supply) 8/10/22  Yes Zoraida Diaz MD   Lancets MISC Freestyle Freedom lancets, use daily, DX: E11.21 8/8/22  Yes Roseann Naranjo MD   glucose monitoring (FREESTYLE FREEDOM) kit 1 kit by Does not apply route daily Glucometer brand per insurance and patient preference.  8/2/22  Yes Roseann Naranjo MD   blood glucose monitor strips Dispense brand per insurance and patient preference. Test daily. 8/2/22  Yes Re Buckner MD   hydrALAZINE (APRESOLINE) 50 MG tablet TAKE 1 TABLET THREE TIMES A DAY 7/20/22  Yes Re Buckner MD   carvedilol (COREG) 12.5 MG tablet TAKE 1 TABLET TWICE A DAY 6/17/22  Yes Spring Durbin MD   simvastatin (ZOCOR) 10 MG tablet TAKE 1 TABLET AT BEDTIME 3/31/22  Yes Re Buckner MD   CPAP Machine MISC by Does not apply route Please change CPAP pressure to 10 cm H20. Please send download in 2 weeks 3/7/22  Yes Ruby Santiago PA-C   Blood Glucose Monitoring Suppl (ADVOCATE BLOOD GLUCOSE MONITOR) CONNIE Dx: 250.00 9/3/13  Yes Re Buckner MD   famotidine (PEPCID) 40 MG tablet Take 40 mg by mouth daily as needed Indications: Mildly Upset Stomach   Patient not taking: No sig reported    Historical Provider, MD   Psyllium (METAMUCIL PO) Take by mouth daily as needed Indications: Constipation   Patient not taking: No sig reported    Historical Provider, MD   mometasone (ELOCON) 0.1 % cream Apply topically daily. Patient not taking: Reported on 1/17/2023 7/26/21   Re Buckner MD   amoxicillin (AMOXIL) 500 MG capsule Take by mouth Prior to teeth cleaning. Patient not taking: No sig reported 10/20/20   Historical Provider, MD   Acetaminophen (TYLENOL PO) Take 500 mg by mouth every 4 hours as needed Don't take more than 3,000 mg each day, including what's in Coricidin. Historical Provider, MD   loperamide (IMODIUM) 2 MG capsule Take 2 mg by mouth 4 times daily as needed for Diarrhea  Patient not taking: No sig reported    Historical Provider, MD   fluorouracil (EFUDEX) 5 % cream Apply topically daily Indications: precancer treatment Off it for intervals, then back on.   Patient not taking: Reported on 1/17/2023 11/29/18   Historical Provider, MD   metroNIDAZOLE (METROCREAM) 0.75 % cream Apply topically See Admin Instructions Indications: Skin Abnormalities As needed  Patient not taking: Reported on 1/17/2023 4/26/18   Historical Provider, MD   fluticasone [facility]) 50 MCG/ACT nasal spray 2 sprays by Nasal route daily  Patient not taking: Reported on 1/17/2023 8/11/17 10/18/22  Andrea Mejia MD     Allergies: Adalat [nifedipine], Amlodipine, Potassium-containing compounds, and Tape [adhesive tape]    Review of Systems Physical Exam   Review of Systems   Constitutional:  Positive for fatigue. Negative for chills and fever. HENT: Negative. Eyes: Negative. Negative for pain. Respiratory:  Positive for shortness of breath. Negative for cough. Cardiovascular:  Positive for leg swelling. Negative for chest pain. Gastrointestinal:  Negative for abdominal pain, diarrhea, nausea and vomiting. Genitourinary:  Negative for dysuria, frequency and hematuria. Musculoskeletal:  Negative for back pain and neck pain. Skin:  Negative for rash. Neurological:  Positive for dizziness, light-headedness and headaches. Psychiatric/Behavioral:  Negative for agitation and confusion. Physical Exam  Vitals reviewed. Constitutional:       Appearance: Normal appearance. HENT:      Head: Normocephalic and atraumatic. Right Ear: External ear normal.      Left Ear: External ear normal.      Nose: Nose normal.      Mouth/Throat:      Mouth: Mucous membranes are moist.   Eyes:      General: No scleral icterus. Right eye: No discharge. Left eye: No discharge. Conjunctiva/sclera: Conjunctivae normal.   Cardiovascular:      Rate and Rhythm: Normal rate and regular rhythm. Heart sounds: Normal heart sounds. Pulmonary:      Effort: Pulmonary effort is normal. No respiratory distress. Breath sounds: Normal breath sounds. No wheezing. Abdominal:      General: There is no distension. Palpations: Abdomen is soft. Tenderness: There is no abdominal tenderness. Musculoskeletal:         General: No swelling.       Cervical back: Normal range of motion and neck supple. Right lower leg: No edema. Left lower leg: No edema. Skin:     General: Skin is warm and dry. Findings: No rash. Neurological:      General: No focal deficit present. Mental Status: She is alert and oriented to person, place, and time. Psychiatric:         Mood and Affect: Mood normal.         Behavior: Behavior normal.        Vitals   Vitals:    01/17/23 1424   BP: (!) 140/78   Site: Right Upper Arm   Position: Sitting   Cuff Size: Medium Adult   Pulse: 73   SpO2: 96%   Weight: 207 lb (93.9 kg)        Labs, Radiology and Tests :    Labs -    10/22           potassium 5.2           BUN 38           Creatinine 1.9           eGFR 27                       CR            [facility]                          Renal Ultrasound Scan -- will order       Other : old  lab data have been reviewed and noted patient baseline creatinine trends close to 1.7    Echo - 2021 - EF 55%  Assessment    Renal -patient definitely appears to be having stage III/IV, 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   She was on metformin which was stopped however she is on Januvia 100 based on her renal function she should be only taking 25 mg we will readjust based on the next lab work  Hx of congestive heart failure on Lasix reasonably well compensated  Renal cyst-evaluate with a renal ultrasound scan  Medications reviewed discussed with the patient in detail  Follow-up in 4 weeks  Plan     Orders Placed This Encounter   Procedures    US RENAL LIMITED    Protein / creatinine ratio, urine    Microalbumin / Creatinine Urine Ratio    Urinalysis with Microscopic    CBC    Comprehensive Metabolic Panel       Guilherme Mcintyre M.D  [facility].

## 2023-02-02 ENCOUNTER — HOSPITAL ENCOUNTER (OUTPATIENT)
Dept: PHARMACY | Age: 78
Setting detail: THERAPIES SERIES
Discharge: HOME | End: 2023-02-02
Payer: MEDICARE

## 2023-02-02 DIAGNOSIS — I48.20 CHRONIC ATRIAL FIBRILLATION (HCC): ICD-10-CM

## 2023-02-02 DIAGNOSIS — Z95.2 H/O MITRAL VALVE REPLACEMENT: Primary | ICD-10-CM

## 2023-02-02 DIAGNOSIS — Z51.81 ENCOUNTER FOR THERAPEUTIC DRUG MONITORING: ICD-10-CM

## 2023-02-02 DIAGNOSIS — Z79.01 ANTICOAGULATED ON COUMADIN: ICD-10-CM

## 2023-02-02 LAB — POC INR: 3.3 (ref 0.8–1.2)

## 2023-02-02 PROCEDURE — 85610 PROTHROMBIN TIME: CPT

## 2023-02-02 PROCEDURE — 99212 OFFICE O/P EST SF 10 MIN: CPT

## 2023-02-02 PROCEDURE — 36416 COLLJ CAPILLARY BLOOD SPEC: CPT

## 2023-02-02 RX ORDER — WARFARIN SODIUM 1 MG/1
TABLET ORAL
Qty: 130 TABLET | Refills: 2 | Status: SHIPPED | OUTPATIENT
Start: 2023-02-02

## 2023-02-02 RX ORDER — WARFARIN SODIUM 1 MG/1
TABLET ORAL
Qty: 130 TABLET | Refills: 2 | Status: SHIPPED | OUTPATIENT
Start: 2023-02-02 | End: 2023-02-02 | Stop reason: CLARIF

## 2023-02-02 RX ORDER — WARFARIN SODIUM 1 MG/1
TABLET ORAL EVERY EVENING
COMMUNITY

## 2023-02-02 NOTE — PROGRESS NOTES
Medication Management [address] [place] [address]  [phone] (phone)  [phone] (fax)    Ms. Maday Fleming is a 65 y.o.  female with history of chronic atrial fib./mechanical MVR, per Dr. Angely Tapia referral, who presents today for Warfarin monitoring and adjustment (4 week visit). Patient verifies current dosing regimen and tablet strength. No missed or extra doses. Patient denies bleeding. Has usual SOB/swelling of legs/easy bruising/chest twinges. No blood in urine or stool. Dietary changes: had some extra broccoli. No changes in medication/OTC agents/herbals. No change in alcohol use or tobacco use. No change in activity level. Patient denies falls. Has usual dizziness. Has usual headaches - only takes Tylenol if really bothersome (not often). No vomiting/diarrhea or acute illness. No procedures scheduled in the future at this time. Assessment:   Lab Results   Component Value Date    INR 3.30 (H) 02/02/2023    INR 2.50 (H) 01/05/2023    INR 2.70 (H) 12/08/2022     INR therapeutic - goal 2.5-3.5. Recent Labs     02/02/23  1026   INR 3.30*        Plan:  POCT INR performed/result reviewed. Continue PO Coumadin 1.5 mg MWF, 1 mg TThSS. Recheck INR in 4 week(s). Patient reminded to call the Anticoagulation Clinic with any signs or symptoms of bleeding or with any medication changes. Patient given instructions utilizing the teach back method. After visit summary printed and reviewed with patient. Discharged ambulatory in no apparent distress. Prescription sent electronically per clinic pharmacist under new medical director.     For Pharmacy Admin Tracking Only    Intervention Detail: Refill(s) Provided  Total # of Interventions Recommended: 1  Total # of Interventions Accepted: 1  Time Spent (min):  0.5

## 2023-02-09 ENCOUNTER — HOSPITAL ENCOUNTER (OUTPATIENT)
Dept: WOMENS IMAGING | Age: 78
Discharge: HOME | End: 2023-02-09
Payer: MEDICARE

## 2023-02-09 DIAGNOSIS — Z12.31 VISIT FOR SCREENING MAMMOGRAM: ICD-10-CM

## 2023-02-09 PROCEDURE — 77063 BREAST TOMOSYNTHESIS BI: CPT

## 2023-02-13 ENCOUNTER — HOSPITAL ENCOUNTER (OUTPATIENT)
Dept: ULTRASOUND IMAGING | Age: 78
Discharge: HOME | End: 2023-02-13
Payer: MEDICARE

## 2023-02-13 ENCOUNTER — TELEPHONE (OUTPATIENT)
Dept: NEPHROLOGY | Age: 78
End: 2023-02-13

## 2023-02-13 DIAGNOSIS — N18.4 STAGE 4 CHRONIC KIDNEY DISEASE (HCC): ICD-10-CM

## 2023-02-13 PROCEDURE — 76770 US EXAM ABDO BACK WALL COMP: CPT

## 2023-02-13 NOTE — TELEPHONE ENCOUNTER
----- Message from Chava Aviles MD sent at 2/13/2023  1:53 PM EST -----  Please let the patient know that the kidney US scan is normal

## 2023-02-14 ENCOUNTER — HOSPITAL ENCOUNTER (OUTPATIENT)
Age: 78
Discharge: HOME | End: 2023-02-14
Payer: MEDICARE

## 2023-02-14 DIAGNOSIS — I10 HTN (HYPERTENSION), BENIGN: ICD-10-CM

## 2023-02-14 DIAGNOSIS — N18.4 STAGE 4 CHRONIC KIDNEY DISEASE (HCC): ICD-10-CM

## 2023-02-14 DIAGNOSIS — N28.1 RENAL CYST: ICD-10-CM

## 2023-02-14 LAB
ALBUMIN SERPL BCG-MCNC: 4.1 G/DL (ref 3.5–5.1)
ALP SERPL-CCNC: 87 U/L (ref 38–126)
ALT SERPL W/O P-5'-P-CCNC: 8 U/L (ref 11–66)
ANION GAP SERPL CALC-SCNC: 12 MEQ/L (ref 8–16)
AST SERPL-CCNC: 15 U/L (ref 5–40)
BACTERIA: ABNORMAL
BILIRUB SERPL-MCNC: 0.3 MG/DL (ref 0.3–1.2)
BILIRUB UR QL STRIP: NEGATIVE
BUN SERPL-MCNC: 34 MG/DL (ref 7–22)
CALCIUM SERPL-MCNC: 9.4 MG/DL (ref 8.5–10.5)
CASTS #/AREA URNS LPF: ABNORMAL /LPF
CASTS #/AREA URNS LPF: ABNORMAL /LPF
CHARACTER UR: CLEAR
CHARCOAL URNS QL MICRO: ABNORMAL
CHLORIDE SERPL-SCNC: 107 MEQ/L (ref 98–111)
CO2 SERPL-SCNC: 23 MEQ/L (ref 23–33)
COLOR UR: YELLOW
CREAT SERPL-MCNC: 1.7 MG/DL (ref 0.4–1.2)
CREAT UR-MCNC: 81.5 MG/DL
CREAT UR-MCNC: 81.5 MG/DL
CRYSTALS URNS QL MICRO: ABNORMAL
DEPRECATED RDW RBC AUTO: 46.8 FL (ref 35–45)
EPITHELIAL CELLS, UA: ABNORMAL /HPF
ERYTHROCYTE [DISTWIDTH] IN BLOOD BY AUTOMATED COUNT: 13.7 % (ref 11.5–14.5)
GFR SERPL CREATININE-BSD FRML MDRD: 30 ML/MIN/1.73M2
GLUCOSE SERPL-MCNC: 184 MG/DL (ref 70–108)
GLUCOSE UR QL STRIP.AUTO: >= 1000 MG/DL
HCT VFR BLD AUTO: 38.2 % (ref 37–47)
HGB BLD-MCNC: 12.1 GM/DL (ref 12–16)
HGB UR QL STRIP.AUTO: NEGATIVE
KETONES UR QL STRIP.AUTO: NEGATIVE
LEUKOCYTE ESTERASE UR QL STRIP.AUTO: NEGATIVE
MCH RBC QN AUTO: 29.4 PG (ref 26–33)
MCHC RBC AUTO-ENTMCNC: 31.7 GM/DL (ref 32.2–35.5)
MCV RBC AUTO: 92.9 FL (ref 81–99)
MICROALBUMIN UR-MCNC: 1.77 MG/DL
MICROALBUMIN/CREAT RATIO PNL UR: 22 MG/G (ref 0–30)
NITRITE UR QL STRIP.AUTO: NEGATIVE
PH UR STRIP.AUTO: 5.5 [PH] (ref 5–9)
PLATELET # BLD AUTO: 166 THOU/MM3 (ref 130–400)
PMV BLD AUTO: 11.4 FL (ref 9.4–12.4)
POTASSIUM SERPL-SCNC: 4.4 MEQ/L (ref 3.5–5.2)
PROT SERPL-MCNC: 6.6 G/DL (ref 6.1–8)
PROT UR STRIP.AUTO-MCNC: NEGATIVE MG/DL
PROT UR-MCNC: 16.9 MG/DL
PROT/CREAT 24H UR: 0.21 MG/G{CREAT}
RBC # BLD AUTO: 4.11 MILL/MM3 (ref 4.2–5.4)
RBC #/AREA URNS HPF: ABNORMAL /HPF
RENAL EPI CELLS #/AREA URNS HPF: ABNORMAL /[HPF]
SODIUM SERPL-SCNC: 142 MEQ/L (ref 135–145)
SPECIFIC GRAVITY UA: 1.02 (ref 1–1.03)
UROBILINOGEN, URINE: 0.2 EU/DL (ref 0–1)
WBC # BLD AUTO: 5.9 THOU/MM3 (ref 4.8–10.8)
WBC #/AREA URNS HPF: ABNORMAL /HPF
YEAST LIKE FUNGI URNS QL MICRO: ABNORMAL

## 2023-02-14 PROCEDURE — 85027 COMPLETE CBC AUTOMATED: CPT

## 2023-02-14 PROCEDURE — 36415 COLL VENOUS BLD VENIPUNCTURE: CPT

## 2023-02-14 PROCEDURE — 84156 ASSAY OF PROTEIN URINE: CPT

## 2023-02-14 PROCEDURE — 81001 URINALYSIS AUTO W/SCOPE: CPT

## 2023-02-14 PROCEDURE — 82570 ASSAY OF URINE CREATININE: CPT

## 2023-02-14 PROCEDURE — 80053 COMPREHEN METABOLIC PANEL: CPT

## 2023-02-14 PROCEDURE — 82043 UR ALBUMIN QUANTITATIVE: CPT

## 2023-02-20 ENCOUNTER — OFFICE VISIT (OUTPATIENT)
Dept: NEPHROLOGY | Age: 78
End: 2023-02-20
Payer: MEDICARE

## 2023-02-20 VITALS
SYSTOLIC BLOOD PRESSURE: 169 MMHG | OXYGEN SATURATION: 98 % | WEIGHT: 210 LBS | DIASTOLIC BLOOD PRESSURE: 71 MMHG | BODY MASS INDEX: 31.01 KG/M2 | HEART RATE: 85 BPM

## 2023-02-20 DIAGNOSIS — I10 HTN (HYPERTENSION), BENIGN: ICD-10-CM

## 2023-02-20 DIAGNOSIS — N18.4 STAGE 4 CHRONIC KIDNEY DISEASE (HCC): Primary | ICD-10-CM

## 2023-02-20 DIAGNOSIS — N28.1 RENAL CYST: ICD-10-CM

## 2023-02-20 PROCEDURE — G8427 DOCREV CUR MEDS BY ELIG CLIN: HCPCS | Performed by: INTERNAL MEDICINE

## 2023-02-20 PROCEDURE — G8399 PT W/DXA RESULTS DOCUMENT: HCPCS | Performed by: INTERNAL MEDICINE

## 2023-02-20 PROCEDURE — 3078F DIAST BP <80 MM HG: CPT | Performed by: INTERNAL MEDICINE

## 2023-02-20 PROCEDURE — 1090F PRES/ABSN URINE INCON ASSESS: CPT | Performed by: INTERNAL MEDICINE

## 2023-02-20 PROCEDURE — 1036F TOBACCO NON-USER: CPT | Performed by: INTERNAL MEDICINE

## 2023-02-20 PROCEDURE — G8417 CALC BMI ABV UP PARAM F/U: HCPCS | Performed by: INTERNAL MEDICINE

## 2023-02-20 PROCEDURE — 1123F ACP DISCUSS/DSCN MKR DOCD: CPT | Performed by: INTERNAL MEDICINE

## 2023-02-20 PROCEDURE — G8484 FLU IMMUNIZE NO ADMIN: HCPCS | Performed by: INTERNAL MEDICINE

## 2023-02-20 PROCEDURE — 3077F SYST BP >= 140 MM HG: CPT | Performed by: INTERNAL MEDICINE

## 2023-02-20 PROCEDURE — 99214 OFFICE O/P EST MOD 30 MIN: CPT | Performed by: INTERNAL MEDICINE

## 2023-02-20 NOTE — PROGRESS NOTES
[facility]        Outpatient Follow-Up note         2/20/2023 1:26 PM    Patient Name:   Cassy Myers  YOB: 1945  Primary Care Physician:  Karl Yepez MD   [address] [place] [address] AND HYPERTENSION  [address]  Dept: [phone]  Loc: [phone]     Chief Complaint / Reason for follow-up : Follow Up of CKD     Interval History :  Patient seen and examined by me. Feels well. No complaints     Past History :  Past Medical History:   Diagnosis Date    Arrhythmia     CHRONIC ATRIAL FIB    Breast cancer ([facility] 12/10/2012    [facility]    Cancer ([facility]     breast ca left    CHF (congestive heart failure) (HCC)     Chronic kidney disease, stage III (moderate) ([facility] 10/29/2018    Depression     Diabetes mellitus (HCC)     Generalized headaches     History of dizziness     History of sinus bradycardia     History of therapeutic radiation     History of valvular heart disease     Hyperlipidemia     Hypertension     Medtronic dual pacemaker 5/23/2017    MI, old     Mitral valve replaced     Numbness and tingling     Obesity     Obstructive sleep apnea on CPAP 2011    Osteopenia     SOB (shortness of breath)     HX OF:     Past Surgical History:   Procedure Laterality Date    BREAST LUMPECTOMY Left 01/09/2013    [facility]    BREAST SURGERY Left 1/15/13    re-excision cranial margin and mammosite spacer    CARDIAC CATHETERIZATION  10/06/2003    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 MV area by recent EKG was 1 sq. cm. and mean gradient across MV was 17mmHg. MV index was about 0.7 sq. cm. per body surface area. Moderate pulmonary artery systolic HTN. Patent coronary arteries. CARDIAC VALVE REPLACEMENT  2003    MVR  33mm [facility] [place] Mechanical valve    CARDIOVASCULAR STRESS TEST  05/26/2011    Cannot exclude mild degree of ischemia in anterolateral wall. EF 58%.  Mildly abnormal nuclear scan.     CARDIOVASCULAR STRESS TEST  7-09-10    Atrial fibrillation. Episodes of bradycardia mainly at night, which could be physiologic. Predominantly bradycardiac rhythm w/o significant pauses. Frequent PVC's     CARDIOVASCULAR STRESS TEST  1-14-08    Atrial fibrillation w/ controlled ventricular repsonse and an average heart rate of 58 bpm. 1 episode of 3 beat run of nonsustained ventricular tachyarrhythmia at rate of 110-158 bpm. 3 transient episodes of ventricular bigeminy w/ rate in mid 60's to low 70's. CARDIOVASCULAR STRESS TEST  2-08-99    Unremarkable routine stress test. Chronic A-fib. CARDIOVASCULAR STRESS TEST  2-08-00    Normal stress EKG. Hypertension, not well controlled. Patient's sitting BP was 160/94. At peak exercise BP was 180/102. Moderate degree of MVP. CATARACT REMOVAL Right 7/16/14    CATARACT REMOVAL      COLONOSCOPY  2/2010    COLONOSCOPY  08/2017    KNEE ARTHROSCOPY  6/2011    KNEE SURGERY      [facility] BREAST BIOPSY LEFT Left 12/10/2012    [facility]    GARTH STEROTACTIC LOC BREAST BIOPSY RIGHT Right 11/16/2011    benign    MITRAL VALVE REPLACEMENT      PACEMAKER INSERTION Left 05/18/2017    by Dr Lisa Centeno 8/22/2017    COLONOSCOPY performed by Cami Meade MD at [facility]    HI EGD TRANSORAL BIOPSY SINGLE/MULTIPLE N/A 8/22/2017    EGD BIOPSY performed by Cami Meade MD at [address] [place] [address] ECHOCARDIOGRAM  05/26/2011    LV mildly dilated, systolic function mildly reduced. EF 40-45%. Mild diffuse hypokinesis. Mild lateral wall was hypokinetic. Apical lateral wall was dyskinetic. Wall thickness was at upper limits of normal. LA moderately dilated. RV and RA mildly dilated. Systolic function mildly reduced. Mild MR and TR.     TRANSTHORACIC ECHOCARDIOGRAM  01/14/2008    Biatrial enlargement, RV dilated. Atheromatous aortic root. Borderline LVH. Normal LV systolic function. EF 55%.  Prosthetic MV appears to be functioning well. Mildly sclerosed AV. Trivial MR, mild TR, trivial PI, RVSP 47mmHg. TRANSTHORACIC ECHOCARDIOGRAM  2-08-00    Moderate MV stenosis based on available echo doppler data. UPPER GASTROINTESTINAL ENDOSCOPY  08/2017        Medications :     Outpatient Medications Marked as Taking for the 2/20/23 encounter (Office Visit) with Rivas Gordon MD   Medication Sig Dispense Refill    warfarin (COUMADIN) 1 MG tablet Take as directed by [facility] (#130 ~ 90 day supply) 130 tablet 2    warfarin (COUMADIN) 1 MG tablet Take by mouth every evening Dosed by [facility].      glimepiride (AMARYL) 2 MG tablet Take 2 tablets twice daily 360 tablet 3    olmesartan (BENICAR) 40 MG tablet TAKE 1 TABLET DAILY 90 tablet 3    SITagliptin (JANUVIA) 100 MG tablet TAKE 1 TABLET DAILY 90 tablet 3    empagliflozin (JARDIANCE) 10 MG tablet Take 1 tablet by mouth daily 90 tablet 3    bumetanide (BUMEX) 1 MG tablet TAKE 1 TABLET DAILY 90 tablet 3    Lancets MISC Freestyle Freedom lancets, use daily, DX: E11.21 100 each 3    glucose monitoring (FREESTYLE FREEDOM) kit 1 kit by Does not apply route daily Glucometer brand per insurance and patient preference. 1 kit 0    blood glucose monitor strips Dispense brand per insurance and patient preference. Test daily. 100 strip 3    hydrALAZINE (APRESOLINE) 50 MG tablet TAKE 1 TABLET THREE TIMES A  tablet 3    carvedilol (COREG) 12.5 MG tablet TAKE 1 TABLET TWICE A  tablet 2    simvastatin (ZOCOR) 10 MG tablet TAKE 1 TABLET AT BEDTIME 90 tablet 3    CPAP Machine MISC by Does not apply route Please change CPAP pressure to 10 cm H20. Please send download in 2 weeks 1 each 0    Acetaminophen (TYLENOL PO) Take 500 mg by mouth every 4 hours as needed Don't take more than 3,000 mg each day, including what's in Coricidin. fluorouracil (EFUDEX) 5 % cream Apply topically daily Indications: precancer treatment Off it for intervals, then back on. Blood Glucose Monitoring Suppl (ADVOCATE BLOOD GLUCOSE MONITOR) CONNIE Dx: 250.00 1 Device 0       Vitals     BP (!) 169/71 (Site: Right Upper Arm, Position: Sitting, Cuff Size: Medium Adult)   Pulse 85   Wt 210 lb (95.3 kg)   SpO2 98%   BMI 31.01 kg/m²  Wt Readings from Last 3 Encounters:   02/20/23 210 lb (95.3 kg)   01/17/23 207 lb (93.9 kg)   10/18/22 202 lb (91.6 kg)        Physical Exam     General -- no distress  Lungs -- clear  Heart -- S1, S2 heard, JVD - no  Abdomen - soft, non-tender  Extremities -- no edema  CNS - awake and alert    Labs, Radiology and Tests    Labs -    10/22 2/23          potassium 5.2 4.4          BUN 38 34          Creatinine 1.9 1.7          eGFR 27 30                      UPCR  210          UMCR  22                        Renal Ultrasound Scan -- 2/23  Right kidney 9.1 cm left kidney 10.8 cm  Probable right renal cyst 1 cm     Other : old  lab data have been reviewed and noted patient baseline creatinine trends close to 1.7    Echo - 2021 - EF 55%  Assessment    Renal -patient definitely appears to be having stage III/IV, most likely secondary to senile nephrosclerosis longstanding hypertension and diabetes  -However creatinine overall stable. In fact slightly better  -Urine appears to be clear and renal ultrasound scan is negative for any significant pathology  Electrolytes within normal limits  Essential hypertension running well at home slightly higher in the office today advised to continue home blood pressure monitoring  Hx of diabetes mellitus evaluate for any diabetic nephropathy with a protein creatinine ratio.   She was on metformin which was stopped however she is on Januvia 100 based on her renal function she should be only taking 25 mg we will decrease and notify the PCP office  Hx of congestive heart failure on Bumex reasonably well compensated  Renal cyst  Medications reviewed discussed with the patient in detail  Follow-up in 6 months    Tests and orders placed this Encounter     Orders Placed This Encounter   Alonzo Prater M.D  [facility].

## 2023-03-02 ENCOUNTER — HOSPITAL ENCOUNTER (OUTPATIENT)
Dept: PHARMACY | Age: 78
Setting detail: THERAPIES SERIES
Discharge: HOME | End: 2023-03-02
Payer: MEDICARE

## 2023-03-02 DIAGNOSIS — Z95.2 H/O MITRAL VALVE REPLACEMENT: Primary | ICD-10-CM

## 2023-03-02 DIAGNOSIS — I48.20 CHRONIC ATRIAL FIBRILLATION (HCC): ICD-10-CM

## 2023-03-02 DIAGNOSIS — Z79.01 ANTICOAGULATED ON COUMADIN: ICD-10-CM

## 2023-03-02 DIAGNOSIS — Z51.81 ENCOUNTER FOR THERAPEUTIC DRUG MONITORING: ICD-10-CM

## 2023-03-02 LAB — POC INR: 2.2 (ref 0.8–1.2)

## 2023-03-02 PROCEDURE — 85610 PROTHROMBIN TIME: CPT

## 2023-03-02 PROCEDURE — 99212 OFFICE O/P EST SF 10 MIN: CPT

## 2023-03-02 PROCEDURE — 36416 COLLJ CAPILLARY BLOOD SPEC: CPT

## 2023-03-02 NOTE — PROGRESS NOTES
Medication Management [address]  [phone] (phone)  [phone] (fax)    Ms. Claribel Reyes is a 65 y.o.  female with history of chronic atrial fib./MVR, per Dr. Elio Rodriguez referral, who presents today for Warfarin monitoring and adjustment (4 week visit). Patient verifies current dosing regimen and tablet strength. No missed or extra doses. Patient denies bleeding/bruising. Has usual chest twinges/SOB. Has been having more swelling of legs/ankles - feel tight and uncomfortable. Can't get compression socks on. Same as when she saw Dr. Yudi Cruz 2/20; states he's considering decreasing Jardiance. She plans to call doctor today. No blood in urine or stool. No dietary changes. No changes in medication/OTC agents/herbals. No change in alcohol use or tobacco use. No change in activity level. Patient denies falls. Has usual dizziness. Sometimes takes Tylenol for usual headaches. Has used some for leg discomfort. No vomiting/diarrhea or acute illness. No procedures scheduled in the future at this time. Assessment:   Lab Results   Component Value Date    INR 2.20 (H) 03/02/2023    INR 3.30 (H) 02/02/2023    INR 2.50 (H) 01/05/2023     INR subtherapeutic - goal 2.5-3.5. Recent Labs     03/02/23  1020   INR 2.20*        Plan:  POCT INR performed/result reviewed. 1.5 mg today, Th (per policy), then continue PO Coumadin 1.5 mg MWF, 1 mg TThSS. Recheck INR in 3 week(s), per policy. Patient reminded to call the Anticoagulation Clinic with any signs or symptoms of bleeding or with any medication changes. Patient given instructions utilizing the teach back method. After visit summary printed and reviewed with patient. Discharged ambulatory in no apparent distress. Offered transport chair, but declined.     For Pharmacy Admin Tracking Only    Intervention Detail: Dose Adjustment: 1, reason: Therapy Optimization  Total # of Interventions Recommended: 1  Total # of Interventions Accepted: 1  Time Spent (min):  20

## 2023-03-03 ENCOUNTER — TELEPHONE (OUTPATIENT)
Dept: NEPHROLOGY | Age: 78
End: 2023-03-03

## 2023-03-03 NOTE — TELEPHONE ENCOUNTER
Pt phoned is on jardiance 10 mg qd - but in your note to dr Bruce Caballero you are mentioning something about Yi Sleet being decreased? She is not on januvia. See the note and was questioning it . ...

## 2023-03-03 NOTE — TELEPHONE ENCOUNTER
Well thats good. Our med list shows she takes [facility] [place] 100.   Please update the entire med list.

## 2023-03-06 ENCOUNTER — OFFICE VISIT (OUTPATIENT)
Dept: FAMILY MEDICINE CLINIC | Age: 78
End: 2023-03-06
Payer: MEDICARE

## 2023-03-06 VITALS
HEART RATE: 72 BPM | BODY MASS INDEX: 31.01 KG/M2 | SYSTOLIC BLOOD PRESSURE: 130 MMHG | DIASTOLIC BLOOD PRESSURE: 60 MMHG | RESPIRATION RATE: 16 BRPM | WEIGHT: 210 LBS

## 2023-03-06 DIAGNOSIS — I48.20 CHRONIC ATRIAL FIBRILLATION (HCC): ICD-10-CM

## 2023-03-06 DIAGNOSIS — I50.32 CHRONIC DIASTOLIC CONGESTIVE HEART FAILURE, NYHA CLASS 2 (HCC): ICD-10-CM

## 2023-03-06 DIAGNOSIS — I20.9 ANGINA PECTORIS (HCC): ICD-10-CM

## 2023-03-06 DIAGNOSIS — E11.21 TYPE 2 DIABETES MELLITUS WITH DIABETIC NEPHROPATHY, WITHOUT LONG-TERM CURRENT USE OF INSULIN (HCC): Primary | ICD-10-CM

## 2023-03-06 LAB — HBA1C MFR BLD: 8.9 % (ref 4.3–5.7)

## 2023-03-06 PROCEDURE — 1123F ACP DISCUSS/DSCN MKR DOCD: CPT | Performed by: FAMILY MEDICINE

## 2023-03-06 PROCEDURE — G8399 PT W/DXA RESULTS DOCUMENT: HCPCS | Performed by: FAMILY MEDICINE

## 2023-03-06 PROCEDURE — G8427 DOCREV CUR MEDS BY ELIG CLIN: HCPCS | Performed by: FAMILY MEDICINE

## 2023-03-06 PROCEDURE — 3075F SYST BP GE 130 - 139MM HG: CPT | Performed by: FAMILY MEDICINE

## 2023-03-06 PROCEDURE — 1090F PRES/ABSN URINE INCON ASSESS: CPT | Performed by: FAMILY MEDICINE

## 2023-03-06 PROCEDURE — 99214 OFFICE O/P EST MOD 30 MIN: CPT | Performed by: FAMILY MEDICINE

## 2023-03-06 PROCEDURE — 3052F HG A1C>EQUAL 8.0%<EQUAL 9.0%: CPT | Performed by: FAMILY MEDICINE

## 2023-03-06 PROCEDURE — 83036 HEMOGLOBIN GLYCOSYLATED A1C: CPT | Performed by: FAMILY MEDICINE

## 2023-03-06 PROCEDURE — 1036F TOBACCO NON-USER: CPT | Performed by: FAMILY MEDICINE

## 2023-03-06 PROCEDURE — 3078F DIAST BP <80 MM HG: CPT | Performed by: FAMILY MEDICINE

## 2023-03-06 PROCEDURE — G8484 FLU IMMUNIZE NO ADMIN: HCPCS | Performed by: FAMILY MEDICINE

## 2023-03-06 PROCEDURE — G8417 CALC BMI ABV UP PARAM F/U: HCPCS | Performed by: FAMILY MEDICINE

## 2023-03-06 SDOH — ECONOMIC STABILITY: INCOME INSECURITY: HOW HARD IS IT FOR YOU TO PAY FOR THE VERY BASICS LIKE FOOD, HOUSING, MEDICAL CARE, AND HEATING?: NOT HARD AT ALL

## 2023-03-06 SDOH — ECONOMIC STABILITY: HOUSING INSECURITY
IN THE LAST 12 MONTHS, WAS THERE A TIME WHEN YOU DID NOT HAVE A STEADY PLACE TO SLEEP OR SLEPT IN A SHELTER (INCLUDING NOW)?: NO

## 2023-03-06 SDOH — ECONOMIC STABILITY: FOOD INSECURITY: WITHIN THE PAST 12 MONTHS, YOU WORRIED THAT YOUR FOOD WOULD RUN OUT BEFORE YOU GOT MONEY TO BUY MORE.: NEVER TRUE

## 2023-03-06 SDOH — ECONOMIC STABILITY: FOOD INSECURITY: WITHIN THE PAST 12 MONTHS, THE FOOD YOU BOUGHT JUST DIDN'T LAST AND YOU DIDN'T HAVE MONEY TO GET MORE.: NEVER TRUE

## 2023-03-06 ASSESSMENT — ENCOUNTER SYMPTOMS
BLOOD IN STOOL: 0
DIARRHEA: 0
EYES NEGATIVE: 1
SHORTNESS OF BREATH: 0
NAUSEA: 0
VOMITING: 0
ABDOMINAL PAIN: 0

## 2023-03-06 NOTE — PROGRESS NOTES
3/6/2023      Ash Alexis (:  1945) is a 65 y.o. female,Established patient, here for evaluation of the following chief complaint(s):  Leg Swelling (Pt c/o bilateral leg swelling, pain and itching that has gotten worse. Pt states that the right leg is worse than the left and would like to discuss. Pt would like to discuss a handicap placard. Pt would like to discuss her sugars as well since she has had an increase in thirst along with other symptoms.)        ASSESSMENT/PLAN     1. Type 2 diabetes mellitus with diabetic nephropathy, without long-term current use of insulin (HCC)  -     POCT glycosylated hemoglobin (Hb A1C)  -     SITagliptin (JANUVIA) 25 MG tablet; Take 1 tablet by mouth daily, Disp-90 tablet, R-3Normal  -     empagliflozin (JARDIANCE) 25 MG tablet; Take 1 tablet by mouth daily, Disp-90 tablet, R-3Normal  2. Angina pectoris ([address]  -     Handicap Placard [facility]; Starting Mon 3/6/2023, Disp-1 each, R-0, PrintDuration 5 years. Dx:  CHF  3. Chronic diastolic congestive heart failure, NYHA class 2 (HCC)  -     [facility]; Starting Mon 3/6/2023, Disp-1 each, R-0, PrintDuration 5 years. Dx:  CHF  4. Chronic atrial fibrillation (HCC)  -     Handicap Placard [facility]; Starting Mon 3/6/2023, Disp-1 each, R-0, PrintDuration 5 years. Dx:  CHF    Decrease dose of Januvia to 25mg daily due to renal issues    Increase Jardiance to 25mg daily for better control of blood sugars and CHF    Follow up with cardiology next week as planned. She will call the CHF clinic this week about her increased edema. Rx for handicap placard given today    Follow up with me in 1 month and bring log of blood sugars to the visit         Valentin Mendez is a 65 y. o.female      Here for follow up of chronic health problems including:    Patient Active Problem List   Diagnosis    SOB (shortness of breath)    H/O mitral valve replacement    Type 2 diabetes mellitus with diabetic nephropathy ([facility]    Hyperlipidemia    Arrhythmia    History of valvular heart disease    History of sinus bradycardia    MI, old    Generalized headaches    Nasal septal deviation    Obesity (BMI 35.0-39.9 without comorbidity)    Cataract    Essential hypertension    Obstructive sleep apnea on CPAP    Anticoagulated on Coumadin    Symptomatic bradycardia    Medtronic dual pacemaker    Basal cell carcinoma of skin of neck    Chronic kidney disease, stage III (moderate) ([facility])    Chronic atrial fibrillation (HCC)    Ulcer, stomach peptic    Encounter for therapeutic drug monitoring    Chronic diastolic congestive heart failure, NYHA class 2 ([facility])    CKD (chronic kidney disease) stage 4, GFR 15-29 ml/min ([facility])    Angina pectoris ([facility] [address]     Patient c/o a recent increase in her lower extremity edema. She has gained 3-5 pounds. She continues to wear compression hose. The skin on her legs is itchy due to swelling. She has not notified the CHF clinic about this. Has an appt with her cardiologist next week. She was confused about the doses of her diabetes meds and continues on Januvia 100mg daily. Her nephrologist would like her to take a lower dose due to her compromised renal function. Her blood sugars have been higher since being off metformin and are often over 200. No recent illnesses or hospitalizations. Nonsmoker. Body mass index is 31.01 kg/m². She requests a Rx for a handicap placard today. Review of Systems   Constitutional:  Positive for unexpected weight change (gain). Negative for chills, fatigue and fever. HENT: Negative. Eyes: Negative. Respiratory:  Negative for shortness of breath. Cardiovascular:  Positive for leg swelling. Negative for chest pain and palpitations. Gastrointestinal:  Negative for abdominal pain, blood in stool, diarrhea, nausea and vomiting. Genitourinary:  Negative for dysuria. Musculoskeletal:  Positive for arthralgias. Negative for myalgias.    Skin:  Negative for rash.   Neurological:  Negative for dizziness and headaches. Psychiatric/Behavioral: Negative. All other systems reviewed and are negative. OBJECTIVE     /60   Pulse 72   Resp 16   Wt 210 lb (95.3 kg)   BMI 31.01 kg/m²     Wt Readings from Last 3 Encounters:   03/06/23 210 lb (95.3 kg)   02/20/23 210 lb (95.3 kg)   01/17/23 207 lb (93.9 kg)       Physical Exam  Vitals reviewed. Constitutional:       General: She is not in acute distress. Appearance: She is well-developed. HENT:      Head: Normocephalic and atraumatic. Right Ear: Tympanic membrane normal.      Left Ear: Tympanic membrane normal.      Mouth/Throat:      Mouth: Mucous membranes are moist.      Pharynx: No posterior oropharyngeal erythema. Eyes:      Conjunctiva/sclera: Conjunctivae normal.   Cardiovascular:      Rate and Rhythm: Normal rate and regular rhythm. Heart sounds: No murmur heard. Comments: Valve click noted  Pulmonary:      Breath sounds: Normal breath sounds. No wheezing. Musculoskeletal:      Right lower leg: Edema (2+ to the mid calf) present. Left lower leg: Edema (2+ to the mid calf) present. Lymphadenopathy:      Cervical: No cervical adenopathy. Neurological:      Mental Status: She is alert.      Lab Results   Component Value Date/Time     02/14/2023 09:32 AM    K 4.4 02/14/2023 09:32 AM     02/14/2023 09:32 AM    CO2 23 02/14/2023 09:32 AM    BUN 34 02/14/2023 09:32 AM    CREATININE 1.7 02/14/2023 09:32 AM    GLUCOSE 184 02/14/2023 09:32 AM    GLUCOSE 129 12/15/2011 10:00 AM    CALCIUM 9.4 02/14/2023 09:32 AM      Lab Results   Component Value Date    CHOL 148 07/28/2022    TRIG 186 07/28/2022    HDL 39 07/28/2022    LDLCALC 72 07/28/2022     Lab Results   Component Value Date    WBC 5.9 02/14/2023    HGB 12.1 02/14/2023    HCT 38.2 02/14/2023    MCV 92.9 02/14/2023     02/14/2023           Hemoglobin A1C   Date Value Ref Range Status   03/06/2023 8.9 (H) 4.3 - 5.7 % Final     Comment:     Performed at [facility] under CLIA # [id number]         Immunization History   Administered Date(s) Administered    COVID-19, PFIZER GRAY top, DO NOT Dilute, (age 15 y+), IM, 30 mcg/0.3 mL 07/07/2022    COVID-19, PFIZER PURPLE top, DILUTE for use, (age 15 y+), 30mcg/0.3mL 02/03/2021, 03/03/2021, 10/20/2021    Influenza 11/14/2013    Influenza Virus Vaccine 01/01/2011, 11/01/2012, 11/24/2014, 09/24/2016, 10/07/2017, 10/22/2020    Influenza, FLUAD, (age 72 y+), Adjuvanted, 0.5mL 10/20/2020, 10/25/2022    Influenza, FLUARIX, FLULAVAL, FLUZONE (age 10 mo+) AND AFLURIA, (age 1 y+), PF, 0.5mL 09/26/2019, 11/08/2021    Influenza, High Dose (Fluzone 65 yrs and older) 09/22/2016    Influenza, Triv, inactivated, subunit, adjuvanted, IM (Fluad 65 yrs and older) 10/26/2018    Pneumococcal Conjugate 13-valent ([id number]) 04/23/2015    Pneumococcal Polysaccharide ([id number]) 07/01/2009    Zoster Live (Zostavax) 06/30/2015         Health Maintenance   Topic Date Due    Hepatitis C screen  Never done    DTaP/Tdap/Td vaccine (1 - Tdap) Never done    Shingles vaccine (1 of 2) 08/25/2015    COVID-19 Vaccine (5 - Booster for Pfizer series) 09/01/2022    Lipids  07/28/2023    Depression Monitoring  08/02/2023    Annual Wellness Visit (AWV)  08/03/2023    DEXA (modify frequency per FRAX score)  Completed    Flu vaccine  Completed    Pneumococcal 65+ years Vaccine  Completed    Hepatitis A vaccine  Aged Out    Hib vaccine  Aged Out    Meningococcal (ACWY) vaccine  Aged Out         An electronic signature was used to authenticate this note.               Electronically signed by Blanca Leon MD on 3/6/2023 at 12:00 PM

## 2023-03-07 ENCOUNTER — TELEPHONE (OUTPATIENT)
Dept: CARDIOLOGY CLINIC | Age: 78
End: 2023-03-07

## 2023-03-07 NOTE — TELEPHONE ENCOUNTER
Take Bumex 2mg daily for the next 5 days (can take 1mg later on today and start 2mg tomorrow)    No on Kcl supplement so eat a banana a day for the next 5 days. To call back if no improvements/return of weight/swelling. We will move appointment up if so.

## 2023-03-07 NOTE — TELEPHONE ENCOUNTER
Patient called office   Was to see PCP yesterday note in epic  Wt up home 203 today  Has swelling LE with pain  Denies bloating  SOB (for years)

## 2023-03-13 ENCOUNTER — PATIENT MESSAGE (OUTPATIENT)
Dept: FAMILY MEDICINE CLINIC | Age: 78
End: 2023-03-13

## 2023-03-13 NOTE — TELEPHONE ENCOUNTER
From: Laury Levy  To: Dr. Jazzy Byrne: 3/13/2023 12:46 PM EDT  Subject: Thank You Dr Juan Seth    Just wanted you to know after changing the meds my legs have improved, not back to where they were yet but not as painful. My weight has gone down some. Heart failure clinic increased water pill for 5 days. Just wanted to thank you.

## 2023-03-15 ENCOUNTER — OFFICE VISIT (OUTPATIENT)
Dept: CARDIOLOGY CLINIC | Age: 78
End: 2023-03-15
Payer: MEDICARE

## 2023-03-15 VITALS
HEART RATE: 82 BPM | BODY MASS INDEX: 30.81 KG/M2 | SYSTOLIC BLOOD PRESSURE: 118 MMHG | WEIGHT: 208 LBS | HEIGHT: 69 IN | DIASTOLIC BLOOD PRESSURE: 64 MMHG

## 2023-03-15 DIAGNOSIS — I48.20 CHRONIC ATRIAL FIBRILLATION (HCC): ICD-10-CM

## 2023-03-15 DIAGNOSIS — Z95.0 PACEMAKER: Primary | ICD-10-CM

## 2023-03-15 DIAGNOSIS — I50.32 CHF (CONGESTIVE HEART FAILURE), NYHA CLASS II, CHRONIC, DIASTOLIC (HCC): ICD-10-CM

## 2023-03-15 PROCEDURE — 93000 ELECTROCARDIOGRAM COMPLETE: CPT | Performed by: NUCLEAR MEDICINE

## 2023-03-15 PROCEDURE — G8417 CALC BMI ABV UP PARAM F/U: HCPCS | Performed by: NUCLEAR MEDICINE

## 2023-03-15 PROCEDURE — 3078F DIAST BP <80 MM HG: CPT | Performed by: NUCLEAR MEDICINE

## 2023-03-15 PROCEDURE — 3074F SYST BP LT 130 MM HG: CPT | Performed by: NUCLEAR MEDICINE

## 2023-03-15 PROCEDURE — G8427 DOCREV CUR MEDS BY ELIG CLIN: HCPCS | Performed by: NUCLEAR MEDICINE

## 2023-03-15 PROCEDURE — 1090F PRES/ABSN URINE INCON ASSESS: CPT | Performed by: NUCLEAR MEDICINE

## 2023-03-15 PROCEDURE — G8484 FLU IMMUNIZE NO ADMIN: HCPCS | Performed by: NUCLEAR MEDICINE

## 2023-03-15 PROCEDURE — 1123F ACP DISCUSS/DSCN MKR DOCD: CPT | Performed by: NUCLEAR MEDICINE

## 2023-03-15 PROCEDURE — G8399 PT W/DXA RESULTS DOCUMENT: HCPCS | Performed by: NUCLEAR MEDICINE

## 2023-03-15 PROCEDURE — 1036F TOBACCO NON-USER: CPT | Performed by: NUCLEAR MEDICINE

## 2023-03-15 PROCEDURE — 99214 OFFICE O/P EST MOD 30 MIN: CPT | Performed by: NUCLEAR MEDICINE

## 2023-03-15 NOTE — PROGRESS NOTES
[id number] [facility] [place] [address] [place] [address]  Dept: [phone]  Dept Fax: [phone]  Loc: [phone]    Visit Date: 3/15/2023    Gene Bolton is a 65 y.o. female who presents todayfor:  Chief Complaint   Patient presents with    Follow-up    Hypertension    Valvular Heart Disease    Cardiomyopathy    Coronary Artery Disease   Leg pain   Both ways  Resting usually   Known MVR and CMP   Chronic dyspnea on exertion   Leg limitation and pain   Some chest pain lately   Tooth ache type   Some dyspnea as above  On statins for hyperlipidemia  ?? Issues from it   DM is fair   CONCEPCION is stable  No dizziness  No syncope      HPI:  HPI  Past Medical History:   Diagnosis Date    Arrhythmia     CHRONIC ATRIAL FIB    Breast cancer ([facility] [address] 12/10/2012    [facility]    Cancer ([facility] [address]     breast ca left    CHF (congestive heart failure) (HCC)     Chronic kidney disease, stage III (moderate) ([facility] [address] 10/29/2018    Depression     Diabetes mellitus (HCC)     Generalized headaches     History of dizziness     History of sinus bradycardia     History of therapeutic radiation     History of valvular heart disease     Hyperlipidemia     Hypertension     Medtronic dual pacemaker 5/23/2017    MI, old     Mitral valve replaced     Numbness and tingling     Obesity     Obstructive sleep apnea on CPAP 2011    Osteopenia     SOB (shortness of breath)     HX OF:      Past Surgical History:   Procedure Laterality Date    BREAST LUMPECTOMY Left 01/09/2013    [facility]    BREAST SURGERY Left 1/15/13    re-excision cranial margin and mammosite spacer    CARDIAC CATHETERIZATION  10/06/2003    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 MV area by recent EKG was 1 sq. cm. and mean gradient across MV was 17mmHg. MV index was about 0.7 sq. cm. per body surface area. Moderate pulmonary artery systolic HTN. Patent coronary arteries.     CARDIAC VALVE REPLACEMENT  2003    MVR  33mm [place] Mechanical valve    CARDIOVASCULAR STRESS TEST  05/26/2011    Cannot exclude mild degree of ischemia in anterolateral wall. EF 58%. Mildly abnormal nuclear scan. CARDIOVASCULAR STRESS TEST  7-09-10    Atrial fibrillation. Episodes of bradycardia mainly at night, which could be physiologic. Predominantly bradycardiac rhythm w/o significant pauses. Frequent PVC's     CARDIOVASCULAR STRESS TEST  1-14-08    Atrial fibrillation w/ controlled ventricular repsonse and an average heart rate of 58 bpm. 1 episode of 3 beat run of nonsustained ventricular tachyarrhythmia at rate of 110-158 bpm. 3 transient episodes of ventricular bigeminy w/ rate in mid 60's to low 70's. CARDIOVASCULAR STRESS TEST  2-08-99    Unremarkable routine stress test. Chronic A-fib. CARDIOVASCULAR STRESS TEST  2-08-00    Normal stress EKG. Hypertension, not well controlled. Patient's sitting BP was 160/94. At peak exercise BP was 180/102. Moderate degree of MVP. CATARACT REMOVAL Right 7/16/14    CATARACT REMOVAL      COLONOSCOPY  2/2010    COLONOSCOPY  08/2017    KNEE ARTHROSCOPY  6/2011    KNEE SURGERY      [facility] BREAST BIOPSY LEFT Left 12/10/2012    [facility]    GARTH STEROTACTIC LOC BREAST BIOPSY RIGHT Right 11/16/2011    benign    MITRAL VALVE REPLACEMENT      PACEMAKER INSERTION Left 05/18/2017    by Dr Toby Jeans 8/22/2017    COLONOSCOPY performed by María Elena Camacho MD at [address] [facility]    DC EGD TRANSORAL BIOPSY SINGLE/MULTIPLE N/A 8/22/2017    EGD BIOPSY performed by María Elena Camacho MD at [address] [facility] ECHOCARDIOGRAM  05/26/2011    LV mildly dilated, systolic function mildly reduced. EF 40-45%. Mild diffuse hypokinesis. Mild lateral wall was hypokinetic. Apical lateral wall was dyskinetic. Wall thickness was at upper limits of normal. LA moderately dilated. RV and RA mildly dilated. Systolic function mildly reduced.  Mild MR and TR.     TRANSTHORACIC ECHOCARDIOGRAM 01/14/2008    Biatrial enlargement, RV dilated. Atheromatous aortic root. Borderline LVH. Normal LV systolic function. EF 55%. Prosthetic MV appears to be functioning well. Mildly sclerosed AV. Trivial MR, mild TR, trivial PI, RVSP 47mmHg. TRANSTHORACIC ECHOCARDIOGRAM  2-08-00    Moderate MV stenosis based on available echo doppler data. UPPER GASTROINTESTINAL ENDOSCOPY  08/2017     Family History   Problem Relation Age of Onset    Hypertension Mother     Stroke Mother     Diabetes Mother     Brain Cancer Father     Kidney Cancer Sister     Heart Attack Brother     Heart Disease Brother     High Blood Pressure Brother     Melanoma Brother     Melanoma Brother     Breast Cancer Maternal Aunt 48    Breast Cancer Paternal Aunt     Ovarian Cancer Paternal Aunt 62    Breast Cancer Niece     Breast Cancer Niece     Breast Cancer Niece      Social History     Tobacco Use    Smoking status: Never    Smokeless tobacco: Never    Tobacco comments:     Friends were heavy smokers   Substance Use Topics    Alcohol use: Yes     Alcohol/week: 0.0 standard drinks     Comment: Occassionally      Current Outpatient Medications   Medication Sig Dispense Refill    Handicap Placard MISC by Does not apply route Duration 5 years. Dx:  CHF 1 each 0    SITagliptin (JANUVIA) 25 MG tablet Take 1 tablet by mouth daily 90 tablet 3    empagliflozin (JARDIANCE) 25 MG tablet Take 1 tablet by mouth daily 90 tablet 3    warfarin (COUMADIN) 1 MG tablet Take as directed by [facility] (#130 ~ 90 day supply) 130 tablet 2    isosorbide dinitrate (ISORDIL) 10 MG tablet Take 10 mg by mouth 3 times daily      glimepiride (AMARYL) 2 MG tablet Take 2 tablets twice daily 360 tablet 3    olmesartan (BENICAR) 40 MG tablet TAKE 1 TABLET DAILY 90 tablet 3    nitroGLYCERIN (NITROLINGUAL) 0.4 MG/SPRAY 0.4 mg spray USE 1 SPRAY UNDER THE TONGUE EVERY 5 MINUTES AS NEEDED FOR CHEST PAIN.  IF THIRD SPRAY DOES NOT RELIEVE PAIN, CALL 911 (FOR ANGINA PECTORIS) 4.9 g 0    bumetanide (BUMEX) 1 MG tablet TAKE 1 TABLET DAILY 90 tablet 3    Lancets MISC Freestyle Freedom lancets, use daily, DX: E11.21 100 each 3    glucose monitoring (FREESTYLE FREEDOM) kit 1 kit by Does not apply route daily Glucometer brand per insurance and patient preference. 1 kit 0    blood glucose monitor strips Dispense brand per insurance and patient preference. Test daily. 100 strip 3    hydrALAZINE (APRESOLINE) 50 MG tablet TAKE 1 TABLET THREE TIMES A  tablet 3    carvedilol (COREG) 12.5 MG tablet TAKE 1 TABLET TWICE A  tablet 2    simvastatin (ZOCOR) 10 MG tablet TAKE 1 TABLET AT BEDTIME 90 tablet 3    CPAP Machine MISC by Does not apply route Please change CPAP pressure to 10 cm H20. Please send download in 2 weeks 1 each 0    mometasone (ELOCON) 0.1 % cream Apply topically daily. 50 g 0    amoxicillin (AMOXIL) 500 MG capsule Take by mouth Prior to teeth cleaning. Acetaminophen (TYLENOL PO) Take 500 mg by mouth every 4 hours as needed Don't take more than 3,000 mg each day, including what's in Coricidin. fluorouracil (EFUDEX) 5 % cream Apply topically daily Indications: precancer treatment Off it for intervals, then back on.      metroNIDAZOLE (METROCREAM) 0.75 % cream Apply topically See Admin Instructions Indications: Skin Abnormalities As needed      Blood Glucose Monitoring Suppl (ADVOCATE BLOOD GLUCOSE MONITOR) CONNIE Dx: 250.00 1 Device 0     No current facility-administered medications for this visit.      Allergies   Allergen Reactions    Adalat [Nifedipine] Itching     redness    Amlodipine Swelling     If take more than 5mg legs swell, hot and red    Potassium-Containing Compounds      SHIMA    Tape [Adhesive Tape] Rash     Skin pulls off     Health Maintenance   Topic Date Due    Hepatitis C screen  Never done    DTaP/Tdap/Td vaccine (1 - Tdap) Never done    Shingles vaccine (1 of 2) 08/25/2015    COVID-19 Vaccine (5 - Booster for Shafer Peter series) 09/01/2022 Lipids  07/28/2023    Depression Monitoring  08/02/2023    Annual Wellness Visit (AWV)  08/03/2023    DEXA (modify frequency per FRAX score)  Completed    Flu vaccine  Completed    Pneumococcal 65+ years Vaccine  Completed    Hepatitis A vaccine  Aged Out    Hib vaccine  Aged Out    Meningococcal (ACWY) vaccine  Aged Out       Subjective:  General:   No fever, no chills, No fatigue or weight loss  Pulmonary:    some dyspnea, no wheezing  Cardiac:    Denies recent chest pain,   GI:     No nausea or vomiting, no abdominal pain  Neuro:     No dizziness or light headedness,   Musculoskeletal: Recent leg pain   Extremities:   No edema, no obvious claudication       Objective:  General:   Well developed, well nourished  Lungs:    Clear to auscultation  Heart:    Normal S1 S2, Slight murmur. no rubs, no gallops  Abdomen:   Soft, non tender, no organomegalies, positive bowel sounds  Extremities:   No edema, no cyanosis, good peripheral pulses  Neurological:   Awake, alert, oriented. No obvious focal deficits  Musculoskelatal:  No obvious deformities   /64   Pulse 82   Ht 5' 9\" (1.753 m)   Wt 208 lb (94.3 kg)   BMI 30.72 kg/m²     Assessment:      Diagnosis Orders   1. Pacemaker  EKG 12 lead      2. Chronic atrial fibrillation (HCC)  EKG 12 lead      3. CHF (congestive heart failure), NYHA class II, chronic, diastolic (HCC)  EKG 12 lead      Symptoms as above  ?/ statins related symptoms   Vs back issues  ECG in office was done today. I reviewed the ECG. No acute findings      Plan:  No follow-ups on file. As above  Hold statins   Non invasive cardiac testing will be ordered to further evaluate for any ischemic or structural heart disease as a cause of the patient symptoms. We will proceed with a Stress Cardiolite test and echo soon. Continue risk factor modification and medical management  Thank you for allowing me to participate in the care of your patient.  Please don't hesitate to contact me regarding any further issues related to the patient care    Orders Placed:  Orders Placed This Encounter   Procedures    EKG 12 lead     Order Specific Question:   Reason for Exam?     Answer: Other       Prescribed:  No orders of the defined types were placed in this encounter. Discussed use, benefit, and side effects of prescribed medications. All patient questions answered. Pt voicedunderstanding. Instructed to continue current medications, diet and exercise. Continue risk factor modification and medical management. Patient agreed with treatment plan. Follow up as directed.     Electronically signedby Radha Dotson MD on 3/15/2023 at 12:00 PM

## 2023-03-23 ENCOUNTER — HOSPITAL ENCOUNTER (OUTPATIENT)
Dept: PHARMACY | Age: 78
Setting detail: THERAPIES SERIES
Discharge: HOME | End: 2023-03-23
Payer: MEDICARE

## 2023-03-23 DIAGNOSIS — I48.20 CHRONIC ATRIAL FIBRILLATION (HCC): ICD-10-CM

## 2023-03-23 DIAGNOSIS — Z79.01 ANTICOAGULATED ON COUMADIN: ICD-10-CM

## 2023-03-23 DIAGNOSIS — Z95.2 H/O MITRAL VALVE REPLACEMENT: Primary | ICD-10-CM

## 2023-03-23 DIAGNOSIS — Z51.81 ENCOUNTER FOR THERAPEUTIC DRUG MONITORING: ICD-10-CM

## 2023-03-23 LAB — POC INR: 2.8 (ref 0.8–1.2)

## 2023-03-23 PROCEDURE — 99211 OFF/OP EST MAY X REQ PHY/QHP: CPT | Performed by: PHARMACIST

## 2023-03-23 PROCEDURE — 36416 COLLJ CAPILLARY BLOOD SPEC: CPT | Performed by: PHARMACIST

## 2023-03-23 PROCEDURE — 85610 PROTHROMBIN TIME: CPT | Performed by: PHARMACIST

## 2023-03-23 NOTE — PROGRESS NOTES
Medication Management [address]  [phone] (phone)  [phone] (fax)    Ms. Agustín Esparza is a 65 y.o.  female with history of Afib, Mechanical MVR who presents today for anticoagulation monitoring and adjustment. Patient verifies current dosing regimen and tablet strength. No missed or extra doses. Patient denies s/s bleeding/bruising/swelling/SOB/chest pain  No blood in urine or stool. No dietary changes. No changes in OTC agents/Herbals. - Holding simvastatin for 1 month due to pain in her legs   No change in alcohol use or tobacco use. No change in activity level. Patient denies headaches/dizziness/lightheadedness/falls. No vomiting/diarrhea or acute illness. No Procedures scheduled in the future at this time. Assessment:   Lab Results   Component Value Date    INR 2.80 (H) 03/23/2023    INR 2.20 (H) 03/02/2023    INR 3.30 (H) 02/02/2023     INR therapeutic   Recent Labs     03/23/23  1026   INR 2.80*    INR Goal 2.5 - 3.5   Patient interview completed and discussed with pharmacist by Bryan Rodriguez 2023      Plan:  Continue Coumadin 1.5mg MWF and 1mg TThSaS. Recheck INR in 4 week(s). Patient reminded to call the Anticoagulation Clinic with any signs or symptoms of bleeding or with any medication changes. Patient given instructions utilizing the teach back method. After visit summary printed and reviewed with patient. Discharged ambulatory in no apparent distress.       For Pharmacy Admin Tracking Only  Time Spent (min): 20

## 2023-03-29 ENCOUNTER — HOSPITAL ENCOUNTER (OUTPATIENT)
Dept: NON INVASIVE DIAGNOSTICS | Age: 78
Discharge: HOME | End: 2023-03-29
Payer: MEDICARE

## 2023-03-29 DIAGNOSIS — I48.20 CHRONIC ATRIAL FIBRILLATION (HCC): ICD-10-CM

## 2023-03-29 DIAGNOSIS — Z95.0 PACEMAKER: ICD-10-CM

## 2023-03-29 DIAGNOSIS — I50.32 CHF (CONGESTIVE HEART FAILURE), NYHA CLASS II, CHRONIC, DIASTOLIC (HCC): ICD-10-CM

## 2023-03-29 LAB
LV EF: 55 %
LVEF MODALITY: NORMAL

## 2023-03-29 PROCEDURE — 93306 TTE W/DOPPLER COMPLETE: CPT

## 2023-03-29 PROCEDURE — 6360000002 HC RX W HCPCS

## 2023-03-29 PROCEDURE — 93017 CV STRESS TEST TRACING ONLY: CPT | Performed by: NUCLEAR MEDICINE

## 2023-03-29 PROCEDURE — 3430000000 HC RX DIAGNOSTIC RADIOPHARMACEUTICAL: Performed by: NUCLEAR MEDICINE

## 2023-03-29 PROCEDURE — A9500 TC99M SESTAMIBI: HCPCS | Performed by: NUCLEAR MEDICINE

## 2023-03-29 PROCEDURE — 78452 HT MUSCLE IMAGE SPECT MULT: CPT

## 2023-03-29 PROCEDURE — 78452 HT MUSCLE IMAGE SPECT MULT: CPT | Performed by: NUCLEAR MEDICINE

## 2023-03-29 RX ORDER — TETRAKIS(2-METHOXYISOBUTYLISOCYANIDE)COPPER(I) TETRAFLUOROBORATE 1 MG/ML
33.6 INJECTION, POWDER, LYOPHILIZED, FOR SOLUTION INTRAVENOUS
Status: COMPLETED | OUTPATIENT
Start: 2023-03-29 | End: 2023-03-29

## 2023-03-29 RX ORDER — TETRAKIS(2-METHOXYISOBUTYLISOCYANIDE)COPPER(I) TETRAFLUOROBORATE 1 MG/ML
10.1 INJECTION, POWDER, LYOPHILIZED, FOR SOLUTION INTRAVENOUS
Status: COMPLETED | OUTPATIENT
Start: 2023-03-29 | End: 2023-03-29

## 2023-03-29 RX ADMIN — Medication 10.1 MILLICURIE: at 13:40

## 2023-03-29 RX ADMIN — Medication 33.6 MILLICURIE: at 14:27

## 2023-04-03 ENCOUNTER — OFFICE VISIT (OUTPATIENT)
Dept: FAMILY MEDICINE CLINIC | Age: 78
End: 2023-04-03
Payer: MEDICARE

## 2023-04-03 VITALS
WEIGHT: 207 LBS | TEMPERATURE: 97.7 F | DIASTOLIC BLOOD PRESSURE: 70 MMHG | RESPIRATION RATE: 16 BRPM | SYSTOLIC BLOOD PRESSURE: 128 MMHG | HEART RATE: 68 BPM | BODY MASS INDEX: 30.57 KG/M2

## 2023-04-03 DIAGNOSIS — L02.229 BOIL OF TRUNK: ICD-10-CM

## 2023-04-03 DIAGNOSIS — E11.21 TYPE 2 DIABETES MELLITUS WITH DIABETIC NEPHROPATHY, WITHOUT LONG-TERM CURRENT USE OF INSULIN (HCC): Primary | ICD-10-CM

## 2023-04-03 DIAGNOSIS — E78.5 HYPERLIPIDEMIA, UNSPECIFIED HYPERLIPIDEMIA TYPE: ICD-10-CM

## 2023-04-03 PROCEDURE — 99214 OFFICE O/P EST MOD 30 MIN: CPT | Performed by: FAMILY MEDICINE

## 2023-04-03 PROCEDURE — 3078F DIAST BP <80 MM HG: CPT | Performed by: FAMILY MEDICINE

## 2023-04-03 PROCEDURE — 1123F ACP DISCUSS/DSCN MKR DOCD: CPT | Performed by: FAMILY MEDICINE

## 2023-04-03 PROCEDURE — G8427 DOCREV CUR MEDS BY ELIG CLIN: HCPCS | Performed by: FAMILY MEDICINE

## 2023-04-03 PROCEDURE — 1090F PRES/ABSN URINE INCON ASSESS: CPT | Performed by: FAMILY MEDICINE

## 2023-04-03 PROCEDURE — G8417 CALC BMI ABV UP PARAM F/U: HCPCS | Performed by: FAMILY MEDICINE

## 2023-04-03 PROCEDURE — G8399 PT W/DXA RESULTS DOCUMENT: HCPCS | Performed by: FAMILY MEDICINE

## 2023-04-03 PROCEDURE — 3074F SYST BP LT 130 MM HG: CPT | Performed by: FAMILY MEDICINE

## 2023-04-03 PROCEDURE — 3052F HG A1C>EQUAL 8.0%<EQUAL 9.0%: CPT | Performed by: FAMILY MEDICINE

## 2023-04-03 PROCEDURE — 1036F TOBACCO NON-USER: CPT | Performed by: FAMILY MEDICINE

## 2023-04-03 RX ORDER — CEFDINIR 300 MG/1
300 CAPSULE ORAL 2 TIMES DAILY
Qty: 20 CAPSULE | Refills: 0 | Status: SHIPPED | OUTPATIENT
Start: 2023-04-03 | End: 2023-04-13

## 2023-04-03 ASSESSMENT — PATIENT HEALTH QUESTIONNAIRE - PHQ9
SUM OF ALL RESPONSES TO PHQ QUESTIONS 1-9: 0
SUM OF ALL RESPONSES TO PHQ9 QUESTIONS 1 & 2: 0
1. LITTLE INTEREST OR PLEASURE IN DOING THINGS: 0
SUM OF ALL RESPONSES TO PHQ QUESTIONS 1-9: 0
SUM OF ALL RESPONSES TO PHQ QUESTIONS 1-9: 0
2. FEELING DOWN, DEPRESSED OR HOPELESS: 0
SUM OF ALL RESPONSES TO PHQ QUESTIONS 1-9: 0

## 2023-04-03 ASSESSMENT — ENCOUNTER SYMPTOMS
ABDOMINAL PAIN: 0
COLOR CHANGE: 1
SHORTNESS OF BREATH: 0

## 2023-04-03 NOTE — PROGRESS NOTES
4/3/2023    Javier Sahu (:  1945) is a 65 y.o. female, Established patient, here for evaluation of the following chief complaint(s):  Diabetes (1 month f/u. Dose of Jardiance increased as last OV. Sugars over the last 3 days running in the 150's, prior to that 160-170's. ) and Cyst (Area under left breast. Red and tender for the past week. Not draining. Using warm compresses and Neosporin. )      ASSESSMENT/PLAN:    1. Type 2 diabetes mellitus with diabetic nephropathy, without long-term current use of insulin (HCC)  -     Hemoglobin A1C; Future  -     Microalbumin / Creatinine Urine Ratio; Future  2. Boil of trunk  -     cefdinir (OMNICEF) 300 MG capsule; Take 1 capsule by mouth 2 times daily for 10 days, Disp-20 capsule, R-0Normal  3. Hyperlipidemia, unspecified hyperlipidemia type  -     Lipid Panel; Future    Continue current medication for diabetes. Her blood sugars have come down quite nicely. Trial of Omnicef 300 mg p.o. twice daily x10 days for skin infection. If not improved, she may need I&D of the area. She will notify the Coumadin clinic that she is taking antibiotics. Labs as above in July    Follow-up with specialist as planned    Return in about 6 months (around 10/3/2023). SUBJECTIVE/OBJECTIVE:    HPI    Patient today for follow-up of diabetes and hyperlipidemia. She states that her cardiologist took her off of Zocor and she has noticed a significant improvement in her leg discomfort. Her blood sugars are improved with an increased dose of Jardiance and she is noticing less lower extremity swelling. Fasting blood sugars have come down 30-50 points. She denies any hypoglycemia. She takes her prescribed medications as directed and denies side effects. No recent illnesses or hospitalizations. Weight is down. She has noticed a red and tender cyst below the left breast over the last week. She denies fever, chills, or sweats. There is no drainage from the area.   She

## 2023-04-04 ENCOUNTER — TELEPHONE (OUTPATIENT)
Dept: PHARMACY | Age: 78
End: 2023-04-04

## 2023-04-04 NOTE — TELEPHONE ENCOUNTER
Called pt, LM to continue current Coumadin regimen and keep appt as currently scheduled. Instructed patient to contact [facility] for an earlier INR appt if she develops and GI side effects from antibiotic.

## 2023-04-04 NOTE — TELEPHONE ENCOUNTER
Patient called and left a message that she has been started on Cefdinir for probably for 10 days by her family doctor. She said you could leave a message on her machine as far as Coumadin dosing.

## 2023-04-18 ENCOUNTER — HOSPITAL ENCOUNTER (INPATIENT)
Age: 78
LOS: 2 days | Discharge: HOME | End: 2023-04-22
Attending: EMERGENCY MEDICINE
Payer: MEDICARE

## 2023-04-18 ENCOUNTER — APPOINTMENT (OUTPATIENT)
Dept: GENERAL RADIOLOGY | Age: 78
End: 2023-04-18
Payer: MEDICARE

## 2023-04-18 ENCOUNTER — APPOINTMENT (OUTPATIENT)
Dept: CT IMAGING | Age: 78
End: 2023-04-18
Payer: MEDICARE

## 2023-04-18 DIAGNOSIS — I10 ESSENTIAL HYPERTENSION: ICD-10-CM

## 2023-04-18 DIAGNOSIS — R07.9 CHEST PAIN, UNSPECIFIED TYPE: Primary | ICD-10-CM

## 2023-04-18 LAB
ANION GAP SERPL CALC-SCNC: 11 MEQ/L (ref 8–16)
APTT PPP: 32.7 SECONDS (ref 22–38)
BASOPHILS ABSOLUTE: 0 THOU/MM3 (ref 0–0.1)
BASOPHILS NFR BLD AUTO: 0.2 %
BUN SERPL-MCNC: 35 MG/DL (ref 7–22)
CALCIUM SERPL-MCNC: 9.2 MG/DL (ref 8.5–10.5)
CHLORIDE SERPL-SCNC: 107 MEQ/L (ref 98–111)
CO2 SERPL-SCNC: 22 MEQ/L (ref 23–33)
CREAT SERPL-MCNC: 1.8 MG/DL (ref 0.4–1.2)
D DIMER PPP IA.FEU-MCNC: 1856 NG/ML FEU (ref 0–500)
DEPRECATED RDW RBC AUTO: 48.3 FL (ref 35–45)
EOSINOPHIL NFR BLD AUTO: 2.4 %
EOSINOPHILS ABSOLUTE: 0.1 THOU/MM3 (ref 0–0.4)
ERYTHROCYTE [DISTWIDTH] IN BLOOD BY AUTOMATED COUNT: 13.8 % (ref 11.5–14.5)
GFR SERPL CREATININE-BSD FRML MDRD: 28 ML/MIN/1.73M2
GLUCOSE BLD STRIP.AUTO-MCNC: 135 MG/DL (ref 70–108)
GLUCOSE BLD STRIP.AUTO-MCNC: 209 MG/DL (ref 70–108)
GLUCOSE SERPL-MCNC: 191 MG/DL (ref 70–108)
HCT VFR BLD AUTO: 41.9 % (ref 37–47)
HEPARIN UNFRACTIONATED: 0.36 U/ML (ref 0.3–0.7)
HEPARIN UNFRACTIONATED: < 0.04 U/ML (ref 0.3–0.7)
HGB BLD-MCNC: 12.8 GM/DL (ref 12–16)
IMM GRANULOCYTES # BLD AUTO: 0.02 THOU/MM3 (ref 0–0.07)
IMM GRANULOCYTES NFR BLD AUTO: 0.3 %
INR PPP: 1.03 (ref 0.85–1.13)
INR PPP: 1.08 (ref 0.85–1.13)
LYMPHOCYTES ABSOLUTE: 0.7 THOU/MM3 (ref 1–4.8)
LYMPHOCYTES NFR BLD AUTO: 12.7 %
MCH RBC QN AUTO: 29 PG (ref 26–33)
MCHC RBC AUTO-ENTMCNC: 30.5 GM/DL (ref 32.2–35.5)
MCV RBC AUTO: 95 FL (ref 81–99)
MONOCYTES ABSOLUTE: 0.5 THOU/MM3 (ref 0.4–1.3)
MONOCYTES NFR BLD AUTO: 7.9 %
NEUTROPHILS NFR BLD AUTO: 76.5 %
NRBC BLD AUTO-RTO: 0 /100 WBC
NT-PROBNP SERPL IA-MCNC: 1901 PG/ML (ref 0–449)
OSMOLALITY SERPL CALC.SUM OF ELEC: 292.5 MOSMOL/KG (ref 275–300)
PLATELET # BLD AUTO: 155 THOU/MM3 (ref 130–400)
PMV BLD AUTO: 10.8 FL (ref 9.4–12.4)
POTASSIUM SERPL-SCNC: 4.9 MEQ/L (ref 3.5–5.2)
RBC # BLD AUTO: 4.41 MILL/MM3 (ref 4.2–5.4)
SEGMENTED NEUTROPHILS ABSOLUTE COUNT: 4.5 THOU/MM3 (ref 1.8–7.7)
SODIUM SERPL-SCNC: 140 MEQ/L (ref 135–145)
TROPONIN T: 0.23 NG/ML
TROPONIN T: 0.43 NG/ML
TROPONIN T: < 0.01 NG/ML
WBC # BLD AUTO: 5.9 THOU/MM3 (ref 4.8–10.8)

## 2023-04-18 PROCEDURE — 6360000004 HC RX CONTRAST MEDICATION: Performed by: STUDENT IN AN ORGANIZED HEALTH CARE EDUCATION/TRAINING PROGRAM

## 2023-04-18 PROCEDURE — 93005 ELECTROCARDIOGRAM TRACING: CPT | Performed by: STUDENT IN AN ORGANIZED HEALTH CARE EDUCATION/TRAINING PROGRAM

## 2023-04-18 PROCEDURE — 82948 REAGENT STRIP/BLOOD GLUCOSE: CPT

## 2023-04-18 PROCEDURE — 71275 CT ANGIOGRAPHY CHEST: CPT

## 2023-04-18 PROCEDURE — G0378 HOSPITAL OBSERVATION PER HR: HCPCS

## 2023-04-18 PROCEDURE — 99223 1ST HOSP IP/OBS HIGH 75: CPT

## 2023-04-18 PROCEDURE — 80048 BASIC METABOLIC PNL TOTAL CA: CPT

## 2023-04-18 PROCEDURE — 6370000000 HC RX 637 (ALT 250 FOR IP): Performed by: STUDENT IN AN ORGANIZED HEALTH CARE EDUCATION/TRAINING PROGRAM

## 2023-04-18 PROCEDURE — 4A023N7 MEASUREMENT OF CARDIAC SAMPLING AND PRESSURE, LEFT HEART, PERCUTANEOUS APPROACH: ICD-10-PCS | Performed by: INTERNAL MEDICINE

## 2023-04-18 PROCEDURE — 83880 ASSAY OF NATRIURETIC PEPTIDE: CPT

## 2023-04-18 PROCEDURE — 6370000000 HC RX 637 (ALT 250 FOR IP)

## 2023-04-18 PROCEDURE — B2111ZZ FLUOROSCOPY OF MULTIPLE CORONARY ARTERIES USING LOW OSMOLAR CONTRAST: ICD-10-PCS | Performed by: INTERNAL MEDICINE

## 2023-04-18 PROCEDURE — 85730 THROMBOPLASTIN TIME PARTIAL: CPT

## 2023-04-18 PROCEDURE — 85610 PROTHROMBIN TIME: CPT

## 2023-04-18 PROCEDURE — 85025 COMPLETE CBC W/AUTO DIFF WBC: CPT

## 2023-04-18 PROCEDURE — 71046 X-RAY EXAM CHEST 2 VIEWS: CPT

## 2023-04-18 PROCEDURE — 6360000002 HC RX W HCPCS

## 2023-04-18 PROCEDURE — 85379 FIBRIN DEGRADATION QUANT: CPT

## 2023-04-18 PROCEDURE — 93005 ELECTROCARDIOGRAM TRACING: CPT

## 2023-04-18 PROCEDURE — 36415 COLL VENOUS BLD VENIPUNCTURE: CPT

## 2023-04-18 PROCEDURE — B2151ZZ FLUOROSCOPY OF LEFT HEART USING LOW OSMOLAR CONTRAST: ICD-10-PCS | Performed by: INTERNAL MEDICINE

## 2023-04-18 PROCEDURE — 99285 EMERGENCY DEPT VISIT HI MDM: CPT

## 2023-04-18 PROCEDURE — 84484 ASSAY OF TROPONIN QUANT: CPT

## 2023-04-18 PROCEDURE — 93010 ELECTROCARDIOGRAM REPORT: CPT | Performed by: INTERNAL MEDICINE

## 2023-04-18 PROCEDURE — 2580000003 HC RX 258: Performed by: STUDENT IN AN ORGANIZED HEALTH CARE EDUCATION/TRAINING PROGRAM

## 2023-04-18 PROCEDURE — 85520 HEPARIN ASSAY: CPT

## 2023-04-18 RX ORDER — SODIUM CHLORIDE 0.9 % (FLUSH) 0.9 %
5-40 SYRINGE (ML) INJECTION EVERY 12 HOURS SCHEDULED
Status: DISCONTINUED | OUTPATIENT
Start: 2023-04-18 | End: 2023-04-22 | Stop reason: HOSPADM

## 2023-04-18 RX ORDER — ISOSORBIDE DINITRATE 10 MG/1
10 TABLET ORAL 3 TIMES DAILY
Status: DISCONTINUED | OUTPATIENT
Start: 2023-04-18 | End: 2023-04-18

## 2023-04-18 RX ORDER — ONDANSETRON 2 MG/ML
4 INJECTION INTRAMUSCULAR; INTRAVENOUS EVERY 6 HOURS PRN
Status: DISCONTINUED | OUTPATIENT
Start: 2023-04-18 | End: 2023-04-18

## 2023-04-18 RX ORDER — CARVEDILOL 12.5 MG/1
12.5 TABLET ORAL 2 TIMES DAILY
Status: DISCONTINUED | OUTPATIENT
Start: 2023-04-19 | End: 2023-04-21

## 2023-04-18 RX ORDER — HEPARIN SODIUM 1000 [USP'U]/ML
2000 INJECTION, SOLUTION INTRAVENOUS; SUBCUTANEOUS PRN
Status: DISCONTINUED | OUTPATIENT
Start: 2023-04-18 | End: 2023-04-20

## 2023-04-18 RX ORDER — LOSARTAN POTASSIUM 100 MG/1
100 TABLET ORAL DAILY
Status: DISCONTINUED | OUTPATIENT
Start: 2023-04-18 | End: 2023-04-18 | Stop reason: ALTCHOICE

## 2023-04-18 RX ORDER — SODIUM CHLORIDE 0.9 % (FLUSH) 0.9 %
5-40 SYRINGE (ML) INJECTION PRN
Status: DISCONTINUED | OUTPATIENT
Start: 2023-04-18 | End: 2023-04-22 | Stop reason: HOSPADM

## 2023-04-18 RX ORDER — HEPARIN SODIUM 1000 [USP'U]/ML
4000 INJECTION, SOLUTION INTRAVENOUS; SUBCUTANEOUS PRN
Status: DISCONTINUED | OUTPATIENT
Start: 2023-04-18 | End: 2023-04-20

## 2023-04-18 RX ORDER — INSULIN LISPRO 100 [IU]/ML
0-4 INJECTION, SOLUTION INTRAVENOUS; SUBCUTANEOUS NIGHTLY
Status: DISCONTINUED | OUTPATIENT
Start: 2023-04-18 | End: 2023-04-22 | Stop reason: HOSPADM

## 2023-04-18 RX ORDER — ONDANSETRON 4 MG/1
4 TABLET, ORALLY DISINTEGRATING ORAL EVERY 8 HOURS PRN
Status: DISCONTINUED | OUTPATIENT
Start: 2023-04-18 | End: 2023-04-18

## 2023-04-18 RX ORDER — ACETAMINOPHEN 650 MG/1
650 SUPPOSITORY RECTAL EVERY 6 HOURS PRN
Status: DISCONTINUED | OUTPATIENT
Start: 2023-04-18 | End: 2023-04-22 | Stop reason: HOSPADM

## 2023-04-18 RX ORDER — ACETAMINOPHEN 500 MG
1000 TABLET ORAL ONCE
Status: COMPLETED | OUTPATIENT
Start: 2023-04-18 | End: 2023-04-18

## 2023-04-18 RX ORDER — NITROGLYCERIN 0.4 MG/1
0.4 TABLET SUBLINGUAL EVERY 5 MIN PRN
Status: DISCONTINUED | OUTPATIENT
Start: 2023-04-18 | End: 2023-04-18

## 2023-04-18 RX ORDER — HEPARIN SODIUM 10000 [USP'U]/100ML
5-30 INJECTION, SOLUTION INTRAVENOUS CONTINUOUS
Status: DISCONTINUED | OUTPATIENT
Start: 2023-04-18 | End: 2023-04-20

## 2023-04-18 RX ORDER — HEPARIN SODIUM 1000 [USP'U]/ML
4000 INJECTION, SOLUTION INTRAVENOUS; SUBCUTANEOUS ONCE
Status: COMPLETED | OUTPATIENT
Start: 2023-04-18 | End: 2023-04-18

## 2023-04-18 RX ORDER — ACETAMINOPHEN 325 MG/1
650 TABLET ORAL EVERY 6 HOURS PRN
Status: DISCONTINUED | OUTPATIENT
Start: 2023-04-18 | End: 2023-04-22 | Stop reason: HOSPADM

## 2023-04-18 RX ORDER — ONDANSETRON 2 MG/ML
4 INJECTION INTRAMUSCULAR; INTRAVENOUS ONCE
Status: DISCONTINUED | OUTPATIENT
Start: 2023-04-18 | End: 2023-04-22 | Stop reason: HOSPADM

## 2023-04-18 RX ORDER — OLMESARTAN MEDOXOMIL 40 MG/1
40 TABLET ORAL DAILY
Status: DISCONTINUED | OUTPATIENT
Start: 2023-04-19 | End: 2023-04-22 | Stop reason: HOSPADM

## 2023-04-18 RX ORDER — HYDRALAZINE HYDROCHLORIDE 50 MG/1
50 TABLET, FILM COATED ORAL 3 TIMES DAILY
Status: DISCONTINUED | OUTPATIENT
Start: 2023-04-18 | End: 2023-04-20

## 2023-04-18 RX ORDER — POLYETHYLENE GLYCOL 3350 17 G/17G
17 POWDER, FOR SOLUTION ORAL DAILY PRN
Status: DISCONTINUED | OUTPATIENT
Start: 2023-04-18 | End: 2023-04-22 | Stop reason: HOSPADM

## 2023-04-18 RX ORDER — INSULIN LISPRO 100 [IU]/ML
5 INJECTION, SOLUTION INTRAVENOUS; SUBCUTANEOUS
Status: DISCONTINUED | OUTPATIENT
Start: 2023-04-18 | End: 2023-04-22 | Stop reason: HOSPADM

## 2023-04-18 RX ORDER — INSULIN LISPRO 100 [IU]/ML
0-4 INJECTION, SOLUTION INTRAVENOUS; SUBCUTANEOUS
Status: DISCONTINUED | OUTPATIENT
Start: 2023-04-18 | End: 2023-04-22 | Stop reason: HOSPADM

## 2023-04-18 RX ORDER — BUMETANIDE 1 MG/1
1 TABLET ORAL DAILY
Status: DISCONTINUED | OUTPATIENT
Start: 2023-04-18 | End: 2023-04-21

## 2023-04-18 RX ORDER — 0.9 % SODIUM CHLORIDE 0.9 %
250 INTRAVENOUS SOLUTION INTRAVENOUS ONCE
Status: COMPLETED | OUTPATIENT
Start: 2023-04-18 | End: 2023-04-18

## 2023-04-18 RX ORDER — DEXTROSE MONOHYDRATE 100 MG/ML
INJECTION, SOLUTION INTRAVENOUS CONTINUOUS PRN
Status: DISCONTINUED | OUTPATIENT
Start: 2023-04-18 | End: 2023-04-22 | Stop reason: HOSPADM

## 2023-04-18 RX ORDER — MAGNESIUM SULFATE IN WATER 40 MG/ML
2000 INJECTION, SOLUTION INTRAVENOUS PRN
Status: DISCONTINUED | OUTPATIENT
Start: 2023-04-18 | End: 2023-04-22 | Stop reason: HOSPADM

## 2023-04-18 RX ORDER — SODIUM CHLORIDE 9 MG/ML
INJECTION, SOLUTION INTRAVENOUS PRN
Status: DISCONTINUED | OUTPATIENT
Start: 2023-04-18 | End: 2023-04-22 | Stop reason: HOSPADM

## 2023-04-18 RX ORDER — NITROGLYCERIN 0.4 MG/1
0.4 TABLET SUBLINGUAL EVERY 5 MIN PRN
Status: DISCONTINUED | OUTPATIENT
Start: 2023-04-18 | End: 2023-04-22 | Stop reason: HOSPADM

## 2023-04-18 RX ORDER — CARVEDILOL 6.25 MG/1
12.5 TABLET ORAL 2 TIMES DAILY
Status: DISCONTINUED | OUTPATIENT
Start: 2023-04-18 | End: 2023-04-18 | Stop reason: SDUPTHER

## 2023-04-18 RX ADMIN — LOSARTAN POTASSIUM 100 MG: 100 TABLET, FILM COATED ORAL at 21:10

## 2023-04-18 RX ADMIN — SODIUM CHLORIDE 250 ML: 9 INJECTION, SOLUTION INTRAVENOUS at 12:46

## 2023-04-18 RX ADMIN — NITROGLYCERIN 0.4 MG: 0.4 TABLET, ORALLY DISINTEGRATING SUBLINGUAL at 11:35

## 2023-04-18 RX ADMIN — CARVEDILOL 12.5 MG: 6.25 TABLET, FILM COATED ORAL at 21:08

## 2023-04-18 RX ADMIN — IOPAMIDOL 80 ML: 755 INJECTION, SOLUTION INTRAVENOUS at 12:35

## 2023-04-18 RX ADMIN — EMPAGLIFLOZIN 25 MG: 25 TABLET, FILM COATED ORAL at 21:09

## 2023-04-18 RX ADMIN — ACETAMINOPHEN 1000 MG: 500 TABLET ORAL at 11:32

## 2023-04-18 RX ADMIN — HEPARIN SODIUM 4000 UNITS: 1000 INJECTION INTRAVENOUS; SUBCUTANEOUS at 17:40

## 2023-04-18 RX ADMIN — HEPARIN SODIUM 11 UNITS/KG/HR: 10000 INJECTION, SOLUTION INTRAVENOUS at 17:40

## 2023-04-18 RX ADMIN — BUMETANIDE 1 MG: 1 TABLET ORAL at 21:08

## 2023-04-18 RX ADMIN — HYDRALAZINE HYDROCHLORIDE 50 MG: 50 TABLET ORAL at 21:09

## 2023-04-18 ASSESSMENT — PAIN SCALES - GENERAL
PAINLEVEL_OUTOF10: 2
PAINLEVEL_OUTOF10: 5
PAINLEVEL_OUTOF10: 0
PAINLEVEL_OUTOF10: 4

## 2023-04-18 ASSESSMENT — PAIN - FUNCTIONAL ASSESSMENT
PAIN_FUNCTIONAL_ASSESSMENT: ACTIVITIES ARE NOT PREVENTED
PAIN_FUNCTIONAL_ASSESSMENT: 0-10
PAIN_FUNCTIONAL_ASSESSMENT: 0-10

## 2023-04-18 ASSESSMENT — PAIN DESCRIPTION - PAIN TYPE: TYPE: ACUTE PAIN

## 2023-04-18 ASSESSMENT — PAIN DESCRIPTION - LOCATION
LOCATION: CHEST

## 2023-04-18 ASSESSMENT — PAIN DESCRIPTION - ONSET: ONSET: ON-GOING

## 2023-04-18 ASSESSMENT — PAIN DESCRIPTION - ORIENTATION: ORIENTATION: MID

## 2023-04-18 ASSESSMENT — PAIN DESCRIPTION - FREQUENCY: FREQUENCY: CONTINUOUS

## 2023-04-18 NOTE — ED NOTES
Patient resting in bed. Respirations easy and unlabored. No distress noted. Call light within reach.        Aliyah Chapman RN  04/18/23 [id number]

## 2023-04-18 NOTE — ED TRIAGE NOTES
Presents to ED with c/o chest pain that started this morning. States she was sweating and felt sob. Patient had 1 nitro and 4 aspirin by squad. Alert and oriented. Respirations easy and unlabored.

## 2023-04-18 NOTE — H&P
Carb controlled diet. Essential HTN: continue home medications and monitor      History of Present Illness:  Kamala Mulligan is a 65 y.o. female with PMHx of CKD, DM, HTN, CHF, CAD, and atrial fibrillation who presented to [facility] with chief complaint of chest pain. Patient reports she woke up this morning and felt okay, she went to her chair and fell back asleep. When she awoke in her chair, she started having chest pressure/pain in the center of her chest. She went and took her BP medication and a BP reading. She said the reading was \">200\" and then went to an error message. She returned to her chair the chest pain began to radiate into her back and down her left arm. She also reports feeling slightly short of breath at this time. She called 911 as her symptoms were not resolving. She received ASA and nitro en route and reports this helped resolve most of her pain, but it had not entirely resolved. Patient states she also had some nausea with her chest pain. Patient notes she is always lightheaded and also has palpitations due to her atrial fibrillation. She denies any recent illness, fevers/chills, vomiting, abdominal pain, urinary symptoms, and leg swelling. Review of Systems: Pertinent positives as noted in the HPI. All other systems reviewed and negative.     Past Medical History:        Diagnosis Date    Arrhythmia     CHRONIC ATRIAL FIB    Breast cancer ([facility] [address] 12/10/2012    [facility]    Cancer ([facility] [address]     breast ca left    CHF (congestive heart failure) (HCC)     Chronic kidney disease, stage III (moderate) ([facility] [address] 10/29/2018    Depression     Diabetes mellitus (HCC)     Generalized headaches     History of dizziness     History of sinus bradycardia     History of therapeutic radiation     History of valvular heart disease     Hyperlipidemia     Hypertension     Medtronic dual pacemaker 5/23/2017    MI, old     Mitral valve replaced     Numbness and tingling     Obesity     Obstructive sleep apnea on

## 2023-04-18 NOTE — ED NOTES
Pt transported to [facility]. Floor contacted prior to transport, spoke with Rashawn Lucas.       Christiane Lambert  04/18/23 [id number]

## 2023-04-18 NOTE — ED PROVIDER NOTES
4/18/2023 1:05 PM      XR CHEST (2 VW)   Final Result   Suspect small left pleural effusion. Chronic findings are discussed. **This report has been created using voice recognition software. It may contain minor errors which are inherent in voice recognition technology. **      Final report electronically signed by Dr Payal Pimentel on 4/18/2023 11:25 AM          LABS: (none if blank)  Labs Reviewed   BASIC METABOLIC PANEL - Abnormal; Notable for the following components:       Result Value    CO2 22 (*)     Glucose 191 (*)     BUN 35 (*)     Creatinine 1.8 (*)     All other components within normal limits   CBC WITH AUTO DIFFERENTIAL - Abnormal; Notable for the following components:    MCHC 30.5 (*)     RDW-SD 48.3 (*)     Lymphocytes Absolute 0.7 (*)     All other components within normal limits   BRAIN NATRIURETIC PEPTIDE - Abnormal; Notable for the following components:    Pro-BNP 1901.0 (*)     All other components within normal limits   D-DIMER, QUANTITATIVE - Abnormal; Notable for the following components:    D-Dimer, Quant 1856.00 (*)     All other components within normal limits   GLOMERULAR FILTRATION RATE, ESTIMATED - Abnormal; Notable for the following components:    Est, Glom Filt Rate 28 (*)     All other components within normal limits   TROPONIN   PROTIME-INR   ANION GAP   OSMOLALITY   TROPONIN   TROPONIN                 MEDICATION CHANGES     New Prescriptions    No medications on file       FINAL IMPRESSION      1. Chest pain, unspecified type          FINAL DISPOSITION     Final diagnoses:   Chest pain, unspecified type     Condition: condition: stable  Dispo: Admit to med/surg floor      This transcription was electronically signed. Parts of this transcriptions may have been dictated by use of voice recognition software and electronically transcribed, and parts may have been transcribed with the assistance of an ED scribe.  The transcription may contain errors not detected in

## 2023-04-18 NOTE — ED NOTES
Patient resting in bed. Respirations easy and unlabored. No distress noted. Call light within reach.        Afshin Barker RN  04/18/23 7515

## 2023-04-18 NOTE — ED NOTES
Patient resting in bed. Respirations easy and unlabored. No distress noted. Call light within reach.        Gregorio Dong RN  04/18/23 [id number]

## 2023-04-19 LAB
ANION GAP SERPL CALC-SCNC: 11 MEQ/L (ref 8–16)
BASOPHILS ABSOLUTE: 0 THOU/MM3 (ref 0–0.1)
BASOPHILS NFR BLD AUTO: 0.4 %
BUN SERPL-MCNC: 33 MG/DL (ref 7–22)
CALCIUM SERPL-MCNC: 9.3 MG/DL (ref 8.5–10.5)
CHLORIDE SERPL-SCNC: 104 MEQ/L (ref 98–111)
CHOLEST SERPL-MCNC: 236 MG/DL (ref 100–199)
CO2 SERPL-SCNC: 22 MEQ/L (ref 23–33)
CREAT SERPL-MCNC: 1.9 MG/DL (ref 0.4–1.2)
DEPRECATED RDW RBC AUTO: 47.4 FL (ref 35–45)
EKG ATRIAL RATE: 62 BPM
EKG Q-T INTERVAL: 444 MS
EKG Q-T INTERVAL: 462 MS
EKG QRS DURATION: 172 MS
EKG QRS DURATION: 174 MS
EKG QTC CALCULATION (BAZETT): 468 MS
EKG QTC CALCULATION (BAZETT): 509 MS
EKG R AXIS: -89 DEGREES
EKG R AXIS: -96 DEGREES
EKG T AXIS: 76 DEGREES
EKG T AXIS: 93 DEGREES
EKG VENTRICULAR RATE: 62 BPM
EKG VENTRICULAR RATE: 79 BPM
EOSINOPHIL NFR BLD AUTO: 1.9 %
EOSINOPHILS ABSOLUTE: 0.1 THOU/MM3 (ref 0–0.4)
ERYTHROCYTE [DISTWIDTH] IN BLOOD BY AUTOMATED COUNT: 14 % (ref 11.5–14.5)
GFR SERPL CREATININE-BSD FRML MDRD: 27 ML/MIN/1.73M2
GLUCOSE BLD STRIP.AUTO-MCNC: 117 MG/DL (ref 70–108)
GLUCOSE BLD STRIP.AUTO-MCNC: 152 MG/DL (ref 70–108)
GLUCOSE BLD STRIP.AUTO-MCNC: 169 MG/DL (ref 70–108)
GLUCOSE BLD STRIP.AUTO-MCNC: 197 MG/DL (ref 70–108)
GLUCOSE SERPL-MCNC: 189 MG/DL (ref 70–108)
HCT VFR BLD AUTO: 39.7 % (ref 37–47)
HDLC SERPL-MCNC: 35 MG/DL
HEPARIN UNFRACTIONATED: 0.49 U/ML (ref 0.3–0.7)
HGB BLD-MCNC: 12.3 GM/DL (ref 12–16)
IMM GRANULOCYTES # BLD AUTO: 0.02 THOU/MM3 (ref 0–0.07)
IMM GRANULOCYTES NFR BLD AUTO: 0.3 %
LDLC SERPL CALC-MCNC: 145 MG/DL
LYMPHOCYTES ABSOLUTE: 1 THOU/MM3 (ref 1–4.8)
LYMPHOCYTES NFR BLD AUTO: 15.2 %
MCH RBC QN AUTO: 28.9 PG (ref 26–33)
MCHC RBC AUTO-ENTMCNC: 31 GM/DL (ref 32.2–35.5)
MCV RBC AUTO: 93.4 FL (ref 81–99)
MONOCYTES ABSOLUTE: 0.6 THOU/MM3 (ref 0.4–1.3)
MONOCYTES NFR BLD AUTO: 8.9 %
NEUTROPHILS NFR BLD AUTO: 73.3 %
NRBC BLD AUTO-RTO: 0 /100 WBC
PLATELET # BLD AUTO: 164 THOU/MM3 (ref 130–400)
PMV BLD AUTO: 11.6 FL (ref 9.4–12.4)
POTASSIUM SERPL-SCNC: 4.8 MEQ/L (ref 3.5–5.2)
RBC # BLD AUTO: 4.25 MILL/MM3 (ref 4.2–5.4)
SEGMENTED NEUTROPHILS ABSOLUTE COUNT: 4.9 THOU/MM3 (ref 1.8–7.7)
SODIUM SERPL-SCNC: 137 MEQ/L (ref 135–145)
TRIGL SERPL-MCNC: 279 MG/DL (ref 0–199)
WBC # BLD AUTO: 6.7 THOU/MM3 (ref 4.8–10.8)

## 2023-04-19 PROCEDURE — 6360000004 HC RX CONTRAST MEDICATION: Performed by: INTERNAL MEDICINE

## 2023-04-19 PROCEDURE — 82948 REAGENT STRIP/BLOOD GLUCOSE: CPT

## 2023-04-19 PROCEDURE — 2500000003 HC RX 250 WO HCPCS

## 2023-04-19 PROCEDURE — 93010 ELECTROCARDIOGRAM REPORT: CPT | Performed by: INTERNAL MEDICINE

## 2023-04-19 PROCEDURE — 85520 HEPARIN ASSAY: CPT

## 2023-04-19 PROCEDURE — 85025 COMPLETE CBC W/AUTO DIFF WBC: CPT

## 2023-04-19 PROCEDURE — 93458 L HRT ARTERY/VENTRICLE ANGIO: CPT

## 2023-04-19 PROCEDURE — 6370000000 HC RX 637 (ALT 250 FOR IP)

## 2023-04-19 PROCEDURE — 97535 SELF CARE MNGMENT TRAINING: CPT

## 2023-04-19 PROCEDURE — C1769 GUIDE WIRE: HCPCS

## 2023-04-19 PROCEDURE — 80048 BASIC METABOLIC PNL TOTAL CA: CPT

## 2023-04-19 PROCEDURE — 97166 OT EVAL MOD COMPLEX 45 MIN: CPT

## 2023-04-19 PROCEDURE — 97116 GAIT TRAINING THERAPY: CPT

## 2023-04-19 PROCEDURE — 6370000000 HC RX 637 (ALT 250 FOR IP): Performed by: STUDENT IN AN ORGANIZED HEALTH CARE EDUCATION/TRAINING PROGRAM

## 2023-04-19 PROCEDURE — 97530 THERAPEUTIC ACTIVITIES: CPT

## 2023-04-19 PROCEDURE — 80061 LIPID PANEL: CPT

## 2023-04-19 PROCEDURE — 2580000003 HC RX 258

## 2023-04-19 PROCEDURE — 6360000002 HC RX W HCPCS

## 2023-04-19 PROCEDURE — 99232 SBSQ HOSP IP/OBS MODERATE 35: CPT | Performed by: NURSE PRACTITIONER

## 2023-04-19 PROCEDURE — G0378 HOSPITAL OBSERVATION PER HR: HCPCS

## 2023-04-19 PROCEDURE — 99223 1ST HOSP IP/OBS HIGH 75: CPT | Performed by: INTERNAL MEDICINE

## 2023-04-19 PROCEDURE — 97110 THERAPEUTIC EXERCISES: CPT

## 2023-04-19 PROCEDURE — 36415 COLL VENOUS BLD VENIPUNCTURE: CPT

## 2023-04-19 PROCEDURE — C1894 INTRO/SHEATH, NON-LASER: HCPCS

## 2023-04-19 PROCEDURE — 97162 PT EVAL MOD COMPLEX 30 MIN: CPT

## 2023-04-19 RX ORDER — ATORVASTATIN CALCIUM 10 MG/1
10 TABLET, FILM COATED ORAL DAILY
Status: DISCONTINUED | OUTPATIENT
Start: 2023-04-19 | End: 2023-04-22 | Stop reason: HOSPADM

## 2023-04-19 RX ORDER — ASPIRIN 81 MG/1
81 TABLET, CHEWABLE ORAL DAILY
Status: DISCONTINUED | OUTPATIENT
Start: 2023-04-20 | End: 2023-04-22 | Stop reason: HOSPADM

## 2023-04-19 RX ADMIN — ATORVASTATIN CALCIUM 10 MG: 10 TABLET, FILM COATED ORAL at 09:52

## 2023-04-19 RX ADMIN — HYDRALAZINE HYDROCHLORIDE 50 MG: 50 TABLET ORAL at 20:20

## 2023-04-19 RX ADMIN — EMPAGLIFLOZIN 25 MG: 25 TABLET, FILM COATED ORAL at 09:03

## 2023-04-19 RX ADMIN — SODIUM CHLORIDE, PRESERVATIVE FREE 10 ML: 5 INJECTION INTRAVENOUS at 20:20

## 2023-04-19 RX ADMIN — CARVEDILOL 12.5 MG: 12.5 TABLET ORAL at 20:20

## 2023-04-19 RX ADMIN — INSULIN LISPRO 5 UNITS: 100 INJECTION, SOLUTION INTRAVENOUS; SUBCUTANEOUS at 17:37

## 2023-04-19 RX ADMIN — CARVEDILOL 12.5 MG: 12.5 TABLET ORAL at 09:03

## 2023-04-19 RX ADMIN — HYDRALAZINE HYDROCHLORIDE 50 MG: 50 TABLET ORAL at 09:03

## 2023-04-19 RX ADMIN — ASPIRIN 325 MG: 325 TABLET, COATED ORAL at 09:02

## 2023-04-19 RX ADMIN — OLMESARTAN MEDOXOMIL 40 MG: 40 TABLET ORAL at 09:03

## 2023-04-19 RX ADMIN — IOPAMIDOL 20 ML: 755 INJECTION, SOLUTION INTRAVENOUS at 15:33

## 2023-04-19 RX ADMIN — BUMETANIDE 1 MG: 1 TABLET ORAL at 09:03

## 2023-04-19 ASSESSMENT — PAIN SCALES - GENERAL
PAINLEVEL_OUTOF10: 0

## 2023-04-19 NOTE — PLAN OF CARE
Problem: Discharge Planning  Goal: Discharge to home or other facility with appropriate resources  Outcome: Progressing  Note: Pt will discharge home when appropriate. Problem: Pain  Goal: Verbalizes/displays adequate comfort level or baseline comfort level  Outcome: Progressing  Note: Pt denies pain on my shift. Non pharmaceutical interventions like ice and repositioning offered before pain medications. Will continue to assess. Problem: Safety - Adult  Goal: Free from fall injury  Outcome: Progressing  Note: Fall precaution in place. Bed alarm/chair alarm. Bed locked in lowest position. Fall band applied if applicable. Call light and overhead table within reach. Will continue to monitor. Problem: ABCDS Injury Assessment  Goal: Absence of physical injury  Outcome: Progressing  Note: Absence of physical injury. Problem: Cardiovascular - Adult  Goal: Maintains optimal cardiac output and hemodynamic stability  Outcome: Progressing  Note: Pt vitals are stable on my shift. Will continue to assess. Goal: Absence of cardiac dysrhythmias or at baseline  Outcome: Progressing   Pt verbalizes understanding of care plan. Pt contributes to goal settings.

## 2023-04-19 NOTE — CONSULTS
4  NIDDM2    Plan:  Plan for cardiac cath for NSTEMI. Discussed risks and benefits of the procedure with the patient and she is agreeable to proceed. Continue heparin gtt, ASA, Coreg, Nitro prn. Per Dr. Shea Jara office note from 3/15/23, he had stopped patient's statin due to some possible statin related myalgias. Will resume statin as patient to undergo cath. If patient has myalgias will consider stopping again. Keep NPO  Continue telemetry. Further recommendations based on results and clinical course. Electronically signed by Patel Duran DO on 4/19/2023 at 9:36 AM    Attending Supervising Physician's Attestation Statement  I performed a history and physical examination on the patient and discussed the management with the resident physician. I reviewed and agree with the findings and plan as documented in the resident's note except for as noted below. Findings consistent with NSTEMI, GDMT for ACS, IVF, TTE, NPO, Cardiac cath. Has Afib, on Heparin gtt. May need Repatha due to myalgias. Further recommendations based on results and clinical course    Thank you for allowing us to participate in the care of this patient. Please do not hesitate to call us with questions. Time spent reviewing notes, data, discussing with patient/family, and formulating plan with clinical documentation was approximately 80 minutes.      Electronically signed by Gokul June MD on 4/19/23 at 6:16 PM EDT  Interventional Cardiology - [facility]

## 2023-04-20 ENCOUNTER — APPOINTMENT (OUTPATIENT)
Dept: PHARMACY | Age: 78
End: 2023-04-20
Payer: MEDICARE

## 2023-04-20 ENCOUNTER — TELEPHONE (OUTPATIENT)
Dept: CARDIOLOGY CLINIC | Age: 78
End: 2023-04-20

## 2023-04-20 PROBLEM — R79.89 ELEVATED TROPONIN: Status: ACTIVE | Noted: 2023-04-20

## 2023-04-20 PROBLEM — R77.8 ELEVATED TROPONIN: Status: ACTIVE | Noted: 2023-04-20

## 2023-04-20 PROBLEM — Z98.890 S/P CARDIAC CATH: Status: ACTIVE | Noted: 2023-04-20

## 2023-04-20 LAB
ANION GAP SERPL CALC-SCNC: 13 MEQ/L (ref 8–16)
BUN SERPL-MCNC: 33 MG/DL (ref 7–22)
CALCIUM SERPL-MCNC: 9.2 MG/DL (ref 8.5–10.5)
CHLORIDE SERPL-SCNC: 106 MEQ/L (ref 98–111)
CO2 SERPL-SCNC: 19 MEQ/L (ref 23–33)
CREAT SERPL-MCNC: 1.9 MG/DL (ref 0.4–1.2)
DEPRECATED RDW RBC AUTO: 50.6 FL (ref 35–45)
ERYTHROCYTE [DISTWIDTH] IN BLOOD BY AUTOMATED COUNT: 14.4 % (ref 11.5–14.5)
GFR SERPL CREATININE-BSD FRML MDRD: 27 ML/MIN/1.73M2
GLUCOSE BLD STRIP.AUTO-MCNC: 165 MG/DL (ref 70–108)
GLUCOSE BLD STRIP.AUTO-MCNC: 167 MG/DL (ref 70–108)
GLUCOSE BLD STRIP.AUTO-MCNC: 204 MG/DL (ref 70–108)
GLUCOSE BLD STRIP.AUTO-MCNC: 229 MG/DL (ref 70–108)
GLUCOSE BLD STRIP.AUTO-MCNC: 98 MG/DL (ref 70–108)
GLUCOSE SERPL-MCNC: 186 MG/DL (ref 70–108)
HCT VFR BLD AUTO: 43.5 % (ref 37–47)
HGB BLD-MCNC: 13.2 GM/DL (ref 12–16)
INR PPP: 1.03 (ref 0.85–1.13)
MCH RBC QN AUTO: 28.9 PG (ref 26–33)
MCHC RBC AUTO-ENTMCNC: 30.3 GM/DL (ref 32.2–35.5)
MCV RBC AUTO: 95.4 FL (ref 81–99)
PLATELET # BLD AUTO: 178 THOU/MM3 (ref 130–400)
PMV BLD AUTO: 11.3 FL (ref 9.4–12.4)
POTASSIUM SERPL-SCNC: 4.5 MEQ/L (ref 3.5–5.2)
RBC # BLD AUTO: 4.56 MILL/MM3 (ref 4.2–5.4)
SODIUM SERPL-SCNC: 138 MEQ/L (ref 135–145)
WBC # BLD AUTO: 8.7 THOU/MM3 (ref 4.8–10.8)

## 2023-04-20 PROCEDURE — 99233 SBSQ HOSP IP/OBS HIGH 50: CPT | Performed by: NURSE PRACTITIONER

## 2023-04-20 PROCEDURE — 6370000000 HC RX 637 (ALT 250 FOR IP)

## 2023-04-20 PROCEDURE — 85610 PROTHROMBIN TIME: CPT

## 2023-04-20 PROCEDURE — 1200000000 HC SEMI PRIVATE

## 2023-04-20 PROCEDURE — 97530 THERAPEUTIC ACTIVITIES: CPT

## 2023-04-20 PROCEDURE — 97535 SELF CARE MNGMENT TRAINING: CPT

## 2023-04-20 PROCEDURE — 85027 COMPLETE CBC AUTOMATED: CPT

## 2023-04-20 PROCEDURE — 99232 SBSQ HOSP IP/OBS MODERATE 35: CPT | Performed by: NURSE PRACTITIONER

## 2023-04-20 PROCEDURE — 2580000003 HC RX 258: Performed by: NURSE PRACTITIONER

## 2023-04-20 PROCEDURE — 36415 COLL VENOUS BLD VENIPUNCTURE: CPT

## 2023-04-20 PROCEDURE — 6370000000 HC RX 637 (ALT 250 FOR IP): Performed by: NURSE PRACTITIONER

## 2023-04-20 PROCEDURE — 80048 BASIC METABOLIC PNL TOTAL CA: CPT

## 2023-04-20 PROCEDURE — 82948 REAGENT STRIP/BLOOD GLUCOSE: CPT

## 2023-04-20 PROCEDURE — 1200000003 HC TELEMETRY R&B

## 2023-04-20 PROCEDURE — 2580000003 HC RX 258

## 2023-04-20 PROCEDURE — 6370000000 HC RX 637 (ALT 250 FOR IP): Performed by: STUDENT IN AN ORGANIZED HEALTH CARE EDUCATION/TRAINING PROGRAM

## 2023-04-20 RX ORDER — WARFARIN SODIUM 2.5 MG/1
2.5 TABLET ORAL ONCE
Status: COMPLETED | OUTPATIENT
Start: 2023-04-20 | End: 2023-04-20

## 2023-04-20 RX ORDER — 0.9 % SODIUM CHLORIDE 0.9 %
500 INTRAVENOUS SOLUTION INTRAVENOUS ONCE
Status: COMPLETED | OUTPATIENT
Start: 2023-04-20 | End: 2023-04-20

## 2023-04-20 RX ORDER — ENOXAPARIN SODIUM 100 MG/ML
1 INJECTION SUBCUTANEOUS EVERY 24 HOURS
Status: DISCONTINUED | OUTPATIENT
Start: 2023-04-20 | End: 2023-04-22 | Stop reason: HOSPADM

## 2023-04-20 RX ADMIN — BUMETANIDE 1 MG: 1 TABLET ORAL at 08:24

## 2023-04-20 RX ADMIN — EMPAGLIFLOZIN 25 MG: 25 TABLET, FILM COATED ORAL at 08:24

## 2023-04-20 RX ADMIN — SODIUM CHLORIDE, PRESERVATIVE FREE 10 ML: 5 INJECTION INTRAVENOUS at 08:29

## 2023-04-20 RX ADMIN — ATORVASTATIN CALCIUM 10 MG: 10 TABLET, FILM COATED ORAL at 08:24

## 2023-04-20 RX ADMIN — INSULIN LISPRO 5 UNITS: 100 INJECTION, SOLUTION INTRAVENOUS; SUBCUTANEOUS at 13:00

## 2023-04-20 RX ADMIN — CARVEDILOL 12.5 MG: 12.5 TABLET ORAL at 08:24

## 2023-04-20 RX ADMIN — HYDRALAZINE HYDROCHLORIDE 50 MG: 50 TABLET ORAL at 08:24

## 2023-04-20 RX ADMIN — SODIUM CHLORIDE 500 ML: 9 INJECTION, SOLUTION INTRAVENOUS at 10:42

## 2023-04-20 RX ADMIN — OLMESARTAN MEDOXOMIL 40 MG: 40 TABLET ORAL at 08:24

## 2023-04-20 RX ADMIN — INSULIN LISPRO 5 UNITS: 100 INJECTION, SOLUTION INTRAVENOUS; SUBCUTANEOUS at 09:05

## 2023-04-20 RX ADMIN — WARFARIN SODIUM 2.5 MG: 2.5 TABLET ORAL at 18:55

## 2023-04-20 RX ADMIN — ASPIRIN 81 MG 81 MG: 81 TABLET ORAL at 08:24

## 2023-04-20 RX ADMIN — SODIUM CHLORIDE, PRESERVATIVE FREE 10 ML: 5 INJECTION INTRAVENOUS at 21:33

## 2023-04-20 ASSESSMENT — PAIN SCALES - GENERAL
PAINLEVEL_OUTOF10: 0

## 2023-04-20 NOTE — TELEPHONE ENCOUNTER
Missed download from device. Pt is inpatient. Call to floor RN. Asked to interrogate device remotely.

## 2023-04-20 NOTE — PLAN OF CARE
Problem: Discharge Planning  Goal: Discharge to home or other facility with appropriate resources  Outcome: Progressing  Note: Pt will discharge home when appropriate. Problem: Pain  Goal: Verbalizes/displays adequate comfort level or baseline comfort level  Outcome: Progressing  Note: Pt denies pain on my shift. Non pharmaceutical interventions like ice and repositioning offered before pain medications. Will continue to assess. Problem: Safety - Adult  Goal: Free from fall injury  Outcome: Progressing  Note: Fall precaution in place. Bed alarm/chair alarm. Bed locked in lowest position. Fall band applied if applicable. Call light and overhead table within reach. Will continue to monitor. Problem: ABCDS Injury Assessment  Goal: Absence of physical injury  Outcome: Progressing  Note: Absence of physical injury. Problem: Cardiovascular - Adult  Goal: Maintains optimal cardiac output and hemodynamic stability  Outcome: Progressing  Note: Pt vitals are stable on my shift. Will continue to assess. Goal: Absence of cardiac dysrhythmias or at baseline  Outcome: Progressing     Problem: Chronic Conditions and Co-morbidities  Goal: Patient's chronic conditions and co-morbidity symptoms are monitored and maintained or improved  Outcome: Progressing  Note: Pt monitored and assessed on my shift     Pt verbalizes understanding of care plan. Pt contributes to goal settings.

## 2023-04-21 LAB
ANION GAP SERPL CALC-SCNC: 10 MEQ/L (ref 8–16)
BUN SERPL-MCNC: 37 MG/DL (ref 7–22)
CALCIUM SERPL-MCNC: 9 MG/DL (ref 8.5–10.5)
CHLORIDE SERPL-SCNC: 110 MEQ/L (ref 98–111)
CO2 SERPL-SCNC: 21 MEQ/L (ref 23–33)
CREAT SERPL-MCNC: 1.9 MG/DL (ref 0.4–1.2)
DEPRECATED RDW RBC AUTO: 49.8 FL (ref 35–45)
ERYTHROCYTE [DISTWIDTH] IN BLOOD BY AUTOMATED COUNT: 14.2 % (ref 11.5–14.5)
GFR SERPL CREATININE-BSD FRML MDRD: 27 ML/MIN/1.73M2
GLUCOSE BLD STRIP.AUTO-MCNC: 153 MG/DL (ref 70–108)
GLUCOSE BLD STRIP.AUTO-MCNC: 172 MG/DL (ref 70–108)
GLUCOSE BLD STRIP.AUTO-MCNC: 186 MG/DL (ref 70–108)
GLUCOSE BLD STRIP.AUTO-MCNC: 189 MG/DL (ref 70–108)
GLUCOSE SERPL-MCNC: 145 MG/DL (ref 70–108)
HCT VFR BLD AUTO: 37.2 % (ref 37–47)
HGB BLD-MCNC: 11.2 GM/DL (ref 12–16)
INR PPP: 1.09 (ref 0.85–1.13)
MCH RBC QN AUTO: 28.8 PG (ref 26–33)
MCHC RBC AUTO-ENTMCNC: 30.1 GM/DL (ref 32.2–35.5)
MCV RBC AUTO: 95.6 FL (ref 81–99)
PLATELET # BLD AUTO: 128 THOU/MM3 (ref 130–400)
PMV BLD AUTO: 11.1 FL (ref 9.4–12.4)
POTASSIUM SERPL-SCNC: 4.2 MEQ/L (ref 3.5–5.2)
RBC # BLD AUTO: 3.89 MILL/MM3 (ref 4.2–5.4)
SODIUM SERPL-SCNC: 141 MEQ/L (ref 135–145)
WBC # BLD AUTO: 5.5 THOU/MM3 (ref 4.8–10.8)

## 2023-04-21 PROCEDURE — 97530 THERAPEUTIC ACTIVITIES: CPT

## 2023-04-21 PROCEDURE — 1200000000 HC SEMI PRIVATE

## 2023-04-21 PROCEDURE — 82948 REAGENT STRIP/BLOOD GLUCOSE: CPT

## 2023-04-21 PROCEDURE — 6370000000 HC RX 637 (ALT 250 FOR IP): Performed by: PHARMACIST

## 2023-04-21 PROCEDURE — 80048 BASIC METABOLIC PNL TOTAL CA: CPT

## 2023-04-21 PROCEDURE — 85027 COMPLETE CBC AUTOMATED: CPT

## 2023-04-21 PROCEDURE — 6370000000 HC RX 637 (ALT 250 FOR IP)

## 2023-04-21 PROCEDURE — 36415 COLL VENOUS BLD VENIPUNCTURE: CPT

## 2023-04-21 PROCEDURE — 2580000003 HC RX 258

## 2023-04-21 PROCEDURE — 97535 SELF CARE MNGMENT TRAINING: CPT

## 2023-04-21 PROCEDURE — 85610 PROTHROMBIN TIME: CPT

## 2023-04-21 PROCEDURE — 6370000000 HC RX 637 (ALT 250 FOR IP): Performed by: STUDENT IN AN ORGANIZED HEALTH CARE EDUCATION/TRAINING PROGRAM

## 2023-04-21 PROCEDURE — 6370000000 HC RX 637 (ALT 250 FOR IP): Performed by: NURSE PRACTITIONER

## 2023-04-21 PROCEDURE — 99232 SBSQ HOSP IP/OBS MODERATE 35: CPT | Performed by: NURSE PRACTITIONER

## 2023-04-21 PROCEDURE — 97110 THERAPEUTIC EXERCISES: CPT

## 2023-04-21 RX ORDER — WARFARIN SODIUM 2 MG/1
2 TABLET ORAL ONCE
Status: COMPLETED | OUTPATIENT
Start: 2023-04-21 | End: 2023-04-21

## 2023-04-21 RX ORDER — CARVEDILOL 6.25 MG/1
6.25 TABLET ORAL 2 TIMES DAILY
Status: DISCONTINUED | OUTPATIENT
Start: 2023-04-21 | End: 2023-04-21

## 2023-04-21 RX ORDER — BUMETANIDE 1 MG/1
1 TABLET ORAL DAILY
Status: DISCONTINUED | OUTPATIENT
Start: 2023-04-21 | End: 2023-04-22 | Stop reason: HOSPADM

## 2023-04-21 RX ORDER — CARVEDILOL 3.12 MG/1
3.12 TABLET ORAL 2 TIMES DAILY
Status: DISCONTINUED | OUTPATIENT
Start: 2023-04-21 | End: 2023-04-22 | Stop reason: HOSPADM

## 2023-04-21 RX ADMIN — OLMESARTAN MEDOXOMIL 40 MG: 40 TABLET ORAL at 08:22

## 2023-04-21 RX ADMIN — WARFARIN SODIUM 2 MG: 2 TABLET ORAL at 18:23

## 2023-04-21 RX ADMIN — ATORVASTATIN CALCIUM 10 MG: 10 TABLET, FILM COATED ORAL at 08:25

## 2023-04-21 RX ADMIN — BUMETANIDE 1 MG: 1 TABLET ORAL at 10:21

## 2023-04-21 RX ADMIN — EMPAGLIFLOZIN 25 MG: 25 TABLET, FILM COATED ORAL at 08:22

## 2023-04-21 RX ADMIN — CARVEDILOL 3.12 MG: 3.12 TABLET, FILM COATED ORAL at 23:34

## 2023-04-21 RX ADMIN — CARVEDILOL 6.25 MG: 6.25 TABLET ORAL at 10:21

## 2023-04-21 RX ADMIN — SODIUM CHLORIDE, PRESERVATIVE FREE 10 ML: 5 INJECTION INTRAVENOUS at 23:48

## 2023-04-21 RX ADMIN — ASPIRIN 81 MG 81 MG: 81 TABLET ORAL at 08:26

## 2023-04-21 RX ADMIN — SODIUM CHLORIDE, PRESERVATIVE FREE 10 ML: 5 INJECTION INTRAVENOUS at 08:22

## 2023-04-21 RX ADMIN — INSULIN LISPRO 5 UNITS: 100 INJECTION, SOLUTION INTRAVENOUS; SUBCUTANEOUS at 12:57

## 2023-04-21 ASSESSMENT — PAIN SCALES - GENERAL
PAINLEVEL_OUTOF10: 0
PAINLEVEL_OUTOF10: 0

## 2023-04-21 NOTE — PLAN OF CARE
Problem: Discharge Planning  Goal: Discharge to home or other facility with appropriate resources  Outcome: Progressing  Flowsheets (Taken 4/20/2023 2131)  Discharge to home or other facility with appropriate resources:   Identify barriers to discharge with patient and caregiver   Arrange for needed discharge resources and transportation as appropriate   Identify discharge learning needs (meds, wound care, etc)     Problem: Pain  Goal: Verbalizes/displays adequate comfort level or baseline comfort level  Outcome: Progressing     Problem: Safety - Adult  Goal: Free from fall injury  Outcome: Progressing     Problem: ABCDS Injury Assessment  Goal: Absence of physical injury  Outcome: Progressing     Problem: Cardiovascular - Adult  Goal: Maintains optimal cardiac output and hemodynamic stability  Outcome: Progressing  Flowsheets (Taken 4/20/2023 2131)  Maintains optimal cardiac output and hemodynamic stability:   Monitor blood pressure and heart rate   Monitor urine output and notify Licensed Independent Practitioner for values outside of normal range  Goal: Absence of cardiac dysrhythmias or at baseline  Outcome: Progressing     Problem: Chronic Conditions and Co-morbidities  Goal: Patient's chronic conditions and co-morbidity symptoms are monitored and maintained or improved  Outcome: Progressing  Flowsheets (Taken 4/20/2023 2131)  Care Plan - Patient's Chronic Conditions and Co-Morbidity Symptoms are Monitored and Maintained or Improved:   Monitor and assess patient's chronic conditions and comorbid symptoms for stability, deterioration, or improvement   Collaborate with multidisciplinary team to address chronic and comorbid conditions and prevent exacerbation or deterioration   Update acute care plan with appropriate goals if chronic or comorbid symptoms are exacerbated and prevent overall improvement and discharge

## 2023-04-21 NOTE — CARE COORDINATION
4/20/23, 9:02 AM EDT    DISCHARGE ON [address] [place] [address] day: 0  Location: [address] [place] [address] Reason for admit: Chest pain in adult [R07.9]  Chest pain, unspecified type [R07.9]   Procedure: 4-19-23 Cardiac Cath Non obstructive CAD. Barriers to Discharge: Cardiac Cath yesterday, no PCI. Heparin gtt. Creat 1.9 today. Episode of dizziness/hypotension this am. Med adjustment made, stop hydralazine. PCP: Jasen Judge MD  Patient Goals/Plan/Treatment Preferences: Plans return home alone independently. 4/20/23, 2:01 PM EDT    Patient goals/plan/ treatment preferences discussed by  and . Patient goals/plan/ treatment preferences reviewed with patient/ family. Patient/ family verbalize understanding of discharge plan and are in agreement with goal/plan/treatment preferences. Understanding was demonstrated using the teach back method. AVS provided by RN at time of discharge, which includes all necessary medical information pertaining to the patients current course of illness, treatment, post-discharge goals of care, and treatment preferences. Services At/After Discharge: None       IMM Letter  IMM Letter given to Patient/Family/Significant other/Guardian/POA/by[de-identified] Mela VACA Case Manager  IMM Letter date given[de-identified] 04/20/23  IMM Letter time given[de-identified] 4170  Observation Status Letter date given[de-identified] 04/18/23  Observation Status Letter time given[de-identified] 5  Observation Status Letter given to Patient/Family/Significant other/Guardian/POA/by[de-identified] Pt Access     Potential discharge in next 24 hours. Pt plans return home alone as prior to admission. No discharge needs voiced at this time.
4/21/23, 1:35 PM EDT    DISCHARGE ON GOING [address] day: 1  Location: [address] [place] [address] Reason for admit: Chest pain in adult [R07.9]  Chest pain, unspecified type [R07.9]  Chest pain [R07.9]   Procedure:  4-19-23 Cardiac Cath Non obstructive CAD. Barriers to Discharge: BP remains soft. Cont to adjust meds. INR not yet therapeutic. Lovenox and coumadin cont. PCP: Rosana Garcia MD  Readmission Risk Score: 14.1%  Patient Goals/Plan/Treatment Preferences: Plans return home alone as prior to admission. Family and friends supportive.                2
Spoke with patient who advised does not have ACP documents at home, would like to complete this admission and have attached to chart. Denied concerns or questions.
House  Plan for transportation at discharge: [facility] / Plan: MEDICARE PART A AND B / Product Type: *No Product type* /     Does insurance require precert for SNF: No    Potential assistance Purchasing Medications: No  Meds-to-Beds request: Yes      [id number] - [facility] [address]  [address]  Phone: [phone] Fax: [phone] Delivery ([facility] ) - Laverne Kapoor [phone] [facility] [phone]  [facility] [address] [place] [address]  Phone: [phone] Fax: [phone]      Notes:    Factors facilitating achievement of predicted outcomes: Cooperative and Pleasant    Barriers to discharge: Medical complications    Additional Case Management Notes: Admitted with CP. Cardiology consulted. Planning Cardiac Cath. Heparin gtt. Troponin elevated. Procedure: 4-19-23 Planning cardiac cath today. The Plan for Transition of Care is related to the following treatment goals of Chest pain in adult [R07.9]  Chest pain, unspecified type [R07.9]    Patient Goals/Plan/Treatment Preferences: Met with pt today as she is awaiting her cardiac cath. She resides alone and is very independent. She drives and has family and friends who are supportive. She has a PCP and she is insured. CM to follow for needs. Transportation/Food Security/Housekeeping Addressed: No issues identified.      Misty Barraza RN  Case Management Department

## 2023-04-21 NOTE — PLAN OF CARE
Discussed with patient again regarding plan of care, I discussed my concern with her blood pressure still being on the lower side and still adjusting medications, patient agreed to stay in the hospital overnight to continue to monitor and adjust as needed as she states \"I rather be safe than sorry\", patient was appreciative of my time

## 2023-04-22 VITALS
RESPIRATION RATE: 16 BRPM | DIASTOLIC BLOOD PRESSURE: 60 MMHG | HEIGHT: 69 IN | HEART RATE: 61 BPM | WEIGHT: 204.1 LBS | OXYGEN SATURATION: 98 % | SYSTOLIC BLOOD PRESSURE: 132 MMHG | BODY MASS INDEX: 30.23 KG/M2 | TEMPERATURE: 98.1 F

## 2023-04-22 LAB
ANION GAP SERPL CALC-SCNC: 13 MEQ/L (ref 8–16)
BUN SERPL-MCNC: 39 MG/DL (ref 7–22)
CALCIUM SERPL-MCNC: 9.3 MG/DL (ref 8.5–10.5)
CHLORIDE SERPL-SCNC: 109 MEQ/L (ref 98–111)
CO2 SERPL-SCNC: 20 MEQ/L (ref 23–33)
CREAT SERPL-MCNC: 1.7 MG/DL (ref 0.4–1.2)
DEPRECATED RDW RBC AUTO: 49.1 FL (ref 35–45)
ERYTHROCYTE [DISTWIDTH] IN BLOOD BY AUTOMATED COUNT: 14.3 % (ref 11.5–14.5)
GFR SERPL CREATININE-BSD FRML MDRD: 30 ML/MIN/1.73M2
GLUCOSE BLD STRIP.AUTO-MCNC: 166 MG/DL (ref 70–108)
GLUCOSE SERPL-MCNC: 163 MG/DL (ref 70–108)
HCT VFR BLD AUTO: 39.6 % (ref 37–47)
HGB BLD-MCNC: 12.3 GM/DL (ref 12–16)
INR PPP: 1.17 (ref 0.85–1.13)
MCH RBC QN AUTO: 29.5 PG (ref 26–33)
MCHC RBC AUTO-ENTMCNC: 31.1 GM/DL (ref 32.2–35.5)
MCV RBC AUTO: 95 FL (ref 81–99)
PLATELET # BLD AUTO: 151 THOU/MM3 (ref 130–400)
PMV BLD AUTO: 11.6 FL (ref 9.4–12.4)
POTASSIUM SERPL-SCNC: 4.4 MEQ/L (ref 3.5–5.2)
RBC # BLD AUTO: 4.17 MILL/MM3 (ref 4.2–5.4)
SODIUM SERPL-SCNC: 142 MEQ/L (ref 135–145)
WBC # BLD AUTO: 6.2 THOU/MM3 (ref 4.8–10.8)

## 2023-04-22 PROCEDURE — 80048 BASIC METABOLIC PNL TOTAL CA: CPT

## 2023-04-22 PROCEDURE — 6370000000 HC RX 637 (ALT 250 FOR IP): Performed by: NURSE PRACTITIONER

## 2023-04-22 PROCEDURE — 99239 HOSP IP/OBS DSCHRG MGMT >30: CPT | Performed by: NURSE PRACTITIONER

## 2023-04-22 PROCEDURE — 2580000003 HC RX 258

## 2023-04-22 PROCEDURE — 36415 COLL VENOUS BLD VENIPUNCTURE: CPT

## 2023-04-22 PROCEDURE — 82948 REAGENT STRIP/BLOOD GLUCOSE: CPT

## 2023-04-22 PROCEDURE — 85027 COMPLETE CBC AUTOMATED: CPT

## 2023-04-22 PROCEDURE — 6370000000 HC RX 637 (ALT 250 FOR IP): Performed by: STUDENT IN AN ORGANIZED HEALTH CARE EDUCATION/TRAINING PROGRAM

## 2023-04-22 PROCEDURE — 85610 PROTHROMBIN TIME: CPT

## 2023-04-22 RX ORDER — ATORVASTATIN CALCIUM 10 MG/1
10 TABLET, FILM COATED ORAL DAILY
Qty: 30 TABLET | Refills: 3 | Status: SHIPPED | OUTPATIENT
Start: 2023-04-23 | End: 2023-05-11 | Stop reason: SINTOL

## 2023-04-22 RX ORDER — SIMETHICONE 80 MG
80 TABLET,CHEWABLE ORAL EVERY 6 HOURS PRN
Status: DISCONTINUED | OUTPATIENT
Start: 2023-04-22 | End: 2023-04-22

## 2023-04-22 RX ORDER — ENOXAPARIN SODIUM 100 MG/ML
1 INJECTION SUBCUTANEOUS EVERY 24 HOURS
Qty: 14 EACH | Refills: 0 | Status: SHIPPED | OUTPATIENT
Start: 2023-04-22 | End: 2023-05-11

## 2023-04-22 RX ORDER — HYDRALAZINE HYDROCHLORIDE 50 MG/1
50 TABLET, FILM COATED ORAL 3 TIMES DAILY PRN
Qty: 270 TABLET | Refills: 3 | Status: SHIPPED
Start: 2023-04-22

## 2023-04-22 RX ORDER — CARVEDILOL 3.12 MG/1
3.12 TABLET ORAL 2 TIMES DAILY
Qty: 60 TABLET | Refills: 3 | Status: SHIPPED | OUTPATIENT
Start: 2023-04-22

## 2023-04-22 RX ADMIN — SODIUM CHLORIDE, PRESERVATIVE FREE 10 ML: 5 INJECTION INTRAVENOUS at 07:25

## 2023-04-22 RX ADMIN — CARVEDILOL 3.12 MG: 3.12 TABLET, FILM COATED ORAL at 07:24

## 2023-04-22 RX ADMIN — EMPAGLIFLOZIN 25 MG: 25 TABLET, FILM COATED ORAL at 07:22

## 2023-04-22 RX ADMIN — OLMESARTAN MEDOXOMIL 40 MG: 40 TABLET ORAL at 07:22

## 2023-04-22 RX ADMIN — BUMETANIDE 1 MG: 1 TABLET ORAL at 07:22

## 2023-04-22 RX ADMIN — ASPIRIN 81 MG 81 MG: 81 TABLET ORAL at 07:25

## 2023-04-22 RX ADMIN — ATORVASTATIN CALCIUM 10 MG: 10 TABLET, FILM COATED ORAL at 07:24

## 2023-04-22 ASSESSMENT — PAIN SCALES - GENERAL
PAINLEVEL_OUTOF10: 0
PAINLEVEL_OUTOF10: 0

## 2023-04-22 NOTE — DISCHARGE INSTRUCTIONS
Recent INRs:        Recent Labs     04/20/23  1336 04/21/23  0600 04/22/23  0536   INR 1.03 1.09 1.17*      Recommendations for discharge:   Date Warfarin Dose   4/21/23 2.5 mg   4/22/23 2 mg   4/23/23 1.5 mg   4/24/23 1.5 mg         Please monitor your blood pressure and record to show your healthcare providers    If your systolic blood pressure (top number) is greater than 160 then please take one of your hydralazine tablets and continue to monitor    Change positions slowly

## 2023-04-22 NOTE — DISCHARGE SUMMARY
brand per insurance and patient preference. Handicap Placard [facility]  by Does not apply route Duration 5 years. 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 nitroGLYCERIN 0.4 MG/SPRAY 0.4 mg spray  Commonly known as: NITROLINGUAL  USE 1 SPRAY UNDER THE TONGUE EVERY 5 MINUTES AS NEEDED FOR CHEST PAIN. IF THIRD SPRAY DOES NOT RELIEVE PAIN, CALL 911 (FOR ANGINA PECTORIS)     olmesartan 40 MG tablet  Commonly known as: BENICAR  TAKE 1 TABLET DAILY     SITagliptin 25 MG tablet  Commonly known as: Januvia  Take 1 tablet by mouth daily     TYLENOL PO            STOP taking these medications      isosorbide dinitrate 10 MG tablet  Commonly known as: ISORDIL     metroNIDAZOLE 0.75 % cream  Commonly known as: METROCREAM     simvastatin 10 MG tablet  Commonly known as: ZOCOR               Where to Get Your Medications        These medications were sent to [address] [place] [address] [place] [address] [address]  [address]      Phone: [phone]   atorvastatin 10 MG tablet  carvedilol 3.125 MG tablet  enoxaparin 100 MG/ML       Information about where to get these medications is not yet available    Ask your nurse or doctor about these medications  hydrALAZINE 50 MG tablet         Time Spent on discharge is more than 45 minutes in the examination, evaluation, counseling and review of medications and discharge plan. Signed: Thank you Caesar Landeros MD for the opportunity to be involved in this patient's care.     Electronically signed by YANA Garcia CNP on 4/22/2023 at 12:49 PM

## 2023-04-22 NOTE — PROGRESS NOTES
04/18/23 1800   Encounter Summary   Encounter Overview/Reason  Advance Care Planning   Service Provided For: Patient   Referral/Consult From: [facility] Family members  (brother)   Last Encounter  04/18/23   Complexity of Encounter Moderate   Advance Care Planning   Type ACP conversation     Advance Directive Consult: Advance Directive materials were provided to patient and explained. Patient is not ready to complete at this time, gave information for Spiritual Care to be contacted if further assistance is needed. She will read the materials and wants to discuss this with her brother. She has our phone number and will call us as needed.
[address] [address]  INPATIENT PHYSICAL THERAPY  EVALUATION  [facility] MED SURG 8B - 8B-34/034-A    Time In: 60  Time Out: 3514  Timed Code Treatment Minutes: 24 Minutes  Minutes: 34          Date: 2023  Patient Name: Kwesi Harmon,  Gender:  female        MRN: [id number]  : 1945  (65 y.o.)      Referring Practitioner: Shailesh Robles PA-C  Diagnosis: Chest pain in adult  Additional Pertinent Hx: Per H&P : Kash Becker is a 65 y.o. female with PMHx of atrial fibrillation who presented to [facility] with chief complaint of chest pain. Patient reports she woke up this morning and felt okay, she went to her chair and fell back asleep. When she awoke in her chair, she started having chest pressure/pain in the center of her chest. She went and took her BP medication and a BP reading. She said the reading was \">200\" and then went to an error message. She returned to her chair the chest pain began to radiate into her back and down her left arm. She also reports feeling slightly short of breath at this time. She called 911 as her symptoms were not resolving. She received ASA and nitro en route and reports this helped resolve most of her pain, but it had not entirely resolved. Patient states she also had some nausea with her chest pain. Pt presents with NSTEMI with plan for heart cath . Restrictions/Precautions:  Restrictions/Precautions: Fall Risk, General Precautions  Position Activity Restriction  Other position/activity restrictions: monitor chest pain    Subjective:  Chart Reviewed: Yes  Patient assessed for rehabilitation services?: Yes  Family / Caregiver Present: No  Subjective: RN approved session, states cardio assessed the pt, she is not actively having an MI, she has had her medication this AM, her vitals were good and no report of chest pain. Pt was agreeable for PT evaluation. She was receptive to possible need for PT and AD upon discharge.     General:  Overall Orientation Status: Within
Clinical Pharmacy Note                                               Warfarin Discharge Recommendations      Patient discharged from [facility] today on warfarin. Warfarin indication: A fib/flutter and Mechanical mitral valve  INR goal during admission: 2.5-3.5  Interacting medications at discharge: aspirin, enoxaparin  Coumadin 1 mg tabs    Recent INRs:  Recent Labs     04/20/23  1336 04/21/23  0600 04/22/23  0536   INR 1.03 1.09 1.17*     Recommendations for discharge:   Date Warfarin Dose   4/21/23 2.5 mg   4/22/23 2 mg   4/23/23 1.5 mg   4/24/23 1.5 mg     Provider dosing warfarin: [facility] Coumadin clinic    Recheck INR:  Wed 4/25. Please call Coumadin Clinic on Monday 4/23 for appointment on Wed 4/25. Warfarin Pharmacy Consult Note    Warfarin Indication: A fib/flutter and Mechanical mitral valve  Target INR: 2.5-3.5  Dose prior to admission: 1.5mg MWF, 1mg TThSS    Recent Labs     04/20/23  1336 04/21/23  0600 04/22/23  0536   INR 1.03 1.09 1.17*     Recent Labs     04/20/23  0611 04/21/23  0600 04/22/23  0536   HGB 13.2 11.2* 12.3    128* 151     Concurrent anticoagulants/antiplatelets: ASA 81 mg, Enoxaparin 1 mg/kg Q24H,   Significant warfarin drug-drug interactions: none    Date INR Warfarin Dose   4/20/2023 1.03 2.5 mg   4/21/23 1.08 2 mg   4/22/23 1.17 2.5 mg                           Monitoring:                   INR will be monitored daily until therapeutic INR is achieved. **Please contact pharmacy for discharge instructions when indicated**    [employer], Pharm. D., BCPS, BCCCP 4/22/2023 10:48 AM
[facility]  INPATIENT PHYSICAL THERAPY  [facility] MED SURG 8B - 8B-34/034-A  Daily Note    Time In: 3585  Time Out: 1048  Timed Code Treatment Minutes: 23 Minutes  Minutes: 23          Date: 2023  Patient Name: Kwesi Harmon,  Gender:  female        MRN: [id number]  : 1945  (65 y.o.)     Referring Practitioner: Shailesh Robles PA-C  Diagnosis: Chest pain in adult  Additional Pertinent Hx: Per H&P : Kash Becker is a 65 y.o. female with PMHx of atrial fibrillation who [facility] with chief complaint of chest pain. Patient reports she woke up this morning and felt okay, she went to her chair and fell back asleep. When she awoke in her chair, she started having chest pressure/pain in the center of her chest. She went and took her BP medication and a BP reading. She said the reading was \">200\" and then went to an error message. She returned to her chair the chest pain began to radiate into her back and down her left arm. She also reports feeling slightly short of breath at this time. She called 911 as her symptoms were not resolving. She received ASA and nitro en route and reports this helped resolve most of her pain, but it had not entirely resolved. Patient states she also had some nausea with her chest pain. Pt presents with NSTEMI with plan for heart cath . Prior Level of Function:  Lives With: Alone  Type of Home: House  Home Layout: One level  Home Access: Stairs to enter with rails  Entrance Stairs - Number of Steps: 2 DOMENICO  Entrance Stairs - Rails: Right  Home Equipment: Walker, rolling, Cane   Bathroom Shower/Tub: Tub/Shower unit  Bathroom Equipment: Grab bars in shower, Shower chair    ADL Assistance: [address]: Independent  Homemaking Responsibilities: Yes  Ambulation Assistance: Independent  Transfer Assistance: Independent  Active : Yes  Additional Comments: Pt is typically very independent at baseline.  She does not use AD for in
Heart attack teaching covered with patient and/or family or significant other:  Signs and symptoms of a heart attack. When to call 911 and the importance of calling 911. Personal risk factors and ways to lower their risk. 4.   Importance of quitting smoking if applicable. Heart attack booklet given to the patient and/or family or significant other. Reviewed: How to take Nitroglycerin. The importance of participating in Cardiac Rehab and hours of operation. Heart Healthy Diet. Risk factor modification. (Overweight, Obesity, Diabetes, Hypertension, Smoking, High Cholesterol, Stress)  Discharge instructions for Cath/Intervention procedure site if applicable. Patient received pamphlet about cardiac intervention, how to take care of the incision site, mended hearts program, cardiac rehab and the hours of operations, and Nutritional information regarding cardiac diet. MI teaching done reviewing causes, signs symptoms, and risk factors.
Hospitalist Progress Note    Patient:  Alexander Pack      Unit/Bed:8B-34/034-A    YOB: 1945    MRN: [id number]       Acct: [de-identified]     PCP: Jesse Jenkins MD    Date of Admission: 4/18/2023    Assessment/Plan:    NSTEMI, ruled out--appreciate cardiology input, cardiac catheterization done on 4/19 shows no intervention needed however report is still pending; lipids cholesterol 236 and , per cardiology note lipid was stopped however resumed the need to watch for myalgias; on aspirin, Coreg; echo from March 29, 2023 reveals EF 55%; CTA chest did not reveal a PE  Hypotension--hold parameters placed on meds, hydralazine stopped by cardiology, orthostatics (-), improved  Primary hypertension, uncontrolled--on Coreg~decreased to 6.25 mg and took down to 3.125 mg, Benicar; labile, monitor  Diabetes mellitus type 2, uncontrolled--on low-dose sliding scale; on Humalog 5 units 3 times a day with meals, appears at home she is on Januvia and Amaryl and Jardiance; hemoglobin A1c noted be 8.9 on March 6, 2023, monitor  Chronic atrial fibrillation status post pacemaker--IVI8RL4-PRVl score 7; Coumadin with pharmacy to dose for INR goal 2.5-3.5 with mechanical MVR; bridging with Lovenox 1 mg/kg subcu daily to obtain therapeutic INR~today at 5.18  Chronic diastolic heart failure--on Coreg, Bumex; echo from March 29, 2023 reveals EF 55%  History of mechanical MVR--on Coumadin with subtherapeutic INR 1.09; pharmacy dosing Coumadin and bridging with Lovenox to obtain therapeutic INR 2.5-3.5  CKD stage IV--avoid nephrotoxic agents, patient on Bumex, Benicar; appears her creatinine level baseline is around 1.8; today at 1.9~questioning if secondary to hypotension episodes, monitor      Expected discharge date: Pending clinical course    Disposition:    [x] Home       [] TCU       [] Rehab       [] Psych       [] SNF       [] [place]       [] Other-    Chief Complaint: Chest
Hospitalist Progress Note    Patient:  Gloria Richard      Unit/Bed:8B-34/034-A    YOB: 1945    MRN: [id number]       Acct: [de-identified]     PCP: Tia Martínez MD    Date of Admission: 4/18/2023    Assessment/Plan:    NSTEMI--appreciate cardiology input, planning for cardiac catheterization; lipids cholesterol 236 and , per cardiology note note lipid was stopped however resumed the need to watch for myalgias; on aspirin, Coreg; echo from March 29, 2023 reveals EF 55%  Primary hypertension, uncontrolled--on Coreg, hydralazine, Benicar; monitor  Diabetes mellitus type 2, uncontrolled--on low-dose sliding scale; appears at home she is on Januvia and Amaryl and Jardiance; hemoglobin A1c noted be 8.9 on March 6, 2023, monitor  Chronic atrial fibrillation status post pacemaker--ODR8HN6-DGEa score 7; on Coumadin which is on hold secondary to patient being on heparin drip  Chronic diastolic heart failure--on Coreg, Bumex; echo from March 29, 2023 reveals EF 55%  History of MVR  CKD stage IV--avoid nephrotoxic agents, patient on Bumex, Benicar; monitor      Expected discharge date: Pending clinical course    Disposition:    [x] Home       [] TCU       [] Rehab       [] Psych       [] SNF       [] [place]       [] Other-    Chief Complaint: Chest pain    Hospital Course: Per H&P done 4/18/2023: Jaqui Manzano is a 65 y.o. female with PMHx of CKD, DM, HTN, CHF, CAD, and atrial fibrillation who presented to [facility] with chief complaint of chest pain. Patient reports she woke up this morning and felt okay, she went to her chair and fell back asleep. When she awoke in her chair, she started having chest pressure/pain in the center of her chest. She went and took her BP medication and a BP reading. She said the reading was \">200\" and then went to an error message. She returned to her chair the chest pain began to radiate into her back and down her left arm.  She also reports
[address]  PHYSICAL THERAPY MISSED TREATMENT NOTE  [facility] MED SURG 8B    Date: 2023  Patient Name: Meryl Mckinney        MRN: [id number]   : 1945  (65 y.o.)  Gender: female                REASON FOR MISSED TREATMENT:  Missed Treat. Pt with elevated Troponin, await cardiology consult prior to initiation of PT evaluation. PT to see as pt medically appropriate.      Earnestine Ramsay PT, DPT
Pt was with her nurse at the time of the visit. She was dealing with chest pain in adult. She was about going for a procedure but wanted prayer to cope and heal. She was anointed.     04/19/23 0070   Encounter Summary   Service Provided For: Patient   Referral/Consult From: [address] [facility] Family members   Last Encounter  04/19/23  (Anointed)   Complexity of Encounter Low   Spiritual/Emotional needs   Type Spiritual Support   Rituals, Rites and Sacraments   Type Anointing   Assessment/Intervention/Outcome   Assessment Calm   Intervention Empowerment
[facility]  Sedation/Analgesia History & Physical    Pt Name: Shiv Smallwood  Account number: [de-identified]  MRN: [id number]  YOB: 1945  Provider Performing Procedure: Serge Brush MD MD [facility]  Primary Care Physician: Paulette Tanner MD  Date: 4/19/2023    PRE-PROCEDURE    Code Status: FULL CODE  Brief History/Pre-Procedure Diagnosis: Chest pain, elevated troponins    Consent: : I have discussed with the patient risks, benefits, and alternatives (and relevant risks, benefits, and side effects related to alternatives or not receiving care), and likelihood of the success. The patient and/or representative appear to understand and agree to proceed. The discussion encompasses risks, benefits, and side effects related to the alternatives and the risks related to not receiving the proposed care, treatment, and services. PLANNED PROCEDURE   [x]Cath  [x]PCI                []Pacemaker/AICD  []OZZIE             []Cardioversion []Peripheral angiography/PTA  []Other:      VITAL SIGNS   Vitals:    04/19/23 1139   BP: (!) 121/56   Pulse: 60   Resp: 18   Temp: 98.5 °F (36.9 °C)   SpO2: 95%       PHYSICAL:   General: No acute distress  HEENT:  Unremarkable for age  Neck: without increased JVD, carotid pulses 2+ bilaterally without bruits  Heart: RRR, S1 & S2 WNL   Lungs: Clear to auscultation    Abdomen: BS present, without HSM, masses, or tenderness    Extremities: without C,C,E.  Pulses 2+ bilaterally  Mental Status: Alert & Oriented    SEDATION  Planned agent:[x]Midazolam []Meperidine []Sublimaze []Morphine  []Diazepam  [x]Other: fentanyl      ASA Classification:  []1 []2 [x]3 []4 []5  Class 1: A normal healthy patient  Class 2: Pt with mild to moderate systemic disease  Class 3: Severe systemic disease or disturbance  Class 4: Severe systemic disorders that are already life threatening. Class 5: Moribund pt with little chances of survival, for more than 24 hours.   Mallampati I Airway
Warfarin Pharmacy Consult Note    Jennifer Douglas is a 65 y.o. female for whom pharmacy has been asked to manage warfarin therapy. Consulting Provider: Felisha Bernal  Warfarin Indication: A fib/flutter and Mechanical mitral valve  Target INR range: 2.5-3.5   Warfarin dose prior to admission: 1.5 mg MWF, 1 mg TThSaSu   Outpatient warfarin provider: [facility] coumadin clinic     Recent Labs     04/18/23  1055 04/18/23  1653 04/20/23  1336   INR 1.08 1.03 1.03     Recent Labs     04/18/23  1055 04/19/23  0333 04/20/23  0611   HGB 12.8 12.3 13.2    164 178     Concurrent anticoagulants/antiplatelets: ASA 81 mg, Enoxaparin 1 mg/kg Q24H,   Significant warfarin drug-drug interactions: none    Date INR Warfarin Dose   4/20/2023 1.03 2.5 mg                                   INR will be monitored routinely until therapeutic INR is achieved. Pharmacy will continue to follow.  Thank you for the consult,     Lisa Fuentes PharmD 4/20/2023 3:53 PM
Warfarin Pharmacy Consult Note    Warfarin Indication: A fib/flutter and Mechanical mitral valve  Target INR: 2.5-3.5  Dose prior to admission: 1.5mg MWF, 1mg TThSS    Recent Labs     04/18/23  1653 04/20/23  1336 04/21/23  0600   INR 1.03 1.03 1.09     Recent Labs     04/19/23  0333 04/20/23  0611 04/21/23  0600   HGB 12.3 13.2 11.2*    178 128*     Concurrent anticoagulants/antiplatelets: ASA 81 mg, Enoxaparin 1 mg/kg Q24H,   Significant warfarin drug-drug interactions: none    Date INR Warfarin Dose   4/20/2023 1.03 2.5 mg   4/21/23 1.08 2 mg                              Monitoring:                   INR will be monitored daily until therapeutic INR is achieved. **Please contact pharmacy for discharge instructions when indicated**    [employer], Pharm. D., BCPS, [facility] 4/21/2023 1:09 PM
Steps: 2 DOMENICO  Entrance Stairs - Rails: Right  Home Equipment: Walker, rolling, Cane   Bathroom Shower/Tub: Tub/Shower unit  Bathroom Equipment: Grab bars in shower, Shower chair       ADL Assistance: [address]: Independent  Homemaking Responsibilities: Yes  Ambulation Assistance: Independent  Transfer Assistance: Independent    Active : Yes  Occupation: Retired  Additional Comments: Pt is typically very independent at baseline. She does not use AD for in home or community distances. Indep with grocery shopping, medication, and financial management. Pt has limited family support. Does have 2 brothers and a sister in law in the area but one brother is blind and her sister in law has Parkinson's disease. VISION:Corrected    HEARING:  WFL    COGNITION: WFL    RANGE OF MOTION:  Bilateral Upper Extremity:  WFL    STRENGTH:  Bilateral Upper Extremity:  WFL    SENSATION:   WFL    ADL:   Grooming: Supervision. In standing at the sink to wash face and brush teeth   Bathing: Stand By Assistance. For sponge bath in standing at the sink   Upper Extremity Dressing: Minimal Assistance. To manage hospital gown   Lower Extremity Dressing: Stand By Assistance. Toileting: Stand By Assistance. Toilet Transfer: Stand By Assistance. Sharita [employer] BALANCE:  Standing Balance: Supervision, Stand By Assistance. Supervision for static and simple sinkside tasks, SBA for more dynamic reaching     BED MOBILITY:  Supine to Sit: Modified Independent      TRANSFERS:  Sit to Stand:  Stand By Assistance. Stand to Sit: Stand By Assistance. FUNCTIONAL MOBILITY:  Assistive Device: None  Assist Level:  Stand By Assistance and with set-up. Distance: To and from bathroom  Pt initially reaching out for end of bed nad tray table for stability when first standing up and first few steps, slow pace and short step length.  Educaiton provided to pt that if she feels the need to touch or hold onto surrounding she may
1: Pt will navigate to/from bathroom and [facility] distances with MI utilizing AD as needed and good self monitoring techniques  to improve safe access with ADLs. Short Term Goal 2: Pt will tolerate x10 min dynamic standing with no LOB at MI and maintaining Spo2 >90% to improve activity tolerance and balance required for sinkside I/ADLs. Short Term Goal 3: pt will perform item retrieval activity with MI and good safety awareness to prevent risk for falls during housekeeping routines. Short Term Goal 4: Pt will perform toileting, UB/LB dressing and bathing with MI and good use of ECT to improve indepdence with BADL routines. Following session, patient left in safe position with all fall risk precautions in place.
Enzymes:  No results for input(s): CKTOTAL, CKMB, CKMBINDEX, TROPONINI in the last 72 hours.     CBC:   Lab Results   Component Value Date/Time    WBC 8.7 04/20/2023 06:11 AM    RBC 4.56 04/20/2023 06:11 AM    RBC 4.56 04/27/2012 12:32 PM    HGB 13.2 04/20/2023 06:11 AM    HCT 43.5 04/20/2023 06:11 AM     04/20/2023 06:11 AM       CMP:    Lab Results   Component Value Date/Time     04/20/2023 06:11 AM    K 4.5 04/20/2023 06:11 AM     04/20/2023 06:11 AM    CO2 19 04/20/2023 06:11 AM    BUN 33 04/20/2023 06:11 AM    CREATININE 1.9 04/20/2023 06:11 AM    LABGLOM 27 04/20/2023 06:11 AM    GLUCOSE 186 04/20/2023 06:11 AM    GLUCOSE 129 12/15/2011 10:00 AM    CALCIUM 9.2 04/20/2023 06:11 AM       Hepatic Function Panel:    Lab Results   Component Value Date/Time    ALKPHOS 87 02/14/2023 09:32 AM    ALT 8 02/14/2023 09:32 AM    AST 15 02/14/2023 09:32 AM    PROT 6.6 02/14/2023 09:32 AM    BILITOT 0.3 02/14/2023 09:32 AM    BILIDIR 0.2 07/24/2012 10:13 AM    LABALBU 4.1 02/14/2023 09:32 AM       Magnesium:    Lab Results   Component Value Date/Time    MG 2.2 02/25/2022 10:38 AM       PT/INR:    Lab Results   Component Value Date/Time    PROTIME 2.42 12/15/2011 10:00 AM    INR 1.03 04/18/2023 04:53 PM       HgBA1c:    Lab Results   Component Value Date/Time    LABA1C 8.9 03/06/2023 10:46 AM    LABA1C 8.5 10/28/2022 10:33 AM       FLP:    Lab Results   Component Value Date/Time    TRIG 279 04/19/2023 03:33 AM    HDL 35 04/19/2023 03:33 AM    LDLCALC 145 04/19/2023 03:33 AM       TSH:    Lab Results   Component Value Date/Time    TSH 2.890 07/21/2017 09:10 AM         Assessment:    Dizziness  / hypotension --- meds adjusted     Elevated trop -     Sp cardiac cath 4/19/2023: awaiting dictation - per pt and nursing staff - nonobstructive - no PCI     HTN  HLP   Dm II  A1c 8.5 10/2022    CKD stage 4     Hx PAFB -- paced   Hx PPM       Hx mechanical MVR-- lovenox to coumadin       Plan:  Stopped
good self monitoring techniques  to improve safe access with ADLs. Short Term Goal 2: Pt will tolerate x10 min dynamic standing with no LOB at MI and maintaining Spo2 >90% to improve activity tolerance and balance required for sinkside I/ADLs. Short Term Goal 3: pt will perform item retrieval activity with MI and good safety awareness to prevent risk for falls during housekeeping routines. Short Term Goal 4: Pt will perform toileting, UB/LB dressing and bathing with MI and good use of ECT to improve indepdence with BADL routines. Following session, patient left in safe position with all fall risk precautions in place.
mg Oral TID    insulin lispro  0-4 Units SubCUTAneous TID     insulin lispro  0-4 Units SubCUTAneous Nightly    sodium chloride flush  5-40 mL IntraVENous 2 times per day    insulin lispro  5 Units SubCUTAneous TID     olmesartan  40 mg Oral Daily    carvedilol  12.5 mg Oral BID     PRN Meds: glucose, dextrose bolus **OR** dextrose bolus, glucagon (rDNA), dextrose, sodium chloride flush, sodium chloride, polyethylene glycol, acetaminophen **OR** acetaminophen, magnesium sulfate, heparin (porcine), heparin (porcine), nitroGLYCERIN    No intake or output data in the 24 hours ending 04/20/23 0754      Diet:  ADULT DIET; Regular; 5 carb choices (75 gm/meal)    Exam:  BP (!) 106/53   Pulse 60   Temp 97.7 °F (36.5 °C) (Axillary)   Resp 17   Ht 5' 9\" (1.753 m)   Wt 201 lb 14.4 oz (91.6 kg)   SpO2 97%   BMI 29.82 kg/m²     General appearance: No apparent distress, appears stated age and cooperative. HEENT: Pupils equal, round, and reactive to light. Conjunctivae/corneas clear. Neck: Supple, with full range of motion. No jugular venous distention. Trachea midline. Respiratory:  Normal respiratory effort. Clear to auscultation, bilaterally without Rales/Wheezes/Rhonchi. Cardiovascular: Regular rate and rhythm with normal S7/N5 with a click. Abdomen: Soft, non-tender, non-distended with normal bowel sounds. Musculoskeletal: passive and active ROM x 4 extremities. Skin: Skin color, texture, turgor normal.    Neurologic:  Neurovascularly intact without any focal sensory/motor deficits.  Cranial nerves: II-XII intact, grossly non-focal.  Psychiatric: Alert and oriented, thought content appropriate  Capillary Refill: Brisk,< 3 seconds   Peripheral Pulses: +2 palpable, equal bilaterally       Labs:   Recent Labs     04/18/23  1055 04/19/23  0333 04/20/23  0611   WBC 5.9 6.7 8.7   HGB 12.8 12.3 13.2   HCT 41.9 39.7 43.5    164 178       Recent Labs     04/18/23  1055 04/19/23  0333 04/20/23  0611    137

## 2023-04-24 ENCOUNTER — TELEPHONE (OUTPATIENT)
Dept: PHARMACY | Age: 78
End: 2023-04-24

## 2023-04-24 NOTE — TELEPHONE ENCOUNTER
Spoke with patient. She states she only took 1 mg x 2 doses (4/22/23-4/23/23) after discharge as she did not see the updated discharge table on her AVS.    Instructed patient to take Coumadin 1.5 mg x 2 doses (4/24/23-4/25/23) with INR check 4/26/23 @ 11:20 am. Patient voiced understanding. Patient reminded to call the Anticoagulation Clinic with any signs or symptoms of bleeding or with any medication changes. Patient given instructions utilizing the teach back method.     Berenice Lopez, TanD, BCPS  4/24/2023  8:42 AM

## 2023-04-25 ENCOUNTER — TELEPHONE (OUTPATIENT)
Dept: FAMILY MEDICINE CLINIC | Age: 78
End: 2023-04-25

## 2023-04-25 NOTE — TELEPHONE ENCOUNTER
Care Transitions Initial Follow Up Call    Call within 2 business days of discharge: Yes     Patient: Daniel Garcia Patient : 1945 MRN: [id number]    [unfilled]    RARS: Readmission Risk Score: 13.4       Spoke with: patient     Discharge department/facility: [facility]    Non-face-to-face services provided:  Scheduled appointment with PCP-23. Pt doing well with no new concerns at time of call. She will discuss her medications at her visit and keep her appt with the coumadin clinic as well.      Follow Up  Future Appointments   Date Time Provider Department Center   2023  9:30 AM YANA Bermudez CNP Fam Medicine [facility] [place]   2023 11:20 AM Kathya Shook RN STR MED MGMT [facility] [place]   2023 10:30 AM ARTEM Jay SRPX Heart [facility] [place]   2023 10:15 AM ARTEM Storey Pulm Med [facility] [place]   2023  1:40 PM Afshin Javed MD N [facility] [place]   10/2/2023 10:30 AM Cece Steele MD [address]   10/10/2023  2:00 PM YANA Mares CNP N SRPX CHF [facility] [place]   10/12/2023 10:30 AM SCHEDULE, SRPX PACER NURSE N SRPX PACER [facility] [place]   11/15/2023 11:45 AM Davey Chiu MD N SRPX Heart [facility] [place] CMA ([facility])

## 2023-04-26 ENCOUNTER — HOSPITAL ENCOUNTER (OUTPATIENT)
Dept: PHARMACY | Age: 78
Setting detail: THERAPIES SERIES
Discharge: HOME | End: 2023-04-26
Payer: MEDICARE

## 2023-04-26 ENCOUNTER — CARE COORDINATION (OUTPATIENT)
Dept: CARE COORDINATION | Age: 78
End: 2023-04-26

## 2023-04-26 ENCOUNTER — OFFICE VISIT (OUTPATIENT)
Dept: FAMILY MEDICINE CLINIC | Age: 78
End: 2023-04-26

## 2023-04-26 ENCOUNTER — PROCEDURE VISIT (OUTPATIENT)
Dept: CARDIOLOGY CLINIC | Age: 78
End: 2023-04-26

## 2023-04-26 VITALS
HEIGHT: 69 IN | WEIGHT: 205 LBS | OXYGEN SATURATION: 98 % | DIASTOLIC BLOOD PRESSURE: 60 MMHG | HEART RATE: 81 BPM | SYSTOLIC BLOOD PRESSURE: 120 MMHG | BODY MASS INDEX: 30.36 KG/M2

## 2023-04-26 DIAGNOSIS — I50.22 CHRONIC SYSTOLIC (CONGESTIVE) HEART FAILURE (HCC): ICD-10-CM

## 2023-04-26 DIAGNOSIS — Z95.0 PACEMAKER: Primary | ICD-10-CM

## 2023-04-26 DIAGNOSIS — Z79.01 ANTICOAGULATED ON COUMADIN: ICD-10-CM

## 2023-04-26 DIAGNOSIS — E11.21 TYPE 2 DIABETES MELLITUS WITH DIABETIC NEPHROPATHY, WITHOUT LONG-TERM CURRENT USE OF INSULIN (HCC): ICD-10-CM

## 2023-04-26 DIAGNOSIS — Z95.2 H/O MITRAL VALVE REPLACEMENT: Primary | ICD-10-CM

## 2023-04-26 DIAGNOSIS — I10 ESSENTIAL HYPERTENSION: ICD-10-CM

## 2023-04-26 DIAGNOSIS — Z09 HOSPITAL DISCHARGE FOLLOW-UP: Primary | ICD-10-CM

## 2023-04-26 DIAGNOSIS — I48.20 CHRONIC ATRIAL FIBRILLATION (HCC): ICD-10-CM

## 2023-04-26 DIAGNOSIS — E78.5 HYPERLIPIDEMIA, UNSPECIFIED HYPERLIPIDEMIA TYPE: ICD-10-CM

## 2023-04-26 DIAGNOSIS — Z51.81 ENCOUNTER FOR THERAPEUTIC DRUG MONITORING: ICD-10-CM

## 2023-04-26 LAB — POC INR: 2.1 (ref 0.8–1.2)

## 2023-04-26 PROCEDURE — 85610 PROTHROMBIN TIME: CPT

## 2023-04-26 PROCEDURE — 99212 OFFICE O/P EST SF 10 MIN: CPT

## 2023-04-26 PROCEDURE — 36416 COLLJ CAPILLARY BLOOD SPEC: CPT

## 2023-04-26 RX ORDER — WARFARIN SODIUM 1 MG/1
1.5 TABLET ORAL DAILY
Qty: 130 TABLET | Refills: 2 | Status: SHIPPED | OUTPATIENT
Start: 2023-04-26

## 2023-04-26 NOTE — CARE COORDINATION
ACM received RPM referral from PCP office. Spoke with Mariam Napier. She states she doesn't know if she wants to do this or not. States she would like me to call her back tomorrow to discuss as she would like to think about it and would have more time to talk. I will call her back as requested.

## 2023-04-26 NOTE — PROGRESS NOTES
Medication Management [address] [place] [address]  [phone] (phone)  [phone] (fax)    Ms. Lavon Irene is a 65 y.o.  female with history of  mechanical MVR/chronic atrial fib. , per Dr. Koch Divers referral, who presents today for Warfarin monitoring and adjustment. Patient verifies current dosing regimen and tablet strength. Was admitted to [facility]. [facility] 4/18-4/22 with chest pain/hypotension. Had cardiac cath. 4/19 - no PCI. Believes she had no Coumadin 4/18 and 4/19. Pharmacy notes show 2.5 mg 4/20 for INR of 1.03, 2 mg next day for INR of 1.09. Was to have taken 2 mg 4/22 for INR of 1.17 and 1.5 mg 4/23, but forgot or couldn't find instructions. Took usual 1 mg those 2 days. Did take 1.5 mg last night, as directed by clinic pharmacist during 4/24 phone call. Has taken 90 mg Lovenox daily for 3 days (took today) - states has 7 left. Patient denies bleeding/chest pain. Has usual SOB/swelling of legs. States has numerous bruises from hospital stay. No blood in urine or stool. Dietary changes: had less salad while in hospital, but back to usual amount now. Changes in medication/OTC agents/herbals: Zocor was switched to Lipitor in hospital, and Coreg was decreased (does eat before taking it). No change in alcohol use or tobacco use. No change in activity level. Patient denies headaches/falls. Has usual dizziness. No vomiting/diarrhea or acute illness. No procedures scheduled in the future at this time. Already saw PCP and Pacemaker Clinic today. Assessment:   Lab Results   Component Value Date    INR 2.10 (H) 04/26/2023    INR 1.17 (H) 04/22/2023    INR 1.09 04/21/2023     INR subtherapeutic - goal 2.5-3.5. Recent Labs     04/26/23  1131   INR 2.10*        Plan:  POCT INR performed/result reviewed. Continue Lovenox through 4/28, then stop. 1.5 mg tomorrow, Th, otherwise continue PO Coumadin 1.5 mg MWF, 1 mg TThSS. Recheck INR in 2 week(s).  (Report given -

## 2023-04-26 NOTE — PROGRESS NOTES
[place]  [place] [facility]  Dept: [phone]  Dept Fax: [phone]  Loc: [phone]       Post-Discharge Transitional Care  Follow Up      Shey Lee   YOB: 1945    Date of Office Visit:  4/26/2023  Date of Hospital Admission: 4/18/23  Date of Hospital Discharge: 4/22/23  Risk of hospital readmission (high >=14%. Medium >=10%) :Readmission Risk Score: 13.4      Care management risk score Rising risk (score 2-5) and Complex Care (Scores >=6): No Risk Score On File     Non face to face  following discharge, date last encounter closed (first attempt may have been earlier): 04/25/2023    Call initiated 2 business days of discharge: Yes    ASSESSMENT/PLAN:   Hospital discharge follow-up  -     AK DISCHARGE MEDS RECONCILED W/ CURRENT OUTPATIENT MED LIST  -     Ambulatory Referral to Care Management with Device (Remote Patient Monitoring)  Type 2 diabetes mellitus with diabetic nephropathy, without long-term current use of insulin ([address]  -     Ambulatory Referral to Care Management with Device (Remote Patient Monitoring)  Hyperlipidemia, unspecified hyperlipidemia type  Essential hypertension  -     Ambulatory Referral to Care Management with Device (Remote Patient Monitoring)  Chronic systolic (congestive) heart failure    - After discussion will have pt continue Lipitor and follow up with cardiology next week as planned. (Pt was previously taken off Zocor due to leg aches). - Monitor BP and call office with >140/90's consistently. Pt will also be enrolled in [facility] home monitoring program, referral placed today  - Call office with any questions or concerns, or if symptoms are getting worse or changing      Medical Decision Making: moderate complexity  Return in 3 months (on 7/26/2023), or if symptoms worsen or fail to improve.            Subjective:   HPI:  Follow up of Hospital problems/diagnosis(es):   Chief

## 2023-04-26 NOTE — PROGRESS NOTES
[employer] Medtronic Dual Pacemaker   Patient of [place]    Battery 1.5 years    Presenting rhythm AFVP    A Impedance 399  RV Impedance 437    P wave sensing 1.4  R wave sensing 9.5    A Threshold - @ -  A Amplitude - @ -  RV Thresholds 1.0 @ 0.40  RV Amplitude 2.25 @ 0.40      A Paced 0%  V Paced 98.5  %    Programmed Mode VVIR     Hx afib - taking coumadin/lovenox  Afib [place] 36.4%    Episodes   Afib  1/15/23- VT rate 167

## 2023-04-27 ENCOUNTER — CARE COORDINATION (OUTPATIENT)
Dept: CARE COORDINATION | Age: 78
End: 2023-04-27

## 2023-04-27 LAB
EKG ATRIAL RATE: 62 BPM
EKG Q-T INTERVAL: 444 MS
EKG Q-T INTERVAL: 462 MS
EKG QRS DURATION: 172 MS
EKG QRS DURATION: 174 MS
EKG QTC CALCULATION (BAZETT): 468 MS
EKG QTC CALCULATION (BAZETT): 509 MS
EKG R AXIS: -89 DEGREES
EKG R AXIS: -96 DEGREES
EKG T AXIS: 76 DEGREES
EKG T AXIS: 93 DEGREES
EKG VENTRICULAR RATE: 62 BPM
EKG VENTRICULAR RATE: 79 BPM

## 2023-04-27 NOTE — CARE COORDINATION
Remote Patient Kit Ordering Note      Date/Time:  4/27/2023 1:34 PM      [x] CCSS confirmed patient shipping address  [x] Patient will receive package over the next 2-4 business days. Someone 21 years or older must be present to sign for UPS delivery. [x] Patient to contact virtual installation-specific phone number listed in the patient instructions. [x] If the patient does not contact HRS within 24 hours, an [employer] [facility] Richardriana will call the patient directly: If the patient does not answer, HRS will follow up with the clinical team notifying them about the unsuccessful attempt to contact the patient. HRS will make three call attempts to the patient. [x] LPN will contact patient once equipment is active to welcome them to the program.                                                         [x] Hours of RPM monitoring - Monday-Friday [id number]                     All questions answered at this time. LPN made aware the RPM kit has been ordered. EMTP notified patient of RPM equipment order.

## 2023-04-27 NOTE — CARE COORDINATION
Dr. Hernandez, I have enrolled Es into Care Coordination to provide support and education.    Also, she has been enrolled into Remote Patient Monitoring program per Roma Wynn referral.  Equipment will be sent to her to start program.    Please approve Plan of Care using smart phrase . Ccplanofcare.  Thank you!

## 2023-04-27 NOTE — CARE COORDINATION
I agree with the Care Coordinator's Plan of Care        Electronically signed by Cece Steele MD on 4/27/2023 at 1:59 PM

## 2023-04-27 NOTE — CARE COORDINATION
Ambulatory Care Coordination Note  2023    Patient Current Location:  Home: [facility] [place]  [place] [address]    ACM contacted the patient by telephone. Verified name and  with patient as identifiers. Provided introduction to self, and explanation of the ACM role. ACM: Chuckie Kussmaul, RN    Challenges to be reviewed by the provider   Additional needs identified to be addressed with provider: No  none               Method of communication with provider: none.     Spoke with Sonia Cross self/role  Was PCP referral for RPM enrollment  Discussed this with Obed Cardenas and she would like to enroll  Follows with [facility] cardiology, CHF clinic and nephrology  Obtained baseline of chronic conditions  Will mail out zone tools  Gave her my contact information and was agreeable to ACM followup    Plan  Referral to RPM  Mail out zone tools  Collaborate with [facility] specialist as ordered  Reinforce importance of early symptom recognition and reporting to prevent exacerbation and unnecessary hospitalization  Diabetes Assessment    Medic Alert ID: No  Meal Planning: Avoidance of concentrated sweets       Do you have hyperglycemia symptoms?: No   Do you have hypoglycemia symptoms?: No         and   Congestive Heart Failure Assessment    Do you understand a low sodium diet?: Yes  Do you understand how to read food labels?: Yes  How many restaurant meals do you eat per week?: 1-2  Do you salt your food before tasting it?: No         Symptoms:  CHF associated angina: Neg, CHF associated dyspnea on exertion: Pos, CHF associated fatigue: Neg, CHF associated leg swelling: Pos, CHF associated orthostatic hypotension: Neg, CHF associated PND: Neg, CHF associated shortness of breath: Neg, CHF associated weakness: Neg      Symptom course: stable  Patient-reported weight (lb):  (Comment: 205)           Offered patient enrollment in the Remote Patient Monitoring (RPM) program for in-home monitoring: Yes, patient enrolled:     Remote

## 2023-05-01 ENCOUNTER — CARE COORDINATION (OUTPATIENT)
Dept: CARE COORDINATION | Age: 78
End: 2023-05-01

## 2023-05-03 ENCOUNTER — CARE COORDINATION (OUTPATIENT)
Dept: CASE MANAGEMENT | Age: 78
End: 2023-05-03

## 2023-05-03 NOTE — CARE COORDINATION
Remote Patient Monitoring Note      Date/Time:  5/3/2023 3:26 PM    LPN reviewed patients reported daily Remote Patient Monitoring metrics. All reported metrics are within alert parameters. Plan/Follow Up: Will continue to review, monitor and address alerts with follow up based on severity of symptoms and risk factors. Remote Patient Monitoring Welcome Note    Date/Time:  5/3/2023 3:26 PM     Verified patients name and  as identifiers. Completed and confirmed the following:     Emergency Contact: Guilel Rader [phone]    [x] Patient received all RPM equipment (tablet, scale, blood pressure device and cuff, and pulse oximeter)  Cuff Size: Small  []  Regular   [x]  Large  []   Weight Scale: Regular   [x]  Bariatric  []               [x] Instructed patient keep box for use when returning equipment                                                          [x] Reviewed Patient Welcome Letter with patient                         [x] Reviewed expectations for patient and care team  [x] Reviewed RPM consent form         [x] Instructed patient to keep scale on flat surface                                                         [x] Instructed patient to keep tablet plugged in at all times                         [x] Instructed how to contact IT support (number listed on welcome letter)  [x] Provided Remote Patient Monitoring care  information                 All questions answered at this time.        Current Patient Metrics ---- Blood Pressure: 128/62, 70bpm Glucose: 155mg/dl Pulseox: 97%, 71bpm Survey: C Weight: 204.6lbs Note Created at: 2023 03:27 PM ET ---- Time-Spent: 4 minutes 0 seconds

## 2023-05-04 ENCOUNTER — CARE COORDINATION (OUTPATIENT)
Dept: CARE COORDINATION | Age: 78
End: 2023-05-04

## 2023-05-10 NOTE — PROGRESS NOTES
[facility] PROFESSIONAL SERVICES  [facility]   [address] [facility]   [address] [address]   Dept: [phone]   Dept Fax: [phone]: [phone]      Chief Complaint   Patient presents with    Follow-Up from Hospital     3 week. Takes atorvastatin but it is giving her leg cramps and statins have given her problems before      Cardiologist:  Dr. Ermelinda Lowery  66 yo female presents for hfu for chest pain. Had [facility] with non obs CAD. Hx of PAF, pacer, mechanical MVR, CKD, DM, HTN, HLD. No chest pain, angina, LAND, orthopnea, PND, sob at rest, palpitations, LE edema, or syncope. Having issues with leg cramps again. Was on simvastatin and had same issues. Now low dose of lipitor and leg cramps again. No other issues lately. Has been feeling well.        General:   No fever, no chills, no weight loss, no fatigue  Pulmonary:    No dyspnea, no wheezing  Cardiac:    Denies recent chest pain   GI:     No nausea or vomiting, no abdominal pain  Neuro:     No dizziness or light headedness  Musculoskeletal:  Leg cramping  Extremities:   No edema      Past Medical History:   Diagnosis Date    Arrhythmia     CHRONIC ATRIAL FIB    Breast cancer ([facility] 12/10/2012    [facility]    Cancer ([facility]     breast ca left    CHF (congestive heart failure) (HCC)     Chronic kidney disease, stage III (moderate) ([facility] 10/29/2018    Depression     Diabetes mellitus (HCC)     Generalized headaches     History of dizziness     History of sinus bradycardia     History of therapeutic radiation     History of valvular heart disease     Hyperlipidemia     Hypertension     Medtronic dual pacemaker 5/23/2017    MI, old     Mitral valve replaced     Numbness and tingling     Obesity     Obstructive sleep apnea on CPAP 2011    Osteopenia     SOB (shortness of breath)     HX OF:       Allergies   Allergen Reactions    Adalat [Nifedipine] Itching     redness    Amlodipine Swelling     If take more than 5mg legs swell,

## 2023-05-11 ENCOUNTER — OFFICE VISIT (OUTPATIENT)
Dept: CARDIOLOGY CLINIC | Age: 78
End: 2023-05-11
Payer: MEDICARE

## 2023-05-11 ENCOUNTER — HOSPITAL ENCOUNTER (OUTPATIENT)
Dept: PHARMACY | Age: 78
Setting detail: THERAPIES SERIES
Discharge: HOME | End: 2023-05-11
Payer: MEDICARE

## 2023-05-11 ENCOUNTER — CARE COORDINATION (OUTPATIENT)
Dept: CARE COORDINATION | Age: 78
End: 2023-05-11

## 2023-05-11 VITALS
HEIGHT: 69 IN | WEIGHT: 200.4 LBS | HEART RATE: 85 BPM | SYSTOLIC BLOOD PRESSURE: 142 MMHG | DIASTOLIC BLOOD PRESSURE: 75 MMHG | BODY MASS INDEX: 29.68 KG/M2

## 2023-05-11 DIAGNOSIS — I50.32 CHRONIC DIASTOLIC CONGESTIVE HEART FAILURE, NYHA CLASS 2 (HCC): ICD-10-CM

## 2023-05-11 DIAGNOSIS — Z79.01 ANTICOAGULATED ON COUMADIN: ICD-10-CM

## 2023-05-11 DIAGNOSIS — I10 ESSENTIAL HYPERTENSION: Primary | ICD-10-CM

## 2023-05-11 DIAGNOSIS — Z95.2 H/O MITRAL VALVE REPLACEMENT: Primary | ICD-10-CM

## 2023-05-11 DIAGNOSIS — I48.20 CHRONIC ATRIAL FIBRILLATION (HCC): ICD-10-CM

## 2023-05-11 DIAGNOSIS — I25.10 CORONARY ARTERY DISEASE INVOLVING NATIVE CORONARY ARTERY OF NATIVE HEART WITHOUT ANGINA PECTORIS: ICD-10-CM

## 2023-05-11 DIAGNOSIS — Z95.0 PACEMAKER: ICD-10-CM

## 2023-05-11 DIAGNOSIS — Z51.81 ENCOUNTER FOR THERAPEUTIC DRUG MONITORING: ICD-10-CM

## 2023-05-11 LAB — POC INR: 2.2 (ref 0.8–1.2)

## 2023-05-11 PROCEDURE — 85610 PROTHROMBIN TIME: CPT

## 2023-05-11 PROCEDURE — 1036F TOBACCO NON-USER: CPT | Performed by: STUDENT IN AN ORGANIZED HEALTH CARE EDUCATION/TRAINING PROGRAM

## 2023-05-11 PROCEDURE — 3077F SYST BP >= 140 MM HG: CPT | Performed by: STUDENT IN AN ORGANIZED HEALTH CARE EDUCATION/TRAINING PROGRAM

## 2023-05-11 PROCEDURE — 99214 OFFICE O/P EST MOD 30 MIN: CPT | Performed by: STUDENT IN AN ORGANIZED HEALTH CARE EDUCATION/TRAINING PROGRAM

## 2023-05-11 PROCEDURE — 3078F DIAST BP <80 MM HG: CPT | Performed by: STUDENT IN AN ORGANIZED HEALTH CARE EDUCATION/TRAINING PROGRAM

## 2023-05-11 PROCEDURE — G8427 DOCREV CUR MEDS BY ELIG CLIN: HCPCS | Performed by: STUDENT IN AN ORGANIZED HEALTH CARE EDUCATION/TRAINING PROGRAM

## 2023-05-11 PROCEDURE — 99212 OFFICE O/P EST SF 10 MIN: CPT

## 2023-05-11 PROCEDURE — 36416 COLLJ CAPILLARY BLOOD SPEC: CPT

## 2023-05-11 PROCEDURE — 1111F DSCHRG MED/CURRENT MED MERGE: CPT | Performed by: STUDENT IN AN ORGANIZED HEALTH CARE EDUCATION/TRAINING PROGRAM

## 2023-05-11 PROCEDURE — 1123F ACP DISCUSS/DSCN MKR DOCD: CPT | Performed by: STUDENT IN AN ORGANIZED HEALTH CARE EDUCATION/TRAINING PROGRAM

## 2023-05-11 PROCEDURE — G8417 CALC BMI ABV UP PARAM F/U: HCPCS | Performed by: STUDENT IN AN ORGANIZED HEALTH CARE EDUCATION/TRAINING PROGRAM

## 2023-05-11 PROCEDURE — G8399 PT W/DXA RESULTS DOCUMENT: HCPCS | Performed by: STUDENT IN AN ORGANIZED HEALTH CARE EDUCATION/TRAINING PROGRAM

## 2023-05-11 PROCEDURE — 1090F PRES/ABSN URINE INCON ASSESS: CPT | Performed by: STUDENT IN AN ORGANIZED HEALTH CARE EDUCATION/TRAINING PROGRAM

## 2023-05-11 RX ORDER — EZETIMIBE 10 MG/1
10 TABLET ORAL DAILY
Qty: 30 TABLET | Refills: 0 | Status: SHIPPED | OUTPATIENT
Start: 2023-05-11

## 2023-05-11 NOTE — CARE COORDINATION
Ambulatory Care Coordination Note  2023    Patient Current Location:  Home: [address]  [place] [address]     ACM contacted the patient by telephone. Verified name and  with patient as identifiers. Provided introduction to self, and explanation of the ACM role. Challenges to be reviewed by the provider   Additional needs identified to be addressed with provider: No  none               Method of communication with provider: none. ACM: Sheldon Osorio, RN    Spoke with Nilam Quarles for continued Care Coordination follow up  States she is now using RPM equipment  Also putting in blood sugar readings  Doing well and no alerts  Discussed CHF Summer initiative information  Completed follow up appt with cardiology and meds changed due to side effects of leg spasms  No barriers noted    Plan  Reinforce education completed  Encourage continued participation in RPM  Reinforce summer CHF initiative information  Reinforce importance of early symptom recognition and reporting to prevent exacerbation and unnecessary hospitalization  Diabetes Assessment    Medic Alert ID: No  Meal Planning: Avoidance of concentrated sweets   How often do you test your blood sugar?: Daily   Do you have barriers with adherence to non-pharmacologic self-management interventions?  (Nutrition/Exercise/Self-Monitoring): No   Have you ever had to go to the ED for symptoms of low blood sugar?: No       Do you have hyperglycemia symptoms?: No   Do you have hypoglycemia symptoms?: No   Last Blood Sugar Value: 170   Blood Sugar Monitoring Regimen: Once a Day   Blood Sugar Trends: No Change         and   Congestive Heart Failure Assessment    Do you understand a low sodium diet?: Yes  Do you understand how to read food labels?: Yes  How many restaurant meals do you eat per week?: 1-2  Do you salt your food before tasting it?: No         Symptoms:  CHF associated angina: Neg, CHF associated dyspnea on exertion: Pos, CHF associated fatigue: Neg, CHF

## 2023-05-11 NOTE — PROGRESS NOTES
Medication Management [address]  [phone] (phone)  [phone] (fax)    Ms. Monika Antonio is a 65 y.o.  female with history of Afib, Mechanical MVR who presents today for anticoagulation monitoring and adjustment. Patient verifies current dosing regimen and tablet strength. No missed or extra doses. Patient denies s/s bleeding/bruising/swelling/SOB/chest pain  No blood in urine or stool. No dietary changes. No changes in medication/OTC agents/Herbals. No change in alcohol use or tobacco use. More active with nicer weather  Patient denies headaches/dizziness/lightheadedness/falls. No vomiting/diarrhea or acute illness. No Procedures scheduled in the future at this time. Assessment:   Goal 2.5-3.5  Lab Results   Component Value Date    INR 2.20 (H) 05/11/2023    INR 2.10 (H) 04/26/2023    INR 1.17 (H) 04/22/2023     INR subtherapeutic   Recent Labs     05/11/23  0950   INR 2.20*     Patient presents with subtherapeutic INR. Other recent low INRs were around recent hospitalization. Patient was mostly stable on this regimen prior to hospitalization. Plan:  Take 2 mg today then continue Coumadin 1.5 mg MoWeFr and 1 mg SuTuThSa. Recheck INR in 2 week(s). Patient reminded to call the Anticoagulation Clinic with any signs or symptoms of bleeding or with any medication changes. Patient given instructions utilizing the teach back method. After visit summary printed and reviewed with patient. Discharged ambulatory in no apparent distress.     For Pharmacy Admin Tracking Only    Intervention Detail: Dose Adjustment: 1, reason: Therapy Optimization  Total # of Interventions Recommended: 1  Total # of Interventions Accepted: 1  Time Spent (min): 20

## 2023-05-18 ENCOUNTER — CARE COORDINATION (OUTPATIENT)
Dept: CARE COORDINATION | Age: 78
End: 2023-05-18

## 2023-05-20 PROBLEM — R77.8 ELEVATED TROPONIN: Status: RESOLVED | Noted: 2023-04-20 | Resolved: 2023-05-20

## 2023-05-20 PROBLEM — R79.89 ELEVATED TROPONIN: Status: RESOLVED | Noted: 2023-04-20 | Resolved: 2023-05-20

## 2023-05-23 ENCOUNTER — TELEPHONE (OUTPATIENT)
Dept: PHARMACY | Age: 78
End: 2023-05-23

## 2023-05-23 NOTE — TELEPHONE ENCOUNTER
Anticipate no interaction with Coumadin. Called patient, LM to advise patient to continue current dose and follow up as scheduled.

## 2023-05-23 NOTE — TELEPHONE ENCOUNTER
Patient called and left a message that she has been taken off Lipitor and started on Zetia 10 mg 1 tab daily. She states she has an appointment coming up but knew she was to call with med changes. If need be call patient with Coumadin instructions.

## 2023-05-25 ENCOUNTER — CARE COORDINATION (OUTPATIENT)
Dept: CARE COORDINATION | Age: 78
End: 2023-05-25

## 2023-05-25 ENCOUNTER — APPOINTMENT (OUTPATIENT)
Dept: PHARMACY | Age: 78
End: 2023-05-25
Payer: MEDICARE

## 2023-05-25 NOTE — CARE COORDINATION
Left message to return phone call to complete Care Coordination follow up/ RPM monitoring is active and no alerts noted.

## 2023-05-30 ENCOUNTER — HOSPITAL ENCOUNTER (OUTPATIENT)
Dept: PHARMACY | Age: 78
Setting detail: THERAPIES SERIES
Discharge: HOME | End: 2023-05-30
Payer: MEDICARE

## 2023-05-30 DIAGNOSIS — Z51.81 ENCOUNTER FOR THERAPEUTIC DRUG MONITORING: ICD-10-CM

## 2023-05-30 DIAGNOSIS — Z79.01 ANTICOAGULATED ON COUMADIN: ICD-10-CM

## 2023-05-30 DIAGNOSIS — Z95.2 H/O MITRAL VALVE REPLACEMENT: Primary | ICD-10-CM

## 2023-05-30 DIAGNOSIS — I48.20 CHRONIC ATRIAL FIBRILLATION (HCC): ICD-10-CM

## 2023-05-30 LAB — POC INR: 2.7 (ref 0.8–1.2)

## 2023-05-30 PROCEDURE — 36416 COLLJ CAPILLARY BLOOD SPEC: CPT

## 2023-05-30 PROCEDURE — 85610 PROTHROMBIN TIME: CPT

## 2023-05-30 PROCEDURE — 99211 OFF/OP EST MAY X REQ PHY/QHP: CPT

## 2023-05-30 RX ORDER — IMIQUIMOD 12.5 MG/.25G
CREAM TOPICAL
COMMUNITY
Start: 2023-05-12

## 2023-05-30 NOTE — PROGRESS NOTES
Medication Management [address] [place] [address]  [phone] (phone)  [phone] (fax)    Ms. Irish Riedel is a 65 y.o.  female with history of mechanical MVR/chronic atrial fib. , per Dr. Kirsty Story referral, who presents today for Warfarin monitoring and adjustment (5 days late for 2 week visit). Patient verifies current dosing regimen and tablet strength. No missed or extra doses, except for bolus ordered last visit. Patient denies bleeding/bruising/swelling/chest pain. Has usual SOB. No blood in urine or stool. Dietary changes: ate very little while ill last week (missed usual salad). Changes in medication/OTC agents/herbals: had Aldara cream with her - states has CA spot on scalp (in addition to other creams). No change in alcohol use or tobacco use. Change in activity level: decreased while ill last week - back to normal now. Patient denies headaches/falls. Has usual dizziness. No acute illness. Had vomiting/diarrhea/chills 1-1.5 days 5/25-5/26. Took nothing. No procedures scheduled in the future at this time. Assessment:     Lab Results   Component Value Date    INR 2.70 (H) 05/30/2023    INR 2.20 (H) 05/11/2023    INR 2.10 (H) 04/26/2023     INR therapeutic - goal 2.5-3.5. Recent Labs     05/30/23  1027   INR 2.70*      Plan:  POCT INR performed/result reviewed. Continue PO Coumadin 1.5 mg MWF, 1 mg TThSS. Recheck INR in 1.5-2 week(s). (Report given - orders entered by YO Hall, PharmD.)  Patient reminded to call the Anticoagulation Clinic with any signs or symptoms of bleeding or with any medication changes. Patient given instructions utilizing the teach back method. After visit summary printed and reviewed with patient. Discharged ambulatory in no apparent distress.     For Pharmacy Admin Tracking Only    Time Spent (min):  22

## 2023-05-31 VITALS
WEIGHT: 200.6 LBS | HEART RATE: 69 BPM | DIASTOLIC BLOOD PRESSURE: 68 MMHG | SYSTOLIC BLOOD PRESSURE: 124 MMHG | OXYGEN SATURATION: 98 % | BODY MASS INDEX: 29.62 KG/M2

## 2023-06-01 ENCOUNTER — CARE COORDINATION (OUTPATIENT)
Dept: CARE COORDINATION | Age: 78
End: 2023-06-01

## 2023-06-01 DIAGNOSIS — I10 ESSENTIAL HYPERTENSION: ICD-10-CM

## 2023-06-01 RX ORDER — HYDRALAZINE HYDROCHLORIDE 50 MG/1
50 TABLET, FILM COATED ORAL 3 TIMES DAILY PRN
Qty: 270 TABLET | Refills: 3 | Status: SHIPPED | OUTPATIENT
Start: 2023-06-01

## 2023-06-01 NOTE — CARE COORDINATION
EMTP placed call to patient to arrange RPM kit  through [id number]. Reviewed with patient how to pack equipment in original packing. UPS  time requested. Anticipated  date range : Anytime    Paramedic attempted to contact patient in regards to RPM Return Kit order. Patient is unavailable at this time. Left HIPAA compliant message with Paramedic contact information, reason for call and request for call back. Will expect a return call. RPM Return Kit Order was completed successfully.

## 2023-06-01 NOTE — TELEPHONE ENCOUNTER
This medication refill is regarding a fax request. Refill requested by  [facility] . Requested Prescriptions     Pending Prescriptions Disp Refills    hydrALAZINE (APRESOLINE) 50 MG tablet 270 tablet 3     Sig: Take 1 tablet by mouth 3 times daily as needed (systolic BP >766)     Date of last visit: 4/26/2023   Date of next visit: 10/2/2023  Date of last refill: 7/22/22 #270/3     Rx verified, ordered and set to EP.

## 2023-06-01 NOTE — CARE COORDINATION
RPM team,       Nilam Quarles states she is ready to graduate from RPM monitoring. Could you please reach out and provide support to turn equipment back in. Thank you! Remote Patient Monitoring Graduation      Date/Time:  6/1/2023 10:21 AM  Patient Current Location: Home: [address] [place] [address]  [place]y [place] [id number]  Patient has graduated from the Remote Patient Monitoring program on 6/1/2023. RPM goals have been met at this time. Patient has been provided instruction on process to return RPM equipment and RPM has been deactivated. Patient has AC's contact information for any further questions, concerns, or needs.   Time-Spent: 5 minutes 0 seconds

## 2023-06-01 NOTE — CARE COORDINATION
Ambulatory Care Coordination Note  2023    Patient Current Location:  Home: [place] [id number]     ACM contacted the patient by telephone. Verified name and  with patient as identifiers. Provided introduction to self, and explanation of the ACM role. Challenges to be reviewed by the provider   Additional needs identified to be addressed with provider: No  none               Method of communication with provider: none. ACM: Sasha Lopez, RN    Spoke with Romelia Tabares for continued Care Coordination follow up  States she is doing very well for review  Discussed RPM monitoring, no alerts noted  She states she is ready to turn equipment back in and feels more confident now about how her blood pressures are running  Has all needed equipment at home to continue to check her vitals and reporting changes as needed  States leg cramps have improved since medication change  Continues to work with CHF clinic    Plan  Alert RPM team of  graduation  Ensure no barriers with getting equipment turned back in  Reinforce importance of early symptom recognition and reporting to prevent exacerbation and unnecessary hospitalization  Diabetes Assessment    Medic Alert ID: No  Meal Planning: Avoidance of concentrated sweets   How often do you test your blood sugar?: Daily   Do you have barriers with adherence to non-pharmacologic self-management interventions?  (Nutrition/Exercise/Self-Monitoring): No   Have you ever had to go to the ED for symptoms of low blood sugar?: No       Do you have hyperglycemia symptoms?: No   Do you have hypoglycemia symptoms?: No         and   Congestive Heart Failure Assessment    Do you understand a low sodium diet?: Yes  Do you understand how to read food labels?: Yes  How many restaurant meals do you eat per week?: 1-2  Do you salt your food before tasting it?: No         Symptoms:  CHF associated angina: Neg, CHF associated dyspnea on exertion: Neg, CHF associated fatigue: Neg, CHF

## 2023-06-06 ENCOUNTER — CARE COORDINATION (OUTPATIENT)
Dept: CARE COORDINATION | Age: 78
End: 2023-06-06

## 2023-06-06 NOTE — CARE COORDINATION
RPM team,      Danna Kidd called and states she forgot to put pulse ox machine back into RPM box when she turned equipment back in. She is asking if RPM team could reach out to her and tell her what to do to turn the pulse ox back in. Thank you!

## 2023-06-07 RX ORDER — EZETIMIBE 10 MG/1
10 TABLET ORAL DAILY
Qty: 90 TABLET | Refills: 1 | Status: SHIPPED | OUTPATIENT
Start: 2023-06-07

## 2023-06-07 NOTE — TELEPHONE ENCOUNTER
Vel Bach called requesting a refill on the following medications:  Requested Prescriptions     Pending Prescriptions Disp Refills    ezetimibe (ZETIA) 10 MG tablet 30 tablet 0     Sig: Take 1 tablet by mouth daily     [facility]   . pv      Date of last visit:   Date of next visit (if applicable): 09/02/1812

## 2023-06-20 ENCOUNTER — CARE COORDINATION (OUTPATIENT)
Dept: CARE COORDINATION | Age: 78
End: 2023-06-20

## 2023-06-20 NOTE — CARE COORDINATION
Dr. Lesia Mccullough, I have been working with Leticia Gaytan on Care Coordination and Remote Patient Monitoring program to provide support and education to help manage chronic conditions. Pt has met those goals and all education has been completed with no new barriers noted. I would like to graduate pt from Care Coordination at this time pending PCP approval.  Do you approve of this discharge? Please advise. Thank you.

## 2023-06-20 NOTE — CARE COORDINATION
Additional return label and box requested to be shipped out to patient for missing pulse oximeter in original return order.

## 2023-07-03 RX ORDER — BUMETANIDE 1 MG/1
TABLET ORAL
Qty: 90 TABLET | Refills: 3 | Status: SHIPPED | OUTPATIENT
Start: 2023-07-03

## 2023-07-03 NOTE — TELEPHONE ENCOUNTER
Pt returned call. She confirmed was taking Bumex 1mg daily. Weight has been down. Swelling remains stable.

## 2023-07-03 NOTE — TELEPHONE ENCOUNTER
Bumex script requested from [facility]. Last OV with Uziel stated bumex 1mg BID, med list says 1mg daily. Call to patient. LMOM to see what she is taking?

## 2023-07-11 ENCOUNTER — OFFICE VISIT (OUTPATIENT)
Dept: PULMONOLOGY | Age: 78
End: 2023-07-11
Payer: MEDICARE

## 2023-07-11 ENCOUNTER — HOSPITAL ENCOUNTER (OUTPATIENT)
Dept: PHARMACY | Age: 78
Setting detail: THERAPIES SERIES
Discharge: HOME | End: 2023-07-11
Payer: MEDICARE

## 2023-07-11 VITALS
DIASTOLIC BLOOD PRESSURE: 74 MMHG | WEIGHT: 202.2 LBS | SYSTOLIC BLOOD PRESSURE: 122 MMHG | OXYGEN SATURATION: 98 % | BODY MASS INDEX: 28.95 KG/M2 | HEIGHT: 70 IN | TEMPERATURE: 97.8 F | HEART RATE: 81 BPM

## 2023-07-11 DIAGNOSIS — Z51.81 ENCOUNTER FOR THERAPEUTIC DRUG MONITORING: ICD-10-CM

## 2023-07-11 DIAGNOSIS — G47.33 OBSTRUCTIVE SLEEP APNEA ON CPAP: Primary | ICD-10-CM

## 2023-07-11 DIAGNOSIS — Z79.01 ANTICOAGULATED ON COUMADIN: ICD-10-CM

## 2023-07-11 DIAGNOSIS — Z99.89 OBSTRUCTIVE SLEEP APNEA ON CPAP: Primary | ICD-10-CM

## 2023-07-11 DIAGNOSIS — Z95.2 H/O MITRAL VALVE REPLACEMENT: Primary | ICD-10-CM

## 2023-07-11 DIAGNOSIS — I48.20 CHRONIC ATRIAL FIBRILLATION (HCC): ICD-10-CM

## 2023-07-11 LAB — POC INR: 3.8 (ref 0.8–1.2)

## 2023-07-11 PROCEDURE — 3074F SYST BP LT 130 MM HG: CPT | Performed by: PHYSICIAN ASSISTANT

## 2023-07-11 PROCEDURE — 1123F ACP DISCUSS/DSCN MKR DOCD: CPT | Performed by: PHYSICIAN ASSISTANT

## 2023-07-11 PROCEDURE — G8417 CALC BMI ABV UP PARAM F/U: HCPCS | Performed by: PHYSICIAN ASSISTANT

## 2023-07-11 PROCEDURE — 85610 PROTHROMBIN TIME: CPT

## 2023-07-11 PROCEDURE — 36416 COLLJ CAPILLARY BLOOD SPEC: CPT

## 2023-07-11 PROCEDURE — 99213 OFFICE O/P EST LOW 20 MIN: CPT | Performed by: PHYSICIAN ASSISTANT

## 2023-07-11 PROCEDURE — G8427 DOCREV CUR MEDS BY ELIG CLIN: HCPCS | Performed by: PHYSICIAN ASSISTANT

## 2023-07-11 PROCEDURE — 1090F PRES/ABSN URINE INCON ASSESS: CPT | Performed by: PHYSICIAN ASSISTANT

## 2023-07-11 PROCEDURE — 3078F DIAST BP <80 MM HG: CPT | Performed by: PHYSICIAN ASSISTANT

## 2023-07-11 PROCEDURE — 1036F TOBACCO NON-USER: CPT | Performed by: PHYSICIAN ASSISTANT

## 2023-07-11 PROCEDURE — 99212 OFFICE O/P EST SF 10 MIN: CPT

## 2023-07-11 PROCEDURE — G8399 PT W/DXA RESULTS DOCUMENT: HCPCS | Performed by: PHYSICIAN ASSISTANT

## 2023-07-11 ASSESSMENT — ENCOUNTER SYMPTOMS
ALLERGIC/IMMUNOLOGIC NEGATIVE: 1
BACK PAIN: 0
EYES NEGATIVE: 1
COUGH: 0
DIARRHEA: 0
SHORTNESS OF BREATH: 0
CHEST TIGHTNESS: 0
WHEEZING: 0
STRIDOR: 0
NAUSEA: 0

## 2023-07-11 NOTE — PROGRESS NOTES
[facility]      [place]         [phone]  7/11/2023   Chief Complaint   Patient presents with    Follow-up     1 year nereida        Pt of Dr. Murtaza Blank      PAP Download:   Micheal Mello or initial AHI: 40.8     Date of initial study: 03/21/2011        Compliant  77%     Noncompliant 0 %     PAP Type cpap Level  10   Avg Hrs/Day 6  AHI: 5.1   Recorded compliance dates [id number]  Machine/Mfg:   [x] ResMed    [] Respironics/Dreamstation   Interface:   [] Nasal    [] Nasal pillows   [x] FFM      Provider:      [x] [facility]     [][facility]     [] [employer]    [] [facility]    [] [place]               [] [facility]      [] [employer]    [] [address] [facility] [address]:      [] Other    Neck Size: 15.75  Mallampati 3  ESS:  5  SAQLI: 49    Here is a scan of the most recent download:                Presentation:   Edna Vargas presents for sleep medicine follow up for obstructive sleep apnea  Since the last visit, Edna Vargas is doing well with PAP. She wakes about 2 times at night to urinate. She otherwise sleep well. She stays awake. Equipment issues: The pressure is  acceptable, the mask is acceptable     Sleep issues:  Do you feel better? Yes  More rested? Yes   Better concentration? yes    Progress History:   Since last visit any new medical issues? No  New ER or hospital visits? Yes chest pain  Any new or changes in medicines? No  Any new sleep medicines? No    Review of Systems -   Review of Systems   Constitutional:  Negative for activity change, appetite change, chills and fever. HENT:  Negative for congestion and postnasal drip. Eyes: Negative. Respiratory:  Negative for cough, chest tightness, shortness of breath, wheezing and stridor. Cardiovascular:  Negative for chest pain and leg swelling. Gastrointestinal:  Negative for diarrhea and nausea. Endocrine: Negative. Genitourinary: Negative. Musculoskeletal: Negative. Negative for arthralgias and back pain.    Skin:

## 2023-07-11 NOTE — PROGRESS NOTES
Medication Management 2 [facility]  [phone] (phone)  [phone] (fax)    Ms. Jeremy Thrasher is a 77 y.o.  female with history of Afib, Mechanical MVR who presents today for anticoagulation monitoring and adjustment. Patient verifies current dosing regimen and tablet strength. No missed or extra doses. Patient denies s/s bleeding/bruising/swelling/SOB  No blood in urine or stool. No dietary changes. No changes in OTC agents/Herbals. Patient is no longer on Aldara 5% cream.  No change in alcohol use or tobacco use. No change in activity level. Patient denies falls. No vomiting/diarrhea or acute illness. No Procedures scheduled in the future at this time. Assessment:   Lab Results   Component Value Date    INR 3.80 (H) 07/11/2023    INR 2.90 (H) 06/13/2023    INR 2.70 (H) 05/30/2023     INR supratherapeutic   Recent Labs     07/11/23  0918   INR 3.80*     Patient is slightly supratherapeutic today. Previously, patient had two consecutive therapeutic INRs while on current regimen. Plan:  Coumadin 0.5 mg x 1 dose today 7/11/23 then continue Coumadin 1.5 mg MWF and 1 mg TuThSaSu. Recheck INR in 2 week(s). Patient reminded to call the Anticoagulation Clinic with any signs or symptoms of bleeding or with any medication changes. Patient given instructions utilizing the teach back method. After visit summary printed and reviewed with patient. Discharged ambulatory in no apparent distress.     For Pharmacy Admin Tracking Only    Intervention Detail: Dose Adjustment: 1, reason: Therapy Optimization  Total # of Interventions Recommended: 1  Total # of Interventions Accepted: 1  Time Spent (min): [address], PharmD, BCPS  7/11/2023  9:26 AM

## 2023-07-18 ENCOUNTER — HOSPITAL ENCOUNTER (OUTPATIENT)
Age: 78
Discharge: HOME | End: 2023-07-18
Payer: MEDICARE

## 2023-07-18 DIAGNOSIS — N28.1 RENAL CYST: ICD-10-CM

## 2023-07-18 DIAGNOSIS — E11.21 TYPE 2 DIABETES MELLITUS WITH DIABETIC NEPHROPATHY, WITHOUT LONG-TERM CURRENT USE OF INSULIN (HCC): ICD-10-CM

## 2023-07-18 DIAGNOSIS — I10 HTN (HYPERTENSION), BENIGN: ICD-10-CM

## 2023-07-18 DIAGNOSIS — N18.4 STAGE 4 CHRONIC KIDNEY DISEASE (HCC): ICD-10-CM

## 2023-07-18 DIAGNOSIS — E78.5 HYPERLIPIDEMIA, UNSPECIFIED HYPERLIPIDEMIA TYPE: ICD-10-CM

## 2023-07-18 DIAGNOSIS — I25.10 CORONARY ARTERY DISEASE INVOLVING NATIVE CORONARY ARTERY OF NATIVE HEART WITHOUT ANGINA PECTORIS: ICD-10-CM

## 2023-07-18 LAB
ANION GAP SERPL CALC-SCNC: 11 MEQ/L (ref 8–16)
BUN SERPL-MCNC: 36 MG/DL (ref 7–22)
CALCIUM SERPL-MCNC: 9.7 MG/DL (ref 8.5–10.5)
CHLORIDE SERPL-SCNC: 105 MEQ/L (ref 98–111)
CHOLESTEROL, FASTING: 202 MG/DL (ref 100–199)
CO2 SERPL-SCNC: 22 MEQ/L (ref 23–33)
CREAT SERPL-MCNC: 1.8 MG/DL (ref 0.4–1.2)
CREAT UR-MCNC: 73.4 MG/DL
DEPRECATED MEAN GLUCOSE BLD GHB EST-ACNC: 228 MG/DL (ref 70–126)
GFR SERPL CREATININE-BSD FRML MDRD: 28 ML/MIN/1.73M2
GLUCOSE SERPL-MCNC: 209 MG/DL (ref 70–108)
HBA1C MFR BLD HPLC: 9.6 % (ref 4.4–6.4)
HDLC SERPL-MCNC: 36 MG/DL
LDLC SERPL CALC-MCNC: 106 MG/DL
MICROALBUMIN UR-MCNC: < 1.2 MG/DL
MICROALBUMIN/CREAT RATIO PNL UR: 16 MG/G (ref 0–30)
POTASSIUM SERPL-SCNC: 4.5 MEQ/L (ref 3.5–5.2)
SODIUM SERPL-SCNC: 138 MEQ/L (ref 135–145)
TRIGLYCERIDE, FASTING: 301 MG/DL (ref 0–199)

## 2023-07-18 PROCEDURE — 83036 HEMOGLOBIN GLYCOSYLATED A1C: CPT

## 2023-07-18 PROCEDURE — 80048 BASIC METABOLIC PNL TOTAL CA: CPT

## 2023-07-18 PROCEDURE — 82043 UR ALBUMIN QUANTITATIVE: CPT

## 2023-07-18 PROCEDURE — 36415 COLL VENOUS BLD VENIPUNCTURE: CPT

## 2023-07-18 PROCEDURE — 80061 LIPID PANEL: CPT

## 2023-07-19 ENCOUNTER — TELEPHONE (OUTPATIENT)
Dept: FAMILY MEDICINE CLINIC | Age: 78
End: 2023-07-19

## 2023-07-19 NOTE — TELEPHONE ENCOUNTER
----- Message from Lo Guadalupe MD sent at 7/19/2023  8:30 AM EDT -----  Blood sugars continue to trend higher and HbA1C up to 9.6%. See if she's willing to start a once a week shot to help with her diabetes. If so, start Trulicity [address] [place] [address] weekly #4 pens/NR. Follow up with me in 1 month and bring a log of blood sugars to the visit.   CG

## 2023-07-20 NOTE — TELEPHONE ENCOUNTER
Spoke to pt and she would prefer to come in for an appointment with CG to discuss Trulicity as she has several concerns about this medication. Appt scheduled with CG on 7/27/23.

## 2023-07-25 ENCOUNTER — HOSPITAL ENCOUNTER (OUTPATIENT)
Dept: PHARMACY | Age: 78
Setting detail: THERAPIES SERIES
Discharge: HOME | End: 2023-07-25
Payer: MEDICARE

## 2023-07-25 DIAGNOSIS — Z95.2 H/O MITRAL VALVE REPLACEMENT: Primary | ICD-10-CM

## 2023-07-25 DIAGNOSIS — Z51.81 ENCOUNTER FOR THERAPEUTIC DRUG MONITORING: ICD-10-CM

## 2023-07-25 DIAGNOSIS — Z79.01 ANTICOAGULATED ON COUMADIN: ICD-10-CM

## 2023-07-25 DIAGNOSIS — I48.20 CHRONIC ATRIAL FIBRILLATION (HCC): ICD-10-CM

## 2023-07-25 LAB — POC INR: 3 (ref 0.8–1.2)

## 2023-07-25 PROCEDURE — 36416 COLLJ CAPILLARY BLOOD SPEC: CPT

## 2023-07-25 PROCEDURE — 85610 PROTHROMBIN TIME: CPT

## 2023-07-25 PROCEDURE — 99211 OFF/OP EST MAY X REQ PHY/QHP: CPT

## 2023-07-25 NOTE — PROGRESS NOTES
Medication Management [address] [place] [address]  [phone] (phone)  [phone] (fax)    Ms. Karlo Rojas is a 77 y.o.  female with history of mechanical MVR/chronic atrial fib. , per Dr. Niecy Dumont referral, who presents today for Warfarin monitoring and  adjustment (2 week visit). Patient verifies current dosing regimen and tablet strength. No missed or extra doses. Patient denies bleeding/bruising. Has usual SOB, but swelling of legs has improved. Dietary changes: had more Vitamin K foods. No changes in medication/OTC agents/herbals. No change in alcohol use or tobacco use. Change in activity level: increased. Patient denies falls. No vomiting/diarrhea or acute illness. No procedures scheduled in the future at this time. Assessment:   Lab Results   Component Value Date    INR 3.00 (H) 07/25/2023    INR 3.80 (H) 07/11/2023    INR 2.90 (H) 06/13/2023     INR therapeutic - goal 2.5-3.5. Recent Labs     07/25/23  1000   INR 3.00*        Plan:  POCT INR performed/result reviewed. Continue PO Coumadin 1.5 mg MWF, 1 mg TThSS. Recheck INR in 4 week(s) - same day as nephrology visit (lives out of town). Patient reminded to call the Anticoagulation Clinic with any signs or symptoms of bleeding or with any medication changes. Patient given instructions utilizing the teach back method. After visit summary printed and reviewed with patient. Discharged ambulatory in no apparent distress.     For Pharmacy Admin Tracking Only    Time Spent (min): 20

## 2023-07-26 ENCOUNTER — TELEPHONE (OUTPATIENT)
Dept: CARDIOLOGY CLINIC | Age: 78
End: 2023-07-26

## 2023-07-26 DIAGNOSIS — I48.20 CHRONIC ATRIAL FIBRILLATION (HCC): Primary | ICD-10-CM

## 2023-07-26 DIAGNOSIS — I48.21 PERMANENT ATRIAL FIBRILLATION (HCC): ICD-10-CM

## 2023-07-26 DIAGNOSIS — Z95.2 S/P MVR (MITRAL VALVE REPLACEMENT): ICD-10-CM

## 2023-07-26 NOTE — TELEPHONE ENCOUNTER
----- Message from Shayy Layton RN sent at 7/26/2023 10:12 AM EDT -----  Please renew annual referral for Anticoagulation Monitoring, #111. Thanks, KEITH Shen RN BSN

## 2023-07-26 NOTE — TELEPHONE ENCOUNTER
----- Message from Eric Diana RN sent at 7/26/2023 10:12 AM EDT -----  Please renew annual referral for Anticoagulation Monitoring, #111. Thanks, KEITH Chavez RN BSN

## 2023-07-27 ENCOUNTER — OFFICE VISIT (OUTPATIENT)
Dept: FAMILY MEDICINE CLINIC | Age: 78
End: 2023-07-27

## 2023-07-27 VITALS
HEIGHT: 70 IN | HEART RATE: 78 BPM | BODY MASS INDEX: 28.79 KG/M2 | OXYGEN SATURATION: 97 % | SYSTOLIC BLOOD PRESSURE: 122 MMHG | DIASTOLIC BLOOD PRESSURE: 78 MMHG | WEIGHT: 201.13 LBS

## 2023-07-27 DIAGNOSIS — E11.65 TYPE 2 DIABETES MELLITUS WITH HYPERGLYCEMIA, WITHOUT LONG-TERM CURRENT USE OF INSULIN (HCC): Primary | ICD-10-CM

## 2023-07-27 DIAGNOSIS — B37.9 YEAST INFECTION: ICD-10-CM

## 2023-07-27 DIAGNOSIS — E11.21 TYPE 2 DIABETES MELLITUS WITH DIABETIC NEPHROPATHY, WITHOUT LONG-TERM CURRENT USE OF INSULIN (HCC): ICD-10-CM

## 2023-07-27 RX ORDER — FLUCONAZOLE 150 MG/1
150 TABLET ORAL
Qty: 2 TABLET | Refills: 0 | Status: SHIPPED | OUTPATIENT
Start: 2023-07-27 | End: 2023-08-02

## 2023-07-27 ASSESSMENT — ENCOUNTER SYMPTOMS
GASTROINTESTINAL NEGATIVE: 1
RESPIRATORY NEGATIVE: 1

## 2023-07-27 NOTE — PROGRESS NOTES
2023    Mabel Rodriugez (:  1945) is a 77 y.o. female, Established patient, here for evaluation of the following chief complaint(s): Other (Would like to discuss elevated A1C and starting medication) and Urinary Frequency (Vaginal itching, pt states urine was recently tested. )      ASSESSMENT/PLAN:    1. Type 2 diabetes mellitus with hyperglycemia, without long-term current use of insulin (HCC)  -     Dulaglutide 0.75 MG/0.5ML SOPN; Inject 0.75 mg into the skin once a week, Disp-4 Adjustable Dose Pre-filled Pen Syringe, R-0Normal  2. Type 2 diabetes mellitus with diabetic nephropathy, without long-term current use of insulin (HCC)  -     Dulaglutide 0.75 MG/0.5ML SOPN; Inject 0.75 mg into the skin once a week, Disp-4 Adjustable Dose Pre-filled Pen Syringe, R-0Normal  3. Yeast infection  -     fluconazole (DIFLUCAN) 150 MG tablet; Take 1 tablet by mouth every 72 hours for 6 days, Disp-2 tablet, R-0Normal    After discussion of risks, benefits, and potential side effects of Trulicity, the patient opts to proceed with the medication as above    She is encouraged to stick to her ADA diet and regular physical activity to reduce her weight and blood sugars    Prescription for Diflucan as above. She will notify the Coumadin clinic that she is taking the medication as it could affect her INR    Follow-up with me in 1 month and bring a log of blood sugars to the visit. As her blood sugars come down, we will discontinue Januvia    SUBJECTIVE/OBJECTIVE:    HPI    Patient here today for follow-up of uncontrolled diabetes. She was taken off of metformin approximately 1 year ago due to chronic kidney disease and her blood sugars have steadily risen over the last year. She also admits that she has not been watching her diet and has been less active. Her blood sugars are commonly running over 200.   She has gotten back to a diabetic diet over the last few weeks and her fasting blood sugars are now between

## 2023-07-28 ENCOUNTER — PROCEDURE VISIT (OUTPATIENT)
Dept: CARDIOLOGY CLINIC | Age: 78
End: 2023-07-28

## 2023-07-28 DIAGNOSIS — Z95.0 PACEMAKER: Primary | ICD-10-CM

## 2023-07-28 NOTE — PROGRESS NOTES
[employer] medtronic dual pacer   Programmed VVIR     . Lucie Salazar Battery longevity:  1.5 years on device   Presenting rhythm  AF   1 short V-V     Atrial impedance 399  RV impedance 437    P wave sensing 1.3  R wave sensing 14.8    0 % atrial paced  96.2 % RV paced     RV threshold 0.875 V at 0.4ms  Mode switches   coumadin

## 2023-07-31 ENCOUNTER — TELEPHONE (OUTPATIENT)
Dept: PHARMACY | Age: 78
End: 2023-07-31

## 2023-07-31 NOTE — TELEPHONE ENCOUNTER
Patient called and left a message that she has been started on a new medication Fluconazole 150 mg 2 tabs and is starting Trulicity. Please call the patient with Coumadin instructions.

## 2023-07-31 NOTE — TELEPHONE ENCOUNTER
Called and spoke with patient. She stated that she started fluconazole on Saturday. Dosing is 150 mg every 72 hours for 2 doses. The last dose of fluconazole is to be taken tomorrow. Instructed patient not to take any Coumadin today, then continue dosing of 1.5 mg MWF, 1 mg all other days tomorrow. Rescheduled appointment from 8/21/23 to 8/3/23 to ensure INR did not go too high. Patient voiced understanding.     Valentín Amaro, PharmD, BCPS  7/31/2023  11:38 AM

## 2023-08-03 ENCOUNTER — HOSPITAL ENCOUNTER (OUTPATIENT)
Dept: PHARMACY | Age: 78
Setting detail: THERAPIES SERIES
Discharge: HOME | End: 2023-08-03
Payer: MEDICARE

## 2023-08-03 DIAGNOSIS — I48.20 CHRONIC ATRIAL FIBRILLATION (HCC): ICD-10-CM

## 2023-08-03 DIAGNOSIS — Z79.01 ANTICOAGULATED ON COUMADIN: ICD-10-CM

## 2023-08-03 DIAGNOSIS — Z51.81 ENCOUNTER FOR THERAPEUTIC DRUG MONITORING: ICD-10-CM

## 2023-08-03 DIAGNOSIS — Z95.2 H/O MITRAL VALVE REPLACEMENT: Primary | ICD-10-CM

## 2023-08-03 LAB — POC INR: 2.6 (ref 0.8–1.2)

## 2023-08-03 PROCEDURE — 99211 OFF/OP EST MAY X REQ PHY/QHP: CPT

## 2023-08-03 PROCEDURE — 85610 PROTHROMBIN TIME: CPT

## 2023-08-03 PROCEDURE — 36416 COLLJ CAPILLARY BLOOD SPEC: CPT

## 2023-08-03 NOTE — PROGRESS NOTES
Medication Management [address] [place] [address]  [phone] (phone)  [phone] (fax)    Ms. Wilner Singleton is a 77 y.o.  female with history of MVR/chronic permanent atrial fib. , per Dr. Chuyita Galvan referral, who presents today for Warfarin monitoring and adjustment. Patient verifies current dosing regimen and tablet strength. No extra doses. Coumadin 1.5 mg  was held 7/31. Patient called this clinic that day regarding Fluconazole 150 mg 7/29, then again 8/1. Patient denies bleeding/bruising/swelling. Has usual SOB. No blood in urine or stool. Dietary changes: eating healthier due to higher sugar. No change in salads, but had peeled zucchini yesterday. Changes in medication/OTC agents/herbals: to start Trulicity. No change in alcohol use or tobacco use. No change in activity level. Patient denies falls. No vomiting/diarrhea or acute illness. No procedures scheduled in the future at this time. Assessment:   Lab Results   Component Value Date    INR 2.60 (H) 08/03/2023    INR 3.00 (H) 07/25/2023    INR 3.80 (H) 07/11/2023     INR therapeutic - goal 2.5-3.5. Recent Labs     08/03/23  1416   INR 2.60*        Plan:  POCT INR performed/result reviewed. Continue PO Coumadin 1.5 mg MWF, 1 mg TThSS. Recheck INR in 2 week(s) if making major diet change, 3 if minor change. (Report given - orders entered by KEITH Elizondo., PharmD.)  Patient stated it's minor changes in diet. Patient reminded to call the Anticoagulation Clinic with any signs or symptoms of bleeding or with any medication changes. Patient given instructions utilizing the teach back method. After visit summary printed and reviewed with patient. Discharged ambulatory in no apparent distress.     For Pharmacy Admin Tracking Only    Time Spent (min): 22

## 2023-08-21 ENCOUNTER — OFFICE VISIT (OUTPATIENT)
Dept: NEPHROLOGY | Age: 78
End: 2023-08-21
Payer: MEDICARE

## 2023-08-21 VITALS
HEART RATE: 82 BPM | DIASTOLIC BLOOD PRESSURE: 65 MMHG | OXYGEN SATURATION: 96 % | BODY MASS INDEX: 28.82 KG/M2 | WEIGHT: 198 LBS | SYSTOLIC BLOOD PRESSURE: 144 MMHG

## 2023-08-21 DIAGNOSIS — I10 ESSENTIAL HYPERTENSION: ICD-10-CM

## 2023-08-21 DIAGNOSIS — N28.1 RENAL CYST: ICD-10-CM

## 2023-08-21 DIAGNOSIS — N18.4 STAGE 4 CHRONIC KIDNEY DISEASE (HCC): Primary | ICD-10-CM

## 2023-08-21 DIAGNOSIS — I10 HTN (HYPERTENSION), BENIGN: ICD-10-CM

## 2023-08-21 LAB — POC INR: 2.4 (ref 0.8–1.2)

## 2023-08-21 PROCEDURE — 1090F PRES/ABSN URINE INCON ASSESS: CPT | Performed by: INTERNAL MEDICINE

## 2023-08-21 PROCEDURE — G8417 CALC BMI ABV UP PARAM F/U: HCPCS | Performed by: INTERNAL MEDICINE

## 2023-08-21 PROCEDURE — G8399 PT W/DXA RESULTS DOCUMENT: HCPCS | Performed by: INTERNAL MEDICINE

## 2023-08-21 PROCEDURE — 1036F TOBACCO NON-USER: CPT | Performed by: INTERNAL MEDICINE

## 2023-08-21 PROCEDURE — 99211 OFF/OP EST MAY X REQ PHY/QHP: CPT

## 2023-08-21 PROCEDURE — 3078F DIAST BP <80 MM HG: CPT | Performed by: INTERNAL MEDICINE

## 2023-08-21 PROCEDURE — 36416 COLLJ CAPILLARY BLOOD SPEC: CPT

## 2023-08-21 PROCEDURE — 85610 PROTHROMBIN TIME: CPT

## 2023-08-21 PROCEDURE — G8427 DOCREV CUR MEDS BY ELIG CLIN: HCPCS | Performed by: INTERNAL MEDICINE

## 2023-08-21 PROCEDURE — 1123F ACP DISCUSS/DSCN MKR DOCD: CPT | Performed by: INTERNAL MEDICINE

## 2023-08-21 PROCEDURE — 99214 OFFICE O/P EST MOD 30 MIN: CPT | Performed by: INTERNAL MEDICINE

## 2023-08-21 PROCEDURE — 3077F SYST BP >= 140 MM HG: CPT | Performed by: INTERNAL MEDICINE

## 2023-08-21 RX ORDER — CARVEDILOL 3.12 MG/1
3.12 TABLET ORAL 2 TIMES DAILY
Qty: 60 TABLET | Refills: 4 | Status: SHIPPED | OUTPATIENT
Start: 2023-08-21

## 2023-08-21 RX ORDER — HYDRALAZINE HYDROCHLORIDE 100 MG/1
100 TABLET, FILM COATED ORAL 2 TIMES DAILY
Qty: 180 TABLET | Refills: 1 | Status: SHIPPED | OUTPATIENT
Start: 2023-08-21

## 2023-08-21 NOTE — PROGRESS NOTES
Normal LV systolic function. EF 55%. Prosthetic MV appears to be functioning well. Mildly sclerosed AV. Trivial MR, mild TR, trivial PI, RVSP 47mmHg. TRANSTHORACIC ECHOCARDIOGRAM  2-08-00    Moderate MV stenosis based on available echo doppler data. UPPER GASTROINTESTINAL ENDOSCOPY  08/2017        Medications :     Outpatient Medications Marked as Taking for the 8/21/23 encounter (Office Visit) with Lulu Lee MD   Medication Sig Dispense Refill    carvedilol (COREG) 3.125 MG tablet Take 1 tablet by mouth 2 times daily 60 tablet 4    Dulaglutide 0.75 MG/0.5ML SOPN Inject 0.75 mg into the skin once a week 4 Adjustable Dose Pre-filled Pen Syringe 0    bumetanide (BUMEX) 1 MG tablet TAKE 1 TABLET DAILY 90 tablet 3    ezetimibe (ZETIA) 10 MG tablet Take 1 tablet by mouth daily 90 tablet 1    hydrALAZINE (APRESOLINE) 50 MG tablet Take 1 tablet by mouth 3 times daily as needed (systolic BP >698) 130 tablet 3    warfarin (COUMADIN) 1 MG tablet Take 1.5 tablets by mouth daily Take as directed by [facility] (#130 ~ 90 day supply) 130 tablet 2    Handicap Placard MISC by Does not apply route Duration 5 years. Dx:  CHF 1 each 0    SITagliptin (JANUVIA) 25 MG tablet Take 1 tablet by mouth daily 90 tablet 3    empagliflozin (JARDIANCE) 25 MG tablet Take 1 tablet by mouth daily 90 tablet 3    glimepiride (AMARYL) 2 MG tablet Take 2 tablets twice daily 360 tablet 3    olmesartan (BENICAR) 40 MG tablet TAKE 1 TABLET DAILY 90 tablet 3    Lancets MISC Freestyle Freedom lancets, use daily, DX: E11.21 100 each 3    glucose monitoring (FREESTYLE FREEDOM) kit 1 kit by Does not apply route daily Glucometer brand per insurance and patient preference. 1 kit 0    blood glucose monitor strips Dispense brand per insurance and patient preference. Test daily. 100 strip 3    CPAP Machine MISC by Does not apply route Please change CPAP pressure to 10 cm H20.   Please send download in 2 weeks 1 each 0    fluorouracil

## 2023-08-21 NOTE — PROGRESS NOTES
Medication Management [address] [place] [address]  [phone] (phone)  [phone] (fax)    Ms. Jamar Belle is a 77 y.o.  female with history of  MVR/chronic permanent atrial fib  who presents today for anticoagulation monitoring and adjustment. Patient verifies current dosing regimen and tablet strength. No missed or extra doses. Patient denies s/s bleeding/bruising/swelling/SOB  No blood in urine or stool. No dietary changes. Overall, trying to limit carbs. States she has remained consistent with green, leafy vegetable intake for past few weeks. No changes in medication/OTC agents/Herbals. Started Trulicity on 8/4. No change in alcohol use or tobacco use. No change in activity level. Patient denies falls. No vomiting/diarrhea or acute illness. No Procedures scheduled in the future at this time. Assessment:   Lab Results   Component Value Date    INR 2.40 (H) 08/21/2023    INR 2.60 (H) 08/03/2023    INR 3.00 (H) 07/25/2023     INR subtherapeutic   Recent Labs     08/21/23  1130   INR 2.40*   Goal INR 2.5-3.5      Plan:  Coumadin 1.5 mg today (8/21), 1.5 mg tomorrow (8/22), then continue Coumadin 1.5 mg MWF, 1 mg TThSS. Recheck INR in 3 week(s). Patient reminded to call the Anticoagulation Clinic with any signs or symptoms of bleeding or with any medication changes. Patient given instructions utilizing the teach back method. After visit summary printed and reviewed with patient. Discharged ambulatory in no apparent distress.     Alex Rich RPh  8/21/2023 11:33 AM     For Pharmacy Admin Tracking Only    Intervention Detail: Dose Adjustment: 1, reason: Therapy Optimization  Total # of Interventions Recommended: 1  Total # of Interventions Accepted: 1  Time Spent (min): 20

## 2023-08-24 ENCOUNTER — APPOINTMENT (OUTPATIENT)
Dept: PHARMACY | Age: 78
End: 2023-08-24
Payer: MEDICARE

## 2023-08-24 ENCOUNTER — OFFICE VISIT (OUTPATIENT)
Dept: FAMILY MEDICINE CLINIC | Age: 78
End: 2023-08-24

## 2023-08-24 VITALS
RESPIRATION RATE: 16 BRPM | BODY MASS INDEX: 29.17 KG/M2 | SYSTOLIC BLOOD PRESSURE: 124 MMHG | DIASTOLIC BLOOD PRESSURE: 70 MMHG | TEMPERATURE: 97.7 F | HEART RATE: 68 BPM | WEIGHT: 200.4 LBS

## 2023-08-24 DIAGNOSIS — I48.20 CHRONIC ATRIAL FIBRILLATION (HCC): ICD-10-CM

## 2023-08-24 DIAGNOSIS — Z51.81 ENCOUNTER FOR THERAPEUTIC DRUG MONITORING: ICD-10-CM

## 2023-08-24 DIAGNOSIS — Z95.2 H/O MITRAL VALVE REPLACEMENT: Primary | ICD-10-CM

## 2023-08-24 DIAGNOSIS — E11.65 TYPE 2 DIABETES MELLITUS WITH HYPERGLYCEMIA, WITHOUT LONG-TERM CURRENT USE OF INSULIN (HCC): Primary | ICD-10-CM

## 2023-08-24 DIAGNOSIS — Z79.01 ANTICOAGULATED ON COUMADIN: ICD-10-CM

## 2023-08-24 RX ORDER — GLUCOSAMINE HCL/CHONDROITIN SU 500-400 MG
CAPSULE ORAL
Qty: 100 STRIP | Refills: 3 | Status: SHIPPED | OUTPATIENT
Start: 2023-08-24

## 2023-08-24 ASSESSMENT — ENCOUNTER SYMPTOMS
VOMITING: 0
ABDOMINAL PAIN: 0
CONSTIPATION: 0
DIARRHEA: 0
NAUSEA: 0

## 2023-09-11 ENCOUNTER — HOSPITAL ENCOUNTER (OUTPATIENT)
Dept: PHARMACY | Age: 78
Setting detail: THERAPIES SERIES
Discharge: HOME | End: 2023-09-11
Payer: MEDICARE

## 2023-09-11 DIAGNOSIS — I48.20 CHRONIC ATRIAL FIBRILLATION (HCC): ICD-10-CM

## 2023-09-11 DIAGNOSIS — Z95.2 H/O MITRAL VALVE REPLACEMENT: Primary | ICD-10-CM

## 2023-09-11 DIAGNOSIS — Z51.81 ENCOUNTER FOR THERAPEUTIC DRUG MONITORING: ICD-10-CM

## 2023-09-11 DIAGNOSIS — Z79.01 ANTICOAGULATED ON COUMADIN: ICD-10-CM

## 2023-09-11 LAB — POC INR: 2.7 (ref 0.8–1.2)

## 2023-09-11 PROCEDURE — 36416 COLLJ CAPILLARY BLOOD SPEC: CPT

## 2023-09-11 PROCEDURE — 85610 PROTHROMBIN TIME: CPT

## 2023-09-11 PROCEDURE — 99211 OFF/OP EST MAY X REQ PHY/QHP: CPT

## 2023-09-11 NOTE — PROGRESS NOTES
Medication Management [address] [place] [address]  [phone] (phone)  [phone] (fax)    Ms. Elvia Oliveira is a 77 y.o.  female with history of Afib, Mechanical MVR who presents today for anticoagulation monitoring and adjustment. Patient verifies current dosing regimen and tablet strength. No missed or extra doses. Patient denies s/s bleeding/swelling/SOB. Bruising in fingers that is new and not normal.   No blood in urine or stool. No dietary changes. No changes in medication/OTC agents/Herbals. Discontinued Januvia, going to reassess in October. No change in alcohol use or tobacco use. No change in activity level. Patient denies falls. No vomiting/diarrhea or acute illness. No Procedures scheduled in the future at this time. Assessment:     Lab Results   Component Value Date    INR 2.70 (H) 09/11/2023    INR 2.40 (H) 08/21/2023    INR 2.60 (H) 08/03/2023     INR therapeutic   Recent Labs     09/11/23  1509   INR 2.70*   Goal INR: 2.5-3.5    Patient interview completed and discussed with pharmacist by Dulce Morgan PharmD Candidate    Plan:  Continue Coumadin 1.5 mg MWF, 1 mg all other days. Recheck INR in 4 week(s) (recommended 3 week check, but patient wanted 4 week check). Patient reminded to call the Anticoagulation Clinic with any signs or symptoms of bleeding or with any medication changes. Patient given instructions utilizing the teach back method. After visit summary printed and reviewed with patient. Discharged ambulatory in no apparent distress.     For Pharmacy Admin Tracking Only  Time Spent (min): [address] [place] [address], PharmD, BCPS  9/11/2023  3:24 PM

## 2023-09-12 ENCOUNTER — APPOINTMENT (OUTPATIENT)
Dept: PHARMACY | Age: 78
End: 2023-09-12
Payer: MEDICARE

## 2023-10-09 ENCOUNTER — HOSPITAL ENCOUNTER (OUTPATIENT)
Dept: PHARMACY | Age: 78
Setting detail: THERAPIES SERIES
Discharge: HOME | End: 2023-10-09
Payer: MEDICARE

## 2023-10-09 DIAGNOSIS — I48.20 CHRONIC ATRIAL FIBRILLATION (HCC): ICD-10-CM

## 2023-10-09 DIAGNOSIS — Z95.2 H/O MITRAL VALVE REPLACEMENT: Primary | ICD-10-CM

## 2023-10-09 DIAGNOSIS — Z51.81 ENCOUNTER FOR THERAPEUTIC DRUG MONITORING: ICD-10-CM

## 2023-10-09 DIAGNOSIS — Z79.01 ANTICOAGULATED ON COUMADIN: ICD-10-CM

## 2023-10-09 LAB — POC INR: 3.3 (ref 0.8–1.2)

## 2023-10-09 PROCEDURE — 85610 PROTHROMBIN TIME: CPT

## 2023-10-09 PROCEDURE — 99211 OFF/OP EST MAY X REQ PHY/QHP: CPT

## 2023-10-09 PROCEDURE — 36416 COLLJ CAPILLARY BLOOD SPEC: CPT

## 2023-10-09 RX ORDER — DIPHENHYDRAMINE HYDROCHLORIDE 25 MG/1
5000 TABLET ORAL DAILY
COMMUNITY

## 2023-10-09 RX ORDER — ACETAMINOPHEN 160 MG
2000 TABLET,DISINTEGRATING ORAL DAILY
COMMUNITY

## 2023-10-09 NOTE — PROGRESS NOTES
Medication Management [address] [place] [address]  [phone] (phone)  [phone] (fax)    Ms. Tony Zurita is a 77 y.o.  female with history of Afib, Mechanical MVR who presents today for anticoagulation monitoring and adjustment. Patient verifies current dosing regimen and tablet strength. No missed or extra doses. Patient denies s/s bleeding/bruising/swelling/SOB  No blood in urine or stool. No dietary changes. No changes in medication/Herbals. Patient started taking biotin 5,000 mcg daily and Vitamin D 2,000 units daily. No change in alcohol use or tobacco use. No change in activity level. Patient denies falls. No vomiting/diarrhea or acute illness. No Procedures scheduled in the future at this time. Assessment:   Lab Results   Component Value Date    INR 3.30 (H) 10/09/2023    INR 2.70 (H) 09/11/2023    INR 2.40 (H) 08/21/2023     INR therapeutic   Recent Labs     10/09/23  1026   INR 3.30*      Patient interview completed and discussed with pharmacist by Helga Henson PharmD candidate       Patient has been therapeutic at four of the past five INR checks while on current regimen. Plan:  Continue Coumadin 1.5 mg MWF and 1 mg TuThSaSu. Recheck INR in 5 week(s). Patient reminded to call the Anticoagulation Clinic with any signs or symptoms of bleeding or with any medication changes. Patient given instructions utilizing the teach back method. After visit summary printed and reviewed with patient. Discharged ambulatory in no apparent distress.      For Pharmacy Admin Tracking Only    Total # of Interventions Recommended: 0  Total # of Interventions Accepted: 0  Time Spent (min): [address], PharmD, BCPS  10/9/2023  10:38 AM

## 2023-10-10 ENCOUNTER — NURSE ONLY (OUTPATIENT)
Dept: CARDIOLOGY CLINIC | Age: 78
End: 2023-10-10

## 2023-10-10 ENCOUNTER — OFFICE VISIT (OUTPATIENT)
Dept: CARDIOLOGY CLINIC | Age: 78
End: 2023-10-10

## 2023-10-10 VITALS
SYSTOLIC BLOOD PRESSURE: 152 MMHG | BODY MASS INDEX: 28.97 KG/M2 | HEART RATE: 78 BPM | OXYGEN SATURATION: 98 % | WEIGHT: 199 LBS | DIASTOLIC BLOOD PRESSURE: 80 MMHG

## 2023-10-10 DIAGNOSIS — I50.32 CHRONIC DIASTOLIC CONGESTIVE HEART FAILURE, NYHA CLASS 2 (HCC): Primary | ICD-10-CM

## 2023-10-10 DIAGNOSIS — Z95.2 S/P MVR (MITRAL VALVE REPLACEMENT): ICD-10-CM

## 2023-10-10 DIAGNOSIS — Z95.0 PACEMAKER: Primary | ICD-10-CM

## 2023-10-10 DIAGNOSIS — I10 ESSENTIAL HYPERTENSION: ICD-10-CM

## 2023-10-10 DIAGNOSIS — R60.0 EDEMA OF BOTH LOWER LEGS: ICD-10-CM

## 2023-10-10 RX ORDER — HYDRALAZINE HYDROCHLORIDE 50 MG/1
50 TABLET, FILM COATED ORAL 3 TIMES DAILY
Qty: 180 TABLET | Refills: 5
Start: 2023-10-10

## 2023-10-10 ASSESSMENT — ENCOUNTER SYMPTOMS
COUGH: 0
SHORTNESS OF BREATH: 0
ABDOMINAL DISTENTION: 0
ABDOMINAL PAIN: 0
WHEEZING: 0

## 2023-10-10 NOTE — PROGRESS NOTES
In office Medtronic Dual Pacemaker   Patient of [facility]    Battery 10-21 months / avg 16    Presenting rhythm AF   Underlying AF VS 50s    A Impedance 437  RV Impedance 494    P wave sensing 2.3  R wave sensing 6.0    A Threshold - @ -  A Amplitude - @ -  RV Thresholds 1.25 @ 0.40  RV Amplitude 2.0 @ 0.40      A Paced 0%  V Paced 97.9%    Programmed Mode VVIR 60    Hx AF - taking coumadin  Afib [place] 73.8%    Episodes   AF

## 2023-10-10 NOTE — PROGRESS NOTES
Heart Failure Clinic       Visit Date: 10/10/2023  Cardiologist:  Dr. Rhonda Nation  Primary Care Physician: Dr. Angie Lopez MD    Balbir Mccracken is a 77 y.o. female who presents today for:  Chief Complaint   Patient presents with    Congestive Heart Failure       HPI:   Balbir Mccracken is a 77 y.o. female who presents to the office for a follow up patient visit in the heart failure clinic. Last seen by Dr. Rhonda Nation on 7/8, no changes made. Accompanied by self    TYPE HF: HFpEF (55%)  Cause: nonischemic  Device: Pacemaker  HX: MVR (2003), POLINA on CPAP, DM, HLD, HTN, Afib (coumadin)    Dry Wt:  205 202 on 10/18/22, 199 on 10/10/23    Hospitalization:  > 6 months    Concerns today: here today for her 1 yr f/u. Her weight is stable since last visit. She has notes worsening LAND over the last yr. She denies worsening lower leg swelling, bloating, and nocturnal dyspnea. Does wear her CPAP every night. Urinating well on Bumex. Following her diet for the most part. She also notes that since July she has had two episodes of dizziness where she feels like she could pass out. She was driving both times. She notes blurred vision as well. She states that Nephrology increased her hydralazine and she has been having these episodes since. Visit on 10/18/22: here today for her 1 year f/u. Notes that she feels lightheaded/dizzy after taking her morning medications. It lasts for about 2 hrs. Continued SOB, no changes from baseline. Notes LE swelling if she does not wear her socks, no different than her normal. Still taking her Bumex and urinating well on it. Visit on 9/30/21: c/o of dizziness and feeling lightheaded going from a sitting to standing position. Activity: ADLs, LAND w/ long distances, no breaks needed  Diet: does not add salt, stays around 2500mg/day, stays under 2L/day    Patient has:  Chest Pain: no  SOB: no, chronic LAND  Orthopnea/PND: yes, elevates bed    POLINA: yes CPAP wears it most of the time.

## 2023-10-10 NOTE — PATIENT INSTRUCTIONS
You may receive a survey regarding the care you received during your visit. Your input is valuable to us. We encourage you to complete and return your survey. We hope you will choose us in the future for your healthcare needs. Your nurses today were Annette and [employer].   Office hours:   Mon-Thurs 8-4:30  Friday 8-12  Phone: [phone]    Continue:  Continue current medications  Daily weights and record  Fluid restriction of 2 Liters per day  Limit sodium in diet to around 2797-1044 mg/day  Monitor BP  Activity as tolerated     Call the [address] for any of the following symptoms:   Weight gain of 2-3 pounds in 1 day or 5 pounds in 1 week  Increased shortness of breath  Shortness of breath while laying down  Cough  Chest pain  Swelling in feet, ankles or legs  Bloating in abdomen  Fatigue

## 2023-10-23 RX ORDER — EZETIMIBE 10 MG/1
10 TABLET ORAL DAILY
Qty: 90 TABLET | Refills: 0 | Status: SHIPPED | OUTPATIENT
Start: 2023-10-23 | End: 2023-10-25 | Stop reason: SDUPTHER

## 2023-10-23 RX ORDER — CARVEDILOL 3.12 MG/1
3.12 TABLET ORAL 2 TIMES DAILY
Qty: 60 TABLET | Refills: 0 | Status: SHIPPED | OUTPATIENT
Start: 2023-10-23 | End: 2023-10-25 | Stop reason: SDUPTHER

## 2023-10-25 RX ORDER — EZETIMIBE 10 MG/1
10 TABLET ORAL DAILY
Qty: 90 TABLET | Refills: 3 | Status: SHIPPED | OUTPATIENT
Start: 2023-10-25

## 2023-10-25 RX ORDER — CARVEDILOL 3.12 MG/1
3.12 TABLET ORAL 2 TIMES DAILY
Qty: 180 TABLET | Refills: 3 | Status: SHIPPED | OUTPATIENT
Start: 2023-10-25

## 2023-10-26 ENCOUNTER — TELEPHONE (OUTPATIENT)
Dept: CARDIOLOGY CLINIC | Age: 78
End: 2023-10-26

## 2023-10-26 DIAGNOSIS — I10 ESSENTIAL HYPERTENSION: ICD-10-CM

## 2023-10-26 RX ORDER — HYDRALAZINE HYDROCHLORIDE 50 MG/1
75 TABLET, FILM COATED ORAL 3 TIMES DAILY
Qty: 270 TABLET | Refills: 5 | Status: SHIPPED | OUTPATIENT
Start: 2023-10-26

## 2023-10-26 NOTE — TELEPHONE ENCOUNTER
OV from 1010:        Patient called in with update on BP readings  10/21- 146/60  10/22- 143/61  10/24- 146/62  10/25- 142/59  10/26- 121/61  All before medications.   Needs new RX sent to [facility], 50 or 75 mg

## 2023-10-26 NOTE — TELEPHONE ENCOUNTER
Called and talked with pt. She states that since we decreased it to 50mg she has not felt lightheaded/dizzy. Will attempt 75mg TID and she is to call if BP stays elevated or if she is symptomatic again.

## 2023-10-31 ENCOUNTER — OFFICE VISIT (OUTPATIENT)
Dept: FAMILY MEDICINE CLINIC | Age: 78
End: 2023-10-31
Payer: MEDICARE

## 2023-10-31 VITALS
BODY MASS INDEX: 28.08 KG/M2 | WEIGHT: 196.13 LBS | HEART RATE: 63 BPM | SYSTOLIC BLOOD PRESSURE: 128 MMHG | HEIGHT: 70 IN | DIASTOLIC BLOOD PRESSURE: 78 MMHG | OXYGEN SATURATION: 97 %

## 2023-10-31 DIAGNOSIS — E11.21 TYPE 2 DIABETES MELLITUS WITH DIABETIC NEPHROPATHY, WITHOUT LONG-TERM CURRENT USE OF INSULIN (HCC): ICD-10-CM

## 2023-10-31 DIAGNOSIS — Z00.00 MEDICARE ANNUAL WELLNESS VISIT, SUBSEQUENT: Primary | ICD-10-CM

## 2023-10-31 LAB — HBA1C MFR BLD: 7.7 % (ref 4.3–5.7)

## 2023-10-31 PROCEDURE — G0439 PPPS, SUBSEQ VISIT: HCPCS | Performed by: FAMILY MEDICINE

## 2023-10-31 PROCEDURE — 83036 HEMOGLOBIN GLYCOSYLATED A1C: CPT | Performed by: FAMILY MEDICINE

## 2023-10-31 PROCEDURE — G8484 FLU IMMUNIZE NO ADMIN: HCPCS | Performed by: FAMILY MEDICINE

## 2023-10-31 PROCEDURE — 3051F HG A1C>EQUAL 7.0%<8.0%: CPT | Performed by: FAMILY MEDICINE

## 2023-10-31 PROCEDURE — 3078F DIAST BP <80 MM HG: CPT | Performed by: FAMILY MEDICINE

## 2023-10-31 PROCEDURE — 3074F SYST BP LT 130 MM HG: CPT | Performed by: FAMILY MEDICINE

## 2023-10-31 PROCEDURE — 1123F ACP DISCUSS/DSCN MKR DOCD: CPT | Performed by: FAMILY MEDICINE

## 2023-10-31 ASSESSMENT — LIFESTYLE VARIABLES
HOW OFTEN DO YOU HAVE A DRINK CONTAINING ALCOHOL: NEVER
HOW MANY STANDARD DRINKS CONTAINING ALCOHOL DO YOU HAVE ON A TYPICAL DAY: PATIENT DOES NOT DRINK

## 2023-10-31 ASSESSMENT — PATIENT HEALTH QUESTIONNAIRE - PHQ9
2. FEELING DOWN, DEPRESSED OR HOPELESS: 0
SUM OF ALL RESPONSES TO PHQ QUESTIONS 1-9: 0
SUM OF ALL RESPONSES TO PHQ QUESTIONS 1-9: 0
1. LITTLE INTEREST OR PLEASURE IN DOING THINGS: 0
SUM OF ALL RESPONSES TO PHQ QUESTIONS 1-9: 0
SUM OF ALL RESPONSES TO PHQ9 QUESTIONS 1 & 2: 0
SUM OF ALL RESPONSES TO PHQ QUESTIONS 1-9: 0

## 2023-10-31 NOTE — PROGRESS NOTES
cervical lymphadenopathy  Pulmonary/Chest: clear to auscultation bilaterally- no wheezes, rales or rhonchi, normal air movement, no respiratory distress  Cardiovascular: normal rate, regular rhythm, normal S1 and S2, no murmurs, rubs, clicks, or gallops, distal pulses intact, no carotid bruits. Artificial valve click noted. Extremities: no cyanosis, clubbing   Musculoskeletal: normal range of motion, no joint swelling, deformity or tenderness     Hemoglobin A1C   Date Value Ref Range Status   10/31/2023 7.7 (H) 4.3 - 5.7 % Final     Comment:     Performed at [facility] under CLIA # [id number]     Lab Results   Component Value Date    CHOL 236 (H) 04/19/2023    TRIG 279 (H) 04/19/2023    HDL 36 07/18/2023    LDLCALC 106 07/18/2023     Lab Results   Component Value Date     07/18/2023    K 4.5 07/18/2023     07/18/2023    CO2 22 (L) 07/18/2023    BUN 36 (H) 07/18/2023    CREATININE 1.8 (H) 07/18/2023    GLUCOSE 209 (H) 07/18/2023    CALCIUM 9.7 07/18/2023    PROT 6.6 02/14/2023    LABALBU 4.1 02/14/2023    BILITOT 0.3 02/14/2023    ALKPHOS 87 02/14/2023    AST 15 02/14/2023    ALT 8 (L) 02/14/2023    LABGLOM 28 (A) 07/18/2023       Lab Results   Component Value Date    WBC 6.2 04/22/2023    HGB 12.3 04/22/2023    HCT 39.6 04/22/2023    MCV 95.0 04/22/2023     04/22/2023     Lab Results   Component Value Date    MALBCR 16 07/18/2023           Allergies   Allergen Reactions    Adalat [Nifedipine] Itching     redness    Amlodipine Swelling     If take more than 5mg legs swell, hot and red    Potassium-Containing Compounds      SHIMA    Statins Myalgia    Tape [Adhesive Tape] Rash     Skin pulls off     Prior to Visit Medications    Medication Sig Taking?  Authorizing Provider   dulaglutide (TRULICITY) 1.5 NS/0.8MX SC injection Inject 0.5 mLs into the skin once a week Yes Glade Reeve, MD   hydrALAZINE (APRESOLINE) 50 MG tablet Take 1.5 tablets by mouth 3 times daily  Patient taking differently:

## 2023-11-01 RX ORDER — GLIMEPIRIDE 2 MG/1
TABLET ORAL
Qty: 360 TABLET | Refills: 3 | Status: SHIPPED | OUTPATIENT
Start: 2023-11-01

## 2023-11-01 RX ORDER — OLMESARTAN MEDOXOMIL 40 MG/1
TABLET ORAL
Qty: 90 TABLET | Refills: 3 | Status: SHIPPED | OUTPATIENT
Start: 2023-11-01

## 2023-11-01 NOTE — TELEPHONE ENCOUNTER
This medication refill is regarding a electronic request. Refill requested by  pharmacy . Requested Prescriptions     Pending Prescriptions Disp Refills    olmesartan (BENICAR) 40 MG tablet [Pharmacy Med Name: Olmesartan Medoxomil 40 MG Oral Tablet] 90 tablet 3     Sig: TAKE 1 TABLET DAILY    glimepiride (AMARYL) 2 MG tablet [Pharmacy Med Name: Glimepiride 2 MG Oral Tablet] 360 tablet 3     Sig: TAKE 2 TABLETS BY MOUTH TWICE  DAILY       Date of last visit: 10/31/2023   Date of next visit: 1/30/2024  Date of last refill: 9/6/23  Pharmacy Name: [facility]    Last Lipid Panel:    Lab Results   Component Value Date/Time    CHOL 236 04/19/2023 03:33 AM    TRIG 279 04/19/2023 03:33 AM    HDL 36 07/18/2023 09:53 AM    LDLCALC 106 07/18/2023 09:53 AM     Last CMP:   Lab Results   Component Value Date     07/18/2023    K 4.5 07/18/2023     07/18/2023    CO2 22 (L) 07/18/2023    BUN 36 (H) 07/18/2023    CREATININE 1.8 (H) 07/18/2023    GLUCOSE 209 (H) 07/18/2023    CALCIUM 9.7 07/18/2023    PROT 6.6 02/14/2023    LABALBU 4.1 02/14/2023    BILITOT 0.3 02/14/2023    ALKPHOS 87 02/14/2023    AST 15 02/14/2023    ALT 8 (L) 02/14/2023    LABGLOM 28 (A) 07/18/2023       Last Thyroid:    Lab Results   Component Value Date    TSH 2.890 07/21/2017     Last Hemoglobin A1C:    Lab Results   Component Value Date/Time    LABA1C 7.7 10/31/2023 11:41 AM    LABA1C 9.6 07/18/2023 09:53 AM    AVGG 228 07/18/2023 09:53 AM       Rx verified, ordered and set to EP.

## 2023-11-15 ENCOUNTER — OFFICE VISIT (OUTPATIENT)
Dept: CARDIOLOGY CLINIC | Age: 78
End: 2023-11-15
Payer: MEDICARE

## 2023-11-15 ENCOUNTER — HOSPITAL ENCOUNTER (OUTPATIENT)
Dept: PHARMACY | Age: 78
Setting detail: THERAPIES SERIES
Discharge: HOME | End: 2023-11-15
Payer: MEDICARE

## 2023-11-15 VITALS
HEART RATE: 78 BPM | BODY MASS INDEX: 27.63 KG/M2 | SYSTOLIC BLOOD PRESSURE: 169 MMHG | HEIGHT: 70 IN | WEIGHT: 193 LBS | DIASTOLIC BLOOD PRESSURE: 83 MMHG

## 2023-11-15 DIAGNOSIS — I42.0 DILATED CARDIOMYOPATHY (HCC): ICD-10-CM

## 2023-11-15 DIAGNOSIS — Z95.2 H/O MITRAL VALVE REPLACEMENT: Primary | ICD-10-CM

## 2023-11-15 DIAGNOSIS — Z98.890 S/P MVR (MITRAL VALVE REPAIR): ICD-10-CM

## 2023-11-15 DIAGNOSIS — I10 PRIMARY HYPERTENSION: Primary | ICD-10-CM

## 2023-11-15 DIAGNOSIS — Z51.81 ENCOUNTER FOR THERAPEUTIC DRUG MONITORING: ICD-10-CM

## 2023-11-15 DIAGNOSIS — I10 ESSENTIAL HYPERTENSION: ICD-10-CM

## 2023-11-15 DIAGNOSIS — I48.20 CHRONIC ATRIAL FIBRILLATION (HCC): ICD-10-CM

## 2023-11-15 DIAGNOSIS — Z79.01 ANTICOAGULATED ON COUMADIN: ICD-10-CM

## 2023-11-15 LAB — POC INR: 2.6 (ref 0.8–1.2)

## 2023-11-15 PROCEDURE — 36416 COLLJ CAPILLARY BLOOD SPEC: CPT

## 2023-11-15 PROCEDURE — G8399 PT W/DXA RESULTS DOCUMENT: HCPCS | Performed by: NUCLEAR MEDICINE

## 2023-11-15 PROCEDURE — 3074F SYST BP LT 130 MM HG: CPT | Performed by: NUCLEAR MEDICINE

## 2023-11-15 PROCEDURE — 99211 OFF/OP EST MAY X REQ PHY/QHP: CPT

## 2023-11-15 PROCEDURE — G8427 DOCREV CUR MEDS BY ELIG CLIN: HCPCS | Performed by: NUCLEAR MEDICINE

## 2023-11-15 PROCEDURE — G8484 FLU IMMUNIZE NO ADMIN: HCPCS | Performed by: NUCLEAR MEDICINE

## 2023-11-15 PROCEDURE — 1090F PRES/ABSN URINE INCON ASSESS: CPT | Performed by: NUCLEAR MEDICINE

## 2023-11-15 PROCEDURE — 85610 PROTHROMBIN TIME: CPT

## 2023-11-15 PROCEDURE — 1123F ACP DISCUSS/DSCN MKR DOCD: CPT | Performed by: NUCLEAR MEDICINE

## 2023-11-15 PROCEDURE — 3078F DIAST BP <80 MM HG: CPT | Performed by: NUCLEAR MEDICINE

## 2023-11-15 PROCEDURE — G8417 CALC BMI ABV UP PARAM F/U: HCPCS | Performed by: NUCLEAR MEDICINE

## 2023-11-15 PROCEDURE — 1036F TOBACCO NON-USER: CPT | Performed by: NUCLEAR MEDICINE

## 2023-11-15 PROCEDURE — 99214 OFFICE O/P EST MOD 30 MIN: CPT | Performed by: NUCLEAR MEDICINE

## 2023-11-15 RX ORDER — HYDRALAZINE HYDROCHLORIDE 25 MG/1
75 TABLET, FILM COATED ORAL 3 TIMES DAILY
Qty: 270 TABLET | Refills: 3 | Status: SHIPPED | OUTPATIENT
Start: 2023-11-15

## 2023-11-15 NOTE — PROGRESS NOTES
Medication Management [address] [place] [address]  [phone] (phone)  [phone] (fax)    Ms. Flo Fortune is a 77 y.o.  female with history of Afib, Mechanical MVR who presents today for anticoagulation monitoring and adjustment. Patient verifies current dosing regimen and tablet strength. No missed or extra doses. Patient denies s/s bleeding/bruising/swelling/SOB  No blood in urine or stool. No dietary changes. - Decreased appetite since starting the Trulicity. No changes in medication/OTC agents/Herbals. No change in alcohol use or tobacco use. No change in activity level. Patient denies falls. No vomiting/diarrhea or acute illness. No Procedures scheduled in the future at this time. Assessment:   Lab Results   Component Value Date    INR 2.60 (H) 11/15/2023    INR 3.30 (H) 10/09/2023    INR 2.70 (H) 09/11/2023     INR therapeutic   Recent Labs     11/15/23  1057   INR 2.60*     Plan:  Continue Coumadin 1.5 mg MWF, 1 mg all other days. Recheck INR in 5 week(s). Patient reminded to call the Anticoagulation Clinic with any signs or symptoms of bleeding or with any medication changes. Patient given instructions utilizing the teach back method. After visit summary printed and reviewed with patient. Discharged ambulatory in no apparent distress.     For Pharmacy Admin Tracking Only  Time Spent (min): [address], PharmD, BCPS  11/15/2023  11:47 AM

## 2023-11-15 NOTE — PROGRESS NOTES
[address] [facility] [address]  [address]  [facility] [address]  Dept: [phone]  Dept Fax: [phone]  Loc: [phone]    Visit Date: 11/15/2023    Saint Passe is a 77 y.o. female who presents todayfor:  Chief Complaint   Patient presents with    Follow-up    Hypertension    Cardiomyopathy    Hyperlipidemia    Valvular Heart Disease   Issues with the BP  Hydralazine is adjusted  She is not doing well with the current dose  Was initially having dizziness  Coreg is down   No chest pain   Some baseline dyspnea  Known CMP and MVR  Baseline dyspnea  Some dizziness  No syncope  On zetia for hyperlipidemia        HPI:  HPI  Past Medical History:   Diagnosis Date    Arrhythmia     CHRONIC ATRIAL FIB    Breast cancer ([address]) 12/10/2012    [facility]    Cancer ([address])     breast ca left    CHF (congestive heart failure) (HCC)     Chronic kidney disease, stage III (moderate) ([address]) 10/29/2018    Depression     Diabetes mellitus (HCC)     Generalized headaches     History of dizziness     History of sinus bradycardia     History of therapeutic radiation     History of valvular heart disease     Hyperlipidemia     Hypertension     Medtronic dual pacemaker 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 MV area by recent EKG was 1 sq. cm. and mean gradient across MV was 17mmHg. MV index was about 0.7 sq. cm. per body surface area. Moderate pulmonary artery systolic HTN. Patent coronary arteries.     CARDIAC VALVE REPLACEMENT  2003    MVR  33mm [place] Mechanical valve

## 2023-11-21 RX ORDER — WARFARIN SODIUM 1 MG/1
TABLET ORAL
Qty: 135 TABLET | Refills: 3 | Status: SHIPPED | OUTPATIENT
Start: 2023-11-21

## 2023-11-21 NOTE — TELEPHONE ENCOUNTER
Request sent from [facility] for refill of coumadin 1 mg, 1.5 tabs po qd. Last seen 10/31/23, next appt 1/30/24. Med verified. Order pended.

## 2023-11-29 ENCOUNTER — TELEPHONE (OUTPATIENT)
Dept: CARDIOLOGY CLINIC | Age: 78
End: 2023-11-29

## 2023-11-29 NOTE — TELEPHONE ENCOUNTER
Patient called BP readings in after hydralazine changed. Patient states feeling better. First BP is before medicaitons.  Second is about 1.5 hours after                 BP    HR         BP     HR  11-16  134/56 77 and 138/58 86  11-17 149/64 71 and 120/54 82  11-18 152/71 80 and 126/47 88  11-19 151/66 74 and 156/71 82  11-20 133/51 82 and 137/46 80  11-21 128/54 71 and 143/51 86  11-22 158/60 75 and 136/60 78  11-23 144/54 76  11-24 150/65 80 and 136/58 81  11-25 128/47 67 and 123/49 89  11-26 156/60 70 and 132/48 68  11-27 144/68 82  11-28 136/66 66 and 136/61 65  11-29 162/71 78 and 151/63 83

## 2023-12-25 DIAGNOSIS — E11.21 TYPE 2 DIABETES MELLITUS WITH DIABETIC NEPHROPATHY, WITHOUT LONG-TERM CURRENT USE OF INSULIN (HCC): ICD-10-CM

## 2023-12-26 RX ORDER — EMPAGLIFLOZIN 25 MG/1
25 TABLET, FILM COATED ORAL DAILY
Qty: 90 TABLET | Refills: 3 | Status: SHIPPED | OUTPATIENT
Start: 2023-12-26

## 2023-12-26 NOTE — TELEPHONE ENCOUNTER
This medication refill is regarding a electronic request. Refill requested by  [facility] . Requested Prescriptions     Pending Prescriptions Disp Refills    JARDIANCE 25 MG tablet [Pharmacy Med Name: Jardiance 25 MG Oral Tablet] 90 tablet 3     Sig: TAKE 1 TABLET BY MOUTH DAILY     Date of last visit: 10/31/2023   Date of next visit: 1/30/2024  Date of last refill: 3/6/23 #90/3    Last Hemoglobin A1C:    Lab Results   Component Value Date/Time    LABA1C 7.7 10/31/2023 11:41 AM    LABA1C 9.6 07/18/2023 09:53 AM    AVGG 228 07/18/2023 09:53 AM     Rx verified, ordered and set to EP.

## 2024-01-03 ENCOUNTER — HOSPITAL ENCOUNTER (OUTPATIENT)
Dept: PHARMACY | Age: 79
Setting detail: THERAPIES SERIES
Discharge: HOME | End: 2024-01-03
Payer: MEDICARE

## 2024-01-03 DIAGNOSIS — Z51.81 ENCOUNTER FOR THERAPEUTIC DRUG MONITORING: ICD-10-CM

## 2024-01-03 DIAGNOSIS — Z79.01 ANTICOAGULATED ON COUMADIN: ICD-10-CM

## 2024-01-03 DIAGNOSIS — Z95.2 H/O MITRAL VALVE REPLACEMENT: Primary | ICD-10-CM

## 2024-01-03 DIAGNOSIS — I48.20 CHRONIC ATRIAL FIBRILLATION (HCC): ICD-10-CM

## 2024-01-03 LAB — POC INR: 2.8 (ref 0.8–1.2)

## 2024-01-03 PROCEDURE — 36416 COLLJ CAPILLARY BLOOD SPEC: CPT

## 2024-01-03 PROCEDURE — 99211 OFF/OP EST MAY X REQ PHY/QHP: CPT

## 2024-01-03 PROCEDURE — 85610 PROTHROMBIN TIME: CPT

## 2024-01-03 NOTE — PROGRESS NOTES
Medication Management Clinic  [facility]  Anticoagulation Clinic  [phone] (phone)  [phone] (fax)    Ms. Es Curtis is a 78 y.o.  female with history of MVR, chronic/permanent atrial fib., per Dr. Rudolph's referral, who presents today for Warfarin monitoring and adjustment (2 week visit).    Patient verifies current dosing regimen and tablet strength.  No missed or extra doses, except for bolus ordered last visit.  Patient denies bleeding.  Has usual SOB/swelling of legs and ankles/easy bruising.  No blood in urine or stool.  No dietary changes. Has dried cranberries occasionally on salad.  No changes in medication/OTC agents/herbals.  No change in alcohol use or tobacco use.  No change in activity level.  Patient denies falls.  No vomiting/diarrhea or acute illness.   No procedures scheduled in the future at this time.    Assessment:   Lab Results   Component Value Date    INR 2.80 (H) 01/03/2024    INR 2.10 (H) 12/20/2023    INR 2.60 (H) 11/15/2023     INR therapeutic - goal 2.5-3.5.  Recent Labs     01/03/24  1049   INR 2.80*        Plan:  POCT INR performed/result reviewed.  Continue PO Coumadin 1.5 mg MWF, 1 mg TThSS.  Recheck INR in 4 week(s).  Patient reminded to call the Anticoagulation Clinic with any signs or symptoms of bleeding or with any medication changes.  Patient given instructions utilizing the teach back method.       After visit summary printed and reviewed with patient.      Discharged ambulatory in no apparent distress, wearing mask.    For Pharmacy Admin Tracking Only    Time Spent (min): 20

## 2024-01-10 RX ORDER — LANCETS 30 GAUGE
EACH MISCELLANEOUS
Qty: 100 EACH | Refills: 3 | Status: SHIPPED | OUTPATIENT
Start: 2024-01-10

## 2024-01-10 NOTE — TELEPHONE ENCOUNTER
This medication refill is regarding a fax request. Refill requested by  [facility] .    Requested Prescriptions     Pending Prescriptions Disp Refills    Lancets MISC 100 each 3     Sig: Freestyle Freedom lancets, use daily, DX: E11.21     Date of last visit: 10/31/2023   Date of next visit: 1/30/2024  Date of last refill: 8/8/22 #100/3    Rx verified, ordered and set to EP.

## 2024-01-17 ENCOUNTER — PROCEDURE VISIT (OUTPATIENT)
Dept: CARDIOLOGY CLINIC | Age: 79
End: 2024-01-17

## 2024-01-17 DIAGNOSIS — Z95.0 PACEMAKER: Primary | ICD-10-CM

## 2024-01-17 NOTE — PROGRESS NOTES
[employer] Medtronic Dual Pacemaker   Patient of [facility]aki    Battery 8-17 months / avg 13 months    Presenting rhythm AF     A Impedance 437  RV Impedance 456    P wave sensing 1.6  R wave sensing 16    A Threshold - @ -  A Amplitude - @ -  RV Thresholds 1.125 @ 0.40  RV Amplitude 2.25 @ 0.40      A Paced 0%  V Paced 98.9%    Programmed Mode VVIR 60    Hx AF - taking coumadin   Afib [place] 100%    Episodes   none

## 2024-01-30 ENCOUNTER — HOSPITAL ENCOUNTER (OUTPATIENT)
Dept: PHARMACY | Age: 79
Setting detail: THERAPIES SERIES
Discharge: HOME | End: 2024-01-30
Payer: MEDICARE

## 2024-01-30 ENCOUNTER — OFFICE VISIT (OUTPATIENT)
Dept: FAMILY MEDICINE CLINIC | Age: 79
End: 2024-01-30
Payer: MEDICARE

## 2024-01-30 VITALS
TEMPERATURE: 97.9 F | HEART RATE: 68 BPM | BODY MASS INDEX: 28.59 KG/M2 | DIASTOLIC BLOOD PRESSURE: 70 MMHG | RESPIRATION RATE: 16 BRPM | WEIGHT: 196.4 LBS | SYSTOLIC BLOOD PRESSURE: 132 MMHG

## 2024-01-30 DIAGNOSIS — Z12.31 ENCOUNTER FOR SCREENING MAMMOGRAM FOR MALIGNANT NEOPLASM OF BREAST: ICD-10-CM

## 2024-01-30 DIAGNOSIS — E11.21 TYPE 2 DIABETES MELLITUS WITH DIABETIC NEPHROPATHY, WITHOUT LONG-TERM CURRENT USE OF INSULIN (HCC): Primary | ICD-10-CM

## 2024-01-30 DIAGNOSIS — I48.20 CHRONIC ATRIAL FIBRILLATION (HCC): ICD-10-CM

## 2024-01-30 DIAGNOSIS — Z95.2 H/O MITRAL VALVE REPLACEMENT: Primary | ICD-10-CM

## 2024-01-30 DIAGNOSIS — Z51.81 ENCOUNTER FOR THERAPEUTIC DRUG MONITORING: ICD-10-CM

## 2024-01-30 DIAGNOSIS — L30.0 NUMMULAR ECZEMA: ICD-10-CM

## 2024-01-30 DIAGNOSIS — Z79.01 ANTICOAGULATED ON COUMADIN: ICD-10-CM

## 2024-01-30 LAB
HBA1C MFR BLD: 9.4 % (ref 4.3–5.7)
POC INR: 2.3 (ref 0.8–1.2)

## 2024-01-30 PROCEDURE — 1123F ACP DISCUSS/DSCN MKR DOCD: CPT | Performed by: FAMILY MEDICINE

## 2024-01-30 PROCEDURE — 3046F HEMOGLOBIN A1C LEVEL >9.0%: CPT | Performed by: FAMILY MEDICINE

## 2024-01-30 PROCEDURE — 3075F SYST BP GE 130 - 139MM HG: CPT | Performed by: FAMILY MEDICINE

## 2024-01-30 PROCEDURE — G8427 DOCREV CUR MEDS BY ELIG CLIN: HCPCS | Performed by: FAMILY MEDICINE

## 2024-01-30 PROCEDURE — 83036 HEMOGLOBIN GLYCOSYLATED A1C: CPT | Performed by: FAMILY MEDICINE

## 2024-01-30 PROCEDURE — 3078F DIAST BP <80 MM HG: CPT | Performed by: FAMILY MEDICINE

## 2024-01-30 PROCEDURE — 1090F PRES/ABSN URINE INCON ASSESS: CPT | Performed by: FAMILY MEDICINE

## 2024-01-30 PROCEDURE — G8484 FLU IMMUNIZE NO ADMIN: HCPCS | Performed by: FAMILY MEDICINE

## 2024-01-30 PROCEDURE — G8399 PT W/DXA RESULTS DOCUMENT: HCPCS | Performed by: FAMILY MEDICINE

## 2024-01-30 PROCEDURE — G8417 CALC BMI ABV UP PARAM F/U: HCPCS | Performed by: FAMILY MEDICINE

## 2024-01-30 PROCEDURE — 85610 PROTHROMBIN TIME: CPT

## 2024-01-30 PROCEDURE — 99211 OFF/OP EST MAY X REQ PHY/QHP: CPT

## 2024-01-30 PROCEDURE — 99214 OFFICE O/P EST MOD 30 MIN: CPT | Performed by: FAMILY MEDICINE

## 2024-01-30 PROCEDURE — 1036F TOBACCO NON-USER: CPT | Performed by: FAMILY MEDICINE

## 2024-01-30 PROCEDURE — 36416 COLLJ CAPILLARY BLOOD SPEC: CPT

## 2024-01-30 RX ORDER — MOMETASONE FUROATE 1 MG/G
CREAM TOPICAL
Qty: 50 G | Refills: 3 | Status: SHIPPED | OUTPATIENT
Start: 2024-01-30

## 2024-01-30 ASSESSMENT — ENCOUNTER SYMPTOMS
GASTROINTESTINAL NEGATIVE: 1
COUGH: 0
SHORTNESS OF BREATH: 0
RESPIRATORY NEGATIVE: 1

## 2024-01-30 ASSESSMENT — PATIENT HEALTH QUESTIONNAIRE - PHQ9
SUM OF ALL RESPONSES TO PHQ9 QUESTIONS 1 & 2: 0
SUM OF ALL RESPONSES TO PHQ QUESTIONS 1-9: 0
SUM OF ALL RESPONSES TO PHQ QUESTIONS 1-9: 0
1. LITTLE INTEREST OR PLEASURE IN DOING THINGS: 0
SUM OF ALL RESPONSES TO PHQ QUESTIONS 1-9: 0
2. FEELING DOWN, DEPRESSED OR HOPELESS: 0
SUM OF ALL RESPONSES TO PHQ QUESTIONS 1-9: 0

## 2024-01-30 NOTE — PROGRESS NOTES
Medication Management Clinic  [facility]  Anticoagulation Clinic  [phone] (phone)  [phone] (fax)    Ms. Es Curtis is a 78 y.o.  female with history of MVR and chronic/permanent atrial fib., per Dr. Rudolph's referral, who presents today for Warfarin monitoring and adjustment (4 week visit).    Patient verifies current dosing regimen and tablet strength.  No missed or extra doses.  Patient denies bleeding/bruising.  Has usual SOB.  States swelling is not too bad.  Has small skin tear LH - happened yesterday.  No blood in urine or stool.  No dietary changes.  No changes in medication/OTC agents/herbals.  No change in alcohol use or tobacco use.  No change in activity level.  Patient denies falls.  No vomiting/diarrhea or acute illness.   No procedures scheduled in the future at this time.    Assessment:   Lab Results   Component Value Date    INR 2.30 (H) 01/30/2024    INR 2.80 (H) 01/03/2024    INR 2.10 (H) 12/20/2023     INR subtherapeutic - goal 2.5-3.5.  Recent Labs     01/30/24  1129   INR 2.30*        Plan:  POCT INR performed/result reviewed.  2 mg today, T, then increase PO Coumadin to 1 mg MWF, 1.5 mg TThSS (from 1.5 mg MWF, 1 mg TThSS=5.9% increase).  Recheck INR in 2 week(s). (Report given - orders entered by KEITH Anderson RP. PharmD.)  Patient reminded to call the Anticoagulation Clinic with any signs or symptoms of bleeding or with any medication changes.  Patient given instructions utilizing the teach back method.       After visit summary printed and reviewed with patient.      Discharged ambulatory in no apparent distress.  Sees PCP next.    For Pharmacy Admin Tracking Only    Intervention Detail: Dose Adjustment: 1, reason: Therapy Optimization  Total # of Interventions Recommended: 1  Total # of Interventions Accepted: 1  Time Spent (min):  24

## 2024-01-30 NOTE — PROGRESS NOTES
visit on 01/30/24   POCT glycosylated hemoglobin (Hb A1C)   Result Value Ref Range    Hemoglobin A1C POC 9.4 (H) 4.3 - 5.7 %               An electronic signature was used to authenticate this note.        Electronically signed by Ashley Hernandez MD on 1/30/2024 at 1:10 PM

## 2024-02-09 ENCOUNTER — HOSPITAL ENCOUNTER (OUTPATIENT)
Dept: WOMENS IMAGING | Age: 79
Discharge: HOME | End: 2024-02-09
Payer: MEDICARE

## 2024-02-09 DIAGNOSIS — Z12.31 ENCOUNTER FOR SCREENING MAMMOGRAM FOR MALIGNANT NEOPLASM OF BREAST: ICD-10-CM

## 2024-02-09 PROCEDURE — 77063 BREAST TOMOSYNTHESIS BI: CPT

## 2024-02-13 ENCOUNTER — HOSPITAL ENCOUNTER (OUTPATIENT)
Dept: PHARMACY | Age: 79
Setting detail: THERAPIES SERIES
Discharge: HOME | End: 2024-02-13
Payer: MEDICARE

## 2024-02-13 DIAGNOSIS — Z51.81 ENCOUNTER FOR THERAPEUTIC DRUG MONITORING: ICD-10-CM

## 2024-02-13 DIAGNOSIS — Z79.01 ANTICOAGULATED ON COUMADIN: ICD-10-CM

## 2024-02-13 DIAGNOSIS — I48.20 CHRONIC ATRIAL FIBRILLATION (HCC): ICD-10-CM

## 2024-02-13 DIAGNOSIS — Z95.2 H/O MITRAL VALVE REPLACEMENT: Primary | ICD-10-CM

## 2024-02-13 LAB — POC INR: 3.1 (ref 0.8–1.2)

## 2024-02-13 PROCEDURE — 99211 OFF/OP EST MAY X REQ PHY/QHP: CPT

## 2024-02-13 PROCEDURE — 85610 PROTHROMBIN TIME: CPT

## 2024-02-13 PROCEDURE — 36416 COLLJ CAPILLARY BLOOD SPEC: CPT

## 2024-02-13 NOTE — PROGRESS NOTES
Medication Management Clinic  [facility]  Anticoagulation Clinic  [phone] (phone)  [phone] (fax)    Ms. Es Curtis is a 78 y.o.  female with history of chronic/permanent atrial fib., and MVR, per Dr. Rudolph's referral, who presents today for Warfarin monitoring and adjustment (2 week visit).    Patient verifies current tablet strength.  Followed printed instructions for dose.  No missed or extra doses, except for bolus ordered last visit.  Patient denies bleeding.  Has usual SOB/swelling of legs/easy bruising.  No blood in urine or stool.  No dietary changes.  No changes in medication/OTC agents/herbals.  No change in alcohol use or tobacco use.  No change in activity level.  Patient denies falls.  No vomiting/diarrhea or acute illness.   No procedures scheduled in the future at this time.    Assessment:   Lab Results   Component Value Date    INR 3.10 (H) 02/13/2024    INR 2.30 (H) 01/30/2024    INR 2.80 (H) 01/03/2024     INR therapeutic - goal 2.5-3.5.  Recent Labs     02/13/24  1031   INR 3.10*        Plan:  POCT INR performed/result reviewed.  Continue PO Coumadin 1 mg MWF, 1.5 mg TThSS.  Recheck INR in 3 week(s).  Patient reminded to call the Anticoagulation Clinic with any signs or symptoms of bleeding or with any medication changes.  Patient given instructions utilizing the teach back method.       After visit summary printed and reviewed with patient.      Discharged ambulatory in no apparent distress.     For Pharmacy Admin Tracking Only    Time Spent (min): 20

## 2024-02-20 ENCOUNTER — HOSPITAL ENCOUNTER (OUTPATIENT)
Age: 79
Discharge: HOME | End: 2024-02-20
Payer: MEDICARE

## 2024-02-20 DIAGNOSIS — N18.4 STAGE 4 CHRONIC KIDNEY DISEASE (HCC): ICD-10-CM

## 2024-02-20 DIAGNOSIS — I10 HTN (HYPERTENSION), BENIGN: ICD-10-CM

## 2024-02-20 DIAGNOSIS — N28.1 RENAL CYST: ICD-10-CM

## 2024-02-20 LAB
ANION GAP SERPL CALC-SCNC: 12 MEQ/L (ref 8–16)
BUN SERPL-MCNC: 29 MG/DL (ref 7–22)
CALCIUM SERPL-MCNC: 9.8 MG/DL (ref 8.5–10.5)
CHLORIDE SERPL-SCNC: 104 MEQ/L (ref 98–111)
CO2 SERPL-SCNC: 26 MEQ/L (ref 23–33)
CREAT SERPL-MCNC: 1.5 MG/DL (ref 0.4–1.2)
GFR SERPL CREATININE-BSD FRML MDRD: 35 ML/MIN/1.73M2
GLUCOSE SERPL-MCNC: 156 MG/DL (ref 70–108)
POTASSIUM SERPL-SCNC: 4.3 MEQ/L (ref 3.5–5.2)
SODIUM SERPL-SCNC: 142 MEQ/L (ref 135–145)

## 2024-02-20 PROCEDURE — 36415 COLL VENOUS BLD VENIPUNCTURE: CPT

## 2024-02-20 PROCEDURE — 80048 BASIC METABOLIC PNL TOTAL CA: CPT

## 2024-02-27 ENCOUNTER — OFFICE VISIT (OUTPATIENT)
Dept: NEPHROLOGY | Age: 79
End: 2024-02-27
Payer: MEDICARE

## 2024-02-27 VITALS
WEIGHT: 195 LBS | DIASTOLIC BLOOD PRESSURE: 67 MMHG | OXYGEN SATURATION: 96 % | HEART RATE: 86 BPM | SYSTOLIC BLOOD PRESSURE: 145 MMHG | BODY MASS INDEX: 28.38 KG/M2

## 2024-02-27 DIAGNOSIS — N28.1 RENAL CYST: ICD-10-CM

## 2024-02-27 DIAGNOSIS — N18.4 STAGE 4 CHRONIC KIDNEY DISEASE (HCC): Primary | ICD-10-CM

## 2024-02-27 DIAGNOSIS — I10 HTN (HYPERTENSION), BENIGN: ICD-10-CM

## 2024-02-27 PROCEDURE — 99213 OFFICE O/P EST LOW 20 MIN: CPT | Performed by: INTERNAL MEDICINE

## 2024-02-27 PROCEDURE — G8417 CALC BMI ABV UP PARAM F/U: HCPCS | Performed by: INTERNAL MEDICINE

## 2024-02-27 PROCEDURE — 3078F DIAST BP <80 MM HG: CPT | Performed by: INTERNAL MEDICINE

## 2024-02-27 PROCEDURE — G8484 FLU IMMUNIZE NO ADMIN: HCPCS | Performed by: INTERNAL MEDICINE

## 2024-02-27 PROCEDURE — 1123F ACP DISCUSS/DSCN MKR DOCD: CPT | Performed by: INTERNAL MEDICINE

## 2024-02-27 PROCEDURE — 1090F PRES/ABSN URINE INCON ASSESS: CPT | Performed by: INTERNAL MEDICINE

## 2024-02-27 PROCEDURE — G8427 DOCREV CUR MEDS BY ELIG CLIN: HCPCS | Performed by: INTERNAL MEDICINE

## 2024-02-27 PROCEDURE — G8399 PT W/DXA RESULTS DOCUMENT: HCPCS | Performed by: INTERNAL MEDICINE

## 2024-02-27 PROCEDURE — 3077F SYST BP >= 140 MM HG: CPT | Performed by: INTERNAL MEDICINE

## 2024-02-27 PROCEDURE — 1036F TOBACCO NON-USER: CPT | Performed by: INTERNAL MEDICINE

## 2024-02-27 NOTE — PROGRESS NOTES
Atheromatous aortic root. Borderline LVH. Normal LV systolic function. EF 55%. Prosthetic MV appears to be functioning well. Mildly sclerosed AV. Trivial MR, mild TR, trivial PI, RVSP 47mmHg.    TRANSTHORACIC ECHOCARDIOGRAM  2-08-00    Moderate MV stenosis based on available echo doppler data.    UPPER GASTROINTESTINAL ENDOSCOPY  08/2017        Medications :     Outpatient Medications Marked as Taking for the 2/27/24 encounter (Office Visit) with Chava Aviles MD   Medication Sig Dispense Refill    mometasone (ELOCON) 0.1 % cream Apply topically daily. 50 g 3    metroNIDAZOLE (METROCREAM) 0.75 % cream Indications: Skin Abnormalities Apply topically daily as needed 60 g 3    Dulaglutide 3 MG/0.5ML SOPN Inject 3 mg into the skin once a week 12 Adjustable Dose Pre-filled Pen Syringe 3    Lancets MISC Freestyle Freedom lancets, use daily, DX: E11.21 100 each 3    JARDIANCE 25 MG tablet TAKE 1 TABLET BY MOUTH DAILY 90 tablet 3    warfarin (COUMADIN) 1 MG tablet TAKE 1 AND 1/2 TABLETS BY MOUTH  DAILY TAKE AS DIRECTED BY [facility] 135 tablet 3    hydrALAZINE (APRESOLINE) 25 MG tablet Take 3 tablets by mouth 3 times daily Take 25 mg TID (Patient taking differently: Take 1 tablet by mouth 25 mg 3 tablet daily) 270 tablet 3    olmesartan (BENICAR) 40 MG tablet TAKE 1 TABLET DAILY 90 tablet 3    glimepiride (AMARYL) 2 MG tablet TAKE 2 TABLETS BY MOUTH TWICE  DAILY 360 tablet 3    ezetimibe (ZETIA) 10 MG tablet Take 1 tablet by mouth daily 90 tablet 3    carvedilol (COREG) 3.125 MG tablet Take 1 tablet by mouth 2 times daily 180 tablet 3    Cholecalciferol (VITAMIN D3) 50 MCG (2000 UT) CAPS Take 1 capsule by mouth daily      Biotin (BIOTIN 5000) 5 MG CAPS Take 5,000 mcg by mouth daily      blood glucose monitor strips Dispense brand per insurance and patient preference.  Test daily. 100 strip 3    bumetanide (BUMEX) 1 MG tablet TAKE 1 TABLET DAILY 90 tablet 3    Handicap Placard MIS by Does not apply  show

## 2024-03-05 ENCOUNTER — HOSPITAL ENCOUNTER (OUTPATIENT)
Dept: PHARMACY | Age: 79
Setting detail: THERAPIES SERIES
Discharge: HOME | End: 2024-03-05
Payer: MEDICARE

## 2024-03-05 DIAGNOSIS — I48.20 CHRONIC ATRIAL FIBRILLATION (HCC): ICD-10-CM

## 2024-03-05 DIAGNOSIS — Z95.2 H/O MITRAL VALVE REPLACEMENT: Primary | ICD-10-CM

## 2024-03-05 DIAGNOSIS — Z79.01 ANTICOAGULATED ON COUMADIN: ICD-10-CM

## 2024-03-05 DIAGNOSIS — Z51.81 ENCOUNTER FOR THERAPEUTIC DRUG MONITORING: ICD-10-CM

## 2024-03-05 LAB — POC INR: 3.6 (ref 0.8–1.2)

## 2024-03-05 PROCEDURE — 36416 COLLJ CAPILLARY BLOOD SPEC: CPT

## 2024-03-05 PROCEDURE — 99211 OFF/OP EST MAY X REQ PHY/QHP: CPT

## 2024-03-05 PROCEDURE — 85610 PROTHROMBIN TIME: CPT

## 2024-03-05 NOTE — PROGRESS NOTES
Medication Management Clinic  [facility]  Anticoagulation Clinic  [phone] (phone)  [phone] (fax)    Ms. Es uCrtis is a 78 y.o.  female with history of MVR/chronic, permanent atrial fib., per Dr. Rudolph's referral, who presents today for Warfarin monitoring and adjustment (3 week visit).    Patient verifies tablet strength.  Followed printed instructions for dose.  No missed or extra doses.  Patient denies bleeding/bruising.  Has usual SOB/swelling of lower legs and ankles.  Wearing both compression socks and diabetic socks.  No blood in urine or stool.  No dietary changes.  No changes in medication/OTC agents/herbals.  No change in alcohol use or tobacco use.  No change in activity level.  Patient denies falls.  No vomiting/diarrhea or acute illness.   No procedures scheduled in the future at this time.    Assessment:   Lab Results   Component Value Date    INR 3.60 (H) 03/05/2024    INR 3.10 (H) 02/13/2024    INR 2.30 (H) 01/30/2024     INR supratherapeutic - goal 2.5-3.5.  Recent Labs     03/05/24  1027   INR 3.60*        Plan:  POCT INR performed/result reviewed.  1 mg today, T, then continue PO Coumadin 1 mg MWF, 1.5 mg TThSS.  Recheck INR in 3 week(s). (Report given - orders entered by YO Brew[facility] Self Regional HealthcareLisset, PharmD.)  Patient reminded to call the Anticoagulation Clinic with any signs or symptoms of bleeding or with any medication changes.  Patient given instructions utilizing the teach back method.       After visit summary printed and reviewed with patient.    Advised extra caution.  Discharged ambulatory in no apparent distress.     For Pharmacy Admin Tracking Only    Intervention Detail: Dose Adjustment: 1, reason: Therapy De-escalation  Total # of Interventions Recommended: 1  Total # of Interventions Accepted: 1  Time Spent (min):  23

## 2024-03-19 DIAGNOSIS — I20.9 ANGINA PECTORIS (HCC): ICD-10-CM

## 2024-03-19 RX ORDER — NITROGLYCERIN 400 UG/1
SPRAY ORAL
Qty: 4.9 G | Refills: 11 | Status: SHIPPED | OUTPATIENT
Start: 2024-03-19

## 2024-03-19 NOTE — TELEPHONE ENCOUNTER
This medication refill is regarding a electronic request. Refill requested by  [facility] .    Requested Prescriptions     Pending Prescriptions Disp Refills    nitroGLYCERIN (NITROLINGUAL) 0.4 MG/SPRAY 0.4 mg spray [Pharmacy Med Name: NITROGLYCERIN  SPRAY  LINGUAL 60] 4.9 g 11     Sig: USE 1 SPRAY UNDER THE TONGUE  EVERY 5 MINUTES AS NEEDED FOR  CHEST PAIN. IF THIRD SPRAY DOES  NOT RELIEVE PAIN, CALL 911 (FOR  ANGINA PECTORIS)       Date of last visit: 1/30/2024   Date of next visit: 4/30/2024  Date of last refill: 2/26/24 #4.9g/0  Pharmacy Name: [facility]    Rx verified, ordered and set to EP.

## 2024-03-26 ENCOUNTER — HOSPITAL ENCOUNTER (OUTPATIENT)
Dept: PHARMACY | Age: 79
Setting detail: THERAPIES SERIES
Discharge: HOME | End: 2024-03-26
Payer: MEDICARE

## 2024-03-26 DIAGNOSIS — Z79.01 ANTICOAGULATED ON COUMADIN: ICD-10-CM

## 2024-03-26 DIAGNOSIS — Z95.2 H/O MITRAL VALVE REPLACEMENT: Primary | ICD-10-CM

## 2024-03-26 DIAGNOSIS — I48.20 CHRONIC ATRIAL FIBRILLATION (HCC): ICD-10-CM

## 2024-03-26 DIAGNOSIS — Z51.81 ENCOUNTER FOR THERAPEUTIC DRUG MONITORING: ICD-10-CM

## 2024-03-26 LAB — POC INR: 3 (ref 0.8–1.2)

## 2024-03-26 PROCEDURE — 99211 OFF/OP EST MAY X REQ PHY/QHP: CPT

## 2024-03-26 PROCEDURE — 85610 PROTHROMBIN TIME: CPT

## 2024-03-26 PROCEDURE — 36416 COLLJ CAPILLARY BLOOD SPEC: CPT

## 2024-03-26 NOTE — PROGRESS NOTES
Medication Management Clinic  [facility]  Anticoagulation Clinic  [phone] (phone)  [phone] (fax)    Ms. Es Curtis is a 78 y.o.  female with history of MVR/chronic, permanent atrial fib., per Dr. Rudolph's referral,  who presents today for Warfarin monitoring and adjustment (3 week visit).    Patient verifies current dosing regimen and tablet strength.  No missed or extra doses.  Patient denies bleeding/bruising.  SOB sometimes worse than usual.  Has usual swelling of lower legs/ankles.  No blood in urine or stool.  No dietary changes.  No changes in medication/OTC agents/herbals. Added Tylenol back to list (never stopped using occasionally).  No change in alcohol use or tobacco use.  No change in activity level.  Patient denies falls.  No vomiting/diarrhea or acute illness.   No procedures scheduled in the future at this time.    Assessment:   Lab Results   Component Value Date    INR 3.00 (H) 03/26/2024    INR 3.60 (H) 03/05/2024    INR 3.10 (H) 02/13/2024     INR therapeutic - goal 2.5-3.5.  Recent Labs     03/26/24  1036   INR 3.00*        Plan:  POCT INR performed/result reviewed.  Continue PO Coumadin 1 mg MWF, 1.5 mg TThSS.  Recheck INR in 5 week(s) - same day as PCP's visit (lives out of town).  Patient reminded to call the Anticoagulation Clinic with any signs or symptoms of bleeding or with any medication changes.  Patient given instructions utilizing the teach back method.       After visit summary printed and reviewed with patient.      Discharged ambulatory in no apparent distress.    For Pharmacy Admin Tracking Only    Time Spent (min): 20

## 2024-04-24 ENCOUNTER — PROCEDURE VISIT (OUTPATIENT)
Dept: CARDIOLOGY CLINIC | Age: 79
End: 2024-04-24
Payer: MEDICARE

## 2024-04-24 DIAGNOSIS — Z95.0 PACEMAKER: Primary | ICD-10-CM

## 2024-04-24 PROCEDURE — 93296 REM INTERROG EVL PM/IDS: CPT | Performed by: NUCLEAR MEDICINE

## 2024-04-24 PROCEDURE — 93294 REM INTERROG EVL PM/LDLS PM: CPT | Performed by: NUCLEAR MEDICINE

## 2024-04-24 NOTE — PROGRESS NOTES
[employer] Medtronic Dual Pacemaker   Patient of Baki    Battery <5-13 months / avg 9 months     Presenting rhythm AF     A Impedance 418  RV Impedance 437    P wave sensing 0.8  R wave sensing 8.1    A Threshold - @ -  A Amplitude - @ -  RV Thresholds 1.0 @ 0.40  RV Amplitude 2.0 @ 0.40      A Paced 0%  V Paced 99.1%    Programmed Mode VVIR     Hx AF - taking coumadin  Afib [place] 100%    Episodes   none

## 2024-04-30 ENCOUNTER — OFFICE VISIT (OUTPATIENT)
Dept: FAMILY MEDICINE CLINIC | Age: 79
End: 2024-04-30

## 2024-04-30 ENCOUNTER — HOSPITAL ENCOUNTER (OUTPATIENT)
Dept: PHARMACY | Age: 79
Setting detail: THERAPIES SERIES
Discharge: HOME | End: 2024-04-30
Payer: MEDICARE

## 2024-04-30 VITALS
SYSTOLIC BLOOD PRESSURE: 118 MMHG | BODY MASS INDEX: 28.01 KG/M2 | WEIGHT: 192.4 LBS | DIASTOLIC BLOOD PRESSURE: 64 MMHG | TEMPERATURE: 98 F | RESPIRATION RATE: 16 BRPM | HEART RATE: 68 BPM

## 2024-04-30 DIAGNOSIS — I48.20 CHRONIC ATRIAL FIBRILLATION (HCC): ICD-10-CM

## 2024-04-30 DIAGNOSIS — Z95.2 H/O MITRAL VALVE REPLACEMENT: Primary | ICD-10-CM

## 2024-04-30 DIAGNOSIS — Z51.81 ENCOUNTER FOR THERAPEUTIC DRUG MONITORING: ICD-10-CM

## 2024-04-30 DIAGNOSIS — E78.5 HYPERLIPIDEMIA, UNSPECIFIED HYPERLIPIDEMIA TYPE: ICD-10-CM

## 2024-04-30 DIAGNOSIS — Z79.01 ANTICOAGULATED ON COUMADIN: ICD-10-CM

## 2024-04-30 DIAGNOSIS — E11.21 TYPE 2 DIABETES MELLITUS WITH DIABETIC NEPHROPATHY, WITHOUT LONG-TERM CURRENT USE OF INSULIN (HCC): Primary | ICD-10-CM

## 2024-04-30 LAB
HBA1C MFR BLD: 7.5 % (ref 4.3–5.7)
POC INR: 2.7 (ref 0.8–1.2)

## 2024-04-30 PROCEDURE — 85610 PROTHROMBIN TIME: CPT

## 2024-04-30 PROCEDURE — 36416 COLLJ CAPILLARY BLOOD SPEC: CPT

## 2024-04-30 PROCEDURE — 99211 OFF/OP EST MAY X REQ PHY/QHP: CPT

## 2024-04-30 SDOH — ECONOMIC STABILITY: FOOD INSECURITY: WITHIN THE PAST 12 MONTHS, YOU WORRIED THAT YOUR FOOD WOULD RUN OUT BEFORE YOU GOT MONEY TO BUY MORE.: NEVER TRUE

## 2024-04-30 SDOH — ECONOMIC STABILITY: FOOD INSECURITY: WITHIN THE PAST 12 MONTHS, THE FOOD YOU BOUGHT JUST DIDN'T LAST AND YOU DIDN'T HAVE MONEY TO GET MORE.: NEVER TRUE

## 2024-04-30 SDOH — ECONOMIC STABILITY: INCOME INSECURITY: HOW HARD IS IT FOR YOU TO PAY FOR THE VERY BASICS LIKE FOOD, HOUSING, MEDICAL CARE, AND HEATING?: NOT HARD AT ALL

## 2024-04-30 ASSESSMENT — ENCOUNTER SYMPTOMS
RESPIRATORY NEGATIVE: 1
CONSTIPATION: 1

## 2024-04-30 NOTE — PROGRESS NOTES
07/18/2023    LDLCALC 106 07/18/2023                 An electronic signature was used to authenticate this note.        Electronically signed by Ashley Hernandez MD on 4/30/2024 at 11:22 AM

## 2024-04-30 NOTE — PROGRESS NOTES
Medication Management Clinic  [facility]  Anticoagulation Clinic  [phone] (phone)  [phone] (fax)    Ms. Es Curtis is a 78 y.o.  female with history of mechanical MVR/chronic, permanent atrial fib., per Dr. Rudolph's referral, who presents today for Warfarin monitoring and adjustment (5 week visit).    Patient verifies current dosing regimen and tablet strength.  No missed or extra doses.  Patient denies bleeding.  Has usual swelling of lower legs and ankles. Has usual easy bruising.  SOB sometimes worse.  No blood in urine or stool.  No dietary changes.  No changes in medication/OTC agents/herbals.  No change in alcohol use or tobacco use.  Change in activity level: increased.  Patient denies falls.  No vomiting or acute illness. Took nothing for diarrhea 1 day.  No procedures scheduled in the future at this time.    Assessment:   Lab Results   Component Value Date    INR 2.70 (H) 04/30/2024    INR 3.00 (H) 03/26/2024    INR 3.60 (H) 03/05/2024     INR therapeutic - goal 2.5-3.5.  Recent Labs     04/30/24  0942   INR 2.70*        Plan:  POCT INR performed/result reviewed.  Continue PO Coumadin 1 mg MWF, 1.5 mg TThSS.  Recheck INR in 5 week(s).  Patient reminded to call the Anticoagulation Clinic with any signs or symptoms of bleeding or with any medication changes.  Patient given instructions utilizing the teach back method.       After visit summary printed and reviewed with patient.      Discharged ambulatory in no apparent distress.  Sees PCP next.     For Pharmacy Admin Tracking Only    Time Spent (min): 20

## 2024-06-04 ENCOUNTER — HOSPITAL ENCOUNTER (OUTPATIENT)
Dept: PHARMACY | Age: 79
Setting detail: THERAPIES SERIES
Discharge: HOME | End: 2024-06-04
Payer: MEDICARE

## 2024-06-04 DIAGNOSIS — Z95.2 H/O MITRAL VALVE REPLACEMENT: Primary | ICD-10-CM

## 2024-06-04 DIAGNOSIS — I48.20 CHRONIC ATRIAL FIBRILLATION (HCC): ICD-10-CM

## 2024-06-04 DIAGNOSIS — Z51.81 ENCOUNTER FOR THERAPEUTIC DRUG MONITORING: ICD-10-CM

## 2024-06-04 DIAGNOSIS — Z79.01 ANTICOAGULATED ON COUMADIN: ICD-10-CM

## 2024-06-04 LAB — POC INR: 2.8 (ref 0.8–1.2)

## 2024-06-04 PROCEDURE — 85610 PROTHROMBIN TIME: CPT

## 2024-06-04 PROCEDURE — 99211 OFF/OP EST MAY X REQ PHY/QHP: CPT

## 2024-06-04 PROCEDURE — 36416 COLLJ CAPILLARY BLOOD SPEC: CPT

## 2024-06-04 RX ORDER — BUMETANIDE 1 MG/1
1 TABLET ORAL DAILY
Qty: 90 TABLET | Refills: 0 | Status: SHIPPED | OUTPATIENT
Start: 2024-06-04

## 2024-06-04 NOTE — PROGRESS NOTES
Medication Management Clinic  [facility]  Anticoagulation Clinic  [phone] (phone)  [phone] (fax)    Ms. Es Curtis is a 78 y.o.  female with history of chronic/permanent atrial fib., mechanical MVR, per Dr. Rudolph's referral, who presents today for Warfarin monitoring and adjustment (5 week visit).    Patient verifies current dosing regimen and tablet strength.  No missed or extra doses.  Patient denies bleeding/bruising.  Has usual SOB/swelling of ankles.  No blood in urine or stool.  No dietary changes.  No changes in medication/OTC agents/herbals, except off Effudex cream.  No change in alcohol use or tobacco use.  No change in activity level.  Patient denies falls.  No vomiting/diarrhea or acute illness.   No procedures scheduled in the future at this time.    Assessment:   Lab Results   Component Value Date    INR 2.80 (H) 06/04/2024    INR 2.70 (H) 04/30/2024    INR 3.00 (H) 03/26/2024     INR therapeutic - goal 2.5-3.5  Recent Labs     06/04/24  1050   INR 2.80*        Plan:  POCT INR performed/result reviewed.  Continue PO Coumadin 1 mg MWF, 1.5 mg TThSS.  Recheck INR in 5 week(s).  Patient reminded to call the Anticoagulation Clinic with any signs or symptoms of bleeding or with any medication changes.  Patient given instructions utilizing the teach back method.       After visit summary printed and reviewed with patient.      Discharged ambulatory in no apparent distress.    For Pharmacy Admin Tracking Only    Time Spent (min): 15

## 2024-07-03 ENCOUNTER — OFFICE VISIT (OUTPATIENT)
Dept: CARDIOLOGY CLINIC | Age: 79
End: 2024-07-03
Payer: MEDICARE

## 2024-07-03 VITALS
WEIGHT: 192.8 LBS | HEIGHT: 69 IN | BODY MASS INDEX: 28.56 KG/M2 | DIASTOLIC BLOOD PRESSURE: 74 MMHG | SYSTOLIC BLOOD PRESSURE: 136 MMHG | HEART RATE: 88 BPM

## 2024-07-03 DIAGNOSIS — I25.10 CORONARY ARTERY DISEASE INVOLVING NATIVE CORONARY ARTERY OF NATIVE HEART WITHOUT ANGINA PECTORIS: ICD-10-CM

## 2024-07-03 DIAGNOSIS — I10 ESSENTIAL HYPERTENSION: ICD-10-CM

## 2024-07-03 DIAGNOSIS — I10 PRIMARY HYPERTENSION: Primary | ICD-10-CM

## 2024-07-03 DIAGNOSIS — I50.32 CHF (CONGESTIVE HEART FAILURE), NYHA CLASS II, CHRONIC, DIASTOLIC (HCC): ICD-10-CM

## 2024-07-03 DIAGNOSIS — I48.20 CHRONIC ATRIAL FIBRILLATION (HCC): ICD-10-CM

## 2024-07-03 PROCEDURE — 1123F ACP DISCUSS/DSCN MKR DOCD: CPT | Performed by: NUCLEAR MEDICINE

## 2024-07-03 PROCEDURE — G8417 CALC BMI ABV UP PARAM F/U: HCPCS | Performed by: NUCLEAR MEDICINE

## 2024-07-03 PROCEDURE — G8399 PT W/DXA RESULTS DOCUMENT: HCPCS | Performed by: NUCLEAR MEDICINE

## 2024-07-03 PROCEDURE — G8427 DOCREV CUR MEDS BY ELIG CLIN: HCPCS | Performed by: NUCLEAR MEDICINE

## 2024-07-03 PROCEDURE — 3075F SYST BP GE 130 - 139MM HG: CPT | Performed by: NUCLEAR MEDICINE

## 2024-07-03 PROCEDURE — 99214 OFFICE O/P EST MOD 30 MIN: CPT | Performed by: NUCLEAR MEDICINE

## 2024-07-03 PROCEDURE — 1036F TOBACCO NON-USER: CPT | Performed by: NUCLEAR MEDICINE

## 2024-07-03 PROCEDURE — 1090F PRES/ABSN URINE INCON ASSESS: CPT | Performed by: NUCLEAR MEDICINE

## 2024-07-03 PROCEDURE — 3078F DIAST BP <80 MM HG: CPT | Performed by: NUCLEAR MEDICINE

## 2024-07-03 PROCEDURE — 93000 ELECTROCARDIOGRAM COMPLETE: CPT | Performed by: NUCLEAR MEDICINE

## 2024-07-03 RX ORDER — EZETIMIBE 10 MG/1
10 TABLET ORAL DAILY
Qty: 90 TABLET | Refills: 3 | Status: SHIPPED | OUTPATIENT
Start: 2024-07-03

## 2024-07-03 RX ORDER — AMOXICILLIN 500 MG/1
CAPSULE ORAL
COMMUNITY
Start: 2024-06-18

## 2024-07-03 RX ORDER — DULAGLUTIDE 1.5 MG/.5ML
INJECTION, SOLUTION SUBCUTANEOUS
COMMUNITY

## 2024-07-03 RX ORDER — BUMETANIDE 1 MG/1
1 TABLET ORAL DAILY
Qty: 90 TABLET | Refills: 3 | Status: SHIPPED | OUTPATIENT
Start: 2024-07-03

## 2024-07-03 RX ORDER — CARVEDILOL 3.12 MG/1
3.12 TABLET ORAL 2 TIMES DAILY
Qty: 180 TABLET | Refills: 3 | Status: SHIPPED | OUTPATIENT
Start: 2024-07-03

## 2024-07-03 NOTE — PROGRESS NOTES
[facility] PHYSICIANS [place] SPECIALTY  [facility] CARDIOLOGY  [address]  [address]  [place] [address]  Dept: [phone]  Dept Fax: [phone]  Loc: [phone]    Visit Date: 7/3/2024    Es Curtis is a 79 y.o. female who presents todayfor:  Chief Complaint   Patient presents with    Follow-up     6 month follow up.    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, left breast    Cancer (HCC) 2021    Squamous cell skin on top of scalp sees Dr Da Silva    CHF (congestive heart failure) ([facility])     Chronic kidney disease, stage III (moderate) ([facility]) 10/29/2018    Depression     Diabetes mellitus ([facility])     Generalized headaches     History of dizziness     History of sinus bradycardia     History of therapeutic radiation     History of valvular heart disease     Hyperlipidemia     Hypertension     Medtronic dual pacemaker 05/23/2017    MI, old     Mitral valve replaced     Numbness and tingling     Obesity     Obstructive sleep apnea on CPAP 2011    Osteopenia     SOB (shortness of breath)     HX OF:      Past Surgical History:   Procedure Laterality Date    BREAST LUMPECTOMY Left 01/09/2013    DCIS & DCH    BREAST SURGERY Left 1/15/13    re-excision cranial margin and mammosite spacer    CARDIAC CATHETERIZATION  10/06/2003    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 MV area by recent EKG was 1 sq. cm. and mean gradient across MV was 17mmHg. MV index was about 0.7 sq. cm. per body surface area. Moderate pulmonary artery systolic HTN. Patent coronary arteries.    CARDIAC VALVE REPLACEMENT

## 2024-07-03 NOTE — PROGRESS NOTES
6 month follow up.  Reports shortness of breath, disorientation, and edema in her lower extremities.  Denies chest pain, dizziness, and palpitations.

## 2024-07-08 ENCOUNTER — TELEPHONE (OUTPATIENT)
Dept: CARDIOLOGY CLINIC | Age: 79
End: 2024-07-08

## 2024-07-08 DIAGNOSIS — I48.21 PERMANENT ATRIAL FIBRILLATION (HCC): ICD-10-CM

## 2024-07-08 DIAGNOSIS — Z95.2 S/P MVR (MITRAL VALVE REPLACEMENT): Primary | ICD-10-CM

## 2024-07-08 NOTE — TELEPHONE ENCOUNTER
----- Message from Franca Nicholas RN sent at 7/8/2024  2:52 PM EDT -----  Please renew annual referral for Anticoagulation Monitoring, #[phone].  Thanks, KEITH Nicholas RN BSN

## 2024-07-09 ENCOUNTER — PROCEDURE VISIT (OUTPATIENT)
Dept: CARDIOLOGY CLINIC | Age: 79
End: 2024-07-09
Payer: MEDICARE

## 2024-07-09 ENCOUNTER — HOSPITAL ENCOUNTER (OUTPATIENT)
Dept: PHARMACY | Age: 79
Setting detail: THERAPIES SERIES
Discharge: HOME | End: 2024-07-09
Payer: MEDICARE

## 2024-07-09 DIAGNOSIS — Z95.0 PACEMAKER: Primary | ICD-10-CM

## 2024-07-09 DIAGNOSIS — I48.20 CHRONIC ATRIAL FIBRILLATION (HCC): ICD-10-CM

## 2024-07-09 DIAGNOSIS — Z79.01 ANTICOAGULATED ON COUMADIN: ICD-10-CM

## 2024-07-09 DIAGNOSIS — Z95.2 H/O MITRAL VALVE REPLACEMENT: Primary | ICD-10-CM

## 2024-07-09 DIAGNOSIS — Z51.81 ENCOUNTER FOR THERAPEUTIC DRUG MONITORING: ICD-10-CM

## 2024-07-09 LAB — POC INR: 3.2 (ref 0.8–1.2)

## 2024-07-09 PROCEDURE — 85610 PROTHROMBIN TIME: CPT

## 2024-07-09 PROCEDURE — 93296 REM INTERROG EVL PM/IDS: CPT | Performed by: NUCLEAR MEDICINE

## 2024-07-09 PROCEDURE — 36416 COLLJ CAPILLARY BLOOD SPEC: CPT

## 2024-07-09 PROCEDURE — 99211 OFF/OP EST MAY X REQ PHY/QHP: CPT

## 2024-07-09 PROCEDURE — 93294 REM INTERROG EVL PM/LDLS PM: CPT | Performed by: NUCLEAR MEDICINE

## 2024-07-09 NOTE — PROGRESS NOTES
Medication Management Clinic  [facility]  Anticoagulation Clinic  [phone] (phone)  [phone] (fax)    Ms. Es Curtis is a 79 y.o.  female with history of MVR/permanent atrial fib., per Dr. Rudolph's referral, who presents today for Warfarin monitoring and adjustment (5 week visit).    Patient verifies current dosing regimen and tablet strength.  No missed or extra doses.  Patient denies bleeding/bruising.  Has usual SOB/swelling of lower legs and ankles (wearing knee high compression hose bilat.)  No blood in urine or stool.  No dietary changes.  No changes in medication/OTC agents/herbals.  No change in alcohol use or tobacco use.  Change in activity level: increased - \"I feel the best I have in 2 years.\"  Patient denies falls.  No vomiting/diarrhea or acute illness.   No procedures scheduled in the future at this time.    Assessment:   Lab Results   Component Value Date    INR 3.20 (H) 07/09/2024    INR 2.80 (H) 06/04/2024    INR 2.70 (H) 04/30/2024     INR therapeutic - goal 2.5-3.5.  Recent Labs     07/09/24  1038   INR 3.20*        Plan:  POCT INR performed/result reviewed.  Continue PO Coumadin 1 mg MWF, 1.5 mg TThSS.  Recheck INR in 5 week(s).  Patient reminded to call the Anticoagulation Clinic with any signs or symptoms of bleeding or with any medication changes.  Patient given instructions utilizing the teach back method.       After visit summary printed and reviewed with patient.      Discharged ambulatory in no apparent distress.    For Pharmacy Admin Tracking Only    Time Spent (min): 20

## 2024-07-09 NOTE — PROGRESS NOTES
Carelink Medtronic Dual Pacemaker   Patient of [facility]aki    Battery <2-8 months / avg 5 months    Presenting rhythm AF     A Impedance 418  RV Impedance 437    P wave sensing 0.6  R wave sensing 2.4    A Threshold - @ -  A Amplitude - @ -  RV Thresholds 1.125 @ 0.4  RV Amplitude 2.25 @ 0.4      A Paced 0%  V Paced 99.2%    Programmed Mode VVIR       Afib [place] 100% - hx AF on Coumadin    Episodes none

## 2024-07-15 ENCOUNTER — OFFICE VISIT (OUTPATIENT)
Dept: PULMONOLOGY | Age: 79
End: 2024-07-15
Payer: MEDICARE

## 2024-07-15 VITALS
WEIGHT: 193 LBS | HEIGHT: 69 IN | TEMPERATURE: 97.9 F | HEART RATE: 87 BPM | OXYGEN SATURATION: 99 % | SYSTOLIC BLOOD PRESSURE: 128 MMHG | DIASTOLIC BLOOD PRESSURE: 82 MMHG | BODY MASS INDEX: 28.58 KG/M2

## 2024-07-15 DIAGNOSIS — G47.33 OBSTRUCTIVE SLEEP APNEA ON CPAP: Primary | ICD-10-CM

## 2024-07-15 PROCEDURE — 1036F TOBACCO NON-USER: CPT | Performed by: PHYSICIAN ASSISTANT

## 2024-07-15 PROCEDURE — 99213 OFFICE O/P EST LOW 20 MIN: CPT | Performed by: PHYSICIAN ASSISTANT

## 2024-07-15 PROCEDURE — G8399 PT W/DXA RESULTS DOCUMENT: HCPCS | Performed by: PHYSICIAN ASSISTANT

## 2024-07-15 PROCEDURE — 3079F DIAST BP 80-89 MM HG: CPT | Performed by: PHYSICIAN ASSISTANT

## 2024-07-15 PROCEDURE — 1090F PRES/ABSN URINE INCON ASSESS: CPT | Performed by: PHYSICIAN ASSISTANT

## 2024-07-15 PROCEDURE — G8427 DOCREV CUR MEDS BY ELIG CLIN: HCPCS | Performed by: PHYSICIAN ASSISTANT

## 2024-07-15 PROCEDURE — 3074F SYST BP LT 130 MM HG: CPT | Performed by: PHYSICIAN ASSISTANT

## 2024-07-15 PROCEDURE — 1123F ACP DISCUSS/DSCN MKR DOCD: CPT | Performed by: PHYSICIAN ASSISTANT

## 2024-07-15 PROCEDURE — G8417 CALC BMI ABV UP PARAM F/U: HCPCS | Performed by: PHYSICIAN ASSISTANT

## 2024-07-15 ASSESSMENT — ENCOUNTER SYMPTOMS
ALLERGIC/IMMUNOLOGIC NEGATIVE: 1
WHEEZING: 0
SHORTNESS OF BREATH: 0
COUGH: 0
NAUSEA: 0
CHEST TIGHTNESS: 0
STRIDOR: 0
EYES NEGATIVE: 1
DIARRHEA: 0
BACK PAIN: 0

## 2024-07-15 NOTE — PROGRESS NOTES
[facility]      Es Curtis         [id number]  7/15/2024   Chief Complaint   Patient presents with    Follow-up     1 year POLINA follow up         Pt of Dr. Marcia ENRIQUEZ Download:   Original or initial AHI: 40.8     Date of initial study: 03/21/2011      Compliant  87%     Noncompliant 7 %     PAP Type Cpap   Level  10 cmH2O    Avg Hrs/Day 5 hours 25 minutes   AHI: 5.0   Recorded compliance dates , 06/11/2024  to 07/10/2024   Machine/Mfg:   [x] ResMed    [] Respironics/Dreamstation   Interface:   [] Nasal    [] Nasal pillows   [x] FFM      Provider:      [x] [facility]     []Madhu     [] [employer]    [] [employer]    [] [facility]               [] [facility] [facility]      [] [employer]    [] [place]:      [] Other    Neck Size: 15.75  Mallampati Mallampati 3  ESS:  6  SAQLI: 60    Here is a scan of the most recent download:            Presentation:   Es presents for sleep medicine follow up for obstructive sleep apnea  Since the last visit, Es is doing well with PAP. She  is sleeping well and feels rested.  Mask fits well.      Equipment issues:  The pressure is  acceptable, the mask is acceptable     Sleep issues:  Do you feel better? Yes  More rested?Yes   Better concentration? yes    Progress History:   Since last visit any new medical issues? No  New ER or hospital visits? No      Review of Systems -   Review of Systems   Constitutional:  Negative for activity change, appetite change, chills and fever.   HENT:  Negative for congestion and postnasal drip.    Eyes: Negative.    Respiratory:  Negative for cough, chest tightness, shortness of breath, wheezing and stridor.    Cardiovascular:  Negative for chest pain and leg swelling.   Gastrointestinal:  Negative for diarrhea and nausea.   Endocrine: Negative.    Genitourinary: Negative.    Musculoskeletal: Negative.  Negative for arthralgias and back pain.   Skin: Negative.    Allergic/Immunologic: Negative.

## 2024-07-31 ENCOUNTER — PATIENT MESSAGE (OUTPATIENT)
Dept: FAMILY MEDICINE CLINIC | Age: 79
End: 2024-07-31

## 2024-07-31 DIAGNOSIS — E11.21 TYPE 2 DIABETES MELLITUS WITH DIABETIC NEPHROPATHY, WITHOUT LONG-TERM CURRENT USE OF INSULIN (HCC): Primary | ICD-10-CM

## 2024-08-01 RX ORDER — DULAGLUTIDE 3 MG/.5ML
3 INJECTION, SOLUTION SUBCUTANEOUS WEEKLY
Qty: 12 ADJUSTABLE DOSE PRE-FILLED PEN SYRINGE | Refills: 3 | Status: SHIPPED | OUTPATIENT
Start: 2024-08-01

## 2024-08-01 NOTE — TELEPHONE ENCOUNTER
From: Es Curtis  To: Dr. Ashley Hernandez  Sent: 7/31/2024 10:52 PM EDT  Subject: Trulicity Prescription at [facility]    After tomorrow (Thursday August 1st) I have to shots available of Trulicity. Need a new prescription sent to [facility]. The last time I received my order was in April. I had an abundance at that time and delayed getting more and now it has been removed from my list of medications at [facility] for whatever reason. Thank you for your attention to this matter.

## 2024-08-01 NOTE — TELEPHONE ENCOUNTER
Rx EP'd to pharmacy.  Please notify patient.      Requested Prescriptions     Signed Prescriptions Disp Refills    Dulaglutide (TRULICITY) 3 MG/0.5ML SOPN 12 Adjustable Dose Pre-filled Pen Syringe 3     Sig: Inject 3 mg into the skin once a week     Authorizing Provider: MICHAEL HERNANDEZ           Electronically signed by Michael Hernandez MD on 8/1/2024 at 12:35 PM

## 2024-08-14 ENCOUNTER — ANTI-COAG VISIT (OUTPATIENT)
Age: 79
End: 2024-08-14
Payer: MEDICARE

## 2024-08-14 DIAGNOSIS — Z79.01 ANTICOAGULATED ON COUMADIN: ICD-10-CM

## 2024-08-14 DIAGNOSIS — I48.20 CHRONIC ATRIAL FIBRILLATION (HCC): ICD-10-CM

## 2024-08-14 DIAGNOSIS — Z95.2 H/O MITRAL VALVE REPLACEMENT: Primary | ICD-10-CM

## 2024-08-14 DIAGNOSIS — Z51.81 ENCOUNTER FOR THERAPEUTIC DRUG MONITORING: ICD-10-CM

## 2024-08-14 LAB — POC INR: 2.8 (ref 0.8–1.2)

## 2024-08-14 PROCEDURE — 99211 OFF/OP EST MAY X REQ PHY/QHP: CPT

## 2024-08-14 PROCEDURE — 85610 PROTHROMBIN TIME: CPT

## 2024-08-14 NOTE — PROGRESS NOTES
Medication Management Clinic  [facility]  Anticoagulation Clinic  [phone] (phone)  [phone] (fax)    Ms. Es Curtis is a 79 y.o.  female with history of mechanical MVR/permanent atrial fib., per Dr. Rudolph's referral, who presents today for Warfarin monitoring and adjustment (5 week visit - no show yesterday; was on computer and lost track of time).    Patient verifies current dosing regimen and tablet strength.  No missed or extra doses.  Patient denies bleeding. Has usual SOB/easy bruising/swelling of lower legs and ankles.  No blood in urine or stool.  No dietary changes, except for peeled zucchini lately, plus other non-Vitamin K fresh vegetables.  Changes in medication/OTC agents/herbals: started OTC K+ 2 days ago - legs feel better.  No change in alcohol use or tobacco use.  No change in activity level.  Patient denies falls.  No vomiting/diarrhea or acute illness.   No procedures scheduled in the future at this time.    Assessment:   Lab Results   Component Value Date    INR 2.80 (H) 08/14/2024    INR 3.20 (H) 07/09/2024    INR 2.80 (H) 06/04/2024     INR therapeutic - goal 2.5-3.5.  Recent Labs     08/14/24  0947   INR 2.80*        Plan:  POCT INR performed/result reviewed.  Continue PO Coumadin 1 mg MWF, 1.5 mg TThSS.  Recheck INR in 5 week(s).  Patient reminded to call the Anticoagulation Clinic with any signs or symptoms of bleeding or with any medication changes.  Patient given instructions utilizing the teach back method.       After visit summary printed and reviewed with patient.      Discharged ambulatory in no apparent distress.     For Pharmacy Admin Tracking Only    Time Spent (min): 20

## 2024-08-20 RX ORDER — ENOXAPARIN SODIUM 100 MG/ML
INJECTION SUBCUTANEOUS
Qty: 14 ML | OUTPATIENT
Start: 2024-08-20

## 2024-08-20 RX ORDER — CARVEDILOL 3.12 MG/1
3.12 TABLET ORAL 2 TIMES DAILY
Qty: 60 TABLET | OUTPATIENT
Start: 2024-08-20

## 2024-08-23 ENCOUNTER — PROCEDURE VISIT (OUTPATIENT)
Dept: CARDIOLOGY CLINIC | Age: 79
End: 2024-08-23

## 2024-08-23 DIAGNOSIS — Z95.0 PACEMAKER: Primary | ICD-10-CM

## 2024-08-23 NOTE — PROGRESS NOTES
Carelink Medtronic Dual Pacemaker   Patient of [facility]aki    Battery 4 months ( <1 - 6 months) - on battery watch    Presenting rhythm AF     A Impedance 418  RV Impedance 456    P wave sensing 2.3  R wave sensing 8.1    A Threshold - @ -  A Amplitude - @ -  RV Thresholds 1.125 @ 0.4  RV Amplitude 2.25 @ 0.4      A Paced 0%  V Paced 98.9%    Programmed Mode VVIR       Afib [place] 100% - known AF on Coumadin    Episodes none

## 2024-09-05 ENCOUNTER — HOSPITAL ENCOUNTER (OUTPATIENT)
Age: 79
Discharge: HOME | End: 2024-09-05
Payer: MEDICARE

## 2024-09-05 DIAGNOSIS — N18.4 STAGE 4 CHRONIC KIDNEY DISEASE (HCC): ICD-10-CM

## 2024-09-05 DIAGNOSIS — N28.1 RENAL CYST: ICD-10-CM

## 2024-09-05 DIAGNOSIS — I10 HTN (HYPERTENSION), BENIGN: ICD-10-CM

## 2024-09-05 LAB
25(OH)D3 SERPL-MCNC: 44 NG/ML (ref 30–100)
ANION GAP SERPL CALC-SCNC: 9 MEQ/L (ref 8–16)
BUN SERPL-MCNC: 24 MG/DL (ref 7–22)
CALCIUM SERPL-MCNC: 9.2 MG/DL (ref 8.5–10.5)
CHLORIDE SERPL-SCNC: 107 MEQ/L (ref 98–111)
CO2 SERPL-SCNC: 24 MEQ/L (ref 23–33)
CREAT SERPL-MCNC: 1.4 MG/DL (ref 0.4–1.2)
DEPRECATED RDW RBC AUTO: 48.5 FL (ref 35–45)
ERYTHROCYTE [DISTWIDTH] IN BLOOD BY AUTOMATED COUNT: 13.9 % (ref 11.5–14.5)
GFR SERPL CREATININE-BSD FRML MDRD: 38 ML/MIN/1.73M2
GLUCOSE SERPL-MCNC: 180 MG/DL (ref 70–108)
HCT VFR BLD AUTO: 41 % (ref 37–47)
HGB BLD-MCNC: 13 GM/DL (ref 12–16)
MCH RBC QN AUTO: 30 PG (ref 26–33)
MCHC RBC AUTO-ENTMCNC: 31.7 GM/DL (ref 32.2–35.5)
MCV RBC AUTO: 94.7 FL (ref 81–99)
PHOSPHATE SERPL-MCNC: 3.8 MG/DL (ref 2.4–4.7)
PLATELET # BLD AUTO: 167 THOU/MM3 (ref 130–400)
PMV BLD AUTO: 11.2 FL (ref 9.4–12.4)
POTASSIUM SERPL-SCNC: 4.4 MEQ/L (ref 3.5–5.2)
PTH-INTACT SERPL-MCNC: 90.8 PG/ML (ref 15–65)
RBC # BLD AUTO: 4.33 MILL/MM3 (ref 4.2–5.4)
SODIUM SERPL-SCNC: 140 MEQ/L (ref 135–145)
WBC # BLD AUTO: 4.7 THOU/MM3 (ref 4.8–10.8)

## 2024-09-05 PROCEDURE — 85027 COMPLETE CBC AUTOMATED: CPT

## 2024-09-05 PROCEDURE — 80048 BASIC METABOLIC PNL TOTAL CA: CPT

## 2024-09-05 PROCEDURE — 84100 ASSAY OF PHOSPHORUS: CPT

## 2024-09-05 PROCEDURE — 82306 VITAMIN D 25 HYDROXY: CPT

## 2024-09-05 PROCEDURE — 36415 COLL VENOUS BLD VENIPUNCTURE: CPT

## 2024-09-05 PROCEDURE — 83970 ASSAY OF PARATHORMONE: CPT

## 2024-09-10 RX ORDER — OLMESARTAN MEDOXOMIL 40 MG/1
40 TABLET ORAL DAILY
Qty: 90 TABLET | Refills: 3 | Status: SHIPPED | OUTPATIENT
Start: 2024-09-10

## 2024-09-12 ENCOUNTER — OFFICE VISIT (OUTPATIENT)
Dept: NEPHROLOGY | Age: 79
End: 2024-09-12
Payer: MEDICARE

## 2024-09-12 VITALS
SYSTOLIC BLOOD PRESSURE: 148 MMHG | BODY MASS INDEX: 28.21 KG/M2 | HEART RATE: 84 BPM | OXYGEN SATURATION: 98 % | WEIGHT: 191 LBS | DIASTOLIC BLOOD PRESSURE: 88 MMHG

## 2024-09-12 DIAGNOSIS — I10 HTN (HYPERTENSION), BENIGN: Primary | ICD-10-CM

## 2024-09-12 DIAGNOSIS — N28.1 RENAL CYST: ICD-10-CM

## 2024-09-12 DIAGNOSIS — N18.32 STAGE 3B CHRONIC KIDNEY DISEASE (HCC): ICD-10-CM

## 2024-09-12 PROCEDURE — 3077F SYST BP >= 140 MM HG: CPT | Performed by: INTERNAL MEDICINE

## 2024-09-12 PROCEDURE — 99213 OFFICE O/P EST LOW 20 MIN: CPT | Performed by: INTERNAL MEDICINE

## 2024-09-12 PROCEDURE — G8399 PT W/DXA RESULTS DOCUMENT: HCPCS | Performed by: INTERNAL MEDICINE

## 2024-09-12 PROCEDURE — 1123F ACP DISCUSS/DSCN MKR DOCD: CPT | Performed by: INTERNAL MEDICINE

## 2024-09-12 PROCEDURE — 1090F PRES/ABSN URINE INCON ASSESS: CPT | Performed by: INTERNAL MEDICINE

## 2024-09-12 PROCEDURE — 1036F TOBACCO NON-USER: CPT | Performed by: INTERNAL MEDICINE

## 2024-09-12 PROCEDURE — G2211 COMPLEX E/M VISIT ADD ON: HCPCS | Performed by: INTERNAL MEDICINE

## 2024-09-12 PROCEDURE — G8427 DOCREV CUR MEDS BY ELIG CLIN: HCPCS | Performed by: INTERNAL MEDICINE

## 2024-09-12 PROCEDURE — 3079F DIAST BP 80-89 MM HG: CPT | Performed by: INTERNAL MEDICINE

## 2024-09-12 PROCEDURE — G8417 CALC BMI ABV UP PARAM F/U: HCPCS | Performed by: INTERNAL MEDICINE

## 2024-09-17 ENCOUNTER — ANTI-COAG VISIT (OUTPATIENT)
Age: 79
End: 2024-09-17
Payer: MEDICARE

## 2024-09-17 DIAGNOSIS — Z95.2 H/O MITRAL VALVE REPLACEMENT: Primary | ICD-10-CM

## 2024-09-17 DIAGNOSIS — I48.20 CHRONIC ATRIAL FIBRILLATION (HCC): ICD-10-CM

## 2024-09-17 DIAGNOSIS — Z51.81 ENCOUNTER FOR THERAPEUTIC DRUG MONITORING: ICD-10-CM

## 2024-09-17 DIAGNOSIS — Z79.01 ANTICOAGULATED ON COUMADIN: ICD-10-CM

## 2024-09-17 LAB — POC INR: 3.4 (ref 0.8–1.2)

## 2024-09-17 PROCEDURE — 99211 OFF/OP EST MAY X REQ PHY/QHP: CPT

## 2024-09-17 PROCEDURE — 85610 PROTHROMBIN TIME: CPT

## 2024-09-23 DIAGNOSIS — E11.21 TYPE 2 DIABETES MELLITUS WITH DIABETIC NEPHROPATHY, WITHOUT LONG-TERM CURRENT USE OF INSULIN (HCC): ICD-10-CM

## 2024-09-24 RX ORDER — GLIMEPIRIDE 2 MG/1
TABLET ORAL
Qty: 360 TABLET | Refills: 3 | Status: SHIPPED | OUTPATIENT
Start: 2024-09-24

## 2024-09-29 DIAGNOSIS — I10 ESSENTIAL HYPERTENSION: ICD-10-CM

## 2024-09-30 RX ORDER — HYDRALAZINE HYDROCHLORIDE 25 MG/1
25 TABLET, FILM COATED ORAL 3 TIMES DAILY
Qty: 270 TABLET | Refills: 3 | OUTPATIENT
Start: 2024-09-30

## 2024-09-30 NOTE — TELEPHONE ENCOUNTER
LM for patient to call office back.  Need to clarify how patient is taking hydralazine before sending in refill.    Sig: Take 3 tablets by mouth 3 times daily Take 25 mg TID   Patient taking differently: Take 1 tablet by mouth 3 times daily

## 2024-10-01 RX ORDER — HYDRALAZINE HYDROCHLORIDE 25 MG/1
25 TABLET, FILM COATED ORAL 3 TIMES DAILY
Qty: 270 TABLET | Refills: 3 | Status: SHIPPED | OUTPATIENT
Start: 2024-10-01

## 2024-10-22 ENCOUNTER — ANTI-COAG VISIT (OUTPATIENT)
Age: 79
End: 2024-10-22
Payer: MEDICARE

## 2024-10-22 ENCOUNTER — OFFICE VISIT (OUTPATIENT)
Dept: CARDIOLOGY CLINIC | Age: 79
End: 2024-10-22
Payer: MEDICARE

## 2024-10-22 VITALS
HEIGHT: 69 IN | SYSTOLIC BLOOD PRESSURE: 134 MMHG | HEART RATE: 70 BPM | OXYGEN SATURATION: 97 % | WEIGHT: 189.4 LBS | DIASTOLIC BLOOD PRESSURE: 70 MMHG | BODY MASS INDEX: 28.05 KG/M2

## 2024-10-22 DIAGNOSIS — I48.20 CHRONIC ATRIAL FIBRILLATION (HCC): ICD-10-CM

## 2024-10-22 DIAGNOSIS — Z98.890 S/P MVR (MITRAL VALVE REPAIR): ICD-10-CM

## 2024-10-22 DIAGNOSIS — Z91.89 AT RISK FOR FLUID VOLUME OVERLOAD: ICD-10-CM

## 2024-10-22 DIAGNOSIS — Z95.2 H/O MITRAL VALVE REPLACEMENT: Primary | ICD-10-CM

## 2024-10-22 DIAGNOSIS — Z51.81 ENCOUNTER FOR THERAPEUTIC DRUG MONITORING: ICD-10-CM

## 2024-10-22 DIAGNOSIS — Z79.01 ANTICOAGULATED ON COUMADIN: ICD-10-CM

## 2024-10-22 DIAGNOSIS — I50.32 CHRONIC DIASTOLIC CONGESTIVE HEART FAILURE, NYHA CLASS 2 (HCC): Primary | ICD-10-CM

## 2024-10-22 LAB — POC INR: 2.8 (ref 0.8–1.2)

## 2024-10-22 PROCEDURE — 85610 PROTHROMBIN TIME: CPT

## 2024-10-22 PROCEDURE — 1123F ACP DISCUSS/DSCN MKR DOCD: CPT | Performed by: NURSE PRACTITIONER

## 2024-10-22 PROCEDURE — G8482 FLU IMMUNIZE ORDER/ADMIN: HCPCS | Performed by: NURSE PRACTITIONER

## 2024-10-22 PROCEDURE — 99214 OFFICE O/P EST MOD 30 MIN: CPT | Performed by: NURSE PRACTITIONER

## 2024-10-22 PROCEDURE — 3075F SYST BP GE 130 - 139MM HG: CPT | Performed by: NURSE PRACTITIONER

## 2024-10-22 PROCEDURE — 99211 OFF/OP EST MAY X REQ PHY/QHP: CPT

## 2024-10-22 PROCEDURE — G8399 PT W/DXA RESULTS DOCUMENT: HCPCS | Performed by: NURSE PRACTITIONER

## 2024-10-22 PROCEDURE — G8417 CALC BMI ABV UP PARAM F/U: HCPCS | Performed by: NURSE PRACTITIONER

## 2024-10-22 PROCEDURE — 1090F PRES/ABSN URINE INCON ASSESS: CPT | Performed by: NURSE PRACTITIONER

## 2024-10-22 PROCEDURE — 3078F DIAST BP <80 MM HG: CPT | Performed by: NURSE PRACTITIONER

## 2024-10-22 PROCEDURE — 1036F TOBACCO NON-USER: CPT | Performed by: NURSE PRACTITIONER

## 2024-10-22 PROCEDURE — G8427 DOCREV CUR MEDS BY ELIG CLIN: HCPCS | Performed by: NURSE PRACTITIONER

## 2024-10-22 ASSESSMENT — ENCOUNTER SYMPTOMS
COUGH: 0
SHORTNESS OF BREATH: 0
WHEEZING: 0
ABDOMINAL DISTENTION: 0
ABDOMINAL PAIN: 0

## 2024-10-22 NOTE — PROGRESS NOTES
Heart Failure Clinic       Visit Date: 10/22/2024  Cardiologist:  Dr. Tarango  Primary Care Physician: Ashley Kiser MD    Es Curtis is a 79 y.o. female who presents today for:  Chief Complaint   Patient presents with    Check-Up     CHF       HPI:   Es Curtis is a 79 y.o. female who presents to the office for a follow up patient visit in the heart failure clinic. Last seen by Dr. Tarango on 7/8, no changes made.   Accompanied by self    TYPE HF: HFpEF (55%)  Cause: nonischemic  Device: Pacemaker  HX: MVR (2003), POLINA on CPAP, DM, HLD, HTN, Afib (coumadin)    Dry Wt:  205 202 on 10/18/22, 199 on 10/10/23, 189 on 10/22/24    Hospitalization:  > 6 months    Concerns today: 1 yr f/u. Weight is down 10lbs.     Activity: ADLs, LAND w/ long distances, no breaks needed  Diet: does not add salt, stays around 2500mg/day, stays under 2L/day    Patient has:  Chest Pain: no  SOB: no, chronic LAND  - she does feel it has worsened over the last yr.   Orthopnea/PND: yes, elevates bed    POLINA: yes CPAP wears it most of the time.   Edema: yes  Fatigue: no  Abdominal bloating: no  Cough: no  Appetite: good      Visit on 10/10/23: here today for her 1 yr f/u. Her weight is stable since last visit. She has notes worsening LAND over the last yr. She denies worsening lower leg swelling, bloating, and nocturnal dyspnea. Does wear her CPAP every night. Urinating well on Bumex. Following her diet for the most part.   She also notes that since July she has had two episodes of dizziness where she feels like she could pass out. She was driving both times. She notes blurred vision as well. She states that Nephrology increased her hydralazine and she has been having these episodes since.     Visit on 10/18/22: here today for her 1 year f/u. Notes that she feels lightheaded/dizzy after taking her morning medications. It lasts for about 2 hrs. Continued SOB, no changes from baseline. Notes LE swelling if she does not wear her

## 2024-10-22 NOTE — PATIENT INSTRUCTIONS
You may receive a survey regarding the care you received during your visit.  Your input is valuable to us.  We encourage you to complete and return your survey.  We hope you will choose us in the future for your healthcare needs.    Your nurses today were aHlley.  Office hours:   Mon-Thurs 8-4:30  Friday 8-12  Phone: [phone]    Continue:  Continue current medications  Daily weights and record  Fluid restriction of 2 Liters per day  Limit sodium in diet to around 9538-8787 mg/day  Monitor BP  Activity as tolerated     Call the Heart Failure Clinic for any of the following symptoms:   Weight gain of -3 pounds in 1 day or 5 pounds in 1 week  Increased shortness of breath  Shortness of breath while laying down  Cough  Chest pain  Swelling in feet, ankles or legs  Bloating in abdomen  Fatigue        No medication changes today    ECHO ordered     Continue daily wts.  F/U w/ Cardiology  F/U in clinic in 1 yr

## 2024-10-22 NOTE — PROGRESS NOTES
Medication Management Clinic  [facility]  Anticoagulation Clinic  [phone] (phone)  [phone] (fax)    Ms. Es Curtis is a 79 y.o.  female with history of permanent atrial fib./mechanical MVR, per Dr. Rudolph's referral, who presents today for Warfarin monitoring and adjustment (5 week visit).    Patient verifies current dosing regimen and tablet strength.  No missed or extra doses.  Patient denies bleeding/swelling (wearing bilateral knee-high compression hose).  Has usual SOB/easy bruising.  No blood in urine or stool.  No dietary changes.  No changes in medication/OTC agents/herbals.  No change in alcohol use or tobacco use.  No change in activity level.  Patient denies falls.  No vomiting/diarrhea or acute illness.   No procedures scheduled in the future at this time.    Assessment:   Lab Results   Component Value Date    INR 2.80 (H) 10/22/2024    INR 3.40 (H) 09/17/2024    INR 2.80 (H) 08/14/2024     INR therapeutic - goal 2.5-3.5.  Recent Labs     10/22/24  1319   INR 2.80*        Plan:  POCT INR performed/result reviewed.  Continue PO Coumadin 1 mg MWF, 1.5 mg TThSS.  Recheck INR in 6 week(s).  Patient reminded to call the Anticoagulation Clinic with any signs or symptoms of bleeding or with any medication changes.  Patient given instructions utilizing the teach back method.       After visit summary printed and reviewed with patient.      Discharged ambulatory in no apparent distress.  Has CHF Clinic visit next.    For Pharmacy Admin Tracking Only    Time Spent (min): 20

## 2024-10-24 ENCOUNTER — HOSPITAL ENCOUNTER (OUTPATIENT)
Age: 79
Discharge: HOME | End: 2024-10-26
Payer: MEDICARE

## 2024-10-24 VITALS
SYSTOLIC BLOOD PRESSURE: 134 MMHG | WEIGHT: 189 LBS | HEIGHT: 69 IN | BODY MASS INDEX: 27.99 KG/M2 | DIASTOLIC BLOOD PRESSURE: 70 MMHG

## 2024-10-24 DIAGNOSIS — I50.32 CHRONIC DIASTOLIC CONGESTIVE HEART FAILURE, NYHA CLASS 2 (HCC): ICD-10-CM

## 2024-10-24 LAB
ECHO AO ASC DIAM: 3 CM
ECHO AO ASCENDING AORTA INDEX: 1.49 CM/M2
ECHO AV CUSP MM: 1.8 CM
ECHO AV PEAK GRADIENT: 9 MMHG
ECHO AV PEAK VELOCITY: 1.5 M/S
ECHO AV VELOCITY RATIO: 0.6
ECHO BSA: 2.04 M2
ECHO EST RA PRESSURE: 5 MMHG
ECHO LA AREA 2C: 31.2 CM2
ECHO LA AREA 4C: 36.6 CM2
ECHO LA DIAMETER INDEX: 2.57 CM/M2
ECHO LA DIAMETER: 5.2 CM
ECHO LA MAJOR AXIS: 7.9 CM
ECHO LA MINOR AXIS: 7.1 CM
ECHO LA VOL BP: 130 ML (ref 22–52)
ECHO LA VOL MOD A2C: 112 ML (ref 22–52)
ECHO LA VOL MOD A4C: 137 ML (ref 22–52)
ECHO LA VOL/BSA BIPLANE: 64 ML/M2 (ref 16–34)
ECHO LA VOLUME INDEX MOD A2C: 55 ML/M2 (ref 16–34)
ECHO LA VOLUME INDEX MOD A4C: 68 ML/M2 (ref 16–34)
ECHO LV EJECTION FRACTION BIPLANE: 51 % (ref 55–100)
ECHO LV FRACTIONAL SHORTENING: 26 % (ref 28–44)
ECHO LV INTERNAL DIMENSION DIASTOLE INDEX: 2.62 CM/M2
ECHO LV INTERNAL DIMENSION DIASTOLIC: 5.3 CM (ref 3.9–5.3)
ECHO LV INTERNAL DIMENSION SYSTOLIC INDEX: 1.93 CM/M2
ECHO LV INTERNAL DIMENSION SYSTOLIC: 3.9 CM
ECHO LV ISOVOLUMETRIC RELAXATION TIME (IVRT): 77 MS
ECHO LV IVSD: 0.9 CM (ref 0.6–0.9)
ECHO LV MASS 2D: 174.5 G (ref 67–162)
ECHO LV MASS INDEX 2D: 86.4 G/M2 (ref 43–95)
ECHO LV POSTERIOR WALL DIASTOLIC: 0.9 CM (ref 0.6–0.9)
ECHO LV RELATIVE WALL THICKNESS RATIO: 0.34
ECHO LVOT PEAK GRADIENT: 3 MMHG
ECHO LVOT PEAK VELOCITY: 0.9 M/S
ECHO MV E DECELERATION TIME (DT): 205 MS
ECHO MV E VELOCITY: 1.52 M/S
ECHO MV MAX VELOCITY: 1.8 M/S
ECHO MV MEAN GRADIENT: 3 MMHG
ECHO MV MEAN VELOCITY: 0.8 M/S
ECHO MV PEAK GRADIENT: 13 MMHG
ECHO MV VTI: 34.2 CM
ECHO PV MAX VELOCITY: 1 M/S
ECHO PV PEAK GRADIENT: 4 MMHG
ECHO RIGHT VENTRICULAR SYSTOLIC PRESSURE (RVSP): 39 MMHG
ECHO RV INTERNAL DIMENSION: 2.7 CM
ECHO TV E WAVE: 0.7 M/S
ECHO TV REGURGITANT MAX VELOCITY: 2.9 M/S
ECHO TV REGURGITANT PEAK GRADIENT: 34 MMHG

## 2024-10-24 PROCEDURE — 93306 TTE W/DOPPLER COMPLETE: CPT

## 2024-10-25 ENCOUNTER — TELEPHONE (OUTPATIENT)
Dept: CARDIOLOGY CLINIC | Age: 79
End: 2024-10-25

## 2024-10-25 NOTE — TELEPHONE ENCOUNTER
----- Message from YANA Jose CNP sent at 10/25/2024 11:52 AM EDT -----  ECHO stable from previous, no changes at this time.

## 2024-10-28 ENCOUNTER — HOSPITAL ENCOUNTER (OUTPATIENT)
Age: 79
Discharge: HOME | End: 2024-10-28
Payer: MEDICARE

## 2024-10-28 DIAGNOSIS — E78.5 HYPERLIPIDEMIA, UNSPECIFIED HYPERLIPIDEMIA TYPE: ICD-10-CM

## 2024-10-28 DIAGNOSIS — E11.21 TYPE 2 DIABETES MELLITUS WITH DIABETIC NEPHROPATHY, WITHOUT LONG-TERM CURRENT USE OF INSULIN (HCC): ICD-10-CM

## 2024-10-28 LAB
CHOLEST SERPL-MCNC: 218 MG/DL (ref 100–199)
CREAT UR-MCNC: 81 MG/DL
DEPRECATED MEAN GLUCOSE BLD GHB EST-ACNC: 174 MG/DL (ref 70–126)
HBA1C MFR BLD HPLC: 7.8 % (ref 4.4–6.4)
HDLC SERPL-MCNC: 40 MG/DL
LDLC SERPL CALC-MCNC: 134 MG/DL
MICROALBUMIN UR-MCNC: 1.3 MG/DL
MICROALBUMIN/CREAT RATIO PNL UR: 16 MG/G (ref 0–30)
TRIGL SERPL-MCNC: 221 MG/DL (ref 0–199)

## 2024-10-28 PROCEDURE — 83036 HEMOGLOBIN GLYCOSYLATED A1C: CPT

## 2024-10-28 PROCEDURE — 36415 COLL VENOUS BLD VENIPUNCTURE: CPT

## 2024-10-28 PROCEDURE — 80061 LIPID PANEL: CPT

## 2024-10-28 PROCEDURE — 82043 UR ALBUMIN QUANTITATIVE: CPT

## 2024-10-28 SDOH — HEALTH STABILITY: PHYSICAL HEALTH: ON AVERAGE, HOW MANY DAYS PER WEEK DO YOU ENGAGE IN MODERATE TO STRENUOUS EXERCISE (LIKE A BRISK WALK)?: 1 DAY

## 2024-10-28 ASSESSMENT — PATIENT HEALTH QUESTIONNAIRE - PHQ9
SUM OF ALL RESPONSES TO PHQ QUESTIONS 1-9: 2
SUM OF ALL RESPONSES TO PHQ QUESTIONS 1-9: 2
2. FEELING DOWN, DEPRESSED OR HOPELESS: SEVERAL DAYS
SUM OF ALL RESPONSES TO PHQ QUESTIONS 1-9: 2
SUM OF ALL RESPONSES TO PHQ QUESTIONS 1-9: 2
SUM OF ALL RESPONSES TO PHQ9 QUESTIONS 1 & 2: 2
1. LITTLE INTEREST OR PLEASURE IN DOING THINGS: SEVERAL DAYS

## 2024-10-28 ASSESSMENT — LIFESTYLE VARIABLES
HOW OFTEN DO YOU HAVE A DRINK CONTAINING ALCOHOL: PATIENT DECLINED
HOW MANY STANDARD DRINKS CONTAINING ALCOHOL DO YOU HAVE ON A TYPICAL DAY: PATIENT DECLINED
HOW MANY STANDARD DRINKS CONTAINING ALCOHOL DO YOU HAVE ON A TYPICAL DAY: 98
HOW OFTEN DO YOU HAVE SIX OR MORE DRINKS ON ONE OCCASION: 1
HOW OFTEN DO YOU HAVE A DRINK CONTAINING ALCOHOL: 98

## 2024-11-04 ENCOUNTER — OFFICE VISIT (OUTPATIENT)
Dept: FAMILY MEDICINE CLINIC | Age: 79
End: 2024-11-04

## 2024-11-04 VITALS
BODY MASS INDEX: 28.82 KG/M2 | SYSTOLIC BLOOD PRESSURE: 156 MMHG | HEIGHT: 68 IN | WEIGHT: 190.2 LBS | HEART RATE: 68 BPM | RESPIRATION RATE: 16 BRPM | TEMPERATURE: 98.5 F | DIASTOLIC BLOOD PRESSURE: 74 MMHG

## 2024-11-04 DIAGNOSIS — E11.21 TYPE 2 DIABETES MELLITUS WITH DIABETIC NEPHROPATHY, WITHOUT LONG-TERM CURRENT USE OF INSULIN (HCC): ICD-10-CM

## 2024-11-04 DIAGNOSIS — I10 ESSENTIAL HYPERTENSION: ICD-10-CM

## 2024-11-04 DIAGNOSIS — Z00.00 MEDICARE ANNUAL WELLNESS VISIT, SUBSEQUENT: Primary | ICD-10-CM

## 2024-11-04 PROBLEM — I20.9 ANGINA PECTORIS (HCC): Status: RESOLVED | Noted: 2023-03-06 | Resolved: 2024-11-04

## 2024-11-04 NOTE — PROGRESS NOTES
Flowsheets. The following problems were reviewed today and where indicated follow up appointments were made and/or referrals ordered.    Positive Risk Factor Screenings with Interventions:    Fall Risk:  Do you feel unsteady or are you worried about falling? : (!) yes  2 or more falls in past year?: no  Fall with injury in past year?: no       Interventions:    Reviewed medications, home hazards, visual acuity, and co-morbidities that can increase risk for falls             Inactivity:  On average, how many days per week do you engage in moderate to strenuous exercise (like a brisk walk)?: 1 day (!) Abnormal       Interventions:  See AVS for additional education material    Poor Eating Habits/Diet:  Do you eat balanced/healthy meals regularly?: (!) No    Interventions:  See AVS for additional education material        Safety:  Do you have any tripping hazards - loose or unsecured carpets or rugs?: (!) Yes  Do you have non-slip mats or non-slip surfaces or shower bars or grab bars in your shower or bathtub?: (!) No    Interventions:  Patient declined any further interventions or treatment     Advanced Directives:  Do you have a Living Will?: (!) No    Intervention:  has NO advanced directive - information provided              Objective   Vitals:    11/04/24 1247 11/04/24 1335   BP: (!) 160/70 (!) 156/74   Site: Right Upper Arm Right Upper Arm   Cuff Size: Large Adult Large Adult   Pulse: 68    Resp: 16    Temp: 98.5 °F (36.9 °C)    TempSrc: Oral    Weight: 86.3 kg (190 lb 3.2 oz)    Height: 1.715 m (5' 7.5\")         General Appearance: alert and oriented to person, place and time, well developed and well- nourished, in no acute distress  Skin: warm and dry, no rash or erythema  Head: normocephalic and atraumatic  Eyes: pupils equal, round, and reactive to light, extraocular eye movements intact, conjunctivae normal  ENT: tympanic membrane, external ear and ear canal normal bilaterally, nose without deformity, nasal

## 2024-12-02 ENCOUNTER — TELEPHONE (OUTPATIENT)
Dept: CARDIOLOGY CLINIC | Age: 79
End: 2024-12-02

## 2024-12-02 NOTE — TELEPHONE ENCOUNTER
Device has reached RRT as of 11-17-24 - pt has dual pacer and is dependent/ 99%    Spoke with patient who would like gen change at [facility] with first available; scheduling sheet to Brittni for Dr. Gold.

## 2024-12-03 ENCOUNTER — ANTI-COAG VISIT (OUTPATIENT)
Age: 79
End: 2024-12-03
Payer: MEDICARE

## 2024-12-03 DIAGNOSIS — I48.20 CHRONIC ATRIAL FIBRILLATION (HCC): ICD-10-CM

## 2024-12-03 DIAGNOSIS — Z95.2 H/O MITRAL VALVE REPLACEMENT: Primary | ICD-10-CM

## 2024-12-03 DIAGNOSIS — Z79.01 ANTICOAGULATED ON COUMADIN: ICD-10-CM

## 2024-12-03 DIAGNOSIS — Z51.81 ENCOUNTER FOR THERAPEUTIC DRUG MONITORING: ICD-10-CM

## 2024-12-03 LAB — POC INR: 2.8 (ref 0.8–1.2)

## 2024-12-03 PROCEDURE — 85610 PROTHROMBIN TIME: CPT | Performed by: PHARMACIST

## 2024-12-03 PROCEDURE — 99211 OFF/OP EST MAY X REQ PHY/QHP: CPT | Performed by: PHARMACIST

## 2024-12-03 NOTE — PROGRESS NOTES
Medication Management Clinic  [facility]  Anticoagulation Clinic  [phone] (phone)  [phone] (fax)    Ms. Es Curtis is a 79 y.o.  female with history of Mechanical MVR who presents today for anticoagulation monitoring and adjustment.    Patient verifies current dosing regimen and tablet strength.  No missed or extra doses.  Patient denies s/s bleeding/bruising - typical LE edema, states SOB is slightly worse than usual. Advised her to review with CHF clinic or cardiology  No blood in urine or stool.  No dietary changes.  No changes in medication/OTC agents/Herbals.  No change in alcohol use or tobacco use.  Activity level lower due to weather.   Patient denies falls.  No vomiting/diarrhea or acute illness.   Pacemaker batterty change planned, not yet schedule.  She will notify [facility] of the date.     Assessment:   Lab Results   Component Value Date    INR 2.80 (H) 12/03/2024    INR 2.80 (H) 10/22/2024    INR 3.40 (H) 09/17/2024     INR therapeutic   Recent Labs     12/03/24  1018   INR 2.80*        Plan:  Continue Coumadin 1mg MWF and 1.5mg TThSaS.  Recheck INR in 6 week(s).  Patient reminded to call the Anticoagulation Clinic with any signs or symptoms of bleeding or with any medication changes.  Patient given instructions utilizing the teach back method.        After visit summary printed and reviewed with patient.      Discharged ambulatory in no apparent distress.    Paola Anderson, PharmD, BCPS, CACP, CTTS       For Pharmacy Admin Tracking Only  Time Spent (min): 20

## 2024-12-04 NOTE — PATIENT INSTRUCTIONS
If your physician has ordered procedures/ testing and you have not been contacted with in 2 days after your office visit,  please call our office.     If you have had your testing performed -  please allow 48 hours for our office to notify you of the results.  If you have not heard from us with in the 48 hrs,  please call the office.     Results for the 14 day and 30 day heart monitor - please allow 7-10 business days after the monitor is mailed back.    You may receive a survey regarding the care you received during your visit.  Your input is valuable to us.  We encourage you to complete and return your survey.  We hope you will choose us in the future for your healthcare needs.  Thank you for choosing [facility]!    Your Medical Assistant Today:  Nona REYNA     Your RN Today:  Brittni REYNA   Your Provider for Today: Dr. Gold  Ph. [phone] opt 1

## 2024-12-15 DIAGNOSIS — Z95.2 H/O MITRAL VALVE REPLACEMENT: Primary | ICD-10-CM

## 2024-12-16 RX ORDER — WARFARIN SODIUM 1 MG/1
TABLET ORAL
Qty: 135 TABLET | Refills: 3 | Status: SHIPPED | OUTPATIENT
Start: 2024-12-16

## 2024-12-16 NOTE — TELEPHONE ENCOUNTER
This medication refill is regarding a electronic request. Refill requested by patient.    Requested Prescriptions     Pending Prescriptions Disp Refills    warfarin (COUMADIN) 1 MG tablet [Pharmacy Med Name: Warfarin Sodium 1 MG Oral Tablet] 135 tablet 3     Sig: TAKE 1 AND 1/2 TABLETS BY MOUTH  DAILY TAKE AS DIRECTED BY [facility]       Date of last visit: 11/4/2024   Date of next visit: Visit date not found  Date of last refill: 11-21-23    Rx verified, ordered and set to EP.

## 2024-12-18 ENCOUNTER — OFFICE VISIT (OUTPATIENT)
Dept: CARDIOLOGY CLINIC | Age: 79
End: 2024-12-18

## 2024-12-18 VITALS
SYSTOLIC BLOOD PRESSURE: 148 MMHG | BODY MASS INDEX: 28.38 KG/M2 | OXYGEN SATURATION: 98 % | HEART RATE: 77 BPM | WEIGHT: 191.6 LBS | HEIGHT: 69 IN | DIASTOLIC BLOOD PRESSURE: 78 MMHG

## 2024-12-18 DIAGNOSIS — I48.20 CHRONIC ATRIAL FIBRILLATION (HCC): ICD-10-CM

## 2024-12-18 DIAGNOSIS — Z45.010 PACEMAKER AT END OF BATTERY LIFE: ICD-10-CM

## 2024-12-18 DIAGNOSIS — Z95.0 PACEMAKER: Primary | ICD-10-CM

## 2024-12-18 DIAGNOSIS — I50.32 CHRONIC DIASTOLIC CONGESTIVE HEART FAILURE, NYHA CLASS 2 (HCC): ICD-10-CM

## 2024-12-18 NOTE — PROGRESS NOTES
90 tablet 3    hydrALAZINE (APRESOLINE) 25 MG tablet Take 1 tablet by mouth 3 times daily 270 tablet 3    glimepiride (AMARYL) 2 MG tablet TAKE 2 TABLETS BY MOUTH TWICE  DAILY 360 tablet 3    olmesartan (BENICAR) 40 MG tablet TAKE 1 TABLET BY MOUTH DAILY 90 tablet 3    Dulaglutide (TRULICITY) 3 MG/0.5ML SOPN Inject 3 mg into the skin once a week 12 Adjustable Dose Pre-filled Pen Syringe 3    bumetanide (BUMEX) 1 MG tablet Take 1 tablet by mouth daily 90 tablet 3    ezetimibe (ZETIA) 10 MG tablet Take 1 tablet by mouth daily 90 tablet 3    carvedilol (COREG) 3.125 MG tablet Take 1 tablet by mouth 2 times daily 180 tablet 3    Acetaminophen (TYLENOL PO) Take by mouth See Admin Instructions Indications: Pain Don't take more than 3,000 mg each day.      nitroGLYCERIN (NITROLINGUAL) 0.4 MG/SPRAY 0.4 mg spray USE 1 SPRAY UNDER THE TONGUE  EVERY 5 MINUTES AS NEEDED FOR  CHEST PAIN. IF THIRD SPRAY DOES  NOT RELIEVE PAIN, CALL 911 (FOR  ANGINA PECTORIS) 4.9 g 11    mometasone (ELOCON) 0.1 % cream Apply topically daily. (Patient taking differently: daily as needed Apply topically daily prn) 50 g 3    metroNIDAZOLE (METROCREAM) 0.75 % cream Indications: Skin Abnormalities Apply topically daily as needed 60 g 3    Lancets MISC Freestyle Freedom lancets, use daily, DX: E11.21 100 each 3    Cholecalciferol (VITAMIN D3) 50 MCG (2000 UT) CAPS Take 1 capsule by mouth daily      Biotin (BIOTIN 5000) 5 MG CAPS Take 5,000 mcg by mouth daily      blood glucose monitor strips Dispense brand per insurance and patient preference.  Test daily. 100 strip 3    Handicap Placard MISC by Does not apply route Duration 5 years.  Dx:  CHF 1 each 0    glucose monitoring (FREESTYLE FREEDOM) kit 1 kit by Does not apply route daily Glucometer brand per insurance and patient preference. 1 kit 0    CPAP Machine MISC by Does not apply route Please change CPAP pressure to 10 cm H20.  Please send download in 2 weeks 1 each 0     No current

## 2024-12-19 ENCOUNTER — TELEPHONE (OUTPATIENT)
Age: 79
End: 2024-12-19

## 2024-12-19 NOTE — TELEPHONE ENCOUNTER
Patient called and states her pacemaker battery change is scheduled for 1/6/25 and she does NOT need to hold Coumadin for procedure.      Also note from 12/18/24 cardiology visit, \" Hx of mechanical MVR.  She will remain on warfarin and I recommended she not hold this prior to her procedure.  Her INR has been running in the 2.5-3. range.  We will maintain the scope\"    Advised Es to continue Coumadin instructions as given at last appointment and will keep next appointment 1/14/25. She states understanding.    For Pharmacy Admin Tracking Only    Time Spent (min): 5

## 2025-01-03 ENCOUNTER — PREP FOR PROCEDURE (OUTPATIENT)
Dept: CARDIOLOGY | Age: 80
End: 2025-01-03

## 2025-01-03 RX ORDER — SODIUM CHLORIDE 9 MG/ML
INJECTION, SOLUTION INTRAVENOUS PRN
Status: CANCELLED | OUTPATIENT
Start: 2025-01-03

## 2025-01-03 RX ORDER — SODIUM CHLORIDE 9 MG/ML
INJECTION, SOLUTION INTRAVENOUS CONTINUOUS
Status: CANCELLED | OUTPATIENT
Start: 2025-01-03

## 2025-01-03 RX ORDER — SODIUM CHLORIDE 0.9 % (FLUSH) 0.9 %
5-40 SYRINGE (ML) INJECTION PRN
Status: CANCELLED | OUTPATIENT
Start: 2025-01-03

## 2025-01-03 RX ORDER — SODIUM CHLORIDE 0.9 % (FLUSH) 0.9 %
5-40 SYRINGE (ML) INJECTION EVERY 12 HOURS SCHEDULED
Status: CANCELLED | OUTPATIENT
Start: 2025-01-03

## 2025-01-06 ENCOUNTER — TELEPHONE (OUTPATIENT)
Age: 80
End: 2025-01-06

## 2025-01-06 ENCOUNTER — HOSPITAL ENCOUNTER (OUTPATIENT)
Age: 80
Setting detail: OUTPATIENT SURGERY
Discharge: HOME | End: 2025-01-06
Attending: INTERNAL MEDICINE | Admitting: INTERNAL MEDICINE
Payer: MEDICARE

## 2025-01-06 VITALS
TEMPERATURE: 98.2 F | HEART RATE: 60 BPM | SYSTOLIC BLOOD PRESSURE: 135 MMHG | HEIGHT: 69 IN | RESPIRATION RATE: 11 BRPM | BODY MASS INDEX: 27.95 KG/M2 | WEIGHT: 188.71 LBS | DIASTOLIC BLOOD PRESSURE: 56 MMHG | OXYGEN SATURATION: 95 %

## 2025-01-06 DIAGNOSIS — Z45.010 PACEMAKER AT END OF BATTERY LIFE: ICD-10-CM

## 2025-01-06 LAB
ANION GAP SERPL CALC-SCNC: 17 MEQ/L (ref 8–16)
APTT PPP: 64.5 SECONDS (ref 22–38)
BUN SERPL-MCNC: 48 MG/DL (ref 7–22)
CALCIUM SERPL-MCNC: 10 MG/DL (ref 8.5–10.5)
CHLORIDE SERPL-SCNC: 105 MEQ/L (ref 98–111)
CO2 SERPL-SCNC: 19 MEQ/L (ref 23–33)
CREAT SERPL-MCNC: 1.6 MG/DL (ref 0.4–1.2)
DEPRECATED RDW RBC AUTO: 48.2 FL (ref 35–45)
EKG ATRIAL RATE: 375 BPM
EKG Q-T INTERVAL: 438 MS
EKG QRS DURATION: 160 MS
EKG QTC CALCULATION (BAZETT): 473 MS
EKG R AXIS: -115 DEGREES
EKG T AXIS: 71 DEGREES
EKG VENTRICULAR RATE: 70 BPM
ERYTHROCYTE [DISTWIDTH] IN BLOOD BY AUTOMATED COUNT: 14.1 % (ref 11.5–14.5)
GFR SERPL CREATININE-BSD FRML MDRD: 32 ML/MIN/1.73M2
GLUCOSE SERPL-MCNC: 169 MG/DL (ref 70–108)
HCT VFR BLD AUTO: 44.6 % (ref 37–47)
HGB BLD-MCNC: 14.3 GM/DL (ref 12–16)
INR PPP: 4.95 (ref 0.85–1.13)
INR PPP: 5.31 (ref 0.85–1.13)
MCH RBC QN AUTO: 29.7 PG (ref 26–33)
MCHC RBC AUTO-ENTMCNC: 32.1 GM/DL (ref 32.2–35.5)
MCV RBC AUTO: 92.5 FL (ref 81–99)
PLATELET # BLD AUTO: 227 THOU/MM3 (ref 130–400)
PMV BLD AUTO: 10.8 FL (ref 9.4–12.4)
POTASSIUM SERPL-SCNC: 4.2 MEQ/L (ref 3.5–5.2)
RBC # BLD AUTO: 4.82 MILL/MM3 (ref 4.2–5.4)
SODIUM SERPL-SCNC: 141 MEQ/L (ref 135–145)
WBC # BLD AUTO: 9.2 THOU/MM3 (ref 4.8–10.8)

## 2025-01-06 PROCEDURE — 80048 BASIC METABOLIC PNL TOTAL CA: CPT

## 2025-01-06 PROCEDURE — 93005 ELECTROCARDIOGRAM TRACING: CPT | Performed by: STUDENT IN AN ORGANIZED HEALTH CARE EDUCATION/TRAINING PROGRAM

## 2025-01-06 PROCEDURE — 85730 THROMBOPLASTIN TIME PARTIAL: CPT

## 2025-01-06 PROCEDURE — 93010 ELECTROCARDIOGRAM REPORT: CPT | Performed by: INTERNAL MEDICINE

## 2025-01-06 PROCEDURE — 36415 COLL VENOUS BLD VENIPUNCTURE: CPT

## 2025-01-06 PROCEDURE — 85610 PROTHROMBIN TIME: CPT

## 2025-01-06 PROCEDURE — 85027 COMPLETE CBC AUTOMATED: CPT

## 2025-01-06 RX ORDER — SODIUM CHLORIDE 9 MG/ML
INJECTION, SOLUTION INTRAVENOUS CONTINUOUS
Status: DISCONTINUED | OUTPATIENT
Start: 2025-01-06 | End: 2025-01-06 | Stop reason: HOSPADM

## 2025-01-06 RX ORDER — SODIUM CHLORIDE 9 MG/ML
INJECTION, SOLUTION INTRAVENOUS PRN
Status: DISCONTINUED | OUTPATIENT
Start: 2025-01-06 | End: 2025-01-06 | Stop reason: HOSPADM

## 2025-01-06 RX ORDER — SODIUM CHLORIDE 0.9 % (FLUSH) 0.9 %
5-40 SYRINGE (ML) INJECTION EVERY 12 HOURS SCHEDULED
Status: DISCONTINUED | OUTPATIENT
Start: 2025-01-06 | End: 2025-01-06 | Stop reason: HOSPADM

## 2025-01-06 RX ORDER — SODIUM CHLORIDE 0.9 % (FLUSH) 0.9 %
5-40 SYRINGE (ML) INJECTION PRN
Status: DISCONTINUED | OUTPATIENT
Start: 2025-01-06 | End: 2025-01-06 | Stop reason: HOSPADM

## 2025-01-06 ASSESSMENT — PAIN SCALES - GENERAL: PAINLEVEL_OUTOF10: 0

## 2025-01-06 NOTE — PROGRESS NOTES
1100 Patient admitted to 2E02  ambulatory for ICD .  Patient NPO. Patient accompanied by brother, Guille.  Vital signs obtained.   Assessment and data collection intiated.   Oriented to room.  Policies and procedures for 2E explained.   All questions answered with no further questions at this time.   Fall prevention and safety precautions discussed with patient.

## 2025-01-06 NOTE — DISCHARGE INSTRUCTIONS
Hold tonights dose of coumadin.     Get in contact with coumadin clinic for follow up dosing instructions.  (They should be contacting you, a voicemail was left for them.)    Dr. Gold's office will contact you to reschedule procedure.

## 2025-01-06 NOTE — TELEPHONE ENCOUNTER
Received a call from cath lab.  Patient was scheduled for a pacemaker battery change today, but procedure was cancelled due to INR of 5.31.  Patient reports diarrhea x 4 days last week when she was feeling sick.  Instructed patient to HOLD x 2 and rescheduled her to be seen on 1/8/25.     Samantha Brewster, TanD, BCPS  1/6/2025  2:16 PM

## 2025-01-06 NOTE — PROGRESS NOTES
Pt presented for generator change, possible LBB lead upgrade.  INR was 4.9, and on repeat 5.2.  I recommended holding tonight's dose and discussing with her INR clinic re: dose adjustment from tomorrow.  We will plan to reschedule procedure with INR prior to scheduled appointment.  Goal INR 2-2.5 for procedure.   Pt verbalized understanding.  Pt will be discharged to home.

## 2025-01-06 NOTE — FLOWSHEET NOTE
01/06/25 1356   AVS Reviewed   AVS & discharge instructions reviewed with patient and/or representative? Yes   Reviewed instructions with Patient   Level of Understanding Questions answered;Teach back completed;Verbalized understanding;Return demonstration       Pt has no further questions at this time.  Belongings gathered.  Pt leaving for home with brother.

## 2025-01-06 NOTE — PLAN OF CARE
Problem: Pain  Goal: Verbalizes/displays adequate comfort level or baseline comfort level  Outcome: Progressing     Problem: ABCDS Injury Assessment  Goal: Absence of physical injury  Outcome: Progressing     Problem: Cardiovascular - Adult  Goal: Maintains optimal cardiac output and hemodynamic stability  Outcome: Progressing  Flowsheets (Taken 1/6/2025 1111)  Maintains optimal cardiac output and hemodynamic stability:   Monitor blood pressure and heart rate   Monitor urine output and notify Licensed Independent Practitioner for values outside of normal range     Problem: Discharge Planning  Goal: Discharge to home or other facility with appropriate resources  Outcome: Progressing  Flowsheets (Taken 1/6/2025 1111)  Discharge to home or other facility with appropriate resources:   Identify barriers to discharge with patient and caregiver   Arrange for needed discharge resources and transportation as appropriate   Care plan reviewed with patient.  Patient verbalizes understanding of the plan of care and contributes to goal setting.

## 2025-01-06 NOTE — PROGRESS NOTES
Procedure was cancelled due to elevated INR.  Coumadin clinic was notified, and this RN left a voicemail to contact patient for further instructions on coumadin dosing and recheck on INR.  Pt aware to hold tonight's dose per Dr. Gold.

## 2025-01-07 ENCOUNTER — TELEPHONE (OUTPATIENT)
Dept: CARDIOLOGY CLINIC | Age: 80
End: 2025-01-07

## 2025-01-07 NOTE — TELEPHONE ENCOUNTER
Patient hit GILES  11.17.2024  Was scheduled for BIV upgrade 01.06.2025 canceled due to High INR  5.31    Per  CC note  Received a call from cath lab.  Patient was scheduled for a pacemaker battery change today, but procedure was cancelled due to INR of 5.31.  Patient reports diarrhea x 4 days last week when she was feeling sick.  Instructed patient to HOLD x 2 and rescheduled her to be seen on 1/8/25.      Samantha Brewster, TanD, BCPS  1/6/2025  2:16 PM             Will continue to track this.  And will reschedule the BIV once the INR is in range.   FYI for my scheduling- Mechanical valve

## 2025-01-08 ENCOUNTER — ANTI-COAG VISIT (OUTPATIENT)
Age: 80
End: 2025-01-08
Payer: MEDICARE

## 2025-01-08 DIAGNOSIS — Z51.81 ENCOUNTER FOR THERAPEUTIC DRUG MONITORING: ICD-10-CM

## 2025-01-08 DIAGNOSIS — Z79.01 ANTICOAGULATED ON COUMADIN: ICD-10-CM

## 2025-01-08 DIAGNOSIS — I48.20 CHRONIC ATRIAL FIBRILLATION (HCC): ICD-10-CM

## 2025-01-08 DIAGNOSIS — Z95.2 H/O MITRAL VALVE REPLACEMENT: Primary | ICD-10-CM

## 2025-01-08 LAB — POC INR: 3 (ref 0.8–1.2)

## 2025-01-08 PROCEDURE — 99211 OFF/OP EST MAY X REQ PHY/QHP: CPT

## 2025-01-08 PROCEDURE — 85610 PROTHROMBIN TIME: CPT

## 2025-01-08 NOTE — PROGRESS NOTES
Medication Management Clinic  [facility]  Anticoagulation Clinic  [phone] (phone)  [phone] (fax)    Ms. Es Curtis is a 79 y.o.  female with history of Afib, Mechanical MVR who presents today for anticoagulation monitoring and adjustment.    Patient verifies current dosing regimen and tablet strength.  Held Coumadin 1/6 and 1/7 as directed by clinic  Nosebleed on 1/6 following cancelled procedure, self-resolved  No blood in urine or stool.  Only had liquid diet (pedialyte and boost) for duration of GI illness last week  No changes in medication/OTC agents/Herbals.  No change in alcohol use or tobacco use.  No change in activity level.  Patient denies falls.  Significant diarrhea for 1 week, last day of illness was 1/2/25  No Procedures scheduled in the future at this time. Reminded patient to call and inform clinic if pacemaker battery change is rescheduled    Assessment:   Lab Results   Component Value Date    INR 3.00 (H) 01/08/2025    INR 5.31 (HH) 01/06/2025    INR 4.95 (H) 01/06/2025     INR therapeutic   Recent Labs     01/08/25  1441   INR 3.00*      Goal INR: 2.5-3.5    Plan:  Continue Coumadin 1 mg every Mon, Wed, Fri; 1.5 mg all other days.  Recheck INR in 1 week(s). Plan discussed with Parul Coffman PharmD. Patient reminded to call the Anticoagulation Clinic with any signs or symptoms of bleeding or with any medication changes.  Patient given instructions utilizing the teach back method.     After visit summary printed and reviewed with patient.      Discharged ambulatory in no apparent distress.    For Pharmacy Admin Tracking Only    Time Spent (min): 20    Zina Wolf PharmD  1/8/2025 at 3:08 PM

## 2025-01-09 NOTE — TELEPHONE ENCOUNTER
Procedure: BIV - upgrade-  MDT  Date: 01.17.2025  Arrival Time: 10am  Meds to Hold: Jardiance 3 days, diabetes meds the morning.         Instructions verbalized to the patient.  Instructions emailed to the patient thru [facility]          [facility]   [phone] Opt 1  Patient Instructions- Pacemaker/ ICD/ EP Procedures    Patient: Es Curtis  Procedure: BIV upgrade-  MDT  Date of Procedure: 01.17.2025  Arrival Time: 10am    Follow up Appointments:  Incision/ device check-  01.27.2025 a 330pm    Diet:   Nothing to eat or drink after midnight the night before your procedure is scheduled.  You may take your AM medications with a small sip of water the morning of the procedure (unless directed otherwise)  Medications:    Medications to Hold: Jardiance 3 days, diabetes meds the morning.   Continue on your regular medications unless otherwise instructed by the office.  Please notify us of ANY change in Allergies or Medications.  If you are a diabetic, do NOT take your diabetes medications the day of the procedure.   If you are using a CPAP, please bring your machine with you to the hospital.   Admission:   Please report to the second floor - [facility].   Please BRING YOUR ACTUAL BOTTLE OF MEDICATIONS with you to the hospital.   Bring your Photo ID & Insurance Cards.   Bring an overnight bag with pajamas, robe, toiletry items in case you spend the night, as most procedures do require a 23 hour stay.   Our staff will take care of any authorizations/ Pre- auth needs for your procedure if required.                   Post implant Pacemaker and Defibrillator (ICD)   Instructions for Dr. Gold's patients        The pacemaker/ ICD may bulge slightly under the skin, this is normal and common after surgery.  This will lesson over the next few weeks.     Call the office at [phone] if you have any of the following:      Increased Swelling     Increased Redness

## 2025-01-13 ENCOUNTER — TELEPHONE (OUTPATIENT)
Age: 80
End: 2025-01-13

## 2025-01-13 NOTE — TELEPHONE ENCOUNTER
Patient called to report that her pacemaker battery replacement has been rescheduled for 1/23.  Patient would like to reschedule her next INR check closer to this procedure.  Rescheduled patient for 1/20/25.    Samantha Brewster, TanD, BCPS  1/13/2025  4:16 PM

## 2025-01-14 ENCOUNTER — APPOINTMENT (OUTPATIENT)
Age: 80
End: 2025-01-14
Payer: MEDICARE

## 2025-01-20 ENCOUNTER — ANTI-COAG VISIT (OUTPATIENT)
Age: 80
End: 2025-01-20
Payer: MEDICARE

## 2025-01-20 DIAGNOSIS — I48.20 CHRONIC ATRIAL FIBRILLATION (HCC): ICD-10-CM

## 2025-01-20 DIAGNOSIS — Z79.01 ANTICOAGULATED ON COUMADIN: ICD-10-CM

## 2025-01-20 DIAGNOSIS — Z51.81 ENCOUNTER FOR THERAPEUTIC DRUG MONITORING: ICD-10-CM

## 2025-01-20 DIAGNOSIS — Z95.2 H/O MITRAL VALVE REPLACEMENT: Primary | ICD-10-CM

## 2025-01-20 LAB — POC INR: 3 (ref 0.8–1.2)

## 2025-01-20 PROCEDURE — 99211 OFF/OP EST MAY X REQ PHY/QHP: CPT

## 2025-01-20 PROCEDURE — 85610 PROTHROMBIN TIME: CPT

## 2025-01-20 NOTE — PROGRESS NOTES
Medication Management Clinic  [facility]  Anticoagulation Clinic  [phone] (phone)  [phone] (fax)    Ms. Es Curtis is a 79 y.o.  female with history of Afib, Mechanical MVR who presents today for anticoagulation monitoring and adjustment.    Patient verifies current dosing regimen and tablet strength.  No missed or extra doses.  Patient denies s/s bleeding/bruising/swelling/SOB  No blood in urine or stool.  No dietary changes.  No changes in medication/OTC agents/Herbals.  No change in alcohol use or tobacco use.  No change in activity level.  Patient denies falls.  No vomiting/diarrhea or acute illness.   ICD battery placement on 1/23/25 w/ Dr. Gold, Coumadin will not be held.     Assessment:   Lab Results   Component Value Date    INR 3.00 (H) 01/20/2025    INR 3.00 (H) 01/08/2025    INR 5.31 (HH) 01/06/2025     INR therapeutic   Recent Labs     01/20/25  1326   INR 3.00*       Plan:  Continue Coumadin 1 mg on MWF and 1.5 mg all other days.  Recheck INR in 2 week(s).  Patient reminded to call the Anticoagulation Clinic with any signs or symptoms of bleeding or with any medication changes.  Patient given instructions utilizing the teach back method.       After visit summary printed and reviewed with patient.      Discharged ambulatory in no apparent distress.    For Pharmacy Admin Tracking Only  Time Spent (min): Giulia Chapman PharmD  PGY2 Resident   1/20/2025 1:29 PM

## 2025-01-21 ENCOUNTER — TELEPHONE (OUTPATIENT)
Dept: CARDIOLOGY CLINIC | Age: 80
End: 2025-01-21

## 2025-01-21 NOTE — TELEPHONE ENCOUNTER
The dye is ok.  Did she have surgery for breast cancer? Were lymph nodes removed? If so, was surgery performed before or after her current pacemaker implant?

## 2025-01-21 NOTE — TELEPHONE ENCOUNTER
Pt called with a question for Dr. Gold regarding her procedure on Thursday from pacer to BiV ICD. Pt stated she has a history of left breast cancer and was told there's a dye that is used during procedure and wanted to make sure this was still okay for her to receive.    Please advise, thank you!

## 2025-01-22 RX ORDER — SODIUM CHLORIDE 0.9 % (FLUSH) 0.9 %
5-40 SYRINGE (ML) INJECTION PRN
Status: CANCELLED | OUTPATIENT
Start: 2025-01-22

## 2025-01-22 RX ORDER — SODIUM CHLORIDE 9 MG/ML
INJECTION, SOLUTION INTRAVENOUS CONTINUOUS
Status: CANCELLED | OUTPATIENT
Start: 2025-01-22

## 2025-01-22 RX ORDER — SODIUM CHLORIDE 0.9 % (FLUSH) 0.9 %
5-40 SYRINGE (ML) INJECTION EVERY 12 HOURS SCHEDULED
Status: CANCELLED | OUTPATIENT
Start: 2025-01-22

## 2025-01-22 RX ORDER — SODIUM CHLORIDE 9 MG/ML
INJECTION, SOLUTION INTRAVENOUS PRN
Status: CANCELLED | OUTPATIENT
Start: 2025-01-22

## 2025-01-22 NOTE — PROGRESS NOTES
..  Procedure is scheduled for 1/23/25, admission time is 1000   Please arrive 15 minutes early  You will register on 2nd floor at the [facility]  NPO after midnight  Bring drivers license and insurance information  Wear comfortable clean clothes  Shower morning of and night before with liquid antibacterial soap  Remove jewelry   May have to stay overnight- bring an overnight bag.  Leave valuables at home such as cash or credit cards  Bring medications in original bottles - do not take diabetic meds days of take medications  as directed by Dr Gold  Made aware of visitors limit to 2 at a time  Follow all instructions given by your physician  Please notify doctor office if you need to cancel or reschedule your procedure   needed at discharge

## 2025-01-22 NOTE — TELEPHONE ENCOUNTER
Per pacer implant info can and leads were implanted 2017,  looks like breast CA was 2013    Es Curtis  12:06 PM  1/9/2013 surgery Nasreen     Left Breast Lumpectomy      Indications: This patient presents with history of Left breast cancer with clinically negative axillary lymph node exam.      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  The patient has a maternal and paternal aunt with breast cancer.  She does have a history of fibrocystic breast disease and using BCP for a total of 2 years.  She does not have children.  She did not have prior neck or chest radiation exposure.  Menarche age 12 and menopause age 52.  She underwent a routine screening mammogram on 1/28/12 which demonstrated abnormality.  A biopsy revealed ER+ and WI+ DCIS.  A lumpectomy on 1/9/13 and re-excision on 1/15/13 revealed a 2mm DCIS.  mammocyte brachytherapy was administered 1/23/13-1/29/13 with a total of 3400 cGy in 10 fractions.

## 2025-01-23 ENCOUNTER — HOSPITAL ENCOUNTER (OUTPATIENT)
Age: 80
Setting detail: OUTPATIENT SURGERY
Discharge: HOME HEALTH | End: 2025-01-23
Attending: INTERNAL MEDICINE | Admitting: INTERNAL MEDICINE
Payer: MEDICARE

## 2025-01-23 ENCOUNTER — APPOINTMENT (OUTPATIENT)
Dept: GENERAL RADIOLOGY | Age: 80
End: 2025-01-23
Attending: INTERNAL MEDICINE
Payer: MEDICARE

## 2025-01-23 ENCOUNTER — ANESTHESIA EVENT (OUTPATIENT)
Age: 80
End: 2025-01-23
Payer: MEDICARE

## 2025-01-23 ENCOUNTER — ANESTHESIA (OUTPATIENT)
Age: 80
End: 2025-01-23
Payer: MEDICARE

## 2025-01-23 VITALS
TEMPERATURE: 97.8 F | HEART RATE: 60 BPM | DIASTOLIC BLOOD PRESSURE: 49 MMHG | WEIGHT: 181.22 LBS | SYSTOLIC BLOOD PRESSURE: 130 MMHG | RESPIRATION RATE: 18 BRPM | OXYGEN SATURATION: 98 % | HEIGHT: 69 IN | BODY MASS INDEX: 26.84 KG/M2

## 2025-01-23 DIAGNOSIS — I50.9 CHF (CONGESTIVE HEART FAILURE), NYHA CLASS III (HCC): ICD-10-CM

## 2025-01-23 LAB
ANION GAP SERPL CALC-SCNC: 12 MEQ/L (ref 8–16)
APTT PPP: 47.7 SECONDS (ref 22–38)
BUN SERPL-MCNC: 28 MG/DL (ref 7–22)
CALCIUM SERPL-MCNC: 9.5 MG/DL (ref 8.5–10.5)
CHLORIDE SERPL-SCNC: 107 MEQ/L (ref 98–111)
CO2 SERPL-SCNC: 21 MEQ/L (ref 23–33)
CREAT SERPL-MCNC: 1.4 MG/DL (ref 0.4–1.2)
DEPRECATED RDW RBC AUTO: 47.5 FL (ref 35–45)
ECHO BSA: 2 M2
ERYTHROCYTE [DISTWIDTH] IN BLOOD BY AUTOMATED COUNT: 14 % (ref 11.5–14.5)
GFR SERPL CREATININE-BSD FRML MDRD: 38 ML/MIN/1.73M2
GLUCOSE SERPL-MCNC: 179 MG/DL (ref 70–108)
HCT VFR BLD AUTO: 39.7 % (ref 37–47)
HGB BLD-MCNC: 13.1 GM/DL (ref 12–16)
INR PPP: 2.66 (ref 0.85–1.13)
MCH RBC QN AUTO: 30.5 PG (ref 26–33)
MCHC RBC AUTO-ENTMCNC: 33 GM/DL (ref 32.2–35.5)
MCV RBC AUTO: 92.5 FL (ref 81–99)
PLATELET # BLD AUTO: 179 THOU/MM3 (ref 130–400)
PMV BLD AUTO: 11.2 FL (ref 9.4–12.4)
POTASSIUM SERPL-SCNC: 4 MEQ/L (ref 3.5–5.2)
RBC # BLD AUTO: 4.29 MILL/MM3 (ref 4.2–5.4)
SODIUM SERPL-SCNC: 140 MEQ/L (ref 135–145)
WBC # BLD AUTO: 7.3 THOU/MM3 (ref 4.8–10.8)

## 2025-01-23 PROCEDURE — 6360000002 HC RX W HCPCS

## 2025-01-23 PROCEDURE — 36415 COLL VENOUS BLD VENIPUNCTURE: CPT

## 2025-01-23 PROCEDURE — 33229 REMV&REPLC PM GEN MULT LEADS: CPT | Performed by: INTERNAL MEDICINE

## 2025-01-23 PROCEDURE — 2709999900 HC NON-CHARGEABLE SUPPLY: Performed by: INTERNAL MEDICINE

## 2025-01-23 PROCEDURE — C1887 CATHETER, GUIDING: HCPCS | Performed by: INTERNAL MEDICINE

## 2025-01-23 PROCEDURE — 85730 THROMBOPLASTIN TIME PARTIAL: CPT

## 2025-01-23 PROCEDURE — 33224 INSERT PACING LEAD & CONNECT: CPT | Performed by: INTERNAL MEDICINE

## 2025-01-23 PROCEDURE — 2580000003 HC RX 258: Performed by: NURSE PRACTITIONER

## 2025-01-23 PROCEDURE — 7100000010 HC PHASE II RECOVERY - FIRST 15 MIN: Performed by: INTERNAL MEDICINE

## 2025-01-23 PROCEDURE — 6360000002 HC RX W HCPCS: Performed by: NURSE PRACTITIONER

## 2025-01-23 PROCEDURE — C2621 PMKR, OTHER THAN SING/DUAL: HCPCS | Performed by: INTERNAL MEDICINE

## 2025-01-23 PROCEDURE — 85610 PROTHROMBIN TIME: CPT

## 2025-01-23 PROCEDURE — C1898 LEAD, PMKR, OTHER THAN TRANS: HCPCS | Performed by: INTERNAL MEDICINE

## 2025-01-23 PROCEDURE — 7100000011 HC PHASE II RECOVERY - ADDTL 15 MIN: Performed by: INTERNAL MEDICINE

## 2025-01-23 PROCEDURE — 3700000000 HC ANESTHESIA ATTENDED CARE: Performed by: INTERNAL MEDICINE

## 2025-01-23 PROCEDURE — 2500000003 HC RX 250 WO HCPCS

## 2025-01-23 PROCEDURE — 80048 BASIC METABOLIC PNL TOTAL CA: CPT

## 2025-01-23 PROCEDURE — 2500000003 HC RX 250 WO HCPCS: Performed by: NURSE PRACTITIONER

## 2025-01-23 PROCEDURE — 6360000002 HC RX W HCPCS: Performed by: INTERNAL MEDICINE

## 2025-01-23 PROCEDURE — 3700000001 HC ADD 15 MINUTES (ANESTHESIA): Performed by: INTERNAL MEDICINE

## 2025-01-23 PROCEDURE — 99153 MOD SED SAME PHYS/QHP EA: CPT | Performed by: INTERNAL MEDICINE

## 2025-01-23 PROCEDURE — C1769 GUIDE WIRE: HCPCS | Performed by: INTERNAL MEDICINE

## 2025-01-23 PROCEDURE — 71045 X-RAY EXAM CHEST 1 VIEW: CPT

## 2025-01-23 PROCEDURE — 99152 MOD SED SAME PHYS/QHP 5/>YRS: CPT | Performed by: INTERNAL MEDICINE

## 2025-01-23 PROCEDURE — 6360000004 HC RX CONTRAST MEDICATION: Performed by: INTERNAL MEDICINE

## 2025-01-23 PROCEDURE — C1894 INTRO/SHEATH, NON-LASER: HCPCS | Performed by: INTERNAL MEDICINE

## 2025-01-23 PROCEDURE — 85027 COMPLETE CBC AUTOMATED: CPT

## 2025-01-23 DEVICE — IMPLANTABLE DEVICE: Type: IMPLANTABLE DEVICE | Status: FUNCTIONAL

## 2025-01-23 DEVICE — LEAD 3830 US MKT/ 69CM MRI LBBAP
Type: IMPLANTABLE DEVICE | Status: FUNCTIONAL
Brand: SELECTSECURE™ MRI SURESCAN™

## 2025-01-23 RX ORDER — PROPOFOL 10 MG/ML
INJECTION, EMULSION INTRAVENOUS
Status: DISCONTINUED | OUTPATIENT
Start: 2025-01-23 | End: 2025-01-23 | Stop reason: SDUPTHER

## 2025-01-23 RX ORDER — SODIUM CHLORIDE 9 MG/ML
INJECTION, SOLUTION INTRAVENOUS CONTINUOUS
Status: DISCONTINUED | OUTPATIENT
Start: 2025-01-23 | End: 2025-01-23

## 2025-01-23 RX ORDER — SODIUM CHLORIDE 0.9 % (FLUSH) 0.9 %
5-40 SYRINGE (ML) INJECTION PRN
Status: DISCONTINUED | OUTPATIENT
Start: 2025-01-23 | End: 2025-01-23 | Stop reason: HOSPADM

## 2025-01-23 RX ORDER — SODIUM CHLORIDE 9 MG/ML
INJECTION, SOLUTION INTRAVENOUS PRN
Status: DISCONTINUED | OUTPATIENT
Start: 2025-01-23 | End: 2025-01-23 | Stop reason: HOSPADM

## 2025-01-23 RX ORDER — FENTANYL CITRATE 50 UG/ML
INJECTION, SOLUTION INTRAMUSCULAR; INTRAVENOUS
Status: DISCONTINUED | OUTPATIENT
Start: 2025-01-23 | End: 2025-01-23 | Stop reason: SDUPTHER

## 2025-01-23 RX ORDER — IOPAMIDOL 755 MG/ML
INJECTION, SOLUTION INTRAVASCULAR PRN
Status: DISCONTINUED | OUTPATIENT
Start: 2025-01-23 | End: 2025-01-23 | Stop reason: HOSPADM

## 2025-01-23 RX ORDER — SODIUM CHLORIDE 0.9 % (FLUSH) 0.9 %
5-40 SYRINGE (ML) INJECTION EVERY 12 HOURS SCHEDULED
Status: DISCONTINUED | OUTPATIENT
Start: 2025-01-23 | End: 2025-01-23 | Stop reason: HOSPADM

## 2025-01-23 RX ORDER — EPHEDRINE SULFATE/0.9% NACL/PF 25 MG/5 ML
SYRINGE (ML) INTRAVENOUS
Status: DISCONTINUED | OUTPATIENT
Start: 2025-01-23 | End: 2025-01-23 | Stop reason: SDUPTHER

## 2025-01-23 RX ADMIN — FENTANYL CITRATE 25 MCG: 50 INJECTION, SOLUTION INTRAMUSCULAR; INTRAVENOUS at 12:23

## 2025-01-23 RX ADMIN — PROPOFOL 50 MCG/KG/MIN: 10 INJECTION, EMULSION INTRAVENOUS at 12:16

## 2025-01-23 RX ADMIN — PROPOFOL 20 MG: 10 INJECTION, EMULSION INTRAVENOUS at 12:37

## 2025-01-23 RX ADMIN — PROPOFOL 20 MG: 10 INJECTION, EMULSION INTRAVENOUS at 12:49

## 2025-01-23 RX ADMIN — WATER 2000 MG: 1 INJECTION INTRAMUSCULAR; INTRAVENOUS; SUBCUTANEOUS at 12:38

## 2025-01-23 RX ADMIN — PROPOFOL 20 MG: 10 INJECTION, EMULSION INTRAVENOUS at 12:20

## 2025-01-23 RX ADMIN — EPHEDRINE SULFATE 5 MG: 5 INJECTION INTRAVENOUS at 12:45

## 2025-01-23 RX ADMIN — SODIUM CHLORIDE: 9 INJECTION, SOLUTION INTRAVENOUS at 10:25

## 2025-01-23 RX ADMIN — PROPOFOL 20 MG: 10 INJECTION, EMULSION INTRAVENOUS at 12:59

## 2025-01-23 RX ADMIN — PROPOFOL 20 MG: 10 INJECTION, EMULSION INTRAVENOUS at 12:28

## 2025-01-23 RX ADMIN — PROPOFOL 20 MG: 10 INJECTION, EMULSION INTRAVENOUS at 12:55

## 2025-01-23 RX ADMIN — EPHEDRINE SULFATE 5 MG: 5 INJECTION INTRAVENOUS at 12:39

## 2025-01-23 RX ADMIN — PROPOFOL 20 MG: 10 INJECTION, EMULSION INTRAVENOUS at 12:45

## 2025-01-23 NOTE — DISCHARGE INSTRUCTIONS
interference with your device, you must pass through any security devices or rocha (airports, department stores, and other public places) within 10 seconds.          Microwave ovens, televisions, electric tools and gardening equipment should not cause problems.        If you have any questions about equipment not listed, call the  of your device or the pacemaker/Implantable Cardioverter-Defibrillator (ICD) clinic at [facility].     GENERAL INSTRUCTIONS     Always carry your device Identification card with you.   The device  will mail a permanent ID card to you in 6-8 weeks.     Keep your follow up appointment with your doctor and the device clinic to be sure your device and the leads are working properly.      Always take your medications at the times prescribed by your doctor.        Always tell medical personnel that you have a device. Memorize the name of the device company ex. [employer], [employer], [employer] , [employer]    Follow up appointments     Make a follow up appointment with your doctor for 1-2 weeks after your implantation.      If you have any problems or questions about your device, call the Pacemaker /Implantable Cardioverter Defibrillator Device clinic.      Clinic hours:  Monday through Friday 8AM to 4 Pm.       IN case of an emergency , call 911.                   SEDATION/ANALGESIA INFORMATION/HOME GOING ADVICE    SEDATION / ANALGESIA INFORMATION / HOME GOING ADVICE  You have received the sedation/analgesia medication during your visit     Sedation/analgesia is used during short medical procedures under controlled supervision. The medication will produce a strong relaxation. You will be able to hear, speak and follow instructions, but your memory and alertness will be decreased.     You will be able to swallow and breathe on your own. During sedation/analgesia your blood pressure, heart and breathing will be watched closely. After the procedure, you may not  remember what was said or done.     You may have the following effects from the medication.  \"           Drowsiness, dizziness, sleepiness or confusion.  \"           Difficulty remembering or delayed reaction times.  \"           Loss of fine muscle control or difficulty with your balance especially while walking.  \"           Difficulty focusing or blurred vision.  You may not be aware of slight changes in your behavior and/or your reaction time because of the medication used during the procedure. Therefore you should follow these instructions.  \"           Have someone responsible help you with your care.  \"           Do not drive for 24 hours.  \"           Do not operate equipment for 24 hours (lawnmowers, power tools, kitchen accessories, stove).  \"           Do not drink any alcoholic beverages for a minimum of 24 hours.  \"           Do not make important personal, legal or business decisions for 24 hours.  \"           You may experience dizziness or lightheadedness. Move slowly and carefully, do not make sudden position changes.  \"           Drink extra amounts of fluids today.  \"           Increase your diet as tolerated (unless you have received specific instructions from your doctor).  \"           If you feel nauseated, continue with liquids until the nausea is gone.  \"           Notify your physician if you have not urinated within 8 hours after the procedure.  \"           Resume your medications unless otherwise instructed.     Contact your physician if you have any questions or concerns.     IF YOU REPORT TO AN EMERGENCY ROOM, DOCTOR'S OFFICE OR HOSPITAL WITHIN 24 HOURS AFTER YOUR PROCEDURE, BRING THIS SHEET AND YOUR AFTER VISIT SUMMARY WITH YOU AND GIVE IT TO THE PHYSICIAN OR NURSE ATTENDING YOU.

## 2025-01-23 NOTE — ANESTHESIA POSTPROCEDURE EVALUATION
Department of Anesthesiology  Postprocedure Note    Patient: Es Curtis  MRN: [id number]  YOB: 1945  Date of evaluation: 1/23/2025    Procedure Summary       Date: 01/23/25 Room / Location: [facility] CATH LAB  / [facility] CARDIAC CATH LAB    Anesthesia Start: 1206 Anesthesia Stop: 1319    Procedure: Insert ICD multi Diagnosis:       Pacemaker battery depletion      (CHF (congestive heart failure), NYHA class III (HCC) [I50.9])    Providers: Chidi Gold MD Responsible Provider: Yury Silva MD    Anesthesia Type: MAC ASA Status: 4            Anesthesia Type: No value filed.    Mohsen Phase I: Mohsen Score: 10    Mohsen Phase II:      Anesthesia Post Evaluation    Patient location during evaluation: PACU  Patient participation: complete - patient participated  Level of consciousness: awake and alert  Airway patency: patent  Nausea & Vomiting: no nausea  Cardiovascular status: blood pressure returned to baseline and hemodynamically stable  Respiratory status: acceptable and spontaneous ventilation  Hydration status: euvolemic  Pain management: adequate    There were no known notable events for this encounter.

## 2025-01-23 NOTE — PROGRESS NOTES
0954 Patient admitted to 2E10  Ambulatory for BiVICD.  Patient NPO. Patient accompanied by brother and sister in law.  Vital signs obtained.   Assessment and data collection intiated.   Oriented to room.  Policies and procedures for 2E explained.   All questions answered with no further questions at this time.   Fall prevention and safety precautions discussed with patient.         0955 Care plan reviewed with patient  .  Patient  verbalize understanding of the plan of care and contribute to goal setting.       1059 Dr Santiago called, labs given to him, bun, creat, blood sugar, inr.     1205 To cath lab per bed, stable condition.     1325 Care taken over from cath lab, aquacel dressing dry and intact, sling and swathe on  . Patient reinstructed on bedrest,instructed not to lift arm above head    1330  Information booklet and temp ID given to patient and family    1500 Discharge instructions given, voices understanding.   1506 Chest xray called to Dr Santiago, states ok to resume coumadin, ok to leave.   1510 IV discontinued and IV cath lab started discontinued.   1530 Up in room, activity tolerated well.     1539 Discharged per wheelchair, stable condition.

## 2025-01-23 NOTE — H&P
[facility]  HEART CENTER (EP)  [address] [address]  H&P and SEDATION/ANALGESIA     Patient demographics:  Date:   1/23/2025  Patient name: Es Curtis  YOB: 1945  Sex: female   MRN:   [id number]    Reason for admission or planned procedure:  Generator change and LBB lead upgrade    Code Status: Full Code    Consent:I have discussed with the patient and/or the patient representative the indication, alternatives, and the possible risks and/or complications of the planned procedure and the anesthesia methods. The patient and/or patient representative appear to understand and agree to proceed.      Brief clinical summary:  Please see recent H&P for full details.     Past Medical History:  Past Medical History:   Diagnosis Date    Arrhythmia     CHRONIC ATRIAL FIB    Breast cancer (HCC) 12/10/2012    DCIS & DCH, left breast    Cancer (HCC) 2021    Squamous cell skin on top of scalp sees Dr Da Silva    CHF (congestive heart failure) ([facility])     Chronic kidney disease, stage III (moderate) ([facility]) 10/29/2018    Depression     Diabetes mellitus ([facility])     Generalized headaches     History of dizziness     History of sinus bradycardia     History of therapeutic radiation     History of valvular heart disease     Hyperlipidemia     Hypertension     Medtronic dual pacemaker 05/23/2017    MI, old     Mitral valve replaced     Numbness and tingling     Obesity     Obstructive sleep apnea on CPAP 2011    Osteopenia     SOB (shortness of breath)     HX OF:       Past Surgical History:  Past Surgical History:   Procedure Laterality Date    BREAST LUMPECTOMY Left 01/09/2013    DCIS & DCH    BREAST SURGERY Left 1/15/13    re-excision cranial margin and mammosite spacer    CARDIAC CATHETERIZATION  10/06/2003    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 MV area by recent EKG was 1 sq. cm. and mean gradient across MV was 17mmHg. MV index was about 0.7 sq. cm. per body surface area.

## 2025-01-23 NOTE — ANESTHESIA PRE PROCEDURE
Department of Anesthesiology  Preprocedure Note       Name:  Es Curtis   Age:  79 y.o.  :  1945                                          MRN:  [id number]         Date:  2025      Surgeon: Surgeon(s):  Chidi Gold MD    Procedure: Procedure(s):  Insert ICD multi    Medications prior to admission:   Prior to Admission medications    Medication Sig Start Date End Date Taking? Authorizing Provider   warfarin (COUMADIN) 1 MG tablet TAKE 1 AND 1/2 TABLETS BY MOUTH  DAILY TAKE AS DIRECTED BY [facility]  Patient taking differently: Regulated by coumadin clinic here 24  Yes Ashley Hernandez MD   empagliflozin (JARDIANCE) 25 MG tablet Take 1 tablet by mouth daily 24  Yes Ashley Hernandez MD   hydrALAZINE (APRESOLINE) 25 MG tablet Take 1 tablet by mouth 3 times daily 10/1/24  Yes Davey Rudolph MD   glimepiride (AMARYL) 2 MG tablet TAKE 2 TABLETS BY MOUTH TWICE  DAILY 24  Yes Ashley Hernandez MD   olmesartan (BENICAR) 40 MG tablet TAKE 1 TABLET BY MOUTH DAILY 9/10/24  Yes Ashley Hernandez MD   Dulaglutide (TRULICITY) 3 MG/0.5ML SOPN Inject 3 mg into the skin once a week 24  Yes Ashley Hernandez MD   bumetanide (BUMEX) 1 MG tablet Take 1 tablet by mouth daily 7/3/24  Yes Davey Rudolph MD   ezetimibe (ZETIA) 10 MG tablet Take 1 tablet by mouth daily 7/3/24  Yes Davey Rudolph MD   carvedilol (COREG) 3.125 MG tablet Take 1 tablet by mouth 2 times daily 7/3/24  Yes Davey Rudolph MD   Acetaminophen (TYLENOL PO) Take 500 mg by mouth See Admin Instructions Indications: Pain Don't take more than 3,000 mg each day.   Yes Provider, MD Luis Enrique   mometasone (ELOCON) 0.1 % cream Apply topically daily.  Patient taking differently: daily as needed Apply topically daily prn 24  Yes Ashley Hernandez MD   metroNIDAZOLE (METROCREAM) 0.75 % cream Indications: Skin Abnormalities Apply topically daily as needed 24

## 2025-02-03 ENCOUNTER — ANTI-COAG VISIT (OUTPATIENT)
Age: 80
End: 2025-02-03
Payer: MEDICARE

## 2025-02-03 ENCOUNTER — NURSE ONLY (OUTPATIENT)
Dept: CARDIOLOGY CLINIC | Age: 80
End: 2025-02-03

## 2025-02-03 DIAGNOSIS — Z95.0 PACEMAKER: Primary | ICD-10-CM

## 2025-02-03 DIAGNOSIS — I48.20 CHRONIC ATRIAL FIBRILLATION (HCC): ICD-10-CM

## 2025-02-03 DIAGNOSIS — Z95.2 H/O MITRAL VALVE REPLACEMENT: Primary | ICD-10-CM

## 2025-02-03 DIAGNOSIS — Z79.01 ANTICOAGULATED ON COUMADIN: ICD-10-CM

## 2025-02-03 DIAGNOSIS — Z51.81 ENCOUNTER FOR THERAPEUTIC DRUG MONITORING: ICD-10-CM

## 2025-02-03 LAB — POC INR: 3.5 (ref 0.8–1.2)

## 2025-02-03 PROCEDURE — 85610 PROTHROMBIN TIME: CPT | Performed by: PHARMACIST

## 2025-02-03 PROCEDURE — 99211 OFF/OP EST MAY X REQ PHY/QHP: CPT | Performed by: PHARMACIST

## 2025-02-03 NOTE — PROGRESS NOTES
Device/Site check s/p BiV pacemaker upgrade implanted on 1-23-25 with Dr. Gold.    No previous dressing noted-removed by patient. Site appears CDI without drainage; mild bruising and superficial rash related to adhesive bandage noted. Patient advised she may use Benadryl and Hydrocortisone cream only around pacer site--patient aware not to apply cream directly to pacemaker site. Site cleansed with ChloraPrep, photo obtained, and left open to air. Patient aware if at any time the incision site opens up they are recommended to present to the ED for evaluation. Patient aware to monitor for sxs of infection and to call the office for any questions or concerns.

## 2025-02-03 NOTE — PROGRESS NOTES
Medication Management Clinic  [facility]  Anticoagulation Clinic  [phone] (phone)  [phone] (fax)    Ms. Es Curtis is a 79 y.o.  female with history of Afib, Mechanical MVR who presents today for anticoagulation monitoring and adjustment.    Patient verifies current dosing regimen and tablet strength.  No missed or extra doses.  Patient denies s/s bleeding/swelling/SOB Patient has some bruising on left side from procedure. She states it is improving and lightening in color.   No blood in urine or stool.  Dietary changes. Didn't eat as much green leafy vegetables.   No changes in medication/OTC agents/Herbals.  No change in alcohol use or tobacco use.  Change in activity level. A little less due to procedure.   Patient denies falls.  No vomiting/diarrhea or acute illness. Rash around incision site. Following up with cardiology at 3pm today.   No Procedures scheduled in the future at this time.  Patient had a battery replacement 1/23 did not hold coumadin.     Assessment:   Lab Results   Component Value Date    INR 3.50 (H) 02/03/2025    INR 2.66 (H) 01/23/2025    INR 3.00 (H) 01/20/2025     INR therapeutic   Recent Labs     02/03/25  1259   INR 3.50*     Plan:  Continue Coumadin 1mg MWF 1.5mg STuTHS.  Recheck INR in 5 week(s).  Patient reminded to call the Anticoagulation Clinic with any signs or symptoms of bleeding or with any medication changes.  Patient given instructions utilizing the teach back method.    After visit summary printed and reviewed with patient.      Discharged ambulatory in no apparent distress.    For Pharmacy Admin Tracking Only    Intervention Detail:   Total # of Interventions Recommended: 0  Total # of Interventions Accepted: 0  Time Spent (min): 20

## 2025-02-04 ENCOUNTER — APPOINTMENT (OUTPATIENT)
Age: 80
End: 2025-02-04
Payer: MEDICARE

## 2025-03-10 ENCOUNTER — ANTI-COAG VISIT (OUTPATIENT)
Age: 80
End: 2025-03-10
Payer: MEDICARE

## 2025-03-10 DIAGNOSIS — Z95.2 H/O MITRAL VALVE REPLACEMENT: Primary | ICD-10-CM

## 2025-03-10 DIAGNOSIS — I48.20 CHRONIC ATRIAL FIBRILLATION (HCC): ICD-10-CM

## 2025-03-10 DIAGNOSIS — Z79.01 ANTICOAGULATED ON COUMADIN: ICD-10-CM

## 2025-03-10 DIAGNOSIS — Z51.81 ENCOUNTER FOR THERAPEUTIC DRUG MONITORING: ICD-10-CM

## 2025-03-10 LAB — POC INR: 3.3 (ref 0.8–1.2)

## 2025-03-10 PROCEDURE — 85610 PROTHROMBIN TIME: CPT

## 2025-03-10 PROCEDURE — 99211 OFF/OP EST MAY X REQ PHY/QHP: CPT

## 2025-03-10 NOTE — PROGRESS NOTES
Medication Management Clinic  [facility]  Anticoagulation Clinic  [phone] (phone)  [phone] (fax)    Ms. Es Curtis is a 79 y.o.  female with history of MVR/permanent atrial fib., per Dr. Rudolph's referral, who presents today for Warfarin monitoring and adjustment (5 week visit).    Patient verifies current dosing regimen and tablet strength.  No missed or extra doses.  Patient denies bleeding.  Has usual SOB/easy bruising, as well as usual left ankle swelling.  No blood in urine or stool.  No dietary changes. Still eating fewer greens than usual - same as last visit.  No changes in medication/OTC agents/herbals, except has used Benadryl cream for rash since pacemaker insertion.   No change in alcohol use or tobacco use.  Change in activity level: decreased, but will increase with improving weather.  Has more stress.  Patient denies falls.  No vomiting/diarrhea or acute illness.   No procedures scheduled in the future at this time.      Assessment:   Lab Results   Component Value Date    INR 3.30 (H) 03/10/2025    INR 3.50 (H) 02/03/2025    INR 2.66 (H) 01/23/2025     INR therapeutic - goal 2.5-3.5.  Recent Labs     03/10/25  1306   INR 3.30*        Plan:  POCT INR performed/result reviewed.  Continue PO Coumadin 1 mg MWF, 1.5 mg TThSS.  Recheck INR in 6 week(s).  Patient reminded to call the Anticoagulation Clinic with any signs or symptoms of bleeding or with any medication changes.  Patient given instructions utilizing the teach back method.   After visit summary printed and reviewed with patient.      Discharged ambulatory in no apparent distress.    For Pharmacy Admin Tracking Only    Time Spent (min): 20

## 2025-03-19 ENCOUNTER — HOSPITAL ENCOUNTER (OUTPATIENT)
Dept: WOMENS IMAGING | Age: 80
Discharge: HOME | End: 2025-03-19
Payer: MEDICARE

## 2025-03-19 DIAGNOSIS — Z12.31 VISIT FOR SCREENING MAMMOGRAM: ICD-10-CM

## 2025-03-19 PROCEDURE — 77063 BREAST TOMOSYNTHESIS BI: CPT

## 2025-04-21 ENCOUNTER — ANTI-COAG VISIT (OUTPATIENT)
Age: 80
End: 2025-04-21
Payer: MEDICARE

## 2025-04-21 DIAGNOSIS — Z79.01 ANTICOAGULATED ON COUMADIN: ICD-10-CM

## 2025-04-21 DIAGNOSIS — I48.20 CHRONIC ATRIAL FIBRILLATION (HCC): ICD-10-CM

## 2025-04-21 DIAGNOSIS — Z51.81 ENCOUNTER FOR THERAPEUTIC DRUG MONITORING: ICD-10-CM

## 2025-04-21 DIAGNOSIS — Z95.2 H/O MITRAL VALVE REPLACEMENT: Primary | ICD-10-CM

## 2025-04-21 LAB — POC INR: 3.5 (ref 0.8–1.2)

## 2025-04-21 PROCEDURE — 85610 PROTHROMBIN TIME: CPT

## 2025-04-21 PROCEDURE — 99211 OFF/OP EST MAY X REQ PHY/QHP: CPT

## 2025-04-21 NOTE — PROGRESS NOTES
Medication Management Clinic  [facility]  Anticoagulation Clinic  [phone] (phone)  [phone] (fax)    Ms. Es Curtis is a 79 y.o.  female with history of MVR/permanent atrial fib., per Dr. Rudolph's referral, who presents today for Warfarin monitoring and adjustment (6 week visit).    Patient verifies current dosing regimen and tablet strength.  No missed or extra doses.  Patient denies bleeding.  Has usual SOB/easy bruising.  Swelling \"not bad.\"  No blood in urine or stool.  No dietary changes.  No changes in medication/OTC agents/herbals.  No change in alcohol use or tobacco use.  Change in activity level: slightly increased - hopes to increase further as weather improves.  Patient denies falls.  No vomiting/diarrhea or acute illness.   No procedures scheduled in the future at this time.      Assessment:       INR goal: 2.5-3.5  Lab Results   Component Value Date    INR 3.50 (H) 04/21/2025    INR 3.30 (H) 03/10/2025    INR 3.50 (H) 02/03/2025     INR therapeutic   Recent Labs     04/21/25  1309   INR 3.50*      Plan:  POCT INR performed/result reviewed.  Continue PO Coumadin 1 mg MWF, 1.5 mg TThSS; today is M.  Recheck INR in 6 week(s).  Patient reminded to call the Anticoagulation Clinic with any signs or symptoms of bleeding or with any medication changes.  Patient given instructions utilizing the teach back method.     After visit summary printed and reviewed with patient.      Discharged ambulatory in no apparent distress.    For Pharmacy Admin Tracking Only    Time Spent (min): 20

## 2025-05-06 ENCOUNTER — TELEPHONE (OUTPATIENT)
Dept: CARDIOLOGY CLINIC | Age: 80
End: 2025-05-06

## 2025-05-06 ENCOUNTER — OFFICE VISIT (OUTPATIENT)
Dept: FAMILY MEDICINE CLINIC | Age: 80
End: 2025-05-06

## 2025-05-06 ENCOUNTER — CLINICAL SUPPORT (OUTPATIENT)
Dept: CARDIOLOGY CLINIC | Age: 80
End: 2025-05-06

## 2025-05-06 VITALS
HEART RATE: 64 BPM | SYSTOLIC BLOOD PRESSURE: 128 MMHG | RESPIRATION RATE: 16 BRPM | DIASTOLIC BLOOD PRESSURE: 72 MMHG | WEIGHT: 189.2 LBS | BODY MASS INDEX: 27.94 KG/M2 | TEMPERATURE: 98.4 F

## 2025-05-06 DIAGNOSIS — E78.5 HYPERLIPIDEMIA, UNSPECIFIED HYPERLIPIDEMIA TYPE: ICD-10-CM

## 2025-05-06 DIAGNOSIS — Z95.0 PACEMAKER: Primary | ICD-10-CM

## 2025-05-06 DIAGNOSIS — E11.21 TYPE 2 DIABETES MELLITUS WITH DIABETIC NEPHROPATHY, WITHOUT LONG-TERM CURRENT USE OF INSULIN (HCC): Primary | ICD-10-CM

## 2025-05-06 DIAGNOSIS — N18.32 STAGE 3B CHRONIC KIDNEY DISEASE (HCC): ICD-10-CM

## 2025-05-06 PROBLEM — N18.4 CKD (CHRONIC KIDNEY DISEASE) STAGE 4, GFR 15-29 ML/MIN (HCC): Status: RESOLVED | Noted: 2022-01-31 | Resolved: 2025-05-06

## 2025-05-06 LAB — HBA1C MFR BLD: 8.5 % (ref 4.3–5.7)

## 2025-05-06 SDOH — ECONOMIC STABILITY: FOOD INSECURITY: WITHIN THE PAST 12 MONTHS, THE FOOD YOU BOUGHT JUST DIDN'T LAST AND YOU DIDN'T HAVE MONEY TO GET MORE.: NEVER TRUE

## 2025-05-06 SDOH — ECONOMIC STABILITY: FOOD INSECURITY: WITHIN THE PAST 12 MONTHS, YOU WORRIED THAT YOUR FOOD WOULD RUN OUT BEFORE YOU GOT MONEY TO BUY MORE.: NEVER TRUE

## 2025-05-06 ASSESSMENT — PATIENT HEALTH QUESTIONNAIRE - PHQ9
SUM OF ALL RESPONSES TO PHQ QUESTIONS 1-9: 0
1. LITTLE INTEREST OR PLEASURE IN DOING THINGS: NOT AT ALL
2. FEELING DOWN, DEPRESSED OR HOPELESS: NOT AT ALL
SUM OF ALL RESPONSES TO PHQ QUESTIONS 1-9: 0

## 2025-05-06 NOTE — PROGRESS NOTES
2025      Es Curtis (:  1945) is a 79 y.o. female,Established patient, here for evaluation of the following chief complaint(s):  6 Month Follow-Up (F/u for DM and other chronic issues. A1C in office today. /Morning sugars running 170's recently. ) and Cold Extremity (Feet always feel cold. Skin on legs very itchy. )        Assessment & Plan  1. Type 2 diabetes mellitus with diabetic nephropathy, without long-term current use of insulin (HCC)  -     POCT glycosylated hemoglobin (Hb A1C)  2. Stage 3b chronic kidney disease (HCC)  3. Hyperlipidemia, unspecified hyperlipidemia type        Patient will continue Trulicity 3 mg subcu weekly along with glimepiride 4 mg twice daily and Jardiance 25 mg daily for diabetes.  Her hemoglobin A1c is increased.  We discussed a goal hemoglobin A1c of less than 8%.  Patient will work harder on diabetic diet and regular physical activity and we will recheck her A1c in 3 months.  If not under goal at that time, we will increase her dose of Trulicity.  Her diabetes is a chronic and uncontrolled condition.    Patient will continue to avoid nephrotoxic medications and maintain adequate hydration due to her CKD.  This chronic condition is stable and controlled.    Continue Zetia 10 mg daily for hyperlipidemia.  Condition is chronic and controlled.    Patient may proceed with the RSV vaccine through her pharmacy once well         Return in about 3 months (around 2025) for Follow up.    SUBJECTIVE     Es Curtis is a 79 y.o.female      Here for follow up of chronic health problems including:    Patient Active Problem List   Diagnosis    SOB (shortness of breath)    H/O mitral valve replacement    Type 2 diabetes mellitus with diabetic nephropathy (HCC)    Hyperlipidemia    Arrhythmia    History of valvular heart disease    History of sinus bradycardia    NSTEMI (non-ST elevated myocardial infarction) ([facility])    Generalized headaches    Nasal septal

## 2025-05-07 ASSESSMENT — ENCOUNTER SYMPTOMS
VOMITING: 0
EYES NEGATIVE: 1
BLOOD IN STOOL: 0
SHORTNESS OF BREATH: 0
NAUSEA: 0
DIARRHEA: 0
ABDOMINAL PAIN: 0

## 2025-06-03 ENCOUNTER — ANTI-COAG VISIT (OUTPATIENT)
Age: 80
End: 2025-06-03
Payer: MEDICARE

## 2025-06-03 DIAGNOSIS — Z79.01 ANTICOAGULATED ON COUMADIN: ICD-10-CM

## 2025-06-03 DIAGNOSIS — Z95.2 H/O MITRAL VALVE REPLACEMENT: Primary | ICD-10-CM

## 2025-06-03 DIAGNOSIS — Z51.81 ENCOUNTER FOR THERAPEUTIC DRUG MONITORING: ICD-10-CM

## 2025-06-03 DIAGNOSIS — I48.20 CHRONIC ATRIAL FIBRILLATION (HCC): ICD-10-CM

## 2025-06-03 LAB — POC INR: 3.1 (ref 0.8–1.2)

## 2025-06-03 PROCEDURE — 99211 OFF/OP EST MAY X REQ PHY/QHP: CPT

## 2025-06-03 PROCEDURE — 85610 PROTHROMBIN TIME: CPT

## 2025-06-03 NOTE — PROGRESS NOTES
Medication Management Clinic  [facility]  Anticoagulation Clinic  [phone] (phone)  [phone] (fax)    Ms. Es Curtis is a 79 y.o.  female with history of MVR/permanent atrial fib., per Dr. Rudolph's referral, who presents today for Warfarin monitoring and adjustment (6 week visit).    Patient verifies current dosing regimen and tablet strength.  No missed or extra doses.  Patient denies bleeding.  Has usual SOB/swelling of ankles (wears compression socks bilat.).  Has usual easy bruising/\"blood spots.\"  No blood in urine or stool.  No dietary changes.  No changes in medication/OTC agents/herbals.  No change in alcohol use or tobacco use.  Change in activity level: increased.  Patient denies falls.  No vomiting/diarrhea or acute illness.   No procedures scheduled in the future at this time.      Assessment:       INR goal: 2.5-3.5  Lab Results   Component Value Date    INR 3.10 (H) 06/03/2025    INR 3.50 (H) 04/21/2025    INR 3.30 (H) 03/10/2025     INR therapeutic   Recent Labs     06/03/25  1101   INR 3.10*      Plan:  POCT INR performed/result reviewed.  Continue PO Coumadin 1 mg MWF, 1.5 mg TThSS.  Recheck INR in 6 week(s).  Patient reminded to call the Anticoagulation Clinic with any signs or symptoms of bleeding or with any medication changes.  Patient given instructions utilizing the teach back method.     After visit summary printed and reviewed with patient.      Discharged ambulatory in no apparent distress.    For Pharmacy Admin Tracking Only    Time Spent (min): 15

## 2025-06-07 DIAGNOSIS — E11.21 TYPE 2 DIABETES MELLITUS WITH DIABETIC NEPHROPATHY, WITHOUT LONG-TERM CURRENT USE OF INSULIN (HCC): ICD-10-CM

## 2025-06-08 PROCEDURE — 93297 REM INTERROG DEV EVAL ICPMS: CPT | Performed by: NUCLEAR MEDICINE

## 2025-06-09 RX ORDER — DULAGLUTIDE 3 MG/.5ML
INJECTION, SOLUTION SUBCUTANEOUS
Qty: 6 ML | Refills: 3 | Status: SHIPPED | OUTPATIENT
Start: 2025-06-09

## 2025-06-09 NOTE — TELEPHONE ENCOUNTER
This medication refill is regarding a electronic request. Refill requested by patient.    Requested Prescriptions     Pending Prescriptions Disp Refills    TRULICITY 3 MG/0.5ML SOAJ [Pharmacy Med Name: Trulicity 3 MG/0.5ML Subcutaneous Solution Pen-injector] 6 mL 3     Sig: INJECT THE CONTENTS OF ONE PEN  SUBCUTANEOUSLY WEEKLY AS  DIRECTED     Date of last visit: 5/6/2025   Date of next visit: 8/5/2025  Date of last refill: 8/1/24  Pharmacy Name:  [facility] [address] [place] [address] [phone] -  [phone]     Last Lipid Panel:    Lab Results   Component Value Date/Time    CHOL 218 10/28/2024 09:45 AM    TRIG 221 10/28/2024 09:45 AM    HDL 40 10/28/2024 09:45 AM     Last CMP:   Lab Results   Component Value Date     01/23/2025    K 4.0 01/23/2025     01/23/2025    CO2 21 (L) 01/23/2025    BUN 28 (H) 01/23/2025    CREATININE 1.4 (H) 01/23/2025    GLUCOSE 179 (H) 01/23/2025    CALCIUM 9.5 01/23/2025    BILITOT 0.3 02/14/2023    ALKPHOS 87 02/14/2023    AST 15 02/14/2023    ALT 8 (L) 02/14/2023    LABGLOM 38 (A) 01/23/2025       Last Thyroid:    Lab Results   Component Value Date    TSH 2.890 07/21/2017     Last Hemoglobin A1C:    Lab Results   Component Value Date/Time    LABA1C 8.5 05/06/2025 11:29 AM    LABA1C 7.8 10/28/2024 09:45 AM       Rx verified, ordered and set to EP.

## 2025-06-23 RX ORDER — EZETIMIBE 10 MG/1
10 TABLET ORAL DAILY
Qty: 90 TABLET | Refills: 3 | Status: SHIPPED | OUTPATIENT
Start: 2025-06-23

## 2025-07-01 ENCOUNTER — TELEPHONE (OUTPATIENT)
Dept: CARDIOLOGY CLINIC | Age: 80
End: 2025-07-01

## 2025-07-01 DIAGNOSIS — Z98.890 S/P MVR (MITRAL VALVE REPAIR): Primary | ICD-10-CM

## 2025-07-01 DIAGNOSIS — I48.20 CHRONIC ATRIAL FIBRILLATION (HCC): ICD-10-CM

## 2025-07-01 NOTE — TELEPHONE ENCOUNTER
New referral placed  ----- Message from Franca Nicholas sent at 7/1/2025  8:55 AM EDT -----  Please renew annual referral for Anticoagulation Monitoring, #[id number].    Thanks, KEITH Nicholas RN BSN  Coumadin Clinic

## 2025-07-07 RX ORDER — BUMETANIDE 1 MG/1
1 TABLET ORAL DAILY
Qty: 90 TABLET | Refills: 0 | Status: SHIPPED | OUTPATIENT
Start: 2025-07-07

## 2025-07-14 ENCOUNTER — OFFICE VISIT (OUTPATIENT)
Age: 80
End: 2025-07-14
Payer: MEDICARE

## 2025-07-14 VITALS
SYSTOLIC BLOOD PRESSURE: 122 MMHG | TEMPERATURE: 98 F | OXYGEN SATURATION: 97 % | HEIGHT: 69 IN | HEART RATE: 61 BPM | WEIGHT: 191.2 LBS | BODY MASS INDEX: 28.32 KG/M2 | DIASTOLIC BLOOD PRESSURE: 72 MMHG

## 2025-07-14 DIAGNOSIS — Z79.01 ANTICOAGULATED ON COUMADIN: ICD-10-CM

## 2025-07-14 DIAGNOSIS — G47.33 OSA ON CPAP: Primary | ICD-10-CM

## 2025-07-14 DIAGNOSIS — Z91.81 HISTORY OF RECENT FALL: ICD-10-CM

## 2025-07-14 PROCEDURE — G8417 CALC BMI ABV UP PARAM F/U: HCPCS

## 2025-07-14 PROCEDURE — 1090F PRES/ABSN URINE INCON ASSESS: CPT

## 2025-07-14 PROCEDURE — 1159F MED LIST DOCD IN RCRD: CPT

## 2025-07-14 PROCEDURE — G8399 PT W/DXA RESULTS DOCUMENT: HCPCS

## 2025-07-14 PROCEDURE — 1123F ACP DISCUSS/DSCN MKR DOCD: CPT

## 2025-07-14 PROCEDURE — 1036F TOBACCO NON-USER: CPT

## 2025-07-14 PROCEDURE — G8427 DOCREV CUR MEDS BY ELIG CLIN: HCPCS

## 2025-07-14 PROCEDURE — 3074F SYST BP LT 130 MM HG: CPT

## 2025-07-14 PROCEDURE — 3078F DIAST BP <80 MM HG: CPT

## 2025-07-14 PROCEDURE — 99214 OFFICE O/P EST MOD 30 MIN: CPT

## 2025-07-14 ASSESSMENT — ENCOUNTER SYMPTOMS
SHORTNESS OF BREATH: 0
RHINORRHEA: 0
CHEST TIGHTNESS: 0
SORE THROAT: 0

## 2025-07-14 NOTE — PROGRESS NOTES
[facility]     YAZMIN HERNANDEZ, 80 y.o.   : 1945  Day of encounter: 2025   Previously seen by Dr. Kennedy, Francine Fam PA-C     Subjective   Yazmin is here for 1 year follow up for POLINA.   Yazmin presents to the sleep clinic today alone with complaints of significant mask leaking causing sleep disturbance as well as bridge of nose soreness. She also reports pressure feel too high at times, and is concerned this causing dry mouth in the AM. States she sustained a fall in her back yard last week.   SAQLI: 59  [place] Sleepiness Scale: 8  Progress History:   Since last visit any new medical issues? Fall- last week, Pacemaker battery exchange 2025  New ER or hospitlal visits? No   Any new or changes in medicines? Trulicity    Initial AHI: 40.8 per study dated 3/21/2011     Past Medical hx   PMH:  Past Medical History:   Diagnosis Date    Arrhythmia     CHRONIC ATRIAL FIB    Breast cancer (HCC) 12/10/2012    DCIS & DCH, left breast    Cancer (HCC)     Squamous cell skin on top of scalp sees Dr Da Silva    CHF (congestive heart failure) (HCC)     Chronic kidney disease, stage III (moderate) (HCC) 10/29/2018    Depression     Diabetes mellitus (HCC)     Generalized headaches     History of dizziness     History of sinus bradycardia     History of therapeutic radiation     History of valvular heart disease     Hyperlipidemia     Hypertension     Medtronic dual pacemaker 2017    MI, old     Mitral valve replaced     Numbness and tingling     Obesity     Obstructive sleep apnea on CPAP     Osteopenia     SOB (shortness of breath)     HX OF:     SURGICAL HISTORY:  Past Surgical History:   Procedure Laterality Date    BREAST LUMPECTOMY Left 2013    DCIS & DCH    BREAST SURGERY Left 1/15/13    re-excision cranial margin and mammosite spacer    CARDIAC CATHETERIZATION  10/06/2003    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 MV area by recent EKG was

## 2025-07-16 ENCOUNTER — ANTI-COAG VISIT (OUTPATIENT)
Age: 80
End: 2025-07-16
Payer: MEDICARE

## 2025-07-16 ENCOUNTER — OFFICE VISIT (OUTPATIENT)
Dept: CARDIOLOGY CLINIC | Age: 80
End: 2025-07-16
Payer: MEDICARE

## 2025-07-16 VITALS
HEIGHT: 69 IN | WEIGHT: 180 LBS | BODY MASS INDEX: 26.66 KG/M2 | SYSTOLIC BLOOD PRESSURE: 152 MMHG | HEART RATE: 80 BPM | DIASTOLIC BLOOD PRESSURE: 80 MMHG

## 2025-07-16 DIAGNOSIS — I48.20 CHRONIC ATRIAL FIBRILLATION (HCC): ICD-10-CM

## 2025-07-16 DIAGNOSIS — I10 ESSENTIAL HYPERTENSION: ICD-10-CM

## 2025-07-16 DIAGNOSIS — Z95.2 H/O MITRAL VALVE REPLACEMENT: Primary | ICD-10-CM

## 2025-07-16 DIAGNOSIS — Z79.01 ANTICOAGULATED ON COUMADIN: ICD-10-CM

## 2025-07-16 DIAGNOSIS — Z51.81 ENCOUNTER FOR THERAPEUTIC DRUG MONITORING: ICD-10-CM

## 2025-07-16 DIAGNOSIS — I42.0 DILATED CARDIOMYOPATHY (HCC): ICD-10-CM

## 2025-07-16 DIAGNOSIS — Z95.2 S/P MVR (MITRAL VALVE REPLACEMENT): Primary | ICD-10-CM

## 2025-07-16 DIAGNOSIS — I48.0 PAROXYSMAL ATRIAL FIBRILLATION (HCC): ICD-10-CM

## 2025-07-16 LAB — POC INR: 4.1 (ref 0.8–1.2)

## 2025-07-16 PROCEDURE — 1123F ACP DISCUSS/DSCN MKR DOCD: CPT | Performed by: NUCLEAR MEDICINE

## 2025-07-16 PROCEDURE — 99214 OFFICE O/P EST MOD 30 MIN: CPT | Performed by: NUCLEAR MEDICINE

## 2025-07-16 PROCEDURE — 1159F MED LIST DOCD IN RCRD: CPT | Performed by: NUCLEAR MEDICINE

## 2025-07-16 PROCEDURE — 99212 OFFICE O/P EST SF 10 MIN: CPT

## 2025-07-16 PROCEDURE — 3077F SYST BP >= 140 MM HG: CPT | Performed by: NUCLEAR MEDICINE

## 2025-07-16 PROCEDURE — G8417 CALC BMI ABV UP PARAM F/U: HCPCS | Performed by: NUCLEAR MEDICINE

## 2025-07-16 PROCEDURE — G8427 DOCREV CUR MEDS BY ELIG CLIN: HCPCS | Performed by: NUCLEAR MEDICINE

## 2025-07-16 PROCEDURE — 85610 PROTHROMBIN TIME: CPT

## 2025-07-16 PROCEDURE — G8399 PT W/DXA RESULTS DOCUMENT: HCPCS | Performed by: NUCLEAR MEDICINE

## 2025-07-16 PROCEDURE — 1090F PRES/ABSN URINE INCON ASSESS: CPT | Performed by: NUCLEAR MEDICINE

## 2025-07-16 PROCEDURE — 3079F DIAST BP 80-89 MM HG: CPT | Performed by: NUCLEAR MEDICINE

## 2025-07-16 PROCEDURE — 1036F TOBACCO NON-USER: CPT | Performed by: NUCLEAR MEDICINE

## 2025-07-16 RX ORDER — HYDRALAZINE HYDROCHLORIDE 25 MG/1
25 TABLET, FILM COATED ORAL 3 TIMES DAILY
Qty: 270 TABLET | Refills: 3 | Status: SHIPPED | OUTPATIENT
Start: 2025-07-16

## 2025-07-16 RX ORDER — BUMETANIDE 1 MG/1
1 TABLET ORAL DAILY
Qty: 90 TABLET | Refills: 3 | Status: SHIPPED | OUTPATIENT
Start: 2025-07-16

## 2025-07-16 RX ORDER — EZETIMIBE 10 MG/1
10 TABLET ORAL DAILY
Qty: 90 TABLET | Refills: 3 | Status: SHIPPED | OUTPATIENT
Start: 2025-07-16

## 2025-07-16 RX ORDER — CARVEDILOL 3.12 MG/1
3.12 TABLET ORAL 2 TIMES DAILY
Qty: 180 TABLET | Refills: 3 | Status: SHIPPED | OUTPATIENT
Start: 2025-07-16

## 2025-07-16 NOTE — PROGRESS NOTES
Patient here for follow up.  Patient complains of lightheadedness.   Patient states she fall in backyard on Friday while watering flowers.   Patient doesn't know if she tripped or if she was lightheaded.   EMS was called.  Patient didn't have to come to ER.

## 2025-07-16 NOTE — PROGRESS NOTES
[facility] [place] [facility]  [facility] CARDIOLOGY  [address]  [address]  [place] [address]  Dept: [phone]  Dept Fax: [phone]  Loc: [phone]    Visit Date: 7/16/2025    Es Curtis is a 80 y.o. 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, left breast    Cancer (HCC) 2021    Squamous cell skin on top of scalp sees Dr Da Silva    CHF (congestive heart failure) (HCC)     Chronic kidney disease, stage III (moderate) ([facility]) 10/29/2018    Depression     Diabetes mellitus (HCC)     Generalized headaches     History of dizziness     History of sinus bradycardia     History of therapeutic radiation     History of valvular heart disease     Hyperlipidemia     Hypertension     Medtronic dual pacemaker 05/23/2017    MI, old     Mitral valve replaced     Numbness and tingling     Obesity     Obstructive sleep apnea on CPAP 2011    Osteopenia     SOB (shortness of breath)     HX OF:      Past Surgical History:   Procedure Laterality Date    BREAST LUMPECTOMY Left 01/09/2013    DCIS & DCH    BREAST SURGERY Left 1/15/13    re-excision cranial margin and mammosite spacer    CARDIAC CATHETERIZATION  10/06/2003    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 MV area by recent EKG was 1 sq. cm. and mean gradient across MV was 17mmHg. MV index was about 0.7 sq. cm. per body surface area. Moderate pulmonary artery systolic HTN. Patent coronary arteries.    CARDIAC VALVE REPLACEMENT  2003    MVR  33mm [place] Mechanical valve

## 2025-07-16 NOTE — PROGRESS NOTES
Medication Management Clinic  [facility]  Anticoagulation Clinic  [phone] (phone)  [phone] (fax)    Ms. Es Curtis is a 80 y.o.  female with history of MVR/chronic atrial fib., per Dr. Rudolph's referral, who presents today for Warfarin monitoring and adjustment (6 week visit - at least 20 minutes early for visit).    Patient verifies current dosing regimen and tablet strength.  No missed or extra doses.  Patient denies bleeding.  SOB worsening (sees cardiologist next). Swelling of lower legs \"not bad.\"  Wears knee-high compression socks bilat.  No blood in urine or stool.  No dietary changes.  Weight stable.  No changes in medication/OTC agents/herbals, except was taking more Tylenol than usual 7/11 through 7/14 - none since.  No change in alcohol use or tobacco use.  Change in activity level: decreased since fall, but did a little more yesterday.  Fell in yard 7/11 - doesn't think she hit her head. Fell onto right side - has bruising of shoulder/hip, she states. EMS came, but did not transport. Reminded to mention fall to cardiology office - pacemaker should be checked after a fall. States brother brought her today.  Did drive herself to appt. yesterday. She may have trouble getting to appts.  She asked about having done at [facility].  Advised her that doctor would have to take over Coumadin management if done there.   Has more stress.  No vomiting/diarrhea or acute illness.   No procedures scheduled in the future at this time.      Assessment:       INR goal: 2.5-3.5  Lab Results   Component Value Date    INR 4.10 (H) 07/16/2025    INR 3.10 (H) 06/03/2025    INR 3.50 (H) 04/21/2025     INR supratherapeutic   Recent Labs     07/16/25  1022   INR 4.10*      Plan:  POCT INR performed/result reviewed.  Hold today, W, then continue PO Coumadin 1 mg MWF, 1.5 mg TThSS.  Recheck INR in ~2 week(s). (Report given - orders entered by KEITH Anderson [facility] PharmD.)  Patient reminded to call the

## 2025-07-24 ENCOUNTER — TELEPHONE (OUTPATIENT)
Dept: CARDIOLOGY CLINIC | Age: 80
End: 2025-07-24

## 2025-07-24 NOTE — TELEPHONE ENCOUNTER
[employer] message:    Es Curtis to [facility] Clinical Staff (supporting Davey Rudolph MD) (Selected Message)        7/24/25 10:33 AM  I was taken off Hydralazine on 7/16/25 by Dr Tarango.  These are my reading since then.     7/17  134/57  68;  7/18  137/60   82;   7/19   157/46   69;  2nd reading later in day  119/55   78;  7/20    142/60   76;   7/21  151/60    70;  7/22   155/63   68;   7/23    162/71   64;   7/24  141/59   78                                       My birth date 1945.   Please let me know if I should not take this medication.   Thank you      Please advise

## 2025-07-25 NOTE — TELEPHONE ENCOUNTER
[facility] to [facility] (supporting Davey Rudolph MD)        7/25/25 12:59 PM  I think dizziness seems a little better.  I will continue to moniter BP.  Thank you  Scarlet Toussaint, LIO to Es Curtis        7/25/25  1:02 PM  Okay, great!  Let us know if anything changes.     Thank you     This [employer] message has not been read.

## 2025-07-28 ENCOUNTER — TELEPHONE (OUTPATIENT)
Dept: PULMONOLOGY | Age: 80
End: 2025-07-28

## 2025-07-28 NOTE — TELEPHONE ENCOUNTER
Patient called in today stating you wanted her to report a 2 week download after a pressure change. She also reported she had trouble using the new mask you have given her at the last appointment and is back to using her old mask. Two week download scanned into media, please advise.

## 2025-07-29 ENCOUNTER — ANTI-COAG VISIT (OUTPATIENT)
Age: 80
End: 2025-07-29
Payer: MEDICARE

## 2025-07-29 DIAGNOSIS — Z51.81 ENCOUNTER FOR THERAPEUTIC DRUG MONITORING: ICD-10-CM

## 2025-07-29 DIAGNOSIS — Z79.01 ANTICOAGULATED ON COUMADIN: ICD-10-CM

## 2025-07-29 DIAGNOSIS — I48.20 CHRONIC ATRIAL FIBRILLATION (HCC): ICD-10-CM

## 2025-07-29 DIAGNOSIS — Z95.2 H/O MITRAL VALVE REPLACEMENT: Primary | ICD-10-CM

## 2025-07-29 LAB — POC INR: 2.4 (ref 0.8–1.2)

## 2025-07-29 PROCEDURE — 99211 OFF/OP EST MAY X REQ PHY/QHP: CPT | Performed by: PHARMACIST

## 2025-07-29 PROCEDURE — 85610 PROTHROMBIN TIME: CPT | Performed by: PHARMACIST

## 2025-07-29 NOTE — PROGRESS NOTES
Medication Management Clinic  [facility]  Anticoagulation Clinic  [phone] (phone)  [phone] (fax)    Ms. Es Curtis is a 80 y.o.  female with history of Afib, Mechanical MVR who presents today for anticoagulation monitoring and adjustment.    Patient verifies current dosing regimen and tablet strength.  No missed or extra doses.  Patient denies s/s bleeding/bruising/swelling +typical SOB  No blood in urine or stool.  No dietary changes.  No changes in medication/OTC agents/Herbals.  No change in alcohol use or tobacco use.  No change in activity level.  Patient denies falls.  No vomiting/diarrhea or acute illness.   No Procedures scheduled in the future at this time.      Assessment:   Lab Results   Component Value Date    INR 2.40 (H) 07/29/2025    INR 4.10 (H) 07/16/2025    INR 3.10 (H) 06/03/2025     INR subtherapeutic   Recent Labs     07/29/25  1407   INR 2.40*         INR goal: 2.5-3.5    Plan:  Continue Coumadin 1mg MWF and 1.5mg TThSaS.  Recommended a boosted dose today of 2mg, patient did not want to do this. Recheck INR in 3 week(s).  Patient reminded to call the Anticoagulation Clinic with any signs or symptoms of bleeding or with any medication changes.  Patient given instructions utilizing the teach back method.      After visit summary printed and reviewed with patient.      Discharged ambulatory in no apparent distress.    Paola Anderson, TanD, BCPS, CTTS     For Pharmacy Admin Tracking Only    Intervention Detail: Dose Adjustment: 1, reason: Therapy Optimization  Total # of Interventions Recommended: 1  Total # of Interventions Accepted: 0  Time Spent (min): 20

## 2025-07-30 NOTE — TELEPHONE ENCOUNTER
2 week download reviewed. Leaks and compliance have improved. Will continue with current setting at this time. No new orders.    Electronically signed by Ashley Hernandez PA-C on 7/30/2025 at 11:28 AM

## 2025-08-05 ENCOUNTER — OFFICE VISIT (OUTPATIENT)
Dept: FAMILY MEDICINE CLINIC | Age: 80
End: 2025-08-05
Payer: MEDICARE

## 2025-08-05 VITALS
RESPIRATION RATE: 16 BRPM | WEIGHT: 190.2 LBS | SYSTOLIC BLOOD PRESSURE: 138 MMHG | DIASTOLIC BLOOD PRESSURE: 70 MMHG | TEMPERATURE: 97.1 F | HEART RATE: 72 BPM | BODY MASS INDEX: 28.09 KG/M2

## 2025-08-05 DIAGNOSIS — I10 ESSENTIAL HYPERTENSION: ICD-10-CM

## 2025-08-05 DIAGNOSIS — E11.21 TYPE 2 DIABETES MELLITUS WITH DIABETIC NEPHROPATHY, WITHOUT LONG-TERM CURRENT USE OF INSULIN (HCC): Primary | ICD-10-CM

## 2025-08-05 LAB — HBA1C MFR BLD: 7.4 %

## 2025-08-05 PROCEDURE — 3078F DIAST BP <80 MM HG: CPT | Performed by: FAMILY MEDICINE

## 2025-08-05 PROCEDURE — 1036F TOBACCO NON-USER: CPT | Performed by: FAMILY MEDICINE

## 2025-08-05 PROCEDURE — 83036 HEMOGLOBIN GLYCOSYLATED A1C: CPT | Performed by: FAMILY MEDICINE

## 2025-08-05 PROCEDURE — 1160F RVW MEDS BY RX/DR IN RCRD: CPT | Performed by: FAMILY MEDICINE

## 2025-08-05 PROCEDURE — G2211 COMPLEX E/M VISIT ADD ON: HCPCS | Performed by: FAMILY MEDICINE

## 2025-08-05 PROCEDURE — G8417 CALC BMI ABV UP PARAM F/U: HCPCS | Performed by: FAMILY MEDICINE

## 2025-08-05 PROCEDURE — 3075F SYST BP GE 130 - 139MM HG: CPT | Performed by: FAMILY MEDICINE

## 2025-08-05 PROCEDURE — 1159F MED LIST DOCD IN RCRD: CPT | Performed by: FAMILY MEDICINE

## 2025-08-05 PROCEDURE — G8427 DOCREV CUR MEDS BY ELIG CLIN: HCPCS | Performed by: FAMILY MEDICINE

## 2025-08-05 PROCEDURE — 99214 OFFICE O/P EST MOD 30 MIN: CPT | Performed by: FAMILY MEDICINE

## 2025-08-05 PROCEDURE — 3051F HG A1C>EQUAL 7.0%<8.0%: CPT | Performed by: FAMILY MEDICINE

## 2025-08-05 PROCEDURE — 1123F ACP DISCUSS/DSCN MKR DOCD: CPT | Performed by: FAMILY MEDICINE

## 2025-08-05 PROCEDURE — 1090F PRES/ABSN URINE INCON ASSESS: CPT | Performed by: FAMILY MEDICINE

## 2025-08-05 PROCEDURE — G8399 PT W/DXA RESULTS DOCUMENT: HCPCS | Performed by: FAMILY MEDICINE

## 2025-08-05 RX ORDER — GLIMEPIRIDE 2 MG/1
TABLET ORAL
Qty: 360 TABLET | Refills: 3 | Status: SHIPPED | OUTPATIENT
Start: 2025-08-05

## 2025-08-05 RX ORDER — OLMESARTAN MEDOXOMIL 40 MG/1
40 TABLET ORAL DAILY
Qty: 90 TABLET | Refills: 3 | Status: SHIPPED | OUTPATIENT
Start: 2025-08-05

## 2025-08-05 ASSESSMENT — ENCOUNTER SYMPTOMS
NAUSEA: 0
ABDOMINAL PAIN: 0
EYES NEGATIVE: 1
VOMITING: 0
BLOOD IN STOOL: 0
DIARRHEA: 0
SHORTNESS OF BREATH: 0

## 2025-08-19 ENCOUNTER — ANTI-COAG VISIT (OUTPATIENT)
Age: 80
End: 2025-08-19
Payer: MEDICARE

## 2025-08-19 DIAGNOSIS — I48.20 CHRONIC ATRIAL FIBRILLATION (HCC): ICD-10-CM

## 2025-08-19 DIAGNOSIS — Z51.81 ENCOUNTER FOR THERAPEUTIC DRUG MONITORING: ICD-10-CM

## 2025-08-19 DIAGNOSIS — Z79.01 ANTICOAGULATED ON COUMADIN: ICD-10-CM

## 2025-08-19 DIAGNOSIS — Z95.2 H/O MITRAL VALVE REPLACEMENT: Primary | ICD-10-CM

## 2025-08-19 LAB — POC INR: 2.7 (ref 0.8–1.2)

## 2025-08-19 PROCEDURE — 85610 PROTHROMBIN TIME: CPT

## 2025-08-19 PROCEDURE — 99211 OFF/OP EST MAY X REQ PHY/QHP: CPT

## 2025-09-02 ENCOUNTER — HOSPITAL ENCOUNTER (EMERGENCY)
Age: 80
Discharge: HOME | End: 2025-09-02
Attending: EMERGENCY MEDICINE
Payer: MEDICARE

## 2025-09-02 ENCOUNTER — APPOINTMENT (OUTPATIENT)
Dept: GENERAL RADIOLOGY | Age: 80
End: 2025-09-02
Payer: MEDICARE

## 2025-09-02 ENCOUNTER — APPOINTMENT (OUTPATIENT)
Dept: CT IMAGING | Age: 80
End: 2025-09-02
Payer: MEDICARE

## 2025-09-02 VITALS
WEIGHT: 188 LBS | RESPIRATION RATE: 14 BRPM | HEART RATE: 60 BPM | DIASTOLIC BLOOD PRESSURE: 55 MMHG | TEMPERATURE: 97.6 F | SYSTOLIC BLOOD PRESSURE: 140 MMHG | BODY MASS INDEX: 27.76 KG/M2 | OXYGEN SATURATION: 98 %

## 2025-09-02 DIAGNOSIS — M54.6 ACUTE THORACIC BACK PAIN, UNSPECIFIED BACK PAIN LATERALITY: ICD-10-CM

## 2025-09-02 DIAGNOSIS — R42 LIGHTHEADEDNESS: Primary | ICD-10-CM

## 2025-09-02 LAB
ALBUMIN SERPL BCG-MCNC: 4.3 G/DL (ref 3.4–4.9)
ALP SERPL-CCNC: 87 U/L (ref 38–126)
ALT SERPL W/O P-5'-P-CCNC: 9 U/L (ref 10–35)
ANION GAP SERPL CALC-SCNC: 15 MEQ/L (ref 8–16)
AST SERPL-CCNC: 26 U/L (ref 10–35)
BASOPHILS ABSOLUTE: 0 THOU/MM3 (ref 0–0.1)
BASOPHILS NFR BLD AUTO: 0.5 %
BILIRUB SERPL-MCNC: 0.6 MG/DL (ref 0.3–1.2)
BUN SERPL-MCNC: 33 MG/DL (ref 8–23)
CALCIUM SERPL-MCNC: 10 MG/DL (ref 8.5–10.5)
CHLORIDE SERPL-SCNC: 103 MEQ/L (ref 98–111)
CO2 SERPL-SCNC: 22 MEQ/L (ref 22–29)
CREAT SERPL-MCNC: 1.8 MG/DL (ref 0.5–0.9)
DEPRECATED RDW RBC AUTO: 47.4 FL (ref 35–45)
EOSINOPHIL NFR BLD AUTO: 1.7 %
EOSINOPHILS ABSOLUTE: 0.1 THOU/MM3 (ref 0–0.4)
ERYTHROCYTE [DISTWIDTH] IN BLOOD BY AUTOMATED COUNT: 13.7 % (ref 11.5–14.5)
GFR SERPL CREATININE-BSD FRML MDRD: 28 ML/MIN/1.73M2
GLUCOSE SERPL-MCNC: 224 MG/DL (ref 74–109)
HCT VFR BLD AUTO: 47 % (ref 37–47)
HGB BLD-MCNC: 15.2 GM/DL (ref 12–16)
IMM GRANULOCYTES # BLD AUTO: 0.03 THOU/MM3 (ref 0–0.07)
IMM GRANULOCYTES NFR BLD AUTO: 0.4 %
INR PPP: 2.55 (ref 0.85–1.13)
LYMPHOCYTES ABSOLUTE: 0.9 THOU/MM3 (ref 1–4.8)
LYMPHOCYTES NFR BLD AUTO: 11.4 %
MAGNESIUM SERPL-MCNC: 2.4 MG/DL (ref 1.6–2.6)
MCH RBC QN AUTO: 30.5 PG (ref 26–33)
MCHC RBC AUTO-ENTMCNC: 32.3 GM/DL (ref 32.2–35.5)
MCV RBC AUTO: 94.2 FL (ref 81–99)
MONOCYTES ABSOLUTE: 0.5 THOU/MM3 (ref 0.4–1.3)
MONOCYTES NFR BLD AUTO: 5.7 %
NEUTROPHILS ABSOLUTE: 6.5 THOU/MM3 (ref 1.8–7.7)
NEUTROPHILS NFR BLD AUTO: 80.3 %
NRBC BLD AUTO-RTO: 0 /100 WBC
NT-PROBNP SERPL IA-MCNC: 1398 PG/ML (ref 0–449)
OSMOLALITY SERPL CALC.SUM OF ELEC: 293.6 MOSMOL/KG (ref 275–300)
PLATELET # BLD AUTO: 186 THOU/MM3 (ref 130–400)
PMV BLD AUTO: 11.3 FL (ref 9.4–12.4)
POTASSIUM SERPL-SCNC: 4.1 MEQ/L (ref 3.5–5.2)
PROT SERPL-MCNC: 7.2 G/DL (ref 6.4–8.3)
PROTHROMBIN TIME: 29.6 SECONDS (ref 10–13.5)
RBC # BLD AUTO: 4.99 MILL/MM3 (ref 4.2–5.4)
SODIUM SERPL-SCNC: 140 MEQ/L (ref 135–145)
TROPONIN, HIGH SENSITIVITY: 21 NG/L (ref 0–12)
TROPONIN, HIGH SENSITIVITY: 21 NG/L (ref 0–12)
TSH SERPL DL<=0.05 MIU/L-ACNC: 3.67 UIU/ML (ref 0.27–4.2)
WBC # BLD AUTO: 8.1 THOU/MM3 (ref 4.8–10.8)

## 2025-09-02 PROCEDURE — 85610 PROTHROMBIN TIME: CPT

## 2025-09-02 PROCEDURE — 80053 COMPREHEN METABOLIC PANEL: CPT

## 2025-09-02 PROCEDURE — 6370000000 HC RX 637 (ALT 250 FOR IP)

## 2025-09-02 PROCEDURE — 96360 HYDRATION IV INFUSION INIT: CPT

## 2025-09-02 PROCEDURE — 70450 CT HEAD/BRAIN W/O DYE: CPT

## 2025-09-02 PROCEDURE — 96361 HYDRATE IV INFUSION ADD-ON: CPT

## 2025-09-02 PROCEDURE — 84484 ASSAY OF TROPONIN QUANT: CPT

## 2025-09-02 PROCEDURE — 36415 COLL VENOUS BLD VENIPUNCTURE: CPT

## 2025-09-02 PROCEDURE — 83735 ASSAY OF MAGNESIUM: CPT

## 2025-09-02 PROCEDURE — 2580000003 HC RX 258

## 2025-09-02 PROCEDURE — 71250 CT THORAX DX C-: CPT

## 2025-09-02 PROCEDURE — 99284 EMERGENCY DEPT VISIT MOD MDM: CPT

## 2025-09-02 PROCEDURE — 84443 ASSAY THYROID STIM HORMONE: CPT

## 2025-09-02 PROCEDURE — 76376 3D RENDER W/INTRP POSTPROCES: CPT

## 2025-09-02 PROCEDURE — 85025 COMPLETE CBC W/AUTO DIFF WBC: CPT

## 2025-09-02 PROCEDURE — 71045 X-RAY EXAM CHEST 1 VIEW: CPT

## 2025-09-02 PROCEDURE — 83880 ASSAY OF NATRIURETIC PEPTIDE: CPT

## 2025-09-02 RX ORDER — 0.9 % SODIUM CHLORIDE 0.9 %
500 INTRAVENOUS SOLUTION INTRAVENOUS ONCE
Status: COMPLETED | OUTPATIENT
Start: 2025-09-02 | End: 2025-09-02

## 2025-09-02 RX ORDER — IOPAMIDOL 755 MG/ML
80 INJECTION, SOLUTION INTRAVASCULAR
Status: DISCONTINUED | OUTPATIENT
Start: 2025-09-02 | End: 2025-09-03 | Stop reason: HOSPADM

## 2025-09-02 RX ORDER — TRAMADOL HYDROCHLORIDE 50 MG/1
50 TABLET ORAL EVERY 8 HOURS PRN
Qty: 9 TABLET | Refills: 0 | Status: SHIPPED | OUTPATIENT
Start: 2025-09-02 | End: 2025-09-05

## 2025-09-02 RX ORDER — TRAMADOL HYDROCHLORIDE 50 MG/1
50 TABLET ORAL ONCE
Status: COMPLETED | OUTPATIENT
Start: 2025-09-02 | End: 2025-09-02

## 2025-09-02 RX ORDER — 0.9 % SODIUM CHLORIDE 0.9 %
1000 INTRAVENOUS SOLUTION INTRAVENOUS ONCE
Status: COMPLETED | OUTPATIENT
Start: 2025-09-02 | End: 2025-09-02

## 2025-09-02 RX ADMIN — SODIUM CHLORIDE 500 ML: 0.9 INJECTION, SOLUTION INTRAVENOUS at 21:27

## 2025-09-02 RX ADMIN — SODIUM CHLORIDE 1000 ML: 0.9 INJECTION, SOLUTION INTRAVENOUS at 17:49

## 2025-09-02 RX ADMIN — TRAMADOL HYDROCHLORIDE 50 MG: 50 TABLET, COATED ORAL at 22:44

## 2025-09-02 ASSESSMENT — PAIN - FUNCTIONAL ASSESSMENT: PAIN_FUNCTIONAL_ASSESSMENT: 0-10

## 2025-09-02 ASSESSMENT — PAIN SCALES - GENERAL: PAINLEVEL_OUTOF10: 0

## 2025-09-03 ENCOUNTER — CARE COORDINATION (OUTPATIENT)
Dept: CARE COORDINATION | Age: 80
End: 2025-09-03

## 2025-09-05 ENCOUNTER — HOSPITAL ENCOUNTER (OUTPATIENT)
Dept: GENERAL RADIOLOGY | Age: 80
Discharge: HOME | End: 2025-09-05
Payer: MEDICARE

## 2025-09-05 DIAGNOSIS — N28.1 RENAL CYST: ICD-10-CM

## 2025-09-05 DIAGNOSIS — I10 HTN (HYPERTENSION), BENIGN: ICD-10-CM

## 2025-09-05 DIAGNOSIS — N18.32 STAGE 3B CHRONIC KIDNEY DISEASE (HCC): ICD-10-CM

## 2025-09-05 LAB
ALBUMIN SERPL BCG-MCNC: 4.1 G/DL (ref 3.4–4.9)
ANION GAP SERPL CALC-SCNC: 10 MEQ/L (ref 8–16)
BACTERIA: ABNORMAL
BILIRUB UR QL STRIP: NEGATIVE
BUN SERPL-MCNC: 23 MG/DL (ref 8–23)
CALCIUM SERPL-MCNC: 9.6 MG/DL (ref 8.5–10.5)
CASTS #/AREA URNS LPF: ABNORMAL /LPF
CASTS #/AREA URNS LPF: ABNORMAL /LPF
CHARACTER UR: ABNORMAL
CHARCOAL URNS QL MICRO: ABNORMAL
CHLORIDE SERPL-SCNC: 105 MEQ/L (ref 98–111)
CO2 SERPL-SCNC: 25 MEQ/L (ref 22–29)
COLOR UR: YELLOW
CREAT SERPL-MCNC: 1.6 MG/DL (ref 0.5–0.9)
CREAT UR-MCNC: 63 MG/DL
CREAT UR-MCNC: 87.7 MG/DL
CRYSTALS URNS QL MICRO: ABNORMAL
EPITHELIAL CELLS, UA: ABNORMAL /HPF
GFR SERPL CREATININE-BSD FRML MDRD: 32 ML/MIN/1.73M2
GLUCOSE SERPL-MCNC: 142 MG/DL (ref 74–109)
GLUCOSE UR QL STRIP.AUTO: >= 1000 MG/DL
HGB UR QL STRIP.AUTO: NEGATIVE
KETONES UR QL STRIP.AUTO: NEGATIVE
LEUKOCYTE ESTERASE UR QL STRIP.AUTO: ABNORMAL
MICROALBUMIN UR-MCNC: < 12 MG/DL
MICROALBUMIN/CREAT RATIO PNL UR: 190 MG/G (ref 0–30)
NITRITE UR QL STRIP.AUTO: NEGATIVE
PH UR STRIP.AUTO: 6 [PH] (ref 5–9)
PHOSPHATE SERPL-MCNC: 2.9 MG/DL (ref 2.5–4.5)
POTASSIUM SERPL-SCNC: 4.2 MEQ/L (ref 3.5–5.2)
PROT UR STRIP.AUTO-MCNC: NEGATIVE MG/DL
PROT UR-MCNC: 21.3 MG/DL
PROT/CREAT 24H UR: 0.24 MG/G{CREAT}
RBC #/AREA URNS HPF: ABNORMAL /HPF
RENAL EPI CELLS #/AREA URNS HPF: ABNORMAL /[HPF]
SODIUM SERPL-SCNC: 140 MEQ/L (ref 135–145)
SPECIFIC GRAVITY UA: 1.03 (ref 1–1.03)
UROBILINOGEN, URINE: 0.2 EU/DL (ref 0–1)
WBC #/AREA URNS HPF: ABNORMAL /HPF
YEAST LIKE FUNGI URNS QL MICRO: ABNORMAL

## 2025-09-05 PROCEDURE — 82043 UR ALBUMIN QUANTITATIVE: CPT

## 2025-09-05 PROCEDURE — 80069 RENAL FUNCTION PANEL: CPT

## 2025-09-05 PROCEDURE — 81001 URINALYSIS AUTO W/SCOPE: CPT

## 2025-09-05 PROCEDURE — 82570 ASSAY OF URINE CREATININE: CPT

## 2025-09-05 PROCEDURE — 36415 COLL VENOUS BLD VENIPUNCTURE: CPT

## 2025-09-05 PROCEDURE — 84156 ASSAY OF PROTEIN URINE: CPT

## (undated) DEVICE — SOLUTION IV 1000ML 0.45% SOD CHL PH 5 INJ USP VIAFLX PLAS

## (undated) DEVICE — IV START KIT: Brand: MEDLINE INDUSTRIES, INC.

## (undated) DEVICE — SET ADMIN 25ML L117IN PMP MOD CK VLV RLER CLMP 2 SMRTSITE

## (undated) DEVICE — SNARE POLYP SM W13MMXL240CM SHTH DIA2.4MM OVL FLX DISP

## (undated) DEVICE — C315HIS02 OD7FR ID5.4FR L40CM C315

## (undated) DEVICE — CONMED SCOPE SAVER BITE BLOCK, 20X27 MM: Brand: SCOPE SAVER

## (undated) DEVICE — FORCEPS BX L240CM JAW DIA3.2MM L CAP W/ NDL MIC MESH TOOTH

## (undated) DEVICE — 4F (1.0MM ID) X 9CM STIFF4F (1.0 MICRO-STICK®INTRODUCER SE WITH NITINOL GUIDEWIREWITH NITIN WITH RADIOPAQUE TIPWITH RADIOPAQ: Brand: MICRO-STICK SETMICRO-STICK SET

## (undated) DEVICE — SUREFIT, DUAL DISPERSIVE ELECTRODE, CONTACT QUALITY MONITOR: Brand: SUREFIT

## (undated) DEVICE — CLEANER,CAUTERY TIP,2X2",STERILE: Brand: MEDLINE

## (undated) DEVICE — HIWIRE HYDROPHILIC WIRE GUIDE: Brand: HIWIRE

## (undated) DEVICE — FORCEP RAD JAW W/NEEDLE 160CM

## (undated) DEVICE — CATHETER GUID L45CM DIA3MM L COR MP BEND 2 INTEGR HEMSTAS

## (undated) DEVICE — SET ADMIN PRIMING 7ML L30IN 7.35LB 20 GTT 2ND RLER CLMP

## (undated) DEVICE — TUBING IV STOPCOCK 48 CM 3 W

## (undated) DEVICE — DRESSING HYDROFIBER AQUACEL AG ADVANT 3.5X4 IN

## (undated) DEVICE — SLITTER LD GUID ADJ DISP

## (undated) DEVICE — DECANTER BAG 9": Brand: MEDLINE INDUSTRIES, INC.

## (undated) DEVICE — FORCEPS BX L L240CM DIA2.4MM RAD JAW 4 HOT FOR POLYP DISP

## (undated) DEVICE — ENDO KIT: Brand: MEDLINE INDUSTRIES, INC.

## (undated) DEVICE — Device: Brand: JELCO

## (undated) DEVICE — TRAP POLYP ETRAP